# Patient Record
Sex: FEMALE | Race: WHITE | NOT HISPANIC OR LATINO | Employment: PART TIME | URBAN - NONMETROPOLITAN AREA
[De-identification: names, ages, dates, MRNs, and addresses within clinical notes are randomized per-mention and may not be internally consistent; named-entity substitution may affect disease eponyms.]

---

## 2017-02-14 ENCOUNTER — COMMUNICATION - GICH (OUTPATIENT)
Dept: FAMILY MEDICINE | Facility: OTHER | Age: 32
End: 2017-02-14

## 2017-02-14 DIAGNOSIS — L70.0 ACNE VULGARIS: ICD-10-CM

## 2017-06-03 ENCOUNTER — OFFICE VISIT - GICH (OUTPATIENT)
Dept: FAMILY MEDICINE | Facility: OTHER | Age: 32
End: 2017-06-03

## 2017-06-03 ENCOUNTER — HISTORY (OUTPATIENT)
Dept: FAMILY MEDICINE | Facility: OTHER | Age: 32
End: 2017-06-03

## 2017-06-03 DIAGNOSIS — H92.03 OTALGIA OF BOTH EARS: ICD-10-CM

## 2017-06-03 DIAGNOSIS — H65.03 ACUTE SEROUS OTITIS MEDIA OF BOTH EARS: ICD-10-CM

## 2017-06-26 ENCOUNTER — AMBULATORY - GICH (OUTPATIENT)
Dept: FAMILY MEDICINE | Facility: OTHER | Age: 32
End: 2017-06-26

## 2017-06-26 DIAGNOSIS — F32.9 MAJOR DEPRESSIVE DISORDER, SINGLE EPISODE: ICD-10-CM

## 2017-06-26 DIAGNOSIS — F41.1 GENERALIZED ANXIETY DISORDER: ICD-10-CM

## 2017-06-26 DIAGNOSIS — F41.9 ANXIETY DISORDER: ICD-10-CM

## 2017-08-02 ENCOUNTER — HISTORY (OUTPATIENT)
Dept: FAMILY MEDICINE | Facility: OTHER | Age: 32
End: 2017-08-02

## 2017-08-02 ENCOUNTER — OFFICE VISIT - GICH (OUTPATIENT)
Dept: FAMILY MEDICINE | Facility: OTHER | Age: 32
End: 2017-08-02

## 2017-08-02 DIAGNOSIS — Z83.3 FAMILY HISTORY OF DIABETES MELLITUS: ICD-10-CM

## 2017-08-02 DIAGNOSIS — F41.9 ANXIETY DISORDER: ICD-10-CM

## 2017-08-02 DIAGNOSIS — L70.0 ACNE VULGARIS: ICD-10-CM

## 2017-08-02 DIAGNOSIS — J45.20 MILD INTERMITTENT ASTHMA, UNCOMPLICATED: ICD-10-CM

## 2017-08-02 DIAGNOSIS — Z30.49 ENCOUNTER FOR SURVEILLANCE OF OTHER CONTRACEPTIVES: ICD-10-CM

## 2017-08-02 DIAGNOSIS — F32.9 MAJOR DEPRESSIVE DISORDER, SINGLE EPISODE: ICD-10-CM

## 2017-08-02 DIAGNOSIS — G43.909 MIGRAINE WITHOUT STATUS MIGRAINOSUS, NOT INTRACTABLE: ICD-10-CM

## 2017-08-02 LAB
ALT (SGPT) - HISTORICAL: 16 IU/L (ref 7–52)
ANION GAP - HISTORICAL: 10 (ref 5–18)
AST SERPL-CCNC: 13 IU/L (ref 13–39)
BUN SERPL-MCNC: 12 MG/DL (ref 7–25)
BUN/CREAT RATIO - HISTORICAL: 13
CALCIUM SERPL-MCNC: 9.2 MG/DL (ref 8.6–10.3)
CHLORIDE SERPLBLD-SCNC: 105 MMOL/L (ref 98–107)
CO2 SERPL-SCNC: 23 MMOL/L (ref 21–31)
CREAT SERPL-MCNC: 0.93 MG/DL (ref 0.7–1.3)
ESTIMATED AVERAGE GLUCOSE: 103 MG/DL
GFR IF NOT AFRICAN AMERICAN - HISTORICAL: >60 ML/MIN/1.73M2
GLUCOSE SERPL-MCNC: 88 MG/DL (ref 70–105)
HEMOGLOBIN A1C MONITORING (POCT) - HISTORICAL: 5.2 % (ref 4–6.2)
POTASSIUM SERPL-SCNC: 4.1 MMOL/L (ref 3.5–5.1)
SODIUM SERPL-SCNC: 138 MMOL/L (ref 133–143)

## 2017-08-02 ASSESSMENT — ANXIETY QUESTIONNAIRES
GAD7 TOTAL SCORE: 5
5. BEING SO RESTLESS THAT IT IS HARD TO SIT STILL: NOT AT ALL
3. WORRYING TOO MUCH ABOUT DIFFERENT THINGS: SEVERAL DAYS
6. BECOMING EASILY ANNOYED OR IRRITABLE: MORE THAN HALF THE DAYS
4. TROUBLE RELAXING: NOT AT ALL
7. FEELING AFRAID AS IF SOMETHING AWFUL MIGHT HAPPEN: SEVERAL DAYS
2. NOT BEING ABLE TO STOP OR CONTROL WORRYING: SEVERAL DAYS
1. FEELING NERVOUS, ANXIOUS, OR ON EDGE: NOT AT ALL

## 2017-08-28 ENCOUNTER — AMBULATORY - GICH (OUTPATIENT)
Dept: FAMILY MEDICINE | Facility: OTHER | Age: 32
End: 2017-08-28

## 2017-08-28 DIAGNOSIS — F32.9 MAJOR DEPRESSIVE DISORDER, SINGLE EPISODE: ICD-10-CM

## 2017-08-28 DIAGNOSIS — F41.9 ANXIETY DISORDER: ICD-10-CM

## 2017-08-29 ENCOUNTER — AMBULATORY - GICH (OUTPATIENT)
Dept: SCHEDULING | Facility: OTHER | Age: 32
End: 2017-08-29

## 2017-10-20 ENCOUNTER — COMMUNICATION - GICH (OUTPATIENT)
Dept: FAMILY MEDICINE | Facility: OTHER | Age: 32
End: 2017-10-20

## 2017-10-20 DIAGNOSIS — F41.8 OTHER SPECIFIED ANXIETY DISORDERS: ICD-10-CM

## 2017-12-28 NOTE — PROGRESS NOTES
Patient Information     Patient Name MRN Sex Radha Nicholson [id number] Female 1985      Progress Notes by Adri Drake NP at 6/3/2017  3:05 PM     Author:  Adri Drake NP Service:  (none) Author Type:  PHYS- Nurse Practitioner     Filed:  6/3/2017  4:30 PM Encounter Date:  6/3/2017 Status:  Signed     :  Adri Drake NP (PHYS- Nurse Practitioner)            HPI:    Radha Christensen is a 32 y.o. female who presents to clinic today for ear pain both ears. Feels full and with pressure. No recent head cold. No cough, runny nose, facial pain or fever. Has had a headache all week and fatigue.   Nursing Notes:   Zhane Faustin LPN  6/3/2017  3:36 PM  Signed  The patient is here today with complaints of ear pain and headaches. She states they have been like this for a few days.  Zhane Faustin LPN......6/3/2017  3:20 PM        Past Medical History:     Diagnosis  Date     Hx of pregnancy      2, Para 2      No past surgical history on file.  Social History        Substance Use Topics          Smoking status:   Former Smoker      Types:  Cigarettes      Smokeless tobacco:   Never Used      Alcohol use   Yes      Comment: Occ       Current Outpatient Prescriptions       Medication  Sig Dispense Refill     albuterol HFA (VENTOLIN HFA) 90 mcg/actuation inhaler Inhale 2 Puffs by mouth every 4 hours if needed for Shortness Of Breath or Wheezing. Before exercise 1 Inhaler 1     benzoyl peroxide 10% topical (CLEARASIL) 10 % gel Apply  topically to affected area(s) every morning. 1 Tube 6     clindamycin 1% (CLEOCIN-T) 1 % gel Use with benzoyl peroxide as directed twice daily 1 Tube 6     FLUoxetine (PROZAC) 20 mg capsule TAKE ONE CAPSULE BY MOUTH EVERY MORNING 90 capsule 2     LORazepam (ATIVAN) 0.5 mg tab Take 1 tab 1 hour before air flight and can repeat in 6 hours as needed 10 tablet 0     minocycline (MINOCIN) 100 mg capsule TAKE 1 CAPSULE BY MOUTH TWICE DAILY 120 capsule 2      "norgestimate-ethinyl estradiol, 0.25-35 mg-mcg, (SPRINTEC) 0.25-35 mg-mcg tablet Take 1 tablet by mouth once daily. 3 Package 4     topiramate (TOPAMAX) 25 mg tablet TAKE ONE TABLET BY MOUTH EVERY DAY 90 tablet 2     tretinoin 0.025 % gel Apply  topically to affected area(s) at bedtime. 1 Tube 0     No current facility-administered medications for this visit.      Medications have been reviewed by me and are current to the best of my knowledge and ability.    Allergies     Allergen  Reactions     Ceclor [Cefaclor] *Unknown       ROS:  Refer to HPI    /88  Pulse 80  Temp 98.1  F (36.7  C) (Tympanic)   Ht 1.702 m (5' 7\")  Wt 97.2 kg (214 lb 3.2 oz)  Breastfeeding? No  BMI 33.55 kg/m2    EXAM:  General Appearance: Well appearing female, appropriate appearance for age. No acute distress  Head: normocephalic, atraumatic  Ears: Left TM with bony landmarks appreciated with cone of light, no erythema, clear effusion, no bulging, no purulence.  Right TM with bony landmarks appreciated with cone of light, no erythema, clear effusion, no bulging, no purulence.   Left auditory canal clear, Right auditory canal clear, normal external ears, non tender.  Eyes: conjunctivae normal, no drainage  Orophayrnx: moist mucous membranes, posterior pharynx, tonsils without hypertrophy, no erythema, no exudates or petechiae,  post nasal drip seen.  No sinus pain upon palpation of the frontal, maxillary, or ethmoid sinuses  Neck: supple without adenopathy  Respiratory: normal chest wall and respirations.  Normal effort.  Clear to auscultation bilaterally, no wheezes or rhonchi or congestion, no cough appreciated,   Cardiac: RRR with no murmurs  Abdomen: soft,  normal bowel sounds present  Musculoskeletal:full ROM  Dermatological: no rashes or lesions  Psychological: normal affect, alert and pleasant    ASSESSMENT/PLAN:    ICD-10-CM    1. Earache symptoms in both ears H92.03    2. Bilateral acute serous otitis media, recurrence not " specified H65.03      Findings of exam discussed. Ear congestion. Take Sudafed as directed. Warm wash cloths. Steam therapy.     Please take tylenol or ibuprofen as needed for ear pain.  Monitor for any fevers or chills. Return in 7-10 days if not feeling better. Please call clinic with any questions or concerns. Please take in a lot of fluids and get rest. Discouraged swimming     May use cough syrup or cough drops.  Using a humidifier works well to break up the congestion.       Handouts reviewed and given. States understanding of plan.    Follow up if symptoms persist or worsen    Patient Instructions        Index [employer]   Ear Infection: Middle Ear   ________________________________________________________________________  KEY POINTS    The middle ear is the space behind the eardrum. It can get infected by bacteria or a virus.    The symptoms of ear infections often go away in a couple of days without treatment. Your provider may tell you to take decongestant pills or a nasal spray to help relieve pressure and pain in your ear.    Ask your healthcare provider how to take care of yourself at home and what symptoms or problems you should watch for and what to do if you have them.  ________________________________________________________________________  What is a middle ear infection?   The middle ear is the space behind the eardrum. It can get infected by bacteria or a virus. Anyone can get an ear infection, but ear infections are more common in children less than 8 years old. Children are more at risk because the tube that connects the ear to the back of the throat is small and can easily get blocked. There is a tube connected to each ear (called the Eustachian tube) to help drain fluid from the ears.  The medical term for a middle ear infection is otitis media.  What is the cause?   Ear infections usually start with an infection of the nose and throat, such as a cold or sinus infection that spreads to the ear.  Ear infections may start with an allergy, such as hayfever. The allergy, like an infection, can cause swelling of the Eustachian tube. The swelling may trap bacteria and fluid in your middle ear, which causes an infection.  Pressure from the buildup of pus or fluid in the ear sometimes causes the eardrum to tear. The eardrum is a thin covering that separates your outer ear from your middle ear. It protects the hearing organs of the middle ear. If the eardrum tears or breaks open, it can cause permanent hearing loss if it doesn t heal properly.  What are the symptoms?   Symptoms may include:    Earache    Trouble hearing    Feeling of stuffiness or blockage in the ear    Fever    Dizziness  If you have fluid or pus draining from your ear, it may be a sign of a ruptured eardrum.   How is it diagnosed?   Your healthcare provider will ask about your symptoms and medical history and examine you. Your healthcare provider may use a light called an otoscope to look into your ear canal and check for fluid. Your provider may check your eardrum with a puff of air blown into the ear.   How is it treated?   The symptoms of ear infections often go away in a couple of days without treatment. Your healthcare provider may wait 1 to 3 days to see if the symptoms go away before prescribing an antibiotic. Taking antibiotics when you don t need them can cause problems, such as an allergic reaction, rash, or upset stomach. It can also cause bacteria to become resistant to antibiotics.  Your provider may tell you to take decongestant pills or a nasal spray to help relieve pressure and pain in your ear. Use decongestants as directed. If you are using a nonprescription nasal-spray decongestant, you should not use it for more than 3 days. After 3 days it may make your symptoms worse. Ask your healthcare provider if it is OK for you to use a nasal spray decongestant longer than this.  If you have allergies, ask your healthcare provider if you  should take antihistamines to help control your allergy symptoms. Antihistamines block the effect of histamine and decrease swelling in the nose, sinuses, and Eustachian tubes. Histamine is a chemical your body makes when you have an allergic reaction.  Don t use any eardrops for an earache if there is drainage from the ear, unless the drops are prescribed by your healthcare provider.  For pain take a nonprescription pain reliever such as acetaminophen, ibuprofen, or naproxen. Read the label and take as directed. Unless recommended by your healthcare provider, you should not take these medicines for more than 10 days.     Nonsteroidal anti-inflammatory medicines (NSAIDs), such as ibuprofen, naproxen, and aspirin, may cause stomach bleeding and other problems. These risks increase with age.    Acetaminophen may cause liver damage or other problems. Unless recommended by your provider, don't take more than 3000 milligrams (mg) in 24 hours. To make sure you don t take too much, check other medicines you take to see if they also contain acetaminophen. Ask your provider if you need to avoid drinking alcohol while taking this medicine.  How can I take care of myself?   Follow the full course of treatment prescribed by your healthcare provider. In addition:    If you are taking an antibiotic, take the medicine for as long as your healthcare provider prescribes, even if you feel better. If you stop taking the medicine too soon, you may not kill all of the bacteria and you may get sick again.    For pain relief, either a cold pack or cold wet cloth or a warm moist washcloth or a covered hot water bottle on the ear for 20 minutes may help.    If you have fluid leaking from your ear, you can wipe it away with a washcloth and loosely plug the ear with cotton.    Don t smoke, and stay away from others who are smoking.    If you have allergies, try to avoid the things you are allergic to.    Use a humidifier to put more moisture  in the air. This can help to open blocked sinuses and relieve pain. Avoid steam vaporizers because they can cause burns. Be sure to keep the humidifier clean, as recommended in the 's instructions. It's important to keep bacteria and mold from growing in the water container.    Use saline nasal sprays or rinses to help wash out congested nasal passages.    If you have had frequent ear infections, ask your provider if you need a hearing test.  Ask your provider:    How long it will take to recover    If there are activities you should avoid and when you can return to your normal activities    How to take care of yourself at home    What symptoms or problems you should watch for and what to do if you have them  Make sure you know when you should come back for a checkup. Keep all appointments for provider visits or tests.  How can I help prevent ear infections?     If you tend to get ear infections often, ask your healthcare provider if you need to be checked for allergies. Getting treatment for allergies may help prevent ear infections.    Ask if using decongestants when you have a cold may help prevent you from getting ear infections.  Developed by [employer].  Adult Advisor 2016.3 published by [employer].  Last modified: 2016-05-17  Last reviewed: 2014-09-04  This content is reviewed periodically and is subject to change as new health information becomes available. The information is intended to inform and educate and is not a replacement for medical evaluation, advice, diagnosis or treatment by a healthcare professional.  References   Adult Advisor 2016.3 Index    Copyright   2016 [employer], a division of [employer] All rights reserved.       Fluid in ear. Take decongestant - Sudafed 30 mg as directed. Warm wash cloth applications. Steam therapy.   Please take tylenol or ibuprofen as needed for ear pain.  Monitor for any fevers or chills. Return in 7-10 days if not feeling better.  Please call clinic with any questions or concerns. Please take in a lot of fluids and get rest. Discouraged swimming.  Using a humidifier works well to break up the congestion.    Handouts reviewed and given. States understanding of plan.      FÉLIX CASTILLO NP ....................  6/3/2017   3:37 PM

## 2017-12-28 NOTE — PATIENT INSTRUCTIONS
Patient Information     Patient Name MRN Radha Burrell [id number] Female 1985      Patient Instructions by Ermelinda Nunez NP at 2017  3:30 PM     Author:  Ermelinda Nunez NP Service:  (none) Author Type:  PHYS- Nurse Practitioner     Filed:  2017  4:01 PM Encounter Date:  2017 Status:  Signed     :  Ermelinda Nunez NP (PHYS- Nurse Practitioner)            You are due for pap 10/2017

## 2017-12-28 NOTE — PROGRESS NOTES
Patient Information     Patient Name MRN Sex Radha Nicholson [id number] Female 1985      Progress Notes by Ermelinda Nunez NP at 2017  3:30 PM     Author:  Ermelinda Nunez NP Service:  (none) Author Type:  PHYS- Nurse Practitioner     Filed:  2017  7:13 PM Encounter Date:  2017 Status:  Signed     :  Ermelinda Nunez NP (PHYS- Nurse Practitioner)            SUBJECTIVE:    Radha Christensen is a 32 y.o. female who presents for medication review, monitoring labs and other issues.  Feels Wellbutrin is working however she would like to consider dose adjustment upward. No adverse effects.  Reports overall acne is under good control on oral contraceptives, topical medications and OTC acne skin wash.  No tobacco use. Migraines are under good control.  Reports family history with her brother 39 years old diagnosed with diabetes last year, has other family members with diabetes      HPI    Allergies     Allergen  Reactions     Ceclor [Cefaclor] *Unknown   ,   Family History      Problem  Relation Age of Onset     Heart Disease Maternal Grandfather 50     Diabetes Maternal Grandfather      COPD Maternal Grandfather      Diabetes Mother      Thyroid Disease Mother      Thyroid Disease Brother      Diabetes Brother 38     Diabetes Maternal Uncle      Cancer-breast No Family History      Cancer-ovarian No Family History      Cancer-prostate No Family History      Stroke No Family History      Cancer-colon No Family History      Blood Disease No Family History    ,   Current Outpatient Prescriptions on File Prior to Visit       Medication  Sig Dispense Refill     LORazepam (ATIVAN) 0.5 mg tab Take 1 tab 1 hour before air flight and can repeat in 6 hours as needed 10 tablet 0     minocycline (MINOCIN) 100 mg capsule TAKE 1 CAPSULE BY MOUTH TWICE DAILY 120 capsule 2     No current facility-administered medications on file prior to visit.    ,   Current Outpatient Prescriptions:      albuterol HFA (VENTOLIN  HFA) 90 mcg/actuation inhaler, Inhale 2 Puffs by mouth every 4 hours if needed. Before exercise, Disp: 1 Inhaler, Rfl: 1     buPROPion (WELLBUTRIN XL) 300 mg Extended-Release tablet, Take 1 tablet by mouth every morning., Disp: 90 tablet, Rfl: 0     LORazepam (ATIVAN) 0.5 mg tab, Take 1 tab 1 hour before air flight and can repeat in 6 hours as needed, Disp: 10 tablet, Rfl: 0     minocycline (MINOCIN) 100 mg capsule, TAKE 1 CAPSULE BY MOUTH TWICE DAILY, Disp: 120 capsule, Rfl: 2     norgestimate-ethinyl estradiol, 0.25-35 mg-mcg, (SPRINTEC) 0.25-35 mg-mcg tablet, Take 1 tablet by mouth once daily., Disp: 3 Package, Rfl: 4     topiramate (TOPAMAX) 25 mg tablet, Take 1 tablet by mouth once daily., Disp: 90 tablet, Rfl: 2  Medications have been reviewed by me and are current to the best of my knowledge and ability.,       Past Medical History:     Diagnosis  Date     Hx of pregnancy      2, Para 2    ,   Patient Active Problem List       Diagnosis  Date Noted     Family history of diabetes mellitus (DM)  2017     Encounter for surveillance of contraceptives  2017     Cervical high risk HPV (human papillomavirus) test positive  2016     Cheilitis  2013     MIGRAINE HEADACHE  2011     ABNORMAL PAP SMEAR, LGSIL  10/31/2011     TOBACCO ABUSE  10/19/2011     ALCOHOL ABUSE  10/19/2011     CONCUSSION  2011     no loss of consciousness from softball injury          VAGINITIS  2011     ROUTINE GYNECOLOGICAL EXAMINATION       ASTHMA       exercise induced/mild intermittent          ACNE, MILD       ANXIETY DEPRESSION       CONTRACEPTIVE MANAGEMENT     , No past surgical history on file. and   Social History        Substance Use Topics          Smoking status:   Former Smoker      Types:  Cigarettes      Smokeless tobacco:   Never Used      Alcohol use   Yes      Comment: Occ         REVIEW OF SYSTEMS:  Review of Systems   Constitutional: Negative.    HENT: Negative.    Eyes:  "Negative.    Respiratory: Negative.    Cardiovascular: Negative.    Gastrointestinal: Negative.    Genitourinary: Negative.    Musculoskeletal: Negative.    Skin: Negative.    Neurological: Negative.    Endo/Heme/Allergies: Negative.    Psychiatric/Behavioral: The patient is nervous/anxious.        OBJECTIVE:  /82  Pulse 72  Ht 1.7 m (5' 6.93\")  Wt 96.7 kg (213 lb 4 oz)  LMP 07/02/2017 (Approximate)  Breastfeeding? No  BMI 33.47 kg/m2    EXAM:   Physical Exam   Constitutional: She is oriented to person, place, and time and well-developed, well-nourished, and in no distress.   Cardiovascular: Normal rate.    Pulmonary/Chest: Effort normal.   Musculoskeletal: Normal range of motion.   Neurological: She is alert and oriented to person, place, and time. Gait normal.   Skin: Skin is warm and dry.   Psychiatric: Mood, memory, affect and judgment normal.   Nursing note and vitals reviewed.      ASSESSMENT/PLAN:    ICD-10-CM    1. Intermittent asthma, uncomplicated J45.20 albuterol HFA (VENTOLIN HFA) 90 mcg/actuation inhaler   2. Anxiety and depression F41.9 buPROPion (WELLBUTRIN XL) 300 mg Extended-Release tablet     F32.9    3. Migraine without status migrainosus, not intractable, unspecified migraine type G43.909 topiramate (TOPAMAX) 25 mg tablet   4. Encounter for surveillance of other contraceptive Z30.49 norgestimate-ethinyl estradiol, 0.25-35 mg-mcg, (SPRINTEC) 0.25-35 mg-mcg tablet   5. Acne vulgaris L70.0 norgestimate-ethinyl estradiol, 0.25-35 mg-mcg, (SPRINTEC) 0.25-35 mg-mcg tablet      AST (SGOT)      ALT (SGPT)      BASIC METABOLIC PANEL      AST (SGOT)      ALT (SGPT)      BASIC METABOLIC PANEL   6. Family history of diabetes mellitus (DM) Z83.3 Hgb A1c      Hgb A1c      Results for orders placed or performed in visit on 08/02/17      Hgb A1c      Result  Value Ref Range    HEMOGLOBIN A1C MONITORING (POCT) 5.2 4.0 - 6.2 %    ESTIMATED AVERAGE GLUCOSE  103 mg/dL   AST (SGOT)      Result  Value Ref " Range    AST (SGOT) 13 13 - 39 IU/L   ALT (SGPT)      Result  Value Ref Range    ALT (SGPT) 16 7 - 52 IU/L   BASIC METABOLIC PANEL      Result  Value Ref Range    SODIUM 138 133 - 143 mmol/L    POTASSIUM 4.1 3.5 - 5.1 mmol/L    CHLORIDE 105 98 - 107 mmol/L    CO2,TOTAL 23 21 - 31 mmol/L    ANION GAP 10 5 - 18                    GLUCOSE 88 70 - 105 mg/dL    CALCIUM 9.2 8.6 - 10.3 mg/dL    BUN 12 7 - 25 mg/dL    CREATININE 0.93 0.70 - 1.30 mg/dL    BUN/CREAT RATIO           13                    GFR if African American >60 >60 ml/min/1.73m2    GFR if not African American >60 >60 ml/min/1.73m2     lab results will be released to my chart as requested    Plan:  Wellbutrin 150 mg XL increased to 300 mg XL daily    Medications reviewed and refilled for 1 year    Due for Pap October 2017 follow-up to high-risk HPV and ASCUS colposcopy 2 years ago  Pap 2016 negative with negative high-risk HPV    Would screen yearly A1c with family history

## 2017-12-28 NOTE — TELEPHONE ENCOUNTER
Patient Information     Patient Name MRN Radha Burrell [id number] Female 1985      Telephone Encounter by Suri Staley RN at 10/24/2017  8:10 AM     Author:  Suri Staley RN Service:  (none) Author Type:  NURS- Registered Nurse     Filed:  10/24/2017  8:15 AM Encounter Date:  10/20/2017 Status:  Signed     :  Suri Staley RN (NURS- Registered Nurse)            Called pt to verify dosing, pt states 10 mg appears to be working much better than the Wellbutrin.    Depression-in adults 18 and over  SSRI    Office visit in the past 12 months or as indicated in chart.  Should have clinic visit 1-2 months after initial prescription.    Last visit with JOHNNA FERRER was on: 2017 in [facility]  Next visit with JOHNNA FERRER is on: No future appointment listed with this provider  Next visit with Family Practice is on: No future appointment listed in this department    Max refills 12 months from last office visit or per providers notes.    Prescription refilled per RN Medication Refill Policy.................... Suri Staley RN ....................  10/24/2017   8:14 AM

## 2017-12-29 NOTE — PATIENT INSTRUCTIONS
Patient Information     Patient Name MRN Radha Burrell [id number] Female 1985      Patient Instructions by Adri Drake NP at 6/3/2017  3:05 PM     Author:  Adri Drake NP  Service:  (none) Author Type:  PHYS- Nurse Practitioner     Filed:  6/3/2017  4:28 PM  Encounter Date:  6/3/2017 Status:  Addendum     :  Adri Drake NP (PHYS- Nurse Practitioner)        Related Notes: Original Note by Adri Drake NP (PHYS- Nurse Practitioner) filed at 6/3/2017  3:52 PM                 Index Luxembourgish   Ear Infection: Middle Ear   ________________________________________________________________________  KEY POINTS    The middle ear is the space behind the eardrum. It can get infected by bacteria or a virus.    The symptoms of ear infections often go away in a couple of days without treatment. Your provider may tell you to take decongestant pills or a nasal spray to help relieve pressure and pain in your ear.    Ask your healthcare provider how to take care of yourself at home and what symptoms or problems you should watch for and what to do if you have them.  ________________________________________________________________________  What is a middle ear infection?   The middle ear is the space behind the eardrum. It can get infected by bacteria or a virus. Anyone can get an ear infection, but ear infections are more common in children less than 8 years old. Children are more at risk because the tube that connects the ear to the back of the throat is small and can easily get blocked. There is a tube connected to each ear (called the Eustachian tube) to help drain fluid from the ears.  The medical term for a middle ear infection is otitis media.  What is the cause?   Ear infections usually start with an infection of the nose and throat, such as a cold or sinus infection that spreads to the ear. Ear infections may start with an allergy, such as hayfever. The allergy, like an infection,  can cause swelling of the Eustachian tube. The swelling may trap bacteria and fluid in your middle ear, which causes an infection.  Pressure from the buildup of pus or fluid in the ear sometimes causes the eardrum to tear. The eardrum is a thin covering that separates your outer ear from your middle ear. It protects the hearing organs of the middle ear. If the eardrum tears or breaks open, it can cause permanent hearing loss if it doesn t heal properly.  What are the symptoms?   Symptoms may include:    Earache    Trouble hearing    Feeling of stuffiness or blockage in the ear    Fever    Dizziness  If you have fluid or pus draining from your ear, it may be a sign of a ruptured eardrum.   How is it diagnosed?   Your healthcare provider will ask about your symptoms and medical history and examine you. Your healthcare provider may use a light called an otoscope to look into your ear canal and check for fluid. Your provider may check your eardrum with a puff of air blown into the ear.   How is it treated?   The symptoms of ear infections often go away in a couple of days without treatment. Your healthcare provider may wait 1 to 3 days to see if the symptoms go away before prescribing an antibiotic. Taking antibiotics when you don t need them can cause problems, such as an allergic reaction, rash, or upset stomach. It can also cause bacteria to become resistant to antibiotics.  Your provider may tell you to take decongestant pills or a nasal spray to help relieve pressure and pain in your ear. Use decongestants as directed. If you are using a nonprescription nasal-spray decongestant, you should not use it for more than 3 days. After 3 days it may make your symptoms worse. Ask your healthcare provider if it is OK for you to use a nasal spray decongestant longer than this.  If you have allergies, ask your healthcare provider if you should take antihistamines to help control your allergy symptoms. Antihistamines block the  effect of histamine and decrease swelling in the nose, sinuses, and Eustachian tubes. Histamine is a chemical your body makes when you have an allergic reaction.  Don t use any eardrops for an earache if there is drainage from the ear, unless the drops are prescribed by your healthcare provider.  For pain take a nonprescription pain reliever such as acetaminophen, ibuprofen, or naproxen. Read the label and take as directed. Unless recommended by your healthcare provider, you should not take these medicines for more than 10 days.     Nonsteroidal anti-inflammatory medicines (NSAIDs), such as ibuprofen, naproxen, and aspirin, may cause stomach bleeding and other problems. These risks increase with age.    Acetaminophen may cause liver damage or other problems. Unless recommended by your provider, don't take more than 3000 milligrams (mg) in 24 hours. To make sure you don t take too much, check other medicines you take to see if they also contain acetaminophen. Ask your provider if you need to avoid drinking alcohol while taking this medicine.  How can I take care of myself?   Follow the full course of treatment prescribed by your healthcare provider. In addition:    If you are taking an antibiotic, take the medicine for as long as your healthcare provider prescribes, even if you feel better. If you stop taking the medicine too soon, you may not kill all of the bacteria and you may get sick again.    For pain relief, either a cold pack or cold wet cloth or a warm moist washcloth or a covered hot water bottle on the ear for 20 minutes may help.    If you have fluid leaking from your ear, you can wipe it away with a washcloth and loosely plug the ear with cotton.    Don t smoke, and stay away from others who are smoking.    If you have allergies, try to avoid the things you are allergic to.    Use a humidifier to put more moisture in the air. This can help to open blocked sinuses and relieve pain. Avoid steam vaporizers  because they can cause burns. Be sure to keep the humidifier clean, as recommended in the 's instructions. It's important to keep bacteria and mold from growing in the water container.    Use saline nasal sprays or rinses to help wash out congested nasal passages.    If you have had frequent ear infections, ask your provider if you need a hearing test.  Ask your provider:    How long it will take to recover    If there are activities you should avoid and when you can return to your normal activities    How to take care of yourself at home    What symptoms or problems you should watch for and what to do if you have them  Make sure you know when you should come back for a checkup. Keep all appointments for provider visits or tests.  How can I help prevent ear infections?     If you tend to get ear infections often, ask your healthcare provider if you need to be checked for allergies. Getting treatment for allergies may help prevent ear infections.    Ask if using decongestants when you have a cold may help prevent you from getting ear infections.  Developed by [employer].  Adult Advisor 2016.3 published by [employer].  Last modified: 2016-05-17  Last reviewed: 2014-09-04  This content is reviewed periodically and is subject to change as new health information becomes available. The information is intended to inform and educate and is not a replacement for medical evaluation, advice, diagnosis or treatment by a healthcare professional.  References   Adult Advisor 2016.3 Index    Copyright   2016 [employer], a division of [employer] All rights reserved.       Fluid in ear. Take decongestant - Sudafed 30 mg as directed. Warm wash cloth applications. Steam therapy.   Please take tylenol or ibuprofen as needed for ear pain.  Monitor for any fevers or chills. Return in 7-10 days if not feeling better. Please call clinic with any questions or concerns. Please take in a lot of fluids and get  rest. Discouraged swimming.  Using a humidifier works well to break up the congestion.    Handouts reviewed and given. States understanding of plan.

## 2017-12-30 NOTE — NURSING NOTE
Patient Information     Patient Name MRN Radha Burrell [id number] Female 1985      Nursing Note by Monika Lugo at 2017  3:30 PM     Author:  Monika Lugo Service:  (none) Author Type:  (none)     Filed:  2017  3:36 PM Encounter Date:  2017 Status:  Signed     :  Monika Lugo            Patient presents to clinic today for medication management.     Monika Lugo LPN...................2017  3:10 PM

## 2017-12-30 NOTE — NURSING NOTE
Patient Information     Patient Name MRN Radha Burrell [id number] Female 1985      Nursing Note by Zhane Faustin LPN at 6/3/2017  3:05 PM     Author:  Zhane Faustin LPN Service:  (none) Author Type:  NURS- Licensed Practical Nurse     Filed:  6/3/2017  3:36 PM Encounter Date:  6/3/2017 Status:  Signed     :  Zhane Faustin LPN (NURS- Licensed Practical Nurse)            The patient is here today with complaints of ear pain and headaches. She states they have been like this for a few days.  Zhane Faustin LPN......6/3/2017  3:20 PM

## 2018-01-03 NOTE — TELEPHONE ENCOUNTER
Patient Information     Patient Name MRN Radha Burrell [id number] Female 1985      Telephone Encounter by Janet Gallegos RN at 2/15/2017  9:05 AM     Author:  Janet Gallegos RN Service:  (none) Author Type:  NURS- Registered Nurse     Filed:  2/15/2017  9:44 AM Encounter Date:  2017 Status:  Signed     :  Janet Gallegos RN (NURS- Registered Nurse)            Topical and Oral Acne Medications    Office visit in the past 12 months.    Last visit with JOHNNA FERRER was on: 2016 in [facility]  Next visit with JOHNNA FERRER is on: No future appointment listed with this provider  Next visit with Family Practice is on: No future appointment listed in this department    Max refills 12 months from last office visit or per providers notes.    Prescription refilled per RN Medication Refill Policy.................... Janet Gallegos RN ....................  2/15/2017   9:44 AM

## 2018-01-12 ENCOUNTER — HISTORY (OUTPATIENT)
Dept: FAMILY MEDICINE | Facility: OTHER | Age: 33
End: 2018-01-12

## 2018-01-12 ENCOUNTER — OFFICE VISIT - GICH (OUTPATIENT)
Dept: FAMILY MEDICINE | Facility: OTHER | Age: 33
End: 2018-01-12

## 2018-01-12 DIAGNOSIS — J30.89 OTHER ALLERGIC RHINITIS: ICD-10-CM

## 2018-01-12 DIAGNOSIS — J01.00 ACUTE MAXILLARY SINUSITIS: ICD-10-CM

## 2018-01-27 VITALS
HEART RATE: 80 BPM | TEMPERATURE: 98.1 F | BODY MASS INDEX: 33.62 KG/M2 | WEIGHT: 214.2 LBS | DIASTOLIC BLOOD PRESSURE: 88 MMHG | SYSTOLIC BLOOD PRESSURE: 112 MMHG | HEIGHT: 67 IN

## 2018-01-27 VITALS
WEIGHT: 213.25 LBS | SYSTOLIC BLOOD PRESSURE: 134 MMHG | HEIGHT: 67 IN | HEART RATE: 72 BPM | DIASTOLIC BLOOD PRESSURE: 82 MMHG | BODY MASS INDEX: 33.47 KG/M2

## 2018-02-05 ASSESSMENT — PATIENT HEALTH QUESTIONNAIRE - PHQ9: SUM OF ALL RESPONSES TO PHQ QUESTIONS 1-9: 2

## 2018-02-05 ASSESSMENT — ANXIETY QUESTIONNAIRES: GAD7 TOTAL SCORE: 5

## 2018-02-09 ENCOUNTER — DOCUMENTATION ONLY (OUTPATIENT)
Dept: FAMILY MEDICINE | Facility: OTHER | Age: 33
End: 2018-02-09

## 2018-02-09 VITALS
HEART RATE: 72 BPM | HEIGHT: 67 IN | SYSTOLIC BLOOD PRESSURE: 124 MMHG | TEMPERATURE: 98.4 F | WEIGHT: 220.8 LBS | DIASTOLIC BLOOD PRESSURE: 78 MMHG | BODY MASS INDEX: 34.65 KG/M2

## 2018-02-09 PROBLEM — Z30.40 ENCOUNTER FOR SURVEILLANCE OF CONTRACEPTIVES: Status: ACTIVE | Noted: 2017-08-02

## 2018-02-09 PROBLEM — L70.9 ACNE, MILD: Status: ACTIVE | Noted: 2018-02-09

## 2018-02-09 PROBLEM — Z30.9 CONTRACEPTIVE MANAGEMENT: Status: ACTIVE | Noted: 2018-02-09

## 2018-02-09 PROBLEM — J45.909 ASTHMA: Status: ACTIVE | Noted: 2018-02-09

## 2018-02-09 PROBLEM — Z01.419 ENCOUNTER FOR ROUTINE GYNECOLOGICAL EXAMINATION: Status: ACTIVE | Noted: 2018-02-09

## 2018-02-09 PROBLEM — Z83.3 FAMILY HISTORY OF DIABETES MELLITUS (DM): Status: ACTIVE | Noted: 2017-08-02

## 2018-02-09 PROBLEM — F34.1 DYSTHYMIC DISORDER: Status: ACTIVE | Noted: 2018-02-09

## 2018-02-09 RX ORDER — ALBUTEROL SULFATE 90 UG/1
2 AEROSOL, METERED RESPIRATORY (INHALATION) EVERY 4 HOURS PRN
COMMUNITY
Start: 2017-08-02 | End: 2019-08-06

## 2018-02-09 RX ORDER — MINOCYCLINE HYDROCHLORIDE 100 MG/1
100 CAPSULE ORAL 2 TIMES DAILY
COMMUNITY
Start: 2017-02-15 | End: 2018-04-03

## 2018-02-09 RX ORDER — TOPIRAMATE 25 MG/1
25 TABLET, FILM COATED ORAL DAILY
COMMUNITY
Start: 2017-08-02 | End: 2018-07-23

## 2018-02-09 RX ORDER — ESCITALOPRAM OXALATE 10 MG/1
10 TABLET ORAL DAILY
COMMUNITY
Start: 2017-10-24 | End: 2018-05-09

## 2018-02-09 RX ORDER — LORAZEPAM 0.5 MG/1
1 TABLET ORAL EVERY 6 HOURS PRN
COMMUNITY
Start: 2016-05-19 | End: 2018-11-03

## 2018-02-09 RX ORDER — NORGESTIMATE AND ETHINYL ESTRADIOL 0.25-0.035
1 KIT ORAL DAILY
COMMUNITY
Start: 2017-08-02 | End: 2018-10-05

## 2018-02-11 ENCOUNTER — OFFICE VISIT (OUTPATIENT)
Dept: FAMILY MEDICINE | Facility: OTHER | Age: 33
End: 2018-02-11
Attending: NURSE PRACTITIONER
Payer: COMMERCIAL

## 2018-02-11 ENCOUNTER — HEALTH MAINTENANCE LETTER (OUTPATIENT)
Age: 33
End: 2018-02-11

## 2018-02-11 VITALS
BODY MASS INDEX: 33.11 KG/M2 | DIASTOLIC BLOOD PRESSURE: 84 MMHG | SYSTOLIC BLOOD PRESSURE: 120 MMHG | TEMPERATURE: 98.7 F | HEART RATE: 86 BPM | WEIGHT: 211.38 LBS

## 2018-02-11 DIAGNOSIS — R07.0 THROAT PAIN: Primary | ICD-10-CM

## 2018-02-11 DIAGNOSIS — Z20.818 EXPOSURE TO STREP THROAT: ICD-10-CM

## 2018-02-11 LAB
DEPRECATED S PYO AG THROAT QL EIA: NORMAL
SPECIMEN SOURCE: NORMAL

## 2018-02-11 PROCEDURE — 87880 STREP A ASSAY W/OPTIC: CPT | Performed by: NURSE PRACTITIONER

## 2018-02-11 PROCEDURE — G0463 HOSPITAL OUTPT CLINIC VISIT: HCPCS

## 2018-02-11 PROCEDURE — 99213 OFFICE O/P EST LOW 20 MIN: CPT | Performed by: NURSE PRACTITIONER

## 2018-02-11 RX ORDER — AZITHROMYCIN 250 MG/1
TABLET, FILM COATED ORAL
Qty: 6 TABLET | Refills: 0 | Status: SHIPPED | OUTPATIENT
Start: 2018-02-11 | End: 2018-05-01

## 2018-02-11 ASSESSMENT — PAIN SCALES - GENERAL: PAINLEVEL: MILD PAIN (3)

## 2018-02-11 ASSESSMENT — ENCOUNTER SYMPTOMS
PSYCHIATRIC NEGATIVE: 1
MUSCULOSKELETAL NEGATIVE: 1
SORE THROAT: 1
GASTROINTESTINAL NEGATIVE: 1
EYES NEGATIVE: 1
NEUROLOGICAL NEGATIVE: 1
RESPIRATORY NEGATIVE: 1
CARDIOVASCULAR NEGATIVE: 1

## 2018-02-11 NOTE — MR AVS SNAPSHOT
After Visit Summary   2/11/2018    Radha Christensen    MRN: [id number]           Patient Information     Date Of Birth          1985        Visit Information        Provider Department      2/11/2018 11:00 AM Ermelinda Nunez APRN CNP [facility]        Today's Diagnoses     Throat pain    -  1    Exposure to strep throat          Care Instructions      Strep Screen (Rapid)  Does this test have other names?  Throat swab, rapid strep test, rapid antigen test   What is this test?  The rapid strep screen is used to test for bacteria called group A streptococcus. Group A streptococcus bacteria cause illnesses such as strep throat and scarlet fever--a rash that may occur after a case of strep throat. Strep throat and scarlet fever can cause a number of symptoms, particularly a fever and a sore throat. These illnesses are quite contagious and require antibiotics to treat.   Healthcare providers have two ways to test for group A streptococcus. For the rapid strep screen, your healthcare provider or a nurse takes a sample of cells from your tonsils and back of the throat and tests it right in the provider's office. You can get your results in as little as 5 minutes. If the rapid strep screen is positive, you have strep throat and no further tests may be needed.   Why do I need this test?  You may need this test if your healthcare provider suspects that you have strep throat. Symptoms of strep throat can include:    Sore throat    Painful or difficult swallowing    Fever    Swelling or tenderness of the glands in the neck    Nausea or vomiting    Skin rash    Stomachache    Headache    Lack of appetite    Tonsils that are swollen and red    Patches of white on the tongue or throat  You may need this test to confirm you have a bacterial infection instead of a viral infection before a healthcare provider will prescribe antibiotics. You may also need this test if the results of a throat  culture, which can provide a more accurate diagnosis, are unavailable for a few days.   What other tests might I have along with this test?  If the rapid strep screen is negative, your healthcare provider may do another test called throat culture to make sure that strep is not the cause of your sore throat and other symptoms. This test also requires taking a swab of cells from your tonsils or back of the throat. The sample is sent to a lab, where it is grown, or cultured, and tested for strep bacteria. The results are available in about 2 days. Your results will reveal whether you have group A streptococcus.   Your provider may also order:    Influenza (flu) test    Mononucleosis (mono spot) test  What do my test results mean?  Many things may affect your lab test results. These include the method each lab uses to do the test. Even if your test results are different from the normal value, you may not have a problem. To learn what the results mean for you, talk with your healthcare provider.  Your test results will show whether you have group A streptococcus bacteria in the cells or mucus of your throat. A normal (negative) result will not show any group A streptococcus bacteria. If the test is positive, that means bacteria have been found and you likely have strep throat.   How is this test done?  The rapid strep screen requires taking a swab of mucus or cells from the back of your throat. The healthcare provider, nurse, or laboratory technician will gently swipe the back of your throat with a long cotton swab. A second sample may be taken at the same time to be used in a throat culture if the rapid strep screen is negative.   Does this test pose any risks?  This test poses no known risks.  What might affect my test results?  Nothing is likely to affect the results of your test, as only the presence of group A streptococcus bacteria should give you a positive result.  How do I get ready for this test?  You don't  "need to prepare for this test.     0898-7026 The [employer]. [address] [place] [address], [place] [address]. All rights reserved. This information is not intended as a substitute for professional medical care. Always follow your healthcare professional's instructions.                Follow-ups after your visit        Follow-up notes from your care team     Return if symptoms worsen or fail to improve.      Who to contact     If you have questions or need follow up information about today's clinic visit or your schedule please contact [facility] directly at [phone].  Normal or non-critical lab and imaging results will be communicated to you by [employer], letter or phone within 4 business days after the clinic has received the results. If you do not hear from us within 7 days, please contact the clinic through Mozill[employer] or phone. If you have a critical or abnormal lab result, we will notify you by phone as soon as possible.  Submit refill requests through cheerapp or call your pharmacy and they will forward the refill request to us. Please allow 3 business days for your refill to be completed.          Additional Information About Your Visit        MyChart Information     cheerapp lets you send messages to your doctor, view your test results, renew your prescriptions, schedule appointments and more. To sign up, go to [email or web address] . Click on \"Log in\" on the left side of the screen, which will take you to the Welcome page. Then click on \"Sign up Now\" on the right side of the page.     You will be asked to enter the access code listed below, as well as some personal information. Please follow the directions to create your username and password.     Your access code is: [email or web address]  Expires: 2018 12:44 PM     Your access code will  in 90 days. If you need help or a new code, please call your [facility] clinic or [phone].        Care EveryWhere ID     This is your Care " EveryWhere ID. This could be used by other organizations to access your [facility] medical records  [id number]        Your Vitals Were     Pulse Temperature Last Period Breastfeeding? BMI (Body Mass Index)       86 98.7  F (37.1  C) (Oral) 02/01/2018 No 33.11 kg/m2        Blood Pressure from Last 3 Encounters:   02/11/18 120/84   01/12/18 124/78   08/02/17 134/82    Weight from Last 3 Encounters:   02/11/18 211 lb 6 oz (95.9 kg)   01/12/18 220 lb 12.8 oz (100.2 kg)   08/02/17 213 lb 4 oz (96.7 kg)              We Performed the Following     Strep, Rapid Screen          Today's Medication Changes          These changes are accurate as of 2/11/18 11:59 PM.  If you have any questions, ask your nurse or doctor.               Start taking these medicines.        Dose/Directions    azithromycin 250 MG tablet   Commonly known as:  ZITHROMAX   Used for:  Throat pain, Exposure to strep throat   Started by:  Ermelinda Nunez APRN CNP        2 tabs day 1, 1 tab daily days 2-5   Quantity:  6 tablet   Refills:  0            Where to get your medicines      These medications were sent to [facility] - [place], [place] - [address]  [address], [facility] [address]     Phone:  [phone]     azithromycin 250 MG tablet                Primary Care Provider Office Phone # Fax #    SHANTAL Le [phone]       [address] [facility] [address]        Equal Access to Services     [facility] AH: Hadii aad ku hadasho Soomaali, waaxda luqadaha, qaybta kaalmada adejen, waxay idiin hayaan adeeg kharash la'aan . So [facility] [phone].    ATENCIÓN: Si habla español, tiene a nunez disposición servicios gratuitos de asistencia lingüística. Llame al [phone].    We comply with applicable federal civil rights laws and [place] laws. We do not discriminate on the basis of race, color, national origin, age, disability, sex, sexual orientation, or gender  identity.            Thank you!     Thank you for choosing [facility] AND \[facility]\""  for your care. Our goal is always to provide you with excellent care. Hearing back from our patients is one way we can continue to improve our services. Please take a few minutes to complete the written survey that you may receive in the mail after your visit with us. Thank you!             Your Updated Medication List - Protect others around you: Learn how to safely use, store and throw away your medicines at www.disposemymeds.org.          This list is accurate as of 2/11/18 11:59 PM.  Always use your most recent med list.                   Brand Name Dispense Instructions for use Diagnosis    albuterol 108 (90 BASE) MCG/ACT Inhaler    PROAIR HFA/PROVENTIL HFA/VENTOLIN HFA     Inhale 2 puffs into the lungs every 4 hours as needed        azithromycin 250 MG tablet    ZITHROMAX    6 tablet    2 tabs day 1, 1 tab daily days 2-5    Throat pain, Exposure to strep throat       escitalopram 10 MG tablet    LEXAPRO     Take 10 mg by mouth daily        LORazepam 0.5 MG tablet    ATIVAN     Take 1 tablet by mouth every 6 hours as needed        minocycline 100 MG capsule    MINOCIN/DYNACIN     Take 100 mg by mouth 2 times daily        norgestimate-ethinyl estradiol 0.25-35 MG-MCG per tablet    ORTHO-CYCLEN, SPRINTEC     Take 1 tablet by mouth daily        topiramate 25 MG tablet    TOPAMAX     Take 25 mg by mouth daily

## 2018-02-11 NOTE — NURSING NOTE
Patient presents to clinic today for sore throat starting over the last couple of days.    Monika Lugo LPN...................2/11/2018  11:17 AM

## 2018-02-11 NOTE — PATIENT INSTRUCTIONS
Strep Screen (Rapid)  Does this test have other names?  Throat swab, rapid strep test, rapid antigen test   What is this test?  The rapid strep screen is used to test for bacteria called group A streptococcus. Group A streptococcus bacteria cause illnesses such as strep throat and scarlet fever--a rash that may occur after a case of strep throat. Strep throat and scarlet fever can cause a number of symptoms, particularly a fever and a sore throat. These illnesses are quite contagious and require antibiotics to treat.   Healthcare providers have two ways to test for group A streptococcus. For the rapid strep screen, your healthcare provider or a nurse takes a sample of cells from your tonsils and back of the throat and tests it right in the provider's office. You can get your results in as little as 5 minutes. If the rapid strep screen is positive, you have strep throat and no further tests may be needed.   Why do I need this test?  You may need this test if your healthcare provider suspects that you have strep throat. Symptoms of strep throat can include:    Sore throat    Painful or difficult swallowing    Fever    Swelling or tenderness of the glands in the neck    Nausea or vomiting    Skin rash    Stomachache    Headache    Lack of appetite    Tonsils that are swollen and red    Patches of white on the tongue or throat  You may need this test to confirm you have a bacterial infection instead of a viral infection before a healthcare provider will prescribe antibiotics. You may also need this test if the results of a throat culture, which can provide a more accurate diagnosis, are unavailable for a few days.   What other tests might I have along with this test?  If the rapid strep screen is negative, your healthcare provider may do another test called throat culture to make sure that strep is not the cause of your sore throat and other symptoms. This test also requires taking a swab of cells from your tonsils or  back of the throat. The sample is sent to a lab, where it is grown, or cultured, and tested for strep bacteria. The results are available in about 2 days. Your results will reveal whether you have group A streptococcus.   Your provider may also order:    Influenza (flu) test    Mononucleosis (mono spot) test  What do my test results mean?  Many things may affect your lab test results. These include the method each lab uses to do the test. Even if your test results are different from the normal value, you may not have a problem. To learn what the results mean for you, talk with your healthcare provider.  Your test results will show whether you have group A streptococcus bacteria in the cells or mucus of your throat. A normal (negative) result will not show any group A streptococcus bacteria. If the test is positive, that means bacteria have been found and you likely have strep throat.   How is this test done?  The rapid strep screen requires taking a swab of mucus or cells from the back of your throat. The healthcare provider, nurse, or laboratory technician will gently swipe the back of your throat with a long cotton swab. A second sample may be taken at the same time to be used in a throat culture if the rapid strep screen is negative.   Does this test pose any risks?  This test poses no known risks.  What might affect my test results?  Nothing is likely to affect the results of your test, as only the presence of group A streptococcus bacteria should give you a positive result.  How do I get ready for this test?  You don't need to prepare for this test.     0201-2318 [employer]. [address] [place] [address], [place] [address]. All rights reserved. This information is not intended as a substitute for professional medical care. Always follow your healthcare professional's instructions.

## 2018-02-11 NOTE — PROGRESS NOTES
SUBJECTIVE:   Radha Christensen is a 32 year old female presenting with a chief complaint of sore throat with positive confirmed strep exposure  Chief Complaint   Patient presents with     Throat Problem   .        Review of Systems   Constitutional: Positive for malaise/fatigue.   HENT: Positive for sore throat.    Eyes: Negative.    Respiratory: Negative.    Cardiovascular: Negative.    Gastrointestinal: Negative.    Genitourinary: Negative.    Musculoskeletal: Negative.    Skin: Negative.    Neurological: Negative.    Endo/Heme/Allergies: Negative.    Psychiatric/Behavioral: Negative.          Past Medical History:   Diagnosis Date     Personal history of other medical treatment (CODE)      2, Para 2     Current Outpatient Prescriptions   Medication Sig Dispense Refill     azithromycin (ZITHROMAX) 250 MG tablet 2 tabs day 1, 1 tab daily days 2-5 6 tablet 0     albuterol (PROAIR HFA/PROVENTIL HFA/VENTOLIN HFA) 108 (90 BASE) MCG/ACT Inhaler Inhale 2 puffs into the lungs every 4 hours as needed       escitalopram (LEXAPRO) 10 MG tablet Take 10 mg by mouth daily       LORazepam (ATIVAN) 0.5 MG tablet Take 1 tablet by mouth every 6 hours as needed       minocycline (MINOCIN/DYNACIN) 100 MG capsule Take 100 mg by mouth 2 times daily       norgestimate-ethinyl estradiol (ORTHO-CYCLEN, SPRINTEC) 0.25-35 MG-MCG per tablet Take 1 tablet by mouth daily       topiramate (TOPAMAX) 25 MG tablet Take 25 mg by mouth daily       Social History   Substance Use Topics     Smoking status: Former Smoker     Types: Cigarettes     Smokeless tobacco: Never Used     Alcohol use Yes      Comment: Alcoholic Drinks/day: Occ       OBJECTIVE  /84 (BP Location: Right arm, Patient Position: Sitting, Cuff Size: Adult Regular)  Pulse 86  Temp 98.7  F (37.1  C) (Oral)  Wt 211 lb 6 oz (95.9 kg)  LMP 2018  Breastfeeding? No  BMI 33.11 kg/m2    Physical Exam   Constitutional: She is oriented to person, place, and time and  well-developed, well-nourished, and in no distress.   Cardiovascular: Normal rate.    Pulmonary/Chest: Effort normal.   Musculoskeletal: Normal range of motion.   Neurological: She is alert and oriented to person, place, and time. Gait normal.   Skin: Skin is warm and dry.   Psychiatric: Mood, memory, affect and judgment normal.   Nursing note and vitals reviewed.      Labs:  Results for orders placed or performed in visit on 02/11/18 (from the past 24 hour(s))   Strep, Rapid Screen   Result Value Ref Range    Specimen Description Throat     Rapid Strep A Screen       Negative presumptive for Group A Beta Streptococcus           ASSESSMENT:      ICD-10-CM    1. Throat pain R07.0 Strep, Rapid Screen     azithromycin (ZITHROMAX) 250 MG tablet     Strep, Rapid Screen   2. Exposure to strep throat Z20.818 azithromycin (ZITHROMAX) 250 MG tablet        Medical Decision Making:    Differential Diagnosis: Strep exposure symptomatic      PLAN:    Will treat    Followup:    If not improving or if condition worsens, follow up with your Primary Care Provider, If not improving or if conditions worsens over the next 12-24 hours, go to the Emergency Department    Patient Instructions     Strep Screen (Rapid)  Does this test have other names?  Throat swab, rapid strep test, rapid antigen test   What is this test?  The rapid strep screen is used to test for bacteria called group A streptococcus. Group A streptococcus bacteria cause illnesses such as strep throat and scarlet fever--a rash that may occur after a case of strep throat. Strep throat and scarlet fever can cause a number of symptoms, particularly a fever and a sore throat. These illnesses are quite contagious and require antibiotics to treat.   Healthcare providers have two ways to test for group A streptococcus. For the rapid strep screen, your healthcare provider or a nurse takes a sample of cells from your tonsils and back of the throat and tests it right in the  provider's office. You can get your results in as little as 5 minutes. If the rapid strep screen is positive, you have strep throat and no further tests may be needed.   Why do I need this test?  You may need this test if your healthcare provider suspects that you have strep throat. Symptoms of strep throat can include:    Sore throat    Painful or difficult swallowing    Fever    Swelling or tenderness of the glands in the neck    Nausea or vomiting    Skin rash    Stomachache    Headache    Lack of appetite    Tonsils that are swollen and red    Patches of white on the tongue or throat  You may need this test to confirm you have a bacterial infection instead of a viral infection before a healthcare provider will prescribe antibiotics. You may also need this test if the results of a throat culture, which can provide a more accurate diagnosis, are unavailable for a few days.   What other tests might I have along with this test?  If the rapid strep screen is negative, your healthcare provider may do another test called throat culture to make sure that strep is not the cause of your sore throat and other symptoms. This test also requires taking a swab of cells from your tonsils or back of the throat. The sample is sent to a lab, where it is grown, or cultured, and tested for strep bacteria. The results are available in about 2 days. Your results will reveal whether you have group A streptococcus.   Your provider may also order:    Influenza (flu) test    Mononucleosis (mono spot) test  What do my test results mean?  Many things may affect your lab test results. These include the method each lab uses to do the test. Even if your test results are different from the normal value, you may not have a problem. To learn what the results mean for you, talk with your healthcare provider.  Your test results will show whether you have group A streptococcus bacteria in the cells or mucus of your throat. A normal (negative) result  will not show any group A streptococcus bacteria. If the test is positive, that means bacteria have been found and you likely have strep throat.   How is this test done?  The rapid strep screen requires taking a swab of mucus or cells from the back of your throat. The healthcare provider, nurse, or laboratory technician will gently swipe the back of your throat with a long cotton swab. A second sample may be taken at the same time to be used in a throat culture if the rapid strep screen is negative.   Does this test pose any risks?  This test poses no known risks.  What might affect my test results?  Nothing is likely to affect the results of your test, as only the presence of group A streptococcus bacteria should give you a positive result.  How do I get ready for this test?  You don't need to prepare for this test.     8898-7034 [employer]. [address] [place] [address]. All rights reserved. This information is not intended as a substitute for professional medical care. Always follow your healthcare professional's instructions.

## 2018-02-12 NOTE — PATIENT INSTRUCTIONS
Patient Information     Patient Name MRN Sex Radha Nicholson [id number] Female 1985      Patient Instructions by Ermelinda Nunez NP at 2018 10:51 AM     Author:  Ermelinda Nunez NP  Service:  (none) Author Type:  PHYS- Nurse Practitioner     Filed:  2018 10:51 AM  Encounter Date:  2018 Status:  Addendum     :  Ermelinda Nunez NP (PHYS- Nurse Practitioner)        Related Notes: Original Note by Ermelinda Nunez NP (PHYS- Nurse Practitioner) filed at 2018 10:51 AM               Index [place] Related topics   Sinusitis   ________________________________________________________________________  KEY POINTS    Sinusitis is swelling and irritation of the linings of the sinuses, the hollow spaces in the bones of your face and front of your skull.    You may need to take medicine for your stuffy nose, pain, infection, or swelling.    Use saline nasal sprays or rinses to help wash out nasal passages if you have a sinus infection. A humidifier can add moisture to the air also.  ________________________________________________________________________  What is sinusitis?  Sinusitis is swelling and irritation of the linings of the sinuses. The sinuses are hollow spaces in the bones of your face and front of your skull. They connect with the nose through small openings. Like the nose, they are lined with tissue (membranes) that make mucus. Mucus drains through the small openings to the nose.  What is the cause?  The passageways from the sinuses to the nose are very narrow. When drainage of mucus from the sinuses is blocked, the sinuses get swollen and irritated. They may also become infected with bacteria, a virus, or even fungus. Allergies or irritation from pollen, mold, dust, or smoke can also cause swelling of the sinuses. Sometimes a tooth infection spreads to the sinuses.  You may be more likely to get sinus congestion and infections if you have:    Severe or untreated seasonal or  year-round allergies    Injured the bones in your nose    A deformity of the nose that causes the sinuses not to drain properly    Small growths called polyps in the sinuses that partially block the sinus openings  What are the symptoms?  Symptoms may include:    Feeling of fullness or pressure in your face or head    A headache that is most painful when you first wake up in the morning or when you bend over and put your head down    Pain in your face    Aching in the upper jaw and teeth    Runny or stuffy nose    Cough, especially at night    Fluid draining down the back of your throat (postnasal drip)    Sore throat, especially in the morning or evening  How is it diagnosed?  Your healthcare provider will ask about your symptoms and medical history and examine you. Tests are often not needed but may include:    X-ray of your sinuses    CT scan, which uses X-rays and a computer to show detailed pictures of the sinuses  How is it treated?  Several kinds of medicine may help:    Nonprescription pain medicine, such as acetaminophen, ibuprofen, or naproxen. Read the label and take as directed. Unless recommended by your healthcare provider, you should not take these medicines for more than 10 days.    Nonsteroidal anti-inflammatory medicines (NSAIDs), such as ibuprofen, naproxen, and aspirin, may cause stomach bleeding and other problems. These risks increase with age.    Acetaminophen may cause liver damage or other problems. Unless recommended by your provider, don't take more than 3000 milligrams (mg) in 24 hours. To make sure you don t take too much, check other medicines you take to see if they also contain acetaminophen. Ask your provider if you need to avoid drinking alcohol while taking this medicine.    Decongestants pills or nasal sprays to reduce swelling in your nose and sinuses and lessen the amount of mucus. Use decongestants as directed. If you are using a nonprescription nasal-spray decongestant,  generally you should not use it for more than 3 days. After 3 days it may make your symptoms worse. Ask your healthcare provider if it is OK for you to use a nasal spray decongestant longer than this.    Antihistamine tablets or a nasal spray to treat the allergies during your allergy season or, in some cases, year-round. Antihistamines block the effect of a chemical your body makes when you have an allergic reaction.    Antibiotics, if your provider thinks you might have a sinus infection    Steroid nasal spray if your provider thinks it may help clear your sinuses  If sinusitis is still a problem despite treatment, you may be referred to an allergy specialist or an ear, nose, and throat (ENT) specialist. The allergy specialist will check for and help treat your allergies to lessen the chance of having sinusitis. The ENT specialist will check for polyps or a deformed bone that may be blocking your sinuses. You may need surgery to create an extra or enlarged passageway to help your sinuses drain more easily.  Depending on what caused the sinusitis and how severe it is, it may last for days or weeks. For most cases of sinusitis, the symptoms get better gradually over 3 to 10 days.  How can I take care of myself?  Follow the full course of treatment prescribed by your healthcare provider. In addition:    If you are taking an antibiotic, take all of it as directed by your provider. If you stop taking the medicine when your symptoms are gone but before you have taken all of the medicine, symptoms may come back.    Don t smoke, and stay away from others who are smoking.    If you have allergies, try to avoid the things you are allergic to, like animal dander. Use medicine to keep your nose and sinuses open.    Use a humidifier to put more moisture in the air. This can help to open blocked sinuses and relieve pain. Avoid steam vaporizers because they can cause burns. Be sure to keep the humidifier clean, as recommended in  the 's instructions. It's important to keep bacteria and mold from growing in the water container.    Use saline nasal sprays or rinses to help wash out nasal passages if you have a sinus infection. You may use the sprays also to prevent infections.    Get plenty of rest.    Drink more fluids to keep the mucus as thin as possible so your sinuses can drain more easily.    Raise the head of your bed slightly or sleep on extra pillows to help your sinuses drain.    Put warm, moist cloths on painful areas.  Ask your healthcare provider:    How and when you will get your test results    How long it will take to recover    If there are activities you should avoid and when you can return to your normal activities    How to take care of yourself at home    What symptoms or problems you should watch for and what to do if you have them  Make sure you know when you should come back for a checkup. Keep all appointments for provider visits or tests.  How can I help prevent sinusitis?    Treat your colds and allergies promptly. Use decongestants as soon as you start having symptoms, and before you fly, travel to high altitudes, or swim in deep water.    If you smoke, try to quit. Talk to your healthcare provider about ways to quit smoking.  Developed by [employer].  Adult Advisor 2017.2 published by [employer].  Last modified: 2016-03-23  Last reviewed: 2015-08-27  This content is reviewed periodically and is subject to change as new health information becomes available. The information is intended to inform and educate and is not a replacement for medical evaluation, advice, diagnosis or treatment by a healthcare professional.  References   Adult Advisor 2017.2 Index    Copyright   2017 [employer], a division of [employer] All rights reserved.

## 2018-02-12 NOTE — NURSING NOTE
Patient Information     Patient Name MRN Radha Burrell [id number] Female 1985      Nursing Note by Jamar Proctor at 2018 10:15 AM     Author:  Jamar Proctor Service:  (none) Author Type:  (none)     Filed:  2018 10:43 AM Encounter Date:  2018 Status:  Signed     :  Jamar Proctor            Patient presents to the clinic today for ongoing sinus problems since September. Patient states that she has been taking sudafed, mucinex, coricidin on and off with symptoms. Patient denies fevers and sore throat, states she has been having headaches, dripping eyes, pressure in ears, sneezing, and chest congestion. Patient denies use of any medication today.    Jamar Proctor ....................  2018   10:26 AM

## 2018-02-13 NOTE — PROGRESS NOTES
Patient Information     Patient Name MRN Sex Radha Nicholson [id number] Female 1985      Progress Notes by Ermelinda Nunez NP at 2018 10:15 AM     Author:  Ermelinda Nunez NP Service:  (none) Author Type:  PHYS- Nurse Practitioner     Filed:  2018 11:34 AM Encounter Date:  2018 Status:  Signed     :  Ermelinda Nunez NP (PHYS- Nurse Practitioner)            SUBJECTIVE:    Radha Christensen is a 32 y.o. female who presents for 2 weeks of maxillary pain and pressure secondary to a recent cold. Denies any fever or cough. Reports has had chronic ongoing issues with nasal congestion and postnasal drainage, has tried Claritin. Does not use nasal saline or has not tried nasal corticosteroid. Has not tried anything at home.    HPI    Allergies     Allergen  Reactions     Ceclor [Cefaclor] *Unknown - Childhood Rxn   ,   Family History      Problem  Relation Age of Onset     Heart Disease Maternal Grandfather 50     Diabetes Maternal Grandfather      COPD Maternal Grandfather      Diabetes Mother      Thyroid Disease Mother      Thyroid Disease Brother      Diabetes Brother 38     Diabetes Maternal Uncle      Cancer-breast No Family History      Cancer-ovarian No Family History      Cancer-prostate No Family History      Stroke No Family History      Cancer-colon No Family History      Blood Disease No Family History    ,   Current Outpatient Prescriptions on File Prior to Visit       Medication  Sig Dispense Refill     albuterol HFA (VENTOLIN HFA) 90 mcg/actuation inhaler Inhale 2 Puffs by mouth every 4 hours if needed. Before exercise 1 Inhaler 1     escitalopram oxalate (LEXAPRO) 10 mg tablet TAKE 1 TABLET BY MOUTH EVERY DAY 90 tablet 1     LORazepam (ATIVAN) 0.5 mg tab Take 1 tab 1 hour before air flight and can repeat in 6 hours as needed 10 tablet 0     minocycline (MINOCIN) 100 mg capsule TAKE 1 CAPSULE BY MOUTH TWICE DAILY 120 capsule 2     norgestimate-ethinyl estradiol, 0.25-35 mg-mcg,  (SPRINTEC) 0.25-35 mg-mcg tablet Take 1 tablet by mouth once daily. 3 Package 4     topiramate (TOPAMAX) 25 mg tablet Take 1 tablet by mouth once daily. 90 tablet 2     No current facility-administered medications on file prior to visit.    ,   Current Outpatient Prescriptions:      albuterol HFA (VENTOLIN HFA) 90 mcg/actuation inhaler, Inhale 2 Puffs by mouth every 4 hours if needed. Before exercise, Disp: 1 Inhaler, Rfl: 1     amoxicillin-clavulanate 875-125 mg tablet (AUGMENTIN), Take 1 tablet by mouth 2 times daily with meals for 10 days., Disp: 20 tablet, Rfl: 0     escitalopram oxalate (LEXAPRO) 10 mg tablet, TAKE 1 TABLET BY MOUTH EVERY DAY, Disp: 90 tablet, Rfl: 1     fluticasone (50 mcg per actuation) nasal solution (FLONASE), Inhale 1 Spray in the nostril(s) once daily., Disp: 1 Bottle, Rfl: 0     LORazepam (ATIVAN) 0.5 mg tab, Take 1 tab 1 hour before air flight and can repeat in 6 hours as needed, Disp: 10 tablet, Rfl: 0     minocycline (MINOCIN) 100 mg capsule, TAKE 1 CAPSULE BY MOUTH TWICE DAILY, Disp: 120 capsule, Rfl: 2     norgestimate-ethinyl estradiol, 0.25-35 mg-mcg, (SPRINTEC) 0.25-35 mg-mcg tablet, Take 1 tablet by mouth once daily., Disp: 3 Package, Rfl: 4     topiramate (TOPAMAX) 25 mg tablet, Take 1 tablet by mouth once daily., Disp: 90 tablet, Rfl: 2  Medications have been reviewed by me and are current to the best of my knowledge and ability.,   Past Medical History:     Diagnosis  Date     Hx of pregnancy      2, Para 2    ,   Patient Active Problem List       Diagnosis  Date Noted     Family history of diabetes mellitus (DM)  2017     Encounter for surveillance of contraceptives  2017     Cervical high risk HPV (human papillomavirus) test positive  2016     Cheilitis  2013     MIGRAINE HEADACHE  2011     ABNORMAL PAP SMEAR, LGSIL  10/31/2011     TOBACCO ABUSE  10/19/2011     ALCOHOL ABUSE  10/19/2011     CONCUSSION  2011     no loss of  "consciousness from softball injury          VAGINITIS  02/22/2011     ROUTINE GYNECOLOGICAL EXAMINATION       ASTHMA       exercise induced/mild intermittent          ACNE, MILD       ANXIETY DEPRESSION       CONTRACEPTIVE MANAGEMENT     , No past surgical history on file. and   Social History        Substance Use Topics          Smoking status:   Former Smoker      Types:  Cigarettes      Smokeless tobacco:   Never Used      Alcohol use   Yes      Comment: Occ         REVIEW OF SYSTEMS:  Review of Systems   Constitutional: Negative.    HENT: Positive for congestion and sinus pain.    Eyes: Negative.    Respiratory: Negative.    Cardiovascular: Negative.    Gastrointestinal: Negative.    Genitourinary: Negative.    Skin: Negative.    Neurological: Negative.    Endo/Heme/Allergies: Positive for environmental allergies.   Psychiatric/Behavioral: Negative.        OBJECTIVE:  /78 (Cuff Site: Left Arm, Position: Sitting, Cuff Size: Adult Large)  Pulse 72  Temp 98.4  F (36.9  C) (Temporal)  Ht 1.702 m (5' 7\")  Wt 100.2 kg (220 lb 12.8 oz)  LMP 12/18/2017 (Approximate)  Breastfeeding? No  BMI 34.58 kg/m2    EXAM:   Physical Exam   Constitutional: She is oriented to person, place, and time and well-developed, well-nourished, and in no distress.   HENT:   Head: Normocephalic and atraumatic.   Bilateral maxillary sinus pain and pressure   Cardiovascular: Normal rate.    Pulmonary/Chest: Effort normal.   Musculoskeletal: Normal range of motion.   Neurological: She is alert and oriented to person, place, and time. Gait normal.   Skin: Skin is warm and dry.   Psychiatric: Mood, memory, affect and judgment normal.   Nursing note and vitals reviewed.      ASSESSMENT/PLAN:    ICD-10-CM    1. Acute maxillary sinusitis, recurrence not specified J01.00 amoxicillin-clavulanate 875-125 mg tablet (AUGMENTIN)      fluticasone (50 mcg per actuation) nasal solution (FLONASE)   2. Non-seasonal allergic rhinitis due to other " allergic trigger, unspecified chronicity J30.89 amoxicillin-clavulanate 875-125 mg tablet (AUGMENTIN)      fluticasone (50 mcg per actuation) nasal solution (FLONASE)        Plan:  Will treat sinusitis likely secondary to viral and allergic inflammation    Continue daily Claritin or Zyrtec--- try daily nasal corticosteroid for the next month    Nasal saline as needed to promote flow    Will Notify me if baseline congestion is not resolving

## 2018-04-03 DIAGNOSIS — L70.0 ACNE VULGARIS: Primary | ICD-10-CM

## 2018-04-05 RX ORDER — MINOCYCLINE HYDROCHLORIDE 100 MG/1
CAPSULE ORAL
Qty: 120 CAPSULE | Refills: 3 | Status: SHIPPED | OUTPATIENT
Start: 2018-04-05 | End: 2019-03-26

## 2018-04-27 ENCOUNTER — TELEPHONE (OUTPATIENT)
Dept: FAMILY MEDICINE | Facility: OTHER | Age: 33
End: 2018-04-27

## 2018-04-27 NOTE — TELEPHONE ENCOUNTER
Spoke with patient who confirmed her last name and birth date. Patient was calling to request a  letter for her to have a dog. Patient stated she lives at [facility] and they do not allow any pets, patient stated she would benefit greatly from a dog. She would also like a letter for her daughter, which writer will start note in her chart. Please advise  Enedina Echols LPN 4/27/2018 8:58 AM

## 2018-04-27 NOTE — TELEPHONE ENCOUNTER
Returned call to patient. She would be fine with you mentioning her anxiety and depression, and the benefit of having the dog. Her daughter, she is fine with mentioning her school troubles and disabilities. Patient would be able to  the letter anytime.    Arely Bauman LPN on 4/27/2018 at 9:32 AM

## 2018-04-27 NOTE — TELEPHONE ENCOUNTER
Sure, there any specific components required for the landlord that need to be in the letter--need to know what kind of information that it is okay to state or that she wishes to be stated in the letter supporting a  animal  And is the pet for  purposes only ?    If so I can write a letter and Radha can  today to use as she needs  SHANTAL Le CNP   April 27, 2018

## 2018-05-01 ENCOUNTER — OFFICE VISIT (OUTPATIENT)
Dept: FAMILY MEDICINE | Facility: OTHER | Age: 33
End: 2018-05-01
Attending: NURSE PRACTITIONER
Payer: COMMERCIAL

## 2018-05-01 ENCOUNTER — NURSE TRIAGE (OUTPATIENT)
Dept: FAMILY MEDICINE | Facility: OTHER | Age: 33
End: 2018-05-01

## 2018-05-01 VITALS
DIASTOLIC BLOOD PRESSURE: 80 MMHG | SYSTOLIC BLOOD PRESSURE: 118 MMHG | HEART RATE: 88 BPM | WEIGHT: 209.6 LBS | BODY MASS INDEX: 32.83 KG/M2

## 2018-05-01 DIAGNOSIS — J01.00 ACUTE MAXILLARY SINUSITIS, RECURRENCE NOT SPECIFIED: Primary | ICD-10-CM

## 2018-05-01 PROCEDURE — 99213 OFFICE O/P EST LOW 20 MIN: CPT | Performed by: NURSE PRACTITIONER

## 2018-05-01 PROCEDURE — G0463 HOSPITAL OUTPT CLINIC VISIT: HCPCS

## 2018-05-01 RX ORDER — AZITHROMYCIN 250 MG/1
TABLET, FILM COATED ORAL
Qty: 6 TABLET | Refills: 1 | Status: SHIPPED | OUTPATIENT
Start: 2018-05-01 | End: 2018-05-23

## 2018-05-01 NOTE — MR AVS SNAPSHOT
After Visit Summary   5/1/2018    Radha Christensen    MRN: [id number]           Patient Information     Date Of Birth          1985        Visit Information        Provider Department      5/1/2018 10:15 AM Ermelinda Nunez APRN CNP [facility]        Today's Diagnoses     Acute maxillary sinusitis, recurrence not specified    -  1      Care Instructions      Sinusitis (Antibiotic Treatment)    The sinuses are air-filled spaces within the bones of the face. They connect to the inside of the nose. Sinusitis is an inflammation of the tissue that lines the sinuses. Sinusitis can occur during a cold. It can also happen due to allergies to pollens and other particles in the air. Sinusitis can cause symptoms of sinus congestion and a feeling of fullness. A sinus infection causes fever, headache, and facial pain. There is often green or yellow fluid draining from the nose or into the back of the throat (post-nasal drip). You have been given antibiotics to treat this condition.  Home care    Take the full course of antibiotics as instructed. Do not stop taking them, even when you feel better.    Drink plenty of water, hot tea, and other liquids. This may help thin nasal mucus. It also may help your sinuses drain fluids.    Heat may help soothe painful areas of your face. Use a towel soaked in hot water. Or,  the shower and direct the warm spray onto your face. Using a vaporizer along with a menthol rub at night may also help soothe symptoms.     An expectorant with guaifenesin may help thin nasal mucus and help your sinuses drain fluids.    You can use an over-the-counter decongestant, unless a similar medicine was prescribed to you. Nasal sprays work the fastest. Use one that contains phenylephrine or oxymetazoline. First blow your nose gently. Then use the spray. Do not use these medicines more often than directed on the label. If you do, your symptoms may get worse. You may also  take pills that contain pseudoephedrine. Don t use products that combine multiple medicines. This is because side effects may be increased. Read labels. You can also ask the pharmacist for help. (People with high blood pressure should not use decongestants. They can raise blood pressure.)    Over-the-counter antihistamines may help if allergies contributed to your sinusitis.      Do not use nasal rinses or irrigation during an acute sinus infection, unless your healthcare provider tells you to. Rinsing may spread the infection to other areas in your sinuses.    Use acetaminophen or ibuprofen to control pain, unless another pain medicine was prescribed to you. If you have chronic liver or kidney disease or ever had a stomach ulcer, talk with your healthcare provider before using these medicines. (Aspirin should never be taken by anyone under age 18 who is ill with a fever. It may cause severe liver damage.)    Don't smoke. This can make symptoms worse.  Follow-up care  Follow up with your healthcare provider or our staff if you are better in 1 week.  When to seek medical advice  Call your healthcare provider if any of these occur:    Facial pain or headache that gets worse    Stiff neck    Unusual drowsiness or confusion    Swelling of your forehead or eyelids    Vision problems, such as blurred or double vision    Fever of 100.4 F (38 C) or higher, or as directed by your healthcare provider    Seizure    Breathing problems    Symptoms don't go away in 10 days  Prevention  Here are steps you can take to help prevent an infection:    Keep good hand washing habits.    Don t have close contact with people who have sore throats, colds, or other upper respiratory infections.    Don t smoke, and stay away from secondhand smoke.    Stay up to date with of your vaccines.  Date Last Reviewed: 11/1/2017 2000-2017 [employer]. [address] [place] [address], [place] [address]. All rights reserved. This information is  "not intended as a substitute for professional medical care. Always follow your healthcare professional's instructions.                Follow-ups after your visit        Who to contact     If you have questions or need follow up information about today's clinic visit or your schedule please contact [facility] directly at [phone].  Normal or non-critical lab and imaging results will be communicated to you by MyChart, letter or phone within 4 business days after the clinic has received the results. If you do not hear from us within 7 days, please contact the clinic through [employer] or phone. If you have a critical or abnormal lab result, we will notify you by phone as soon as possible.  Submit refill requests through [employer] or call your pharmacy and they will forward the refill request to us. Please allow 3 business days for your refill to be completed.          Additional Information About Your Visit        MyChart Information     [employer] lets you send messages to your doctor, view your test results, renew your prescriptions, schedule appointments and more. To sign up, go to [email or web address] . Click on \"Log in\" on the left side of the screen, which will take you to the Welcome page. Then click on \"Sign up Now\" on the right side of the page.     You will be asked to enter the access code listed below, as well as some personal information. Please follow the directions to create your username and password.     Your access code is: [email or web address]  Expires: 2018  1:44 PM     Your access code will  in 90 days. If you need help or a new code, please call your [place] clinic or [phone].        Care EveryWhere ID     This is your Care EveryWhere ID. This could be used by other organizations to access your [place] medical records  [id number]        Your Vitals Were     Pulse Last Period Breastfeeding? BMI (Body Mass Index)          88 2018 (Approximate) No 32.83 kg/m2   "       Blood Pressure from Last 3 Encounters:   05/01/18 118/80   02/11/18 120/84   01/12/18 124/78    Weight from Last 3 Encounters:   05/01/18 209 lb 9.6 oz (95.1 kg)   02/11/18 211 lb 6 oz (95.9 kg)   01/12/18 220 lb 12.8 oz (100.2 kg)              Today, you had the following     No orders found for display         Today's Medication Changes          These changes are accurate as of 5/1/18 10:48 AM.  If you have any questions, ask your nurse or doctor.               Start taking these medicines.        Dose/Directions    azithromycin 250 MG tablet   Commonly known as:  ZITHROMAX   Used for:  Acute maxillary sinusitis, recurrence not specified   Started by:  Ermelinda Nunez APRN CNP        Two tablets first day, then one tablet daily for four days.   Quantity:  6 tablet   Refills:  1            Where to get your medicines      These medications were sent to [facility], - [place], [place] - [address]  [address] , [place] [address]     Phone:  [phone]     azithromycin 250 MG tablet                Primary Care Provider Office Phone # Fax #    SHANTAL Le [phone]       [address]  [facility] [address]        Equal Access to Services     JEANNETTE SANTOS AH: Hadivy fletchero Soomaali, waaxda luqadaha, qaybta kaalmada adeegyada, allie sommer haylexie callaway. So [facility] [phone].    ATENCIÓN: Si habla español, tiene a nunez disposición servicios gratuitos de asistencia lingüística. Llame al [phone].    We comply with applicable federal civil rights laws and [place] laws. We do not discriminate on the basis of race, color, national origin, age, disability, sex, sexual orientation, or gender identity.            Thank you!     Thank you for choosing [facility]  for your care. Our goal is always to provide you with excellent care. Hearing back from our patients is one way we can continue to  improve our services. Please take a few minutes to complete the written survey that you may receive in the mail after your visit with us. Thank you!             Your Updated Medication List - Protect others around you: Learn how to safely use, store and throw away your medicines at www.disposemymeds.org.          This list is accurate as of 5/1/18 10:48 AM.  Always use your most recent med list.                   Brand Name Dispense Instructions for use Diagnosis    albuterol 108 (90 Base) MCG/ACT Inhaler    PROAIR HFA/PROVENTIL HFA/VENTOLIN HFA     Inhale 2 puffs into the lungs every 4 hours as needed        azithromycin 250 MG tablet    ZITHROMAX    6 tablet    Two tablets first day, then one tablet daily for four days.    Acute maxillary sinusitis, recurrence not specified       escitalopram 10 MG tablet    LEXAPRO     Take 10 mg by mouth daily        LORazepam 0.5 MG tablet    ATIVAN     Take 1 tablet by mouth every 6 hours as needed        minocycline 100 MG capsule    MINOCIN/DYNACIN    120 capsule    TAKE 1 CAPSULE BY MOUTH TWICE DAILY    Acne vulgaris       norgestimate-ethinyl estradiol 0.25-35 MG-MCG per tablet    ORTHO-CYCLEN, SPRINTEC     Take 1 tablet by mouth daily        topiramate 25 MG tablet    TOPAMAX     Take 25 mg by mouth daily

## 2018-05-01 NOTE — PATIENT INSTRUCTIONS

## 2018-05-01 NOTE — TELEPHONE ENCOUNTER
"Ermelinda,  Pt calls today regarding a headache for the last two weeks.  Pt states the headache is in the front part of her head, it throbs and is constant.  Tylenol and ibuprofen help curb the symptoms but do not take it away completely.  Pt complains of blurry vision, sinus pressure and pressure in her ears.  Pt also states she has a stiff neck when asked.  Pt denies fever.  Pt is treated for migraines with Topamax and feels this headache is different.     Per protocol pt is to have a routine office visit, future appointment noted for today with PCP.  Will route to PCP for FYI.  Suri Staley RN.............................5/1/2018 9:00 AM       Reason for Disposition    [1] MODERATE headache (e.g., interferes with normal activities) AND [2] present > 24 hours AND [3] unexplained  (Exceptions: analgesics not tried, typical migraine, or headache part of viral illness)    Answer Assessment - Initial Assessment Questions  1. LOCATION: \"Where does it hurt?\"       Front of head, forehead  2. ONSET: \"When did the headache start?\" (Minutes, hours or days)       2 weeks ago  3. PATTERN: \"Does the pain come and go, or has it been constant since it started?\"      Constant- throbbing  4. SEVERITY: \"How bad is the pain?\" and \"What does it keep you from doing?\"  (e.g., Scale 1-10; mild, moderate, or severe)    - MILD (1-3): doesn't interfere with normal activities     - MODERATE (4-7): interferes with normal activities or awakens from sleep     - SEVERE (8-10): excruciating pain, unable to do any normal activities         Up to a 6 at least  5. RECURRENT SYMPTOM: \"Have you ever had headaches before?\" If so, ask: \"When was the last time?\" and \"What happened that time?\"       Denies  6. CAUSE: \"What do you think is causing the headache?\"      unknown  7. MIGRAINE: \"Have you been diagnosed with migraine headaches?\" If so, ask: \"Is this headache similar?\"       PT takes topamax  8. HEAD INJURY: \"Has there been any recent injury to the " "head?\"       denies  9. OTHER SYMPTOMS: \"Do you have any other symptoms?\" (fever, stiff neck, eye pain, sore throat, cold symptoms)      Sinus infection?  Pressure, painful to the touch, pressure in ears, stiff neck.  10. PREGNANCY: \"Is there any chance you are pregnant?\" \"When was your last menstrual period?\"        NA    Protocols used: HEADACHE-ADULT-AH    "

## 2018-05-02 ASSESSMENT — ENCOUNTER SYMPTOMS
SINUS PAIN: 1
SINUS PRESSURE: 1

## 2018-05-02 NOTE — PROGRESS NOTES
SUBJECTIVE:   Radha Christensen is a 33 year old female who presents to clinic today for the following health issues:    Presents for 2 weeks of bilateral frontal maxillary sinus pain and pressure.  Feels this started out like a cold with some nasal congestion, postnasal drainage, feels like from the neck up.  Was using nasal corticosteroid however this was giving her nosebleeds.  Has been  Causing migraine flares.  Denies any fever, GI symptoms or shortness of breath.  Taking fluids well has not tried anything over-the-counter  Feels best after hot steamy shower--- rest of day feels like her sinuses fill up again    HPI    Patient Active Problem List   Diagnosis     Other abnormal Papanicolaou smear of cervix and cervical HPV(795.09)     Acne, mild     Alcohol abuse     Dysthymic disorder     Asthma     Cervical high risk HPV (human papillomavirus) test positive     Cheilitis     Concussion     Contraceptive management     Encounter for surveillance of contraceptives     Family history of diabetes mellitus (DM)     Migraine headache     Encounter for routine gynecological examination     Tobacco abuse     Vaginitis     History reviewed. No pertinent surgical history.    Social History   Substance Use Topics     Smoking status: Former Smoker     Types: Cigarettes     Smokeless tobacco: Never Used     Alcohol use Yes      Comment: Alcoholic Drinks/day: Occ     Family History   Problem Relation Age of Onset     HEART DISEASE Maternal Grandfather 50     Heart Disease     DIABETES Maternal Grandfather      Diabetes     Chronic Obstructive Pulmonary Disease Maternal Grandfather      COPD     DIABETES Mother      Diabetes     Thyroid Disease Mother      Thyroid Disease     Thyroid Disease Brother      Thyroid Disease     DIABETES Brother 38     Diabetes     DIABETES Maternal Uncle      Diabetes     Breast Cancer No family hx of      Cancer-breast     Ovarian Cancer No family hx of      Cancer-ovarian     Prostate Cancer No  family hx of      Cancer-prostate     Other - See Comments No family hx of      Stroke     Colon Cancer No family hx of      Cancer-colon     Blood Disease No family hx of      Blood Disease         Current Outpatient Prescriptions   Medication Sig Dispense Refill     albuterol (PROAIR HFA/PROVENTIL HFA/VENTOLIN HFA) 108 (90 BASE) MCG/ACT Inhaler Inhale 2 puffs into the lungs every 4 hours as needed       azithromycin (ZITHROMAX) 250 MG tablet Two tablets first day, then one tablet daily for four days. 6 tablet 1     escitalopram (LEXAPRO) 10 MG tablet Take 10 mg by mouth daily       LORazepam (ATIVAN) 0.5 MG tablet Take 1 tablet by mouth every 6 hours as needed       minocycline (MINOCIN/DYNACIN) 100 MG capsule TAKE 1 CAPSULE BY MOUTH TWICE DAILY 120 capsule 3     norgestimate-ethinyl estradiol (ORTHO-CYCLEN, SPRINTEC) 0.25-35 MG-MCG per tablet Take 1 tablet by mouth daily       topiramate (TOPAMAX) 25 MG tablet Take 25 mg by mouth daily       Allergies   Allergen Reactions     Cefaclor Unknown     BP Readings from Last 3 Encounters:   05/01/18 118/80   02/11/18 120/84   01/12/18 124/78    Wt Readings from Last 3 Encounters:   05/01/18 209 lb 9.6 oz (95.1 kg)   02/11/18 211 lb 6 oz (95.9 kg)   01/12/18 220 lb 12.8 oz (100.2 kg)                  Labs reviewed in EPIC    Review of Systems   HENT: Positive for congestion, ear pain, postnasal drip, sinus pain and sinus pressure.    Allergic/Immunologic: Positive for environmental allergies.   All other systems reviewed and are negative.       OBJECTIVE:     /80 (BP Location: Right arm, Patient Position: Chair, Cuff Size: Adult Regular)  Pulse 88  Wt 209 lb 9.6 oz (95.1 kg)  LMP 04/24/2018 (Approximate)  Breastfeeding? No  BMI 32.83 kg/m2  Body mass index is 32.83 kg/(m^2).  Physical Exam   Constitutional: She is oriented to person, place, and time. She appears well-developed and well-nourished.   HENT:   Head: Normocephalic and atraumatic.   Bilateral TMs  effusion fluid bulge without erythema    Lateral maxillary and frontal sinus pain and pressure    Thick white postnasal drainage  Posterior pharynx erythemic with mild tonsillar hypertrophy, no pustules, uvula midline without edema   Neck: Normal range of motion. Neck supple. No JVD present.   Cardiovascular: Normal rate.    Pulmonary/Chest: Effort normal and breath sounds normal.   Musculoskeletal: Normal range of motion.   Lymphadenopathy:     She has no cervical adenopathy.   Neurological: She is alert and oriented to person, place, and time.   Skin: Skin is warm and dry.   Psychiatric: She has a normal mood and affect. Her behavior is normal. Judgment and thought content normal.   Nursing note and vitals reviewed.          ASSESSMENT/PLAN:     2 week history of upper respiratory viral illness with congestion and secondary sinusitis     1. Acute maxillary sinusitis, recurrence not specified    Recommend nasal saline and sinus hygiene  As needed for comfort    Discussed expected course--- should notice some improvement 48 hours with daily improvement--if any onset of worsening should return to clinic or ED    - azithromycin (ZITHROMAX) 250 MG tablet; Two tablets first day, then one tablet daily for four days.  Dispense: 6 tablet; Refill: 1      SHANTAL Le [facility]

## 2018-05-09 DIAGNOSIS — F34.1 DYSTHYMIC DISORDER: Primary | ICD-10-CM

## 2018-05-14 RX ORDER — ESCITALOPRAM OXALATE 10 MG/1
TABLET ORAL
Qty: 90 TABLET | Refills: 3 | Status: SHIPPED | OUTPATIENT
Start: 2018-05-14 | End: 2019-03-26 | Stop reason: DRUGHIGH

## 2018-05-14 NOTE — TELEPHONE ENCOUNTER
Prescription approved per [facility] Refill Protocol.  Suri Staley RN.............................5/14/2018 9:02 AM

## 2018-05-23 ENCOUNTER — OFFICE VISIT (OUTPATIENT)
Dept: FAMILY MEDICINE | Facility: OTHER | Age: 33
End: 2018-05-23
Attending: NURSE PRACTITIONER
Payer: COMMERCIAL

## 2018-05-23 VITALS
DIASTOLIC BLOOD PRESSURE: 74 MMHG | SYSTOLIC BLOOD PRESSURE: 122 MMHG | HEART RATE: 60 BPM | WEIGHT: 211 LBS | BODY MASS INDEX: 33.05 KG/M2

## 2018-05-23 DIAGNOSIS — F41.1 GAD (GENERALIZED ANXIETY DISORDER): Primary | ICD-10-CM

## 2018-05-23 PROCEDURE — 99213 OFFICE O/P EST LOW 20 MIN: CPT | Performed by: NURSE PRACTITIONER

## 2018-05-23 PROCEDURE — G0463 HOSPITAL OUTPT CLINIC VISIT: HCPCS

## 2018-05-23 ASSESSMENT — ENCOUNTER SYMPTOMS: NERVOUS/ANXIOUS: 1

## 2018-05-23 ASSESSMENT — PAIN SCALES - GENERAL: PAINLEVEL: NO PAIN (0)

## 2018-05-23 NOTE — NURSING NOTE
Patient present to have paperwork filled out RE: pet.   Juliane Infante LPN........................5/23/2018  3:23 PM

## 2018-05-23 NOTE — MR AVS SNAPSHOT
"              After Visit Summary   2018    Radha Christensen    MRN: [id number]           Patient Information     Date Of Birth          1985        Visit Information        Provider Department      2018 3:15 PM Ermelinda Nunez APRN CNP [facility]        Today's Diagnoses     MADELINE (generalized anxiety disorder)    -  1       Follow-ups after your visit        Follow-up notes from your care team     Return if symptoms worsen or fail to improve.      Who to contact     If you have questions or need follow up information about today's clinic visit or your schedule please contact [facility] directly at [phone].  Normal or non-critical lab and imaging results will be communicated to you by [employer], letter or phone within 4 business days after the clinic has received the results. If you do not hear from us within 7 days, please contact the clinic through [employer] or phone. If you have a critical or abnormal lab result, we will notify you by phone as soon as possible.  Submit refill requests through [employer] or call your pharmacy and they will forward the refill request to us. Please allow 3 business days for your refill to be completed.          Additional Information About Your Visit        MyChart Information     [employer] lets you send messages to your doctor, view your test results, renew your prescriptions, schedule appointments and more. To sign up, go to [email or web address] . Click on \"Log in\" on the left side of the screen, which will take you to the Welcome page. Then click on \"Sign up Now\" on the right side of the page.     You will be asked to enter the access code listed below, as well as some personal information. Please follow the directions to create your username and password.     Your access code is: [email or web address]  Expires: 2018  7:07 PM     Your access code will  in 90 days. If you need help or a new code, please call your [facility] or [phone].        Care EveryWhere ID     This is your Care EveryWhere ID. This could be used by other organizations to access your [facility] medical records  [id number]        Your Vitals Were     Pulse Last Period Breastfeeding? BMI (Body Mass Index)          60 04/24/2018 (Approximate) No 33.05 kg/m2         Blood Pressure from Last 3 Encounters:   05/23/18 122/74   05/01/18 118/80   02/11/18 120/84    Weight from Last 3 Encounters:   05/23/18 211 lb (95.7 kg)   05/01/18 209 lb 9.6 oz (95.1 kg)   02/11/18 211 lb 6 oz (95.9 kg)              Today, you had the following     No orders found for display       Primary Care Provider Office Phone # Fax #    Ermelinda GOMES SHANTAL Nunez [phone] [address] [facility] [address]        Equal Access to Services     JEANNETTE SANTOS AH: Hadii aad ku hadasho Soomaali, waaxda luqadaha, qaybta kaalmada adeegyada, allie covarrubiasin haylexie arce . So [facility] [phone].    ATENCIÓN: Si habla espdeborah, tiene a nunez disposición servicios gratuitos de asistencia lingüística. Llame al [phone].    We comply with applicable federal civil rights laws and [place] laws. We do not discriminate on the basis of race, color, national origin, age, disability, sex, sexual orientation, or gender identity.            Thank you!     Thank you for choosing [facility]  for your care. Our goal is always to provide you with excellent care. Hearing back from our patients is one way we can continue to improve our services. Please take a few minutes to complete the written survey that you may receive in the mail after your visit with us. Thank you!             Your Updated Medication List - Protect others around you: Learn how to safely use, store and throw away your medicines at www.disposemymeds.org.          This list is accurate as of 5/23/18  7:07 PM.  Always use your most recent med list.                   Brand Name Dispense  Instructions for use Diagnosis    albuterol 108 (90 Base) MCG/ACT Inhaler    PROAIR HFA/PROVENTIL HFA/VENTOLIN HFA     Inhale 2 puffs into the lungs every 4 hours as needed        escitalopram 10 MG tablet    LEXAPRO    90 tablet    TAKE 1 TABLET BY MOUTH EVERY DAY    Dysthymic disorder       LORazepam 0.5 MG tablet    ATIVAN     Take 1 tablet by mouth every 6 hours as needed        minocycline 100 MG capsule    MINOCIN/DYNACIN    120 capsule    TAKE 1 CAPSULE BY MOUTH TWICE DAILY    Acne vulgaris       norgestimate-ethinyl estradiol 0.25-35 MG-MCG per tablet    ORTHO-CYCLEN, SPRINTEC     Take 1 tablet by mouth daily        topiramate 25 MG tablet    TOPAMAX     Take 25 mg by mouth daily

## 2018-05-24 NOTE — PROGRESS NOTES
SUBJECTIVE:   Radha Christensen is a 33 year old female who presents to clinic today for the following health issues:    Presents for chronic anxiety, would like to pursue a therapy animal--would like to discuss necessary paperwork per landlord request  Patient does not have any disabilities  She is aware that final approval is up to her landlord since this is a private residence and this is not certified service animal    HPI    Patient Active Problem List   Diagnosis     Other abnormal Papanicolaou smear of cervix and cervical HPV(795.09)     Acne, mild     Alcohol abuse     Dysthymic disorder     Asthma     Cervical high risk HPV (human papillomavirus) test positive     Cheilitis     Concussion     Contraceptive management     Encounter for surveillance of contraceptives     Family history of diabetes mellitus (DM)     Migraine headache     Encounter for routine gynecological examination     Tobacco abuse     Vaginitis     No past surgical history on file.    Social History   Substance Use Topics     Smoking status: Former Smoker     Types: Cigarettes     Quit date: 1/1/2013     Smokeless tobacco: Never Used     Alcohol use Yes      Comment: Alcoholic Drinks/day: Occ     Family History   Problem Relation Age of Onset     HEART DISEASE Maternal Grandfather 50     Heart Disease     DIABETES Maternal Grandfather      Diabetes     Chronic Obstructive Pulmonary Disease Maternal Grandfather      COPD     DIABETES Mother      Diabetes     Thyroid Disease Mother      Thyroid Disease     Thyroid Disease Brother      Thyroid Disease     DIABETES Brother 38     Diabetes     DIABETES Maternal Uncle      Diabetes     Breast Cancer No family hx of      Cancer-breast     Ovarian Cancer No family hx of      Cancer-ovarian     Prostate Cancer No family hx of      Cancer-prostate     Other - See Comments No family hx of      Stroke     Colon Cancer No family hx of      Cancer-colon     Blood Disease No family hx of      Blood  Disease         Current Outpatient Prescriptions   Medication Sig Dispense Refill     albuterol (PROAIR HFA/PROVENTIL HFA/VENTOLIN HFA) 108 (90 BASE) MCG/ACT Inhaler Inhale 2 puffs into the lungs every 4 hours as needed       escitalopram (LEXAPRO) 10 MG tablet TAKE 1 TABLET BY MOUTH EVERY DAY 90 tablet 3     LORazepam (ATIVAN) 0.5 MG tablet Take 1 tablet by mouth every 6 hours as needed       minocycline (MINOCIN/DYNACIN) 100 MG capsule TAKE 1 CAPSULE BY MOUTH TWICE DAILY 120 capsule 3     norgestimate-ethinyl estradiol (ORTHO-CYCLEN, SPRINTEC) 0.25-35 MG-MCG per tablet Take 1 tablet by mouth daily       topiramate (TOPAMAX) 25 MG tablet Take 25 mg by mouth daily       Allergies   Allergen Reactions     Cefaclor Unknown     BP Readings from Last 3 Encounters:   05/23/18 122/74   05/01/18 118/80   02/11/18 120/84    Wt Readings from Last 3 Encounters:   05/23/18 211 lb (95.7 kg)   05/01/18 209 lb 9.6 oz (95.1 kg)   02/11/18 211 lb 6 oz (95.9 kg)                  Labs reviewed in EPIC    Review of Systems   Psychiatric/Behavioral: The patient is nervous/anxious.    All other systems reviewed and are negative.       OBJECTIVE:     /74 (BP Location: Right arm, Patient Position: Sitting, Cuff Size: Adult Regular)  Pulse 60  Wt 211 lb (95.7 kg)  LMP 04/24/2018 (Approximate)  Breastfeeding? No  BMI 33.05 kg/m2  Body mass index is 33.05 kg/(m^2).  Physical Exam   Constitutional: She is oriented to person, place, and time. She appears well-developed and well-nourished.   Cardiovascular: Normal rate.    Pulmonary/Chest: Effort normal.   Musculoskeletal: Normal range of motion.   Neurological: She is alert and oriented to person, place, and time.   Skin: Skin is warm and dry.   Psychiatric: She has a normal mood and affect. Her behavior is normal. Judgment and thought content normal.   Nursing note and vitals reviewed.          ASSESSMENT/PLAN:         1. MADELINE (generalized anxiety disorder)    Completed paperwork  with full input from patient who agrees she does not have a chronic disability  Animal would be for therapeutic purposes as a nonmedication alternative for anxiety and depression history    She is fully aware final approval discretion of [employer]    Patient will follow-up if she needs any further documentation that I can support  Did make a copy of form in scans        SHANTAL Le CNP  [facility]

## 2018-06-19 ENCOUNTER — TELEPHONE (OUTPATIENT)
Dept: FAMILY MEDICINE | Facility: OTHER | Age: 33
End: 2018-06-19

## 2018-06-19 NOTE — TELEPHONE ENCOUNTER
Patient notified that I do not remember getting the form back after her apt.  She was given a copy, so she will bring in and we will fax it.   Juliane Infante LPN........................6/19/2018  2:10 PM

## 2018-06-19 NOTE — TELEPHONE ENCOUNTER
Ermelinda Nunez, CNP I thought patient came in to have these forms filled out.  I never saw them after the apt.  Didn't  patient take them with her?      Juliane Infante LPN........................6/19/2018  8:39 AM

## 2018-07-23 DIAGNOSIS — G43.909 MIGRAINE WITHOUT STATUS MIGRAINOSUS, NOT INTRACTABLE, UNSPECIFIED MIGRAINE TYPE: Primary | ICD-10-CM

## 2018-07-25 RX ORDER — TOPIRAMATE 25 MG/1
TABLET, FILM COATED ORAL
Qty: 90 TABLET | Refills: 3 | Status: SHIPPED | OUTPATIENT
Start: 2018-07-25 | End: 2019-08-16

## 2018-07-25 NOTE — TELEPHONE ENCOUNTER
PLEASE REVIEW, SIGN AND SEND AS APPROPRIATE: THANK YOU.    TOPIRAMATE 25MG TABLETS  Last Written Prescription Date:  8/2/17  Last Fill Quantity: 90,   # refills: 2  Last Office Visit: 5/23/18  Future Office visit:   None.    Routing refill request to provider for review/approval because:  Anti-Seizure Meds Protocol Failed7/25 4:07 PM   Normal CBC on file in past 26 months    Normal platelet count on file in past 26 months   Last CBC 5/25/16.     CBC lab pending MD co-sign. If approved, please notify Patient or address at next OV.     Unable to complete prescription refill per RN Medication Refill Policy. Krysten Martinez RN .............. 7/25/2018  4:12 PM

## 2018-08-03 DIAGNOSIS — J01.00 ACUTE MAXILLARY SINUSITIS, RECURRENCE NOT SPECIFIED: Primary | ICD-10-CM

## 2018-08-03 RX ORDER — AZITHROMYCIN 250 MG/1
TABLET, FILM COATED ORAL
Qty: 6 TABLET | Refills: 1 | Status: SHIPPED | OUTPATIENT
Start: 2018-08-03 | End: 2018-09-11

## 2018-08-03 RX ORDER — AZITHROMYCIN 250 MG/1
TABLET, FILM COATED ORAL
Refills: 1 | COMMUNITY
Start: 2018-07-03 | End: 2018-08-03

## 2018-08-03 NOTE — TELEPHONE ENCOUNTER
Patient thinks she has a sinus infection. She was wondering if something could be sent over to [facility].

## 2018-08-03 NOTE — TELEPHONE ENCOUNTER
After birth date was verified, spoke with patient and she stated that she thinks that she has another sinus infection. States that she has the following symptoms:    Throbbing headache, pressure and fluid in ears bilateral, bloody/yellow mucus, it is worst when she gets up in the morning, she is tired and not hungry.     Pharmacy is [facility].        Jamar Proctor LPN 08/03/18 9:13 AM

## 2018-09-11 ENCOUNTER — HOSPITAL ENCOUNTER (OUTPATIENT)
Dept: GENERAL RADIOLOGY | Facility: OTHER | Age: 33
Discharge: HOME | End: 2018-09-11
Attending: NURSE PRACTITIONER | Admitting: NURSE PRACTITIONER
Payer: COMMERCIAL

## 2018-09-11 ENCOUNTER — OFFICE VISIT (OUTPATIENT)
Dept: FAMILY MEDICINE | Facility: OTHER | Age: 33
End: 2018-09-11
Attending: NURSE PRACTITIONER
Payer: COMMERCIAL

## 2018-09-11 VITALS
BODY MASS INDEX: 34.88 KG/M2 | SYSTOLIC BLOOD PRESSURE: 128 MMHG | HEIGHT: 67 IN | TEMPERATURE: 97.7 F | WEIGHT: 222.2 LBS | DIASTOLIC BLOOD PRESSURE: 82 MMHG | HEART RATE: 64 BPM

## 2018-09-11 DIAGNOSIS — R53.83 FATIGUE, UNSPECIFIED TYPE: ICD-10-CM

## 2018-09-11 DIAGNOSIS — R14.0 BLOATED ABDOMEN: ICD-10-CM

## 2018-09-11 DIAGNOSIS — K59.09 OTHER CONSTIPATION: ICD-10-CM

## 2018-09-11 DIAGNOSIS — K59.09 OTHER CONSTIPATION: Primary | ICD-10-CM

## 2018-09-11 DIAGNOSIS — R63.5 WEIGHT GAIN: ICD-10-CM

## 2018-09-11 LAB
ALBUMIN SERPL-MCNC: 4.1 G/DL (ref 3.5–5.7)
ALP SERPL-CCNC: 70 U/L (ref 34–104)
ALT SERPL W P-5'-P-CCNC: 12 U/L (ref 7–52)
ANION GAP SERPL CALCULATED.3IONS-SCNC: 5 MMOL/L (ref 3–14)
AST SERPL W P-5'-P-CCNC: 15 U/L (ref 13–39)
B-HCG SERPL-ACNC: <1 IU/L
BASOPHILS # BLD AUTO: 0 10E9/L (ref 0–0.2)
BASOPHILS NFR BLD AUTO: 0.6 %
BILIRUB SERPL-MCNC: 0.5 MG/DL (ref 0.3–1)
BUN SERPL-MCNC: 16 MG/DL (ref 7–25)
CALCIUM SERPL-MCNC: 8.9 MG/DL (ref 8.6–10.3)
CHLORIDE SERPL-SCNC: 107 MMOL/L (ref 98–107)
CO2 SERPL-SCNC: 24 MMOL/L (ref 21–31)
CREAT SERPL-MCNC: 0.77 MG/DL (ref 0.6–1.2)
DIFFERENTIAL METHOD BLD: NORMAL
EOSINOPHIL # BLD AUTO: 0.2 10E9/L (ref 0–0.7)
EOSINOPHIL NFR BLD AUTO: 2.4 %
ERYTHROCYTE [DISTWIDTH] IN BLOOD BY AUTOMATED COUNT: 12.4 % (ref 10–15)
GFR SERPL CREATININE-BSD FRML MDRD: 85 ML/MIN/1.7M2
GLUCOSE SERPL-MCNC: 97 MG/DL (ref 70–105)
HBA1C MFR BLD: 4.7 % (ref 4–6)
HCT VFR BLD AUTO: 40 % (ref 35–47)
HGB BLD-MCNC: 12.9 G/DL (ref 11.7–15.7)
IMM GRANULOCYTES # BLD: 0 10E9/L (ref 0–0.4)
IMM GRANULOCYTES NFR BLD: 0.1 %
LYMPHOCYTES # BLD AUTO: 2.2 10E9/L (ref 0.8–5.3)
LYMPHOCYTES NFR BLD AUTO: 32.7 %
MCH RBC QN AUTO: 28.4 PG (ref 26.5–33)
MCHC RBC AUTO-ENTMCNC: 32.3 G/DL (ref 31.5–36.5)
MCV RBC AUTO: 88 FL (ref 78–100)
MONOCYTES # BLD AUTO: 0.5 10E9/L (ref 0–1.3)
MONOCYTES NFR BLD AUTO: 6.7 %
NEUTROPHILS # BLD AUTO: 3.9 10E9/L (ref 1.6–8.3)
NEUTROPHILS NFR BLD AUTO: 57.5 %
PLATELET # BLD AUTO: 266 10E9/L (ref 150–450)
POTASSIUM SERPL-SCNC: 4 MMOL/L (ref 3.5–5.1)
PROT SERPL-MCNC: 6.8 G/DL (ref 6.4–8.9)
RBC # BLD AUTO: 4.55 10E12/L (ref 3.8–5.2)
SODIUM SERPL-SCNC: 136 MMOL/L (ref 134–144)
TSH SERPL DL<=0.05 MIU/L-ACNC: 2.7 IU/ML (ref 0.34–5.6)
WBC # BLD AUTO: 6.8 10E9/L (ref 4–11)

## 2018-09-11 PROCEDURE — G0463 HOSPITAL OUTPT CLINIC VISIT: HCPCS

## 2018-09-11 PROCEDURE — 99214 OFFICE O/P EST MOD 30 MIN: CPT | Performed by: NURSE PRACTITIONER

## 2018-09-11 PROCEDURE — 84443 ASSAY THYROID STIM HORMONE: CPT | Performed by: NURSE PRACTITIONER

## 2018-09-11 PROCEDURE — 80053 COMPREHEN METABOLIC PANEL: CPT | Performed by: NURSE PRACTITIONER

## 2018-09-11 PROCEDURE — 85025 COMPLETE CBC W/AUTO DIFF WBC: CPT | Performed by: NURSE PRACTITIONER

## 2018-09-11 PROCEDURE — 36415 COLL VENOUS BLD VENIPUNCTURE: CPT | Performed by: NURSE PRACTITIONER

## 2018-09-11 PROCEDURE — 83036 HEMOGLOBIN GLYCOSYLATED A1C: CPT | Performed by: NURSE PRACTITIONER

## 2018-09-11 PROCEDURE — 84702 CHORIONIC GONADOTROPIN TEST: CPT | Performed by: NURSE PRACTITIONER

## 2018-09-11 PROCEDURE — 74019 RADEX ABDOMEN 2 VIEWS: CPT

## 2018-09-11 RX ORDER — POLYETHYLENE GLYCOL 3350 17 G/17G
1 POWDER, FOR SOLUTION ORAL DAILY
Qty: 510 G | Refills: 1 | Status: SHIPPED | OUTPATIENT
Start: 2018-09-11 | End: 2018-11-01

## 2018-09-11 RX ORDER — SENNOSIDES A AND B 8.6 MG/1
TABLET, FILM COATED ORAL
Qty: 120 TABLET | Refills: 0 | Status: SHIPPED | OUTPATIENT
Start: 2018-09-11 | End: 2019-12-19

## 2018-09-11 ASSESSMENT — ENCOUNTER SYMPTOMS
ABDOMINAL DISTENTION: 1
FATIGUE: 1
CONSTIPATION: 1
APPETITE CHANGE: 1

## 2018-09-11 NOTE — PATIENT INSTRUCTIONS
Excess Gas  Certain foods produce gas when digested. In some people, these foods make an excessive amount of gas. This may cause bloating, burping, or increased gas passing through the rectum (flatulence).     Foods that cause gas  The following foods are more likely to cause this problem. Limit them, or remove them from your diet:    Broccoli    Cauliflower    [place] sprouts    Cabbage    Cooked dried beans    Fizzy (carbonated) drinks, such as sparkling water, soda, beer, and champagne  Other causes  Other causes of excess gas include:    Eating too fast or talking while you chew. This may cause you to swallow air. This increases the amount of gas in your stomach. And it may make your symptoms worse. Chew each mouthful completely before you swallow. Take your time.    Chewing on gum or sucking on hard candy. These cause you to swallow more often. And some of what you are swallowing is air. This leads to more gas in your stomach. Avoid chewing gum and hard candy.    Overeating. This may increase the feeling of being bloated and cause more gas. When you are full, stop eating.     Being constipated. This can increase the amount of normal intestinal gas. Avoid constipation by getting more fiber in your diet. Good sources of fiber include whole-grain cereal, fresh vegetables (except those in the above list), and fresh fruits. High-fiber foods absorb water and carry it out of the body. When adding more fiber to your diet, you also need to drink more water. You should drink at least 8, 8-ounce glasses of water (2 quarts) per day.  Date Last Reviewed: 8/1/2016 2000-2017 [employer]. [address] [place] [address], [place] [address]. All rights reserved. This information is not intended as a substitute for professional medical care. Always follow your healthcare professional's instructions.

## 2018-09-11 NOTE — PROGRESS NOTES
SUBJECTIVE:   Radha Christensen is a 33 year old female who presents to clinic today for the following health issues:    Presents for noticing over the last month of generalized abdominal bloating, had some epigastric pain but that resolves  Denies any nausea or vomiting.  Reports bowel movements usually daily however have been every couple of days recently  She is not having abdominal pain today however generalized bloating that is bothersome--has felt fatigued  Feels like she is pregnant.  No changes in foods or medications or over-the-counter medications  Has gained 10 pounds over the last 3 months  Works at [employer]--lots of sitting--limited exercise    HPI    Patient Active Problem List   Diagnosis     Other abnormal Papanicolaou smear of cervix and cervical HPV(795.09)     Acne, mild     Alcohol abuse     Dysthymic disorder     Asthma     Cervical high risk HPV (human papillomavirus) test positive     Cheilitis     Concussion     Contraceptive management     Encounter for surveillance of contraceptives     Family history of diabetes mellitus (DM)     Migraine headache     Encounter for routine gynecological examination     Tobacco abuse     Vaginitis     History reviewed. No pertinent surgical history.    Social History   Substance Use Topics     Smoking status: Former Smoker     Types: Cigarettes     Quit date: 1/1/2013     Smokeless tobacco: Never Used     Alcohol use Yes      Comment: Alcoholic Drinks/day: Occ     Family History   Problem Relation Age of Onset     HEART DISEASE Maternal Grandfather 50     Heart Disease     Diabetes Maternal Grandfather      Diabetes     Chronic Obstructive Pulmonary Disease Maternal Grandfather      COPD     Diabetes Mother      Diabetes     Thyroid Disease Mother      Thyroid Disease     Thyroid Disease Brother      Thyroid Disease     Diabetes Brother 38     Diabetes     Diabetes Maternal Uncle      Diabetes     Breast Cancer No family hx of      Cancer-breast     Ovarian  "Cancer No family hx of      Cancer-ovarian     Prostate Cancer No family hx of      Cancer-prostate     Other - See Comments No family hx of      Stroke     Colon Cancer No family hx of      Cancer-colon     Blood Disease No family hx of      Blood Disease         Current Outpatient Prescriptions   Medication Sig Dispense Refill     albuterol (PROAIR HFA/PROVENTIL HFA/VENTOLIN HFA) 108 (90 BASE) MCG/ACT Inhaler Inhale 2 puffs into the lungs every 4 hours as needed       escitalopram (LEXAPRO) 10 MG tablet TAKE 1 TABLET BY MOUTH EVERY DAY 90 tablet 3     LORazepam (ATIVAN) 0.5 MG tablet Take 1 tablet by mouth every 6 hours as needed       minocycline (MINOCIN/DYNACIN) 100 MG capsule TAKE 1 CAPSULE BY MOUTH TWICE DAILY 120 capsule 3     norgestimate-ethinyl estradiol (ORTHO-CYCLEN, SPRINTEC) 0.25-35 MG-MCG per tablet Take 1 tablet by mouth daily       polyethylene glycol (MIRALAX) powder Take 17 g (1 capful) by mouth daily 510 g 1     senna (SENOKOT) 8.6 MG tablet 2 tabs today and as needed up to once day for constaiption 120 tablet 0     topiramate (TOPAMAX) 25 MG tablet TAKE 1 TABLET BY MOUTH EVERY DAY 90 tablet 3     Allergies   Allergen Reactions     Cefaclor Unknown     BP Readings from Last 3 Encounters:   09/11/18 128/82   05/23/18 122/74   05/01/18 118/80    Wt Readings from Last 3 Encounters:   09/11/18 222 lb 3.2 oz (100.8 kg)   05/23/18 211 lb (95.7 kg)   05/01/18 209 lb 9.6 oz (95.1 kg)                  Labs reviewed in EPIC    Review of Systems   Constitutional: Positive for appetite change and fatigue.   Gastrointestinal: Positive for abdominal distention and constipation.   All other systems reviewed and are negative.       OBJECTIVE:     /82  Pulse 64  Temp 97.7  F (36.5  C)  Ht 5' 7\" (1.702 m)  Wt 222 lb 3.2 oz (100.8 kg)  LMP 09/03/2018  Breastfeeding? No  BMI 34.8 kg/m2  Body mass index is 34.8 kg/(m^2).  Physical Exam   Constitutional: She is oriented to person, place, and time. She " appears well-developed and well-nourished.   Cardiovascular: Normal rate.    Pulmonary/Chest: Effort normal.   Abdominal: Soft. Bowel sounds are normal. She exhibits distension. She exhibits no mass. There is no tenderness. There is no rebound and no guarding.   Abdomen generally distended, soft, bowel sounds ×4 quadrants, no focal tenderness  No guarding or rebound  Able to palpate any masses, limited by body habitus   Musculoskeletal: Normal range of motion.   Neurological: She is alert and oriented to person, place, and time.   Skin: Skin is warm and dry.   Psychiatric: She has a normal mood and affect. Her behavior is normal. Judgment and thought content normal.   Nursing note and vitals reviewed.          ASSESSMENT/PLAN:   Not really correlated with any particular foods or activity--significant weight gain in the past couple of months    Appears a slow transit issue--will try bowel maintenance--Senna today x1  Tomorrow start MiraLAX daily with fluids for bowel maintenance--diary for any triggering foods    If labs negative would suggest dietitian consult          1. Fatigue, unspecified type    - TSH; Future  - HCG quantitative pregnancy; Future  - CBC and Differential; Future  - Comprehensive Metabolic Panel; Future  - Hemoglobin A1c; Future  - TSH  - HCG quantitative pregnancy  - CBC and Differential  - Comprehensive Metabolic Panel  - Hemoglobin A1c    2. Other constipation    - TSH; Future  - XR Abdomen 2 Views; Future  - polyethylene glycol (MIRALAX) powder; Take 17 g (1 capful) by mouth daily  Dispense: 510 g; Refill: 1  - senna (SENOKOT) 8.6 MG tablet; 2 tabs today and as needed up to once day for constaiption  Dispense: 120 tablet; Refill: 0  - TSH    3. Bloated abdomen    - HCG quantitative pregnancy; Future  - senna (SENOKOT) 8.6 MG tablet; 2 tabs today and as needed up to once day for constaiption  Dispense: 120 tablet; Refill: 0  - HCG quantitative pregnancy      Ermelinda Nunez, SHANTAL CNP  [facility]

## 2018-09-11 NOTE — NURSING NOTE
Patient presents to clinic with concerns about bloating, decreased appetite. LMP 9/3/2018  Ann Bermeo ....................  9/11/2018   2:54 PM

## 2018-09-11 NOTE — MR AVS SNAPSHOT
After Visit Summary   9/11/2018    Radha Christensen    MRN: [id number]           Patient Information     Date Of Birth          1985        Visit Information        Provider Department      9/11/2018 3:00 PM Ermelinda Nunez APRN CNP [facility]        Today's Diagnoses     Other constipation    -  1    Fatigue, unspecified type        Bloated abdomen          Care Instructions      Excess Gas  Certain foods produce gas when digested. In some people, these foods make an excessive amount of gas. This may cause bloating, burping, or increased gas passing through the rectum (flatulence).     Foods that cause gas  The following foods are more likely to cause this problem. Limit them, or remove them from your diet:    Broccoli    Cauliflower    [place] sprouts    Cabbage    Cooked dried beans    Fizzy (carbonated) drinks, such as sparkling water, soda, beer, and champagne  Other causes  Other causes of excess gas include:    Eating too fast or talking while you chew. This may cause you to swallow air. This increases the amount of gas in your stomach. And it may make your symptoms worse. Chew each mouthful completely before you swallow. Take your time.    Chewing on gum or sucking on hard candy. These cause you to swallow more often. And some of what you are swallowing is air. This leads to more gas in your stomach. Avoid chewing gum and hard candy.    Overeating. This may increase the feeling of being bloated and cause more gas. When you are full, stop eating.     Being constipated. This can increase the amount of normal intestinal gas. Avoid constipation by getting more fiber in your diet. Good sources of fiber include whole-grain cereal, fresh vegetables (except those in the above list), and fresh fruits. High-fiber foods absorb water and carry it out of the body. When adding more fiber to your diet, you also need to drink more water. You should drink at least 8, 8-ounce glasses of  water (2 quarts) per day.  Date Last Reviewed: 8/1/2016 2000-2017 [employer]. [address] [place] [address], [place] [address]. All rights reserved. This information is not intended as a substitute for professional medical care. Always follow your healthcare professional's instructions.                Follow-ups after your visit        Follow-up notes from your care team     Return if symptoms worsen or fail to improve.      Future tests that were ordered for you today     Open Future Orders        Priority Expected Expires Ordered    TSH Routine  9/11/2019 9/11/2018    HCG quantitative pregnancy Routine  9/11/2019 9/11/2018    CBC and Differential Routine  9/12/2019 9/11/2018    Comprehensive Metabolic Panel Routine  9/11/2019 9/11/2018    Hemoglobin A1c Routine  9/11/2019 9/11/2018    XR Abdomen 2 Views Routine 9/11/2018 9/11/2019 9/11/2018            Who to contact     If you have questions or need follow up information about today's clinic visit or your schedule please contact [facility] directly at [phone].  Normal or non-critical lab and imaging results will be communicated to you by ISBXhart, letter or phone within 4 business days after the clinic has received the results. If you do not hear from us within 7 days, please contact the clinic through [employer] or phone. If you have a critical or abnormal lab result, we will notify you by phone as soon as possible.  Submit refill requests through [employer] or call your pharmacy and they will forward the refill request to us. Please allow 3 business days for your refill to be completed.          Additional Information About Your Visit        ISBXhart Information     [employer] gives you secure access to your electronic health record. If you see a primary care provider, you can also send messages to your care team and make appointments. If you have questions, please call your primary care clinic.  If you do not have a primary care  "provider, please call [phone] and they will assist you.        Care EveryWhere ID     This is your Care EveryWhere ID. This could be used by other organizations to access your [place] medical records  [id number]        Your Vitals Were     Pulse Temperature Height Last Period Breastfeeding? BMI (Body Mass Index)    64 97.7  F (36.5  C) 5' 7\" (1.702 m) 09/03/2018 No 34.8 kg/m2       Blood Pressure from Last 3 Encounters:   09/11/18 128/82   05/23/18 122/74   05/01/18 118/80    Weight from Last 3 Encounters:   09/11/18 222 lb 3.2 oz (100.8 kg)   05/23/18 211 lb (95.7 kg)   05/01/18 209 lb 9.6 oz (95.1 kg)                 Today's Medication Changes          These changes are accurate as of 9/11/18  3:14 PM.  If you have any questions, ask your nurse or doctor.               Start taking these medicines.        Dose/Directions    polyethylene glycol powder   Commonly known as:  MIRALAX   Used for:  Other constipation   Started by:  Ermelinda Nunez APRN CNP        Dose:  1 capful   Take 17 g (1 capful) by mouth daily   Quantity:  510 g   Refills:  1       senna 8.6 MG tablet   Commonly known as:  SENOKOT   Used for:  Other constipation, Bloated abdomen   Started by:  Ermelinda Nunez APRN CNP        2 tabs today and as needed up to once day for constaiption   Quantity:  120 tablet   Refills:  0            Where to get your medicines      These medications were sent to [facility] [address] [place], [place] - [address]  [address], [facility] [address]     Phone:  [phone]     polyethylene glycol powder    senna 8.6 MG tablet                Primary Care Provider Office Phone # Fax #    SHANTAL Le [phone] [address] [facility] [address]        Equal Access to Services     JEANNETTE SANTOS AH: Hadii marii hernandez Sogeorgi, locda luqadaha, qaybjose liu, allie callaway. So [facility] " [phone].    ATENCIÓN: Si gabriela damon, tiene a nunez disposición servicios gratuitos de asistencia lingüística. Talat martínez [phone].    We comply with applicable federal civil rights laws and [place] laws. We do not discriminate on the basis of race, color, national origin, age, disability, sex, sexual orientation, or gender identity.            Thank you!     Thank you for choosing [facility]  for your care. Our goal is always to provide you with excellent care. Hearing back from our patients is one way we can continue to improve our services. Please take a few minutes to complete the written survey that you may receive in the mail after your visit with us. Thank you!             Your Updated Medication List - Protect others around you: Learn how to safely use, store and throw away your medicines at www.disposemymeds.org.          This list is accurate as of 9/11/18  3:14 PM.  Always use your most recent med list.                   Brand Name Dispense Instructions for use Diagnosis    albuterol 108 (90 Base) MCG/ACT inhaler    PROAIR HFA/PROVENTIL HFA/VENTOLIN HFA     Inhale 2 puffs into the lungs every 4 hours as needed        azithromycin 250 MG tablet    ZITHROMAX    6 tablet    Two tablets first day, then one tablet daily for four days.    Acute maxillary sinusitis, recurrence not specified       escitalopram 10 MG tablet    LEXAPRO    90 tablet    TAKE 1 TABLET BY MOUTH EVERY DAY    Dysthymic disorder       LORazepam 0.5 MG tablet    ATIVAN     Take 1 tablet by mouth every 6 hours as needed        minocycline 100 MG capsule    MINOCIN/DYNACIN    120 capsule    TAKE 1 CAPSULE BY MOUTH TWICE DAILY    Acne vulgaris       norgestimate-ethinyl estradiol 0.25-35 MG-MCG per tablet    ORTHO-CYCLEN, SPRINTEC     Take 1 tablet by mouth daily        polyethylene glycol powder    MIRALAX    510 g    Take 17 g (1 capful) by mouth daily    Other constipation       senna 8.6 MG tablet    SENOKOT     120 tablet    2 tabs today and as needed up to once day for constaiption    Other constipation, Bloated abdomen       topiramate 25 MG tablet    TOPAMAX    90 tablet    TAKE 1 TABLET BY MOUTH EVERY DAY    Migraine without status migrainosus, not intractable, unspecified migraine type

## 2018-09-24 ENCOUNTER — MYC MEDICAL ADVICE (OUTPATIENT)
Dept: FAMILY MEDICINE | Facility: OTHER | Age: 33
End: 2018-09-24

## 2018-10-05 DIAGNOSIS — L70.9 ACNE, MILD: ICD-10-CM

## 2018-10-05 DIAGNOSIS — Z30.41 ENCOUNTER FOR SURVEILLANCE OF CONTRACEPTIVE PILLS: Primary | ICD-10-CM

## 2018-10-08 RX ORDER — NORGESTIMATE AND ETHINYL ESTRADIOL
KIT
Qty: 28 TABLET | Refills: 1 | Status: SHIPPED | OUTPATIENT
Start: 2018-10-08 | End: 2018-10-18

## 2018-10-08 NOTE — TELEPHONE ENCOUNTER
Request from [facility] for Previfem.      LOV with PCP 9/11/2018    Med management appt 8/2/2017    No upcoming appt noted at this time    norgestimate-ethinyl estradiol (ORTHO-CYCLEN, SPRINTEC) 0.25-35 MG-MCG per tablet last ordered 8/2/2017.    Noted patient also on Topamax.    Will route as per teamlet protocol for refill consideration and add reminder in note to pharmacy    Medication Detail      Disp Refills Start End FLORENCIA   norgestimate-ethinyl estradiol, 0.25-35 mg-mcg, (SPRINTEC) 0.25-35 mg-mcg tablet 3 Package 4 8/2/2017  No   Sig: Take 1 tablet by mouth once daily.   Class: eRx   Route: Oral   E-Prescribing Status: Receipt confirmed by pharmacy (8/2/2017  3:40 PM CDT)   Associated Diagnoses     Encounter for surveillance of other contraceptive       Acne vulgaris         Unable to complete prescription refill per RN Medication Refill Policy.................... Cris Mendes ....................  10/8/2018   10:12 AM

## 2018-10-08 NOTE — TELEPHONE ENCOUNTER
"Sangita calling. Wanting a message sent back.  Requesting for this medication refill to be addressed by another provider.  \"pt. Is completely out as of yesterday and we know that Dr. Nunez is out of the office today.\"  "

## 2018-10-18 ENCOUNTER — OFFICE VISIT (OUTPATIENT)
Dept: FAMILY MEDICINE | Facility: OTHER | Age: 33
End: 2018-10-18
Attending: NURSE PRACTITIONER
Payer: COMMERCIAL

## 2018-10-18 VITALS
WEIGHT: 224 LBS | HEART RATE: 64 BPM | SYSTOLIC BLOOD PRESSURE: 120 MMHG | BODY MASS INDEX: 35.08 KG/M2 | DIASTOLIC BLOOD PRESSURE: 88 MMHG | TEMPERATURE: 98.9 F

## 2018-10-18 DIAGNOSIS — Z12.4 SCREENING FOR CERVICAL CANCER: ICD-10-CM

## 2018-10-18 DIAGNOSIS — Z30.41 ENCOUNTER FOR SURVEILLANCE OF CONTRACEPTIVE PILLS: Primary | ICD-10-CM

## 2018-10-18 DIAGNOSIS — Z23 NEED FOR PROPHYLACTIC VACCINATION AND INOCULATION AGAINST INFLUENZA: ICD-10-CM

## 2018-10-18 DIAGNOSIS — Z23 ENCOUNTER FOR IMMUNIZATION: ICD-10-CM

## 2018-10-18 DIAGNOSIS — L70.9 ACNE, MILD: ICD-10-CM

## 2018-10-18 PROCEDURE — 87624 HPV HI-RISK TYP POOLED RSLT: CPT

## 2018-10-18 PROCEDURE — 90471 IMMUNIZATION ADMIN: CPT

## 2018-10-18 PROCEDURE — 88142 CYTOPATH C/V THIN LAYER: CPT | Mod: TC

## 2018-10-18 PROCEDURE — G0123 SCREEN CERV/VAG THIN LAYER: HCPCS

## 2018-10-18 PROCEDURE — 90686 IIV4 VACC NO PRSV 0.5 ML IM: CPT | Performed by: NURSE PRACTITIONER

## 2018-10-18 PROCEDURE — G0463 HOSPITAL OUTPT CLINIC VISIT: HCPCS

## 2018-10-18 PROCEDURE — 99395 PREV VISIT EST AGE 18-39: CPT | Performed by: NURSE PRACTITIONER

## 2018-10-18 RX ORDER — NORGESTIMATE AND ETHINYL ESTRADIOL 0.25-0.035
1 KIT ORAL DAILY
Qty: 84 TABLET | Refills: 5 | Status: SHIPPED | OUTPATIENT
Start: 2018-10-18 | End: 2019-03-26

## 2018-10-18 RX ORDER — NORGESTIMATE AND ETHINYL ESTRADIOL 0.25-0.035
1 KIT ORAL DAILY
Qty: 28 TABLET | Refills: 3 | Status: CANCELLED | OUTPATIENT
Start: 2018-10-18

## 2018-10-18 ASSESSMENT — PAIN SCALES - GENERAL: PAINLEVEL: NO PAIN (0)

## 2018-10-18 NOTE — PATIENT INSTRUCTIONS
What is Genital HPV?  HPV (human papillomavirus) is a very common family of viruses. Some strains of genital HPV can cause abnormal changes (dysplasia) in the cells of a woman s cervix. In a small number of women, these changes can lead to cancer if not treated. Also, certain strains of HPV can cause genital warts (condyloma). Although many people carry HPV, it often causes no symptoms. The virus may first be detected when signs of dysplasia or warts are found during a Pap test.     Dysplasia develops when cervical cells change in ways that are not normal.             Warts on the cervix can be detected with a Pap test.             Warts around the genitals may be visible. These external warts can affect the vulva, vagina, and anus.      How does HPV spread?  HPV lives inside skin and mucous membranes. It spreads when skin carrying the virus touches other skin. Genital HPV most often spreads during sexual contact. Condoms and other barriers help protect against the spread by preventing skin contact. But condoms may not cover all affected skin, so they may not provide complete protection. There is no cure for HPV. Even if symptoms go away, the virus may remain in the body. Because it often doesn t cause symptoms, many people who have HPV don t even know it.  When dysplasia occurs  The cervix is the narrow canal at the bottom of the uterus. It s made up of layers of cells that normally change as they grow. Dysplasia occurs when HPV causes some cervical cells to change in ways that are not normal. These abnormal cells can be detected with a Pap test. Left  untreated, abnormal cells can develop into cancer.  When warts form  Certain strains of HPV can make skin cells reproduce more often than they should. These extra skin cells build up into warts. Warts can form on the cervix. They can also form around or inside the genitals. Treating warts helps keep HPV from spreading to sexual partners.     A vaccine is available  that protects against certain strains of HPV. Your health care provider can tell you more.   Date Last Reviewed: 1/1/2017 2000-2017 [employer] [address] [facility], [place] [address]. All rights reserved. This information is not intended as a substitute for professional medical care. Always follow your healthcare professional's instructions.

## 2018-10-18 NOTE — MR AVS SNAPSHOT
After Visit Summary   10/18/2018    Radha Christensen    MRN: [id number]           Patient Information     Date Of Birth          1985        Visit Information        Provider Department      10/18/2018 3:00 PM Ermelinda Nunez APRN CNP [facility]        Today's Diagnoses     Encounter for surveillance of contraceptive pills    -  1    Acne, mild        Encounter for immunization        Screening for cervical cancer          Care Instructions      What is Genital HPV?  HPV (human papillomavirus) is a very common family of viruses. Some strains of genital HPV can cause abnormal changes (dysplasia) in the cells of a woman s cervix. In a small number of women, these changes can lead to cancer if not treated. Also, certain strains of HPV can cause genital warts (condyloma). Although many people carry HPV, it often causes no symptoms. The virus may first be detected when signs of dysplasia or warts are found during a Pap test.     Dysplasia develops when cervical cells change in ways that are not normal.             Warts on the cervix can be detected with a Pap test.             Warts around the genitals may be visible. These external warts can affect the vulva, vagina, and anus.      How does HPV spread?  HPV lives inside skin and mucous membranes. It spreads when skin carrying the virus touches other skin. Genital HPV most often spreads during sexual contact. Condoms and other barriers help protect against the spread by preventing skin contact. But condoms may not cover all affected skin, so they may not provide complete protection. There is no cure for HPV. Even if symptoms go away, the virus may remain in the body. Because it often doesn t cause symptoms, many people who have HPV don t even know it.  When dysplasia occurs  The cervix is the narrow canal at the bottom of the uterus. It s made up of layers of cells that normally change as they grow. Dysplasia occurs when HPV causes  some cervical cells to change in ways that are not normal. These abnormal cells can be detected with a Pap test. Left  untreated, abnormal cells can develop into cancer.  When warts form  Certain strains of HPV can make skin cells reproduce more often than they should. These extra skin cells build up into warts. Warts can form on the cervix. They can also form around or inside the genitals. Treating warts helps keep HPV from spreading to sexual partners.     A vaccine is available that protects against certain strains of HPV. Your health care provider can tell you more.   Date Last Reviewed: 1/1/2017 2000-2017 [employer]. [address] [place] [id number]. All rights reserved. This information is not intended as a substitute for professional medical care. Always follow your healthcare professional's instructions.                Follow-ups after your visit        Follow-up notes from your care team     Return in about 1 year (around 10/18/2019), or if symptoms worsen or fail to improve.      Who to contact     If you have questions or need follow up information about today's clinic visit or your schedule please contact [facility] directly at [phone].  Normal or non-critical lab and imaging results will be communicated to you by [employer], letter or phone within 4 business days after the clinic has received the results. If you do not hear from us within 7 days, please contact the clinic through [employer] or phone. If you have a critical or abnormal lab result, we will notify you by phone as soon as possible.  Submit refill requests through [employer] or call your pharmacy and they will forward the refill request to us. Please allow 3 business days for your refill to be completed.          Additional Information About Your Visit        [employer] Information     [employer] gives you secure access to your electronic health record. If you see a primary care provider, you can also  send messages to your care team and make appointments. If you have questions, please call your primary care clinic.  If you do not have a primary care provider, please call [phone] and they will assist you.        Care EveryWhere ID     This is your Care EveryWhere ID. This could be used by other organizations to access your [place] medical records  [id number]        Your Vitals Were     Pulse Temperature Last Period Breastfeeding? BMI (Body Mass Index)       64 98.9  F (37.2  C) (Temporal) 10/02/2018 No 35.08 kg/m2        Blood Pressure from Last 3 Encounters:   10/18/18 120/88   09/11/18 128/82   05/23/18 122/74    Weight from Last 3 Encounters:   10/18/18 224 lb (101.6 kg)   09/11/18 222 lb 3.2 oz (100.8 kg)   05/23/18 211 lb (95.7 kg)              We Performed the Following     GH IMM-  HC FLU VAC PRESRV FREE QUAD SPLIT VIR 3+YRS IM     HPV High Risk Types DNA Cervical     Pap Screen Thin Prep with HPV - recommended age 30 - 65 years (select HPV order below)          Today's Medication Changes          These changes are accurate as of 10/18/18  3:46 PM.  If you have any questions, ask your nurse or doctor.               These medicines have changed or have updated prescriptions.        Dose/Directions    norgestimate-ethinyl estradiol 0.25-35 MG-MCG per tablet   Commonly known as:  PREVIFEM   This may have changed:  See the new instructions.   Used for:  Encounter for surveillance of contraceptive pills, Acne, mild   Changed by:  Ermelinda Nunez, APRN CNP        Dose:  1 tablet   Take 1 tablet by mouth daily   Quantity:  84 tablet   Refills:  5            Where to get your medicines      These medications were sent to [id number] - [place], [place] - [address]  [address], [facility] [address]     Phone:  [phone]     norgestimate-ethinyl estradiol 0.25-35 MG-MCG per tablet                Primary Care Provider Office Phone # Fax #    Ermelinda Nunez  APRN [phone]       [address]  [facility] [address]        Equal Access to Services     JEANNETTE SANTOS : Hadii aad ku hadjuloi Palaciosali, josé miguel annajuan, stella kalida noe, allie satishin hayaasachin gelleredward mckeon laRachidlexie callaway. So [facility] [phone].    ATENCIÓN: Si habla español, tiene a nunez disposición servicios gratuitos de asistencia lingüística. Llame al [phone].    We comply with applicable federal civil rights laws and [place] laws. We do not discriminate on the basis of race, color, national origin, age, disability, sex, sexual orientation, or gender identity.            Thank you!     Thank you for choosing [facility]  for your care. Our goal is always to provide you with excellent care. Hearing back from our patients is one way we can continue to improve our services. Please take a few minutes to complete the written survey that you may receive in the mail after your visit with us. Thank you!             Your Updated Medication List - Protect others around you: Learn how to safely use, store and throw away your medicines at www.disposemymeds.org.          This list is accurate as of 10/18/18  3:46 PM.  Always use your most recent med list.                   Brand Name Dispense Instructions for use Diagnosis    albuterol 108 (90 Base) MCG/ACT inhaler    PROAIR HFA/PROVENTIL HFA/VENTOLIN HFA     Inhale 2 puffs into the lungs every 4 hours as needed        escitalopram 10 MG tablet    LEXAPRO    90 tablet    TAKE 1 TABLET BY MOUTH EVERY DAY    Dysthymic disorder       LORazepam 0.5 MG tablet    ATIVAN     Take 1 tablet by mouth every 6 hours as needed        minocycline 100 MG capsule    MINOCIN/DYNACIN    120 capsule    TAKE 1 CAPSULE BY MOUTH TWICE DAILY    Acne vulgaris       norgestimate-ethinyl estradiol 0.25-35 MG-MCG per tablet    PREVIFEM    84 tablet    Take 1 tablet by mouth daily    Encounter for surveillance of contraceptive pills, Acne, mild        polyethylene glycol powder    MIRALAX    510 g    Take 17 g (1 capful) by mouth daily    Other constipation       senna 8.6 MG tablet    SENOKOT    120 tablet    2 tabs today and as needed up to once day for constaiption    Other constipation, Bloated abdomen       topiramate 25 MG tablet    TOPAMAX    90 tablet    TAKE 1 TABLET BY MOUTH EVERY DAY    Migraine without status migrainosus, not intractable, unspecified migraine type

## 2018-10-18 NOTE — NURSING NOTE
Patient is here today for a yearly physical and pap. Annel Bautista LPN......................10/18/2018 3:11 PM

## 2018-10-18 NOTE — PROGRESS NOTES

## 2018-10-19 ENCOUNTER — MYC MEDICAL ADVICE (OUTPATIENT)
Dept: FAMILY MEDICINE | Facility: OTHER | Age: 33
End: 2018-10-19

## 2018-10-19 ASSESSMENT — ASTHMA QUESTIONNAIRES: ACT_TOTALSCORE: 23

## 2018-11-01 ENCOUNTER — MYC MEDICAL ADVICE (OUTPATIENT)
Dept: FAMILY MEDICINE | Facility: OTHER | Age: 33
End: 2018-11-01

## 2018-11-01 DIAGNOSIS — K59.09 OTHER CONSTIPATION: ICD-10-CM

## 2018-11-01 RX ORDER — POLYETHYLENE GLYCOL 3350 17 G/17G
1 POWDER, FOR SOLUTION ORAL DAILY
Qty: 510 G | Refills: 3 | Status: SHIPPED | OUTPATIENT
Start: 2018-11-01 | End: 2019-12-19

## 2019-01-24 ENCOUNTER — OFFICE VISIT (OUTPATIENT)
Dept: FAMILY MEDICINE | Facility: OTHER | Age: 34
End: 2019-01-24
Attending: NURSE PRACTITIONER
Payer: COMMERCIAL

## 2019-01-24 VITALS
DIASTOLIC BLOOD PRESSURE: 80 MMHG | RESPIRATION RATE: 16 BRPM | HEART RATE: 60 BPM | SYSTOLIC BLOOD PRESSURE: 140 MMHG | TEMPERATURE: 97.4 F | WEIGHT: 229.4 LBS | BODY MASS INDEX: 35.93 KG/M2

## 2019-01-24 DIAGNOSIS — Z88.9 HISTORY OF ALLERGY: ICD-10-CM

## 2019-01-24 DIAGNOSIS — H92.03 OTALGIA, BILATERAL: ICD-10-CM

## 2019-01-24 DIAGNOSIS — J01.81 OTHER ACUTE RECURRENT SINUSITIS: Primary | ICD-10-CM

## 2019-01-24 DIAGNOSIS — J34.89 FRONTAL SINUS PAIN: ICD-10-CM

## 2019-01-24 PROCEDURE — G0463 HOSPITAL OUTPT CLINIC VISIT: HCPCS | Performed by: NURSE PRACTITIONER

## 2019-01-24 PROCEDURE — 99214 OFFICE O/P EST MOD 30 MIN: CPT | Performed by: NURSE PRACTITIONER

## 2019-01-24 RX ORDER — DOXYCYCLINE HYCLATE 100 MG
100 TABLET ORAL 2 TIMES DAILY
Qty: 20 TABLET | Refills: 0 | Status: SHIPPED | OUTPATIENT
Start: 2019-01-24 | End: 2019-03-12

## 2019-01-24 RX ORDER — FLUTICASONE PROPIONATE 50 MCG
2 SPRAY, SUSPENSION (ML) NASAL DAILY
Qty: 1 BOTTLE | Refills: 11 | Status: SHIPPED | OUTPATIENT
Start: 2019-01-24 | End: 2019-08-23

## 2019-01-24 ASSESSMENT — PATIENT HEALTH QUESTIONNAIRE - PHQ9: SUM OF ALL RESPONSES TO PHQ QUESTIONS 1-9: 0

## 2019-01-24 ASSESSMENT — PAIN SCALES - GENERAL: PAINLEVEL: MILD PAIN (3)

## 2019-01-24 NOTE — PATIENT INSTRUCTIONS
Use steam or saline sinus rinse to improve drainage and flow    Start flonase or whatever is covered     Start doxycycline 2 x day for 10 days--stop minocycline while use and restart when done    You will get scheduled for sinus CT and ENT consultation

## 2019-01-24 NOTE — NURSING NOTE
Patient presents to clinic today for sinus and ear pain since after [place], taking OTC for allergies and sinus, and took antibiotic treatment and didn't clear the issues up. Ear pain with pressure.     No LMP recorded.  Medication Reconciliation: complete    Valarie Alexandra LPN  1/24/2019 8:42 AM

## 2019-01-25 ENCOUNTER — HOSPITAL ENCOUNTER (OUTPATIENT)
Dept: CT IMAGING | Facility: OTHER | Age: 34
Discharge: HOME | End: 2019-01-25
Attending: NURSE PRACTITIONER | Admitting: NURSE PRACTITIONER
Payer: COMMERCIAL

## 2019-01-25 DIAGNOSIS — J34.89 FRONTAL SINUS PAIN: ICD-10-CM

## 2019-01-25 DIAGNOSIS — J01.81 OTHER ACUTE RECURRENT SINUSITIS: ICD-10-CM

## 2019-01-25 DIAGNOSIS — H92.03 OTALGIA, BILATERAL: ICD-10-CM

## 2019-01-25 PROCEDURE — 70486 CT MAXILLOFACIAL W/O DYE: CPT

## 2019-01-28 ASSESSMENT — ENCOUNTER SYMPTOMS
SINUS PAIN: 1
SINUS PRESSURE: 1

## 2019-01-28 NOTE — PROGRESS NOTES
Nursing Notes:   Valarie Alexandra LPN  1/24/2019  8:51 AM  Signed  Patient presents to clinic today for sinus and ear pain since after Nayeli, taking OTC for allergies and sinus, and took antibiotic treatment and didn't clear the issues up. Ear pain with pressure.     No LMP recorded.  Medication Reconciliation: complete    Valarie Aleaxndra LPN  1/24/2019 8:42 AM    Nursing note reviewed with patient.  Accurracy and completeness verified.   Ms. Christensen is a 33 year old female who:  Patient presents with:  Sinus Problem  Ear Problem      ICD-10-CM    1. Other acute recurrent sinusitis J01.81 CT Sinus w/o Contrast     fluticasone (FLONASE) 50 MCG/ACT nasal spray     doxycycline hyclate (VIBRA-TABS) 100 MG tablet     OTOLARYNGOLOGY REFERRAL   2. Otalgia, bilateral H92.03 CT Sinus w/o Contrast     fluticasone (FLONASE) 50 MCG/ACT nasal spray     doxycycline hyclate (VIBRA-TABS) 100 MG tablet     OTOLARYNGOLOGY REFERRAL   3. Frontal sinus pain J34.89 CT Sinus w/o Contrast     fluticasone (FLONASE) 50 MCG/ACT nasal spray     doxycycline hyclate (VIBRA-TABS) 100 MG tablet     OTOLARYNGOLOGY REFERRAL   4. History of allergy Z88.9 OTOLARYNGOLOGY REFERRAL     HPI   Presents for follow-up on bilateral frontal sinus pain and pressure, mild otalgia feels more like pressure  Recent upper respiratory viral cold, has children in school.  No fever  Does like she has had frontal and maxillary sinus pain and pressure intermittently since last November  When I asked her if sinus infections have ever completely resolved reports unsure  Does get some benefit out of Zyrtec does feel that she has allergies--- however has not had allergy evaluation  Quit tobacco 2013  Denies any snoring or DYLLAN--no known history of asthma  Her brother has septum deviation and she is wondering if she has the same thing        Review of Systems   HENT: Positive for congestion, ear pain, sinus pressure and sinus pain.    All other systems reviewed and are negative.      All other systems reviewed and negative.     MADELINE:   MADELINE-7 SCORE 7/8/2015 5/25/2016 8/2/2017   Total Score 3 1 5     PHQ9:  PHQ-9 SCORE 9/21/2016 8/2/2017 1/24/2019   PHQ-9 Total Score 4 2 0       I have personally reviewed the past medical history, past surgical history, medications, allergies, family and social history as listed below, on 1/28/2019.    Allergies   Allergen Reactions     Cefaclor Unknown       Current Outpatient Medications   Medication Sig Dispense Refill     albuterol (PROAIR HFA/PROVENTIL HFA/VENTOLIN HFA) 108 (90 BASE) MCG/ACT Inhaler Inhale 2 puffs into the lungs every 4 hours as needed       doxycycline hyclate (VIBRA-TABS) 100 MG tablet Take 1 tablet (100 mg) by mouth 2 times daily for 10 days 20 tablet 0     escitalopram (LEXAPRO) 10 MG tablet TAKE 1 TABLET BY MOUTH EVERY DAY 90 tablet 3     fluticasone (FLONASE) 50 MCG/ACT nasal spray Spray 2 sprays into both nostrils daily 1 Bottle 11     minocycline (MINOCIN/DYNACIN) 100 MG capsule TAKE 1 CAPSULE BY MOUTH TWICE DAILY 120 capsule 3     norgestimate-ethinyl estradiol (PREVIFEM) 0.25-35 MG-MCG per tablet Take 1 tablet by mouth daily 84 tablet 5     polyethylene glycol (MIRALAX) powder Take 17 g (1 capful) by mouth daily 510 g 3     senna (SENOKOT) 8.6 MG tablet 2 tabs today and as needed up to once day for constaiption 120 tablet 0     topiramate (TOPAMAX) 25 MG tablet TAKE 1 TABLET BY MOUTH EVERY DAY 90 tablet 3        Patient Active Problem List    Diagnosis Date Noted     Acne, mild 02/09/2018     Priority: Medium     Dysthymic disorder 02/09/2018     Priority: Medium     Asthma 02/09/2018     Priority: Medium     Overview:   exercise induced/mild intermittent       Contraceptive management 02/09/2018     Priority: Medium     Encounter for routine gynecological examination 02/09/2018     Priority: Medium     Encounter for surveillance of contraceptives 08/02/2017     Priority: Medium     Family history of diabetes mellitus (DM)  2017     Priority: Medium     Cervical high risk HPV (human papillomavirus) test positive 2016     Priority: Medium     Cheilitis 2013     Priority: Medium     Migraine headache 2011     Priority: Medium     Other abnormal Papanicolaou smear of cervix and cervical HPV(795.09) 10/31/2011     Priority: Medium     Alcohol abuse 10/19/2011     Priority: Medium     Tobacco abuse 10/19/2011     Priority: Medium     Concussion 2011     Priority: Medium     Overview:   no loss of consciousness from softball injury       Vaginitis 2011     Priority: Medium     Past Medical History:   Diagnosis Date     Personal history of other medical treatment (CODE)      2, Para 2     No past surgical history on file.  Social History     Socioeconomic History     Marital status:      Spouse name: None     Number of children: None     Years of education: None     Highest education level: None   Social Needs     Financial resource strain: None     Food insecurity - worry: None     Food insecurity - inability: None     Transportation needs - medical: None     Transportation needs - non-medical: None   Occupational History     None   Tobacco Use     Smoking status: Former Smoker     Types: Cigarettes     Last attempt to quit: 2013     Years since quittin.0     Smokeless tobacco: Never Used   Substance and Sexual Activity     Alcohol use: Yes     Comment: Alcoholic Drinks/day: Occ     Drug use: No     Sexual activity: Yes     Partners: Male     Birth control/protection: Pill   Other Topics Concern     Parent/sibling w/ CABG, MI or angioplasty before 65F 55M? Not Asked   Social History Narrative    two children.     Preload 2013     Family History   Problem Relation Age of Onset     Heart Disease Maternal Grandfather 50        Heart Disease     Diabetes Maternal Grandfather         Diabetes     Chronic Obstructive Pulmonary Disease Maternal Grandfather         COPD      "Diabetes Mother         Diabetes     Thyroid Disease Mother         Thyroid Disease     Thyroid Disease Brother         Thyroid Disease     Diabetes Brother 38        Diabetes     Diabetes Maternal Uncle         Diabetes     Breast Cancer No family hx of         Cancer-breast     Ovarian Cancer No family hx of         Cancer-ovarian     Prostate Cancer No family hx of         Cancer-prostate     Other - See Comments No family hx of         Stroke     Colon Cancer No family hx of         Cancer-colon     Blood Disease No family hx of         Blood Disease       EXAM:   Vitals:    01/24/19 0845   BP: 140/80   BP Location: Right arm   Patient Position: Sitting   Cuff Size: Adult Regular   Pulse: 60   Resp: 16   Temp: 97.4  F (36.3  C)   TempSrc: Tympanic   Weight: 104.1 kg (229 lb 6.4 oz)       Current Pain Score: Mild Pain (3)     BP Readings from Last 3 Encounters:   01/24/19 140/80   10/18/18 120/88   09/11/18 128/82      Wt Readings from Last 3 Encounters:   01/24/19 104.1 kg (229 lb 6.4 oz)   10/18/18 101.6 kg (224 lb)   09/11/18 100.8 kg (222 lb 3.2 oz)      Estimated body mass index is 35.93 kg/m  as calculated from the following:    Height as of 9/11/18: 1.702 m (5' 7\").    Weight as of this encounter: 104.1 kg (229 lb 6.4 oz).     Physical Exam   Constitutional: She is oriented to person, place, and time. She appears well-developed and well-nourished.   HENT:   Head: Normocephalic and atraumatic.   Bilateral serous effusions  Bilateral maxillary and frontal sinus pain and pressure    Posterior pharynx injection without tonsillar hypertrophy or pustules, uvula midline without edema     Neck: Normal range of motion. Neck supple.   Cardiovascular: Normal rate.   Pulmonary/Chest: Effort normal and breath sounds normal.   Musculoskeletal: Normal range of motion.   Lymphadenopathy:     She has no cervical adenopathy.   Neurological: She is alert and oriented to person, place, and time.   Skin: Skin is warm and dry. "   Psychiatric: She has a normal mood and affect. Her behavior is normal. Judgment and thought content normal.   Nursing note and vitals reviewed.       INVESTIGATIONS:  /25/19 11:43 AM [id number] [facility]    PACS Images      Show images for CT Sinus w/o Contrast   Study Result     PROCEDURE: CT SINUS W/O CONTRAST     HISTORY: Sinusitis, chronic, possible surgery; recurrent sinusitis,  allergies; Otalgia, bilateral; Frontal sinus pain; Other acute  recurrent sinusitis.       TECHNIQUE: Helical noncontrast CT images of the paranasal sinuses with  reconstructions in 3 planes.     COMPARISON: None.     FINDINGS:     Frontal sinus: The frontal sinus is aplastic.     Anterior ethmoids: Minimal mucosal thickening.     Maxillary sinuses: Lobulated mucosal thickening/retention cyst  formation is present in the right maxillary sinus. Mild lobulated left  maxillary sinus mucosal thickening is present. The ostiomeatal units  are clear.     Posterior ethmoids: The posterior ethmoid air cells are clear.     Sphenoid sinus: The sphenoid sinus and bilateral sphenoethmoidal  recesses are clear.       There is mild S-shaped nasal septal deviation.  The nasal turbinate  morphology is notable for partial reversal of the middle turbinates.   The nasal cavity is clear.     The lamina papyracea are intact.  The roof of the ethmoids is  relatively symmetric.                                                                      IMPRESSION:     Aplastic frontal sinus.     Moderate lobulated right maxillary sinus mucosal thickening versus  retention cyst formation. Clear ostiomeatal units.     Mild S-shaped nasal septal deviation.     MICKY SHEPPARD MD   Order-Level Documents:     There are no order-level documents.   Lab and Collection     CT Sinus w/o Contrast (Order: [id number]) - 1/25/2019   Result Information     Status: Final result (Exam End: 1/25/2019 11:43 AM) Provider Status: Open   Reading Providers       Reading Role Read Nikos Jack MD  1/25/2019   Signed by     Signed Date/Time  Phone Pager   NIKOS SHEPPARD 1/25/2019 12:53 [phone]    Associated Diagnoses     Otalgia, bilateral [H92.03]       Frontal sinus pain [J34.89]       Other acute recurrent sinusitis [J01.81]       Patient Release Status:     This result is viewable by the patient in MyChart.   Last viewed in MyChart:     1/28/2019  9:39 AM   By:     Radha Christensen   Result Notes for CT Sinus w/o Contrast     Notes recorded by Ermelinda Nunez APRN CNP on 1/27/2019 at 5:01 PM CST  Has upcoming consult in the next few weeks  ------    Notes recorded by Karen Sanchez MD on 1/25/2019 at 8:09 PM CST  Don't think i've seen her yet?          Recipient List for Orders   Sent From To Cc'd Forwarded To Results   1/25/2019 12:54 PM Interface, Radiant Ib  Karen Sanchez MD  CT Sinus w/o Contrast [[id number]]      1/25/2019 12:54 PM Interface, Radiant Ib  SHANTAL Le CNP  CT Sinus w/o Contrast [[id number]]             Order Providers     Authorizing Provider Encounter Provider Billing Provider   Ermelinda Nunez APRN CNP [id number] Nikos Sheppard MD   Base CPT Code (Reference Only)     Code CPT Chargeables   [id number]     [id number]     [id number]     [id number]   External Result Report     External Result Report   Order Report     View Order Information         ASSESSMENT AND PLAN:  Problem List Items Addressed This Visit     None      Visit Diagnoses     Other acute recurrent sinusitis    -  Primary    Relevant Medications    fluticasone (FLONASE) 50 MCG/ACT nasal spray    doxycycline hyclate (VIBRA-TABS) 100 MG tablet    Other Relevant Orders    CT Sinus w/o Contrast (Completed)    OTOLARYNGOLOGY REFERRAL    Otalgia, bilateral        Relevant Medications    fluticasone (FLONASE) 50 MCG/ACT nasal spray    doxycycline hyclate (VIBRA-TABS) 100 MG tablet    Other Relevant Orders    CT Sinus w/o Contrast (Completed)     OTOLARYNGOLOGY REFERRAL    Frontal sinus pain        Relevant Medications    fluticasone (FLONASE) 50 MCG/ACT nasal spray    doxycycline hyclate (VIBRA-TABS) 100 MG tablet    Other Relevant Orders    CT Sinus w/o Contrast (Completed)    OTOLARYNGOLOGY REFERRAL    History of allergy        Relevant Orders    OTOLARYNGOLOGY REFERRAL      Current sinus symptoms over 2 weeks, will treat with course of doxycycline  Continue saline sinus rinse    Restart Flonase nasal spray every other day---   She is not able to tolerate nasal corticosteroid daily as it causes nosebleeds and irritation    Will obtain sinus imaging to determine if there is any structural etiology      Will refer to [facility] for evaluation and recurrent sinusitis and allergy evaluation    Would encourage her to use her Zyrtec daily if there is an allergy component I suspect that there is      -- Expected clinical course discussed    -- Medications and their side effects discussed    Patient Instructions   Use steam or saline sinus rinse to improve drainage and flow    Start flonase or whatever is covered     Start doxycycline 2 x day for 10 days--stop minocycline while use and restart when done    You will get scheduled for sinus CT and ENT consultation    Ermelinda Nunez [facility]     Portions of this note were dictated using speech recognition software. The note has been proofread but errors in the text may have been overlooked. Please contact me if there are any concerns regarding the accuracy of the dictation.

## 2019-01-29 ENCOUNTER — OFFICE VISIT (OUTPATIENT)
Dept: OTOLARYNGOLOGY | Facility: OTHER | Age: 34
End: 2019-01-29
Attending: NURSE PRACTITIONER
Payer: COMMERCIAL

## 2019-01-29 VITALS
HEIGHT: 67 IN | OXYGEN SATURATION: 98 % | DIASTOLIC BLOOD PRESSURE: 82 MMHG | TEMPERATURE: 98.5 F | BODY MASS INDEX: 35.94 KG/M2 | SYSTOLIC BLOOD PRESSURE: 128 MMHG | WEIGHT: 229 LBS | HEART RATE: 89 BPM

## 2019-01-29 DIAGNOSIS — J32.0 CHRONIC MAXILLARY SINUSITIS: ICD-10-CM

## 2019-01-29 DIAGNOSIS — Z91.09 ALLERGY TO ANIMALS: ICD-10-CM

## 2019-01-29 DIAGNOSIS — H69.93 DYSFUNCTION OF BOTH EUSTACHIAN TUBES: Primary | ICD-10-CM

## 2019-01-29 DIAGNOSIS — J34.3 NASAL TURBINATE HYPERTROPHY: ICD-10-CM

## 2019-01-29 DIAGNOSIS — R09.81 NASAL CONGESTION: ICD-10-CM

## 2019-01-29 PROCEDURE — 87070 CULTURE OTHR SPECIMN AEROBIC: CPT | Mod: ZL | Performed by: NURSE PRACTITIONER

## 2019-01-29 PROCEDURE — 99213 OFFICE O/P EST LOW 20 MIN: CPT | Mod: 25 | Performed by: NURSE PRACTITIONER

## 2019-01-29 PROCEDURE — 31237 NSL/SINS NDSC SURG BX POLYPC: CPT | Performed by: NURSE PRACTITIONER

## 2019-01-29 PROCEDURE — G0463 HOSPITAL OUTPT CLINIC VISIT: HCPCS

## 2019-01-29 PROCEDURE — 87102 FUNGUS ISOLATION CULTURE: CPT | Mod: ZL | Performed by: NURSE PRACTITIONER

## 2019-01-29 PROCEDURE — 87205 SMEAR GRAM STAIN: CPT | Mod: ZL | Performed by: NURSE PRACTITIONER

## 2019-01-29 PROCEDURE — 99000 SPECIMEN HANDLING OFFICE-LAB: CPT | Performed by: NURSE PRACTITIONER

## 2019-01-29 RX ORDER — BUDESONIDE 0.5 MG/2ML
INHALANT ORAL
Qty: 2 BOX | Refills: 3 | Status: SHIPPED | OUTPATIENT
Start: 2019-01-29 | End: 2019-12-19

## 2019-01-29 ASSESSMENT — MIFFLIN-ST. JEOR: SCORE: 1766.37

## 2019-01-29 ASSESSMENT — PAIN SCALES - GENERAL: PAINLEVEL: MODERATE PAIN (4)

## 2019-01-29 NOTE — PATIENT INSTRUCTIONS
Thank you for allowing Valeria Hurtado CNP and our ENT team to participate in your care.  If your medications are too expensive, please give the nurse a call.  We can possibly change this medication.  If you have a scheduling or an appointment question please contact [facility] Coordinator at their direct line [phone]  ALL nursing questions or concerns can be directed to your ENT nurse at: [phone] - Kamila    1. Budesonide nasal saline irrigation per instructions: Do twice daily x 1 month. Stop and notify me if any bleeding.   -Obtain Jones Med Sinus rinse over the counter.    -Use warm distilled water and 2 packets of the salt solution that comes with the bottle, dissolve in bottle up to the 240 mL jory.  -Add 1 vial of budesonide.  -Irrigate each side of your nose leaning over the sink, using 1/2 the volume of the bottle in each nostril every irrigation.  Irrigate 2 times daily.  -If additional rinses are needed/recommended, you may use the plain [facility] Sinus irrigation without the use of added budesonide.   2. Daily antihistamine x 2-3 weeks, stop at at that time if no improvement in your symptoms.  3. Plain Jones Med sinus rinse as needed throughout the day.   4. 2 weeks can start daily Flonase as directed, stop if any bleeding.  5. Complete allergy testing and I will see you after.    To prevent epistaxis:   Use over the counter nasal saline spray (ocean nasal spray)  3-4 x daily and before bed, then apply aquaphor ointment.  No bending, straining or lifting over 10 pounds.  Notify me if any further bleeding.

## 2019-01-29 NOTE — NURSING NOTE
"Chief Complaint   Patient presents with     Ent Problem     otalgia bilateral, acute recurrent sinusitis. Referred by Ermelinda Nunez. Off and on sinus infections for about 1 year, ears and head are in pain right now.        Initial /82   Pulse 89   Temp 98.5  F (36.9  C) (Tympanic)   Ht 1.702 m (5' 7\")   Wt 103.9 kg (229 lb)   LMP 01/20/2019 (Exact Date)   SpO2 98%   BMI 35.87 kg/m   Estimated body mass index is 35.87 kg/m  as calculated from the following:    Height as of this encounter: 1.702 m (5' 7\").    Weight as of this encounter: 103.9 kg (229 lb).  Medication Reconciliation: complete    Kamila Duke LPN  "

## 2019-01-29 NOTE — LETTER
1/29/2019         RE: Radha Christensen  [address]  [place] [address]        Dear Colleague,    Thank you for referring your patient, Radha Christensen, to the [facility]. Please see a copy of my visit note below.    Otolaryngology Note         Chief Complaint:     Chief Complaint   Patient presents with     Ent Problem     otalgia bilateral, acute recurrent sinusitis. Referred by Ermelinda Nunez. Off and on sinus infections for about 1 year, ears and head are in pain right now.           History of Present Illness:     Radha Christensen is a 33 year old female  I was asked to see for evaluation of chronic sinusitis.The patient was sent here by  Ermelinda Nunez.  For the past year, has been having issues with her sinuses, treated 4-5 times this year for acute sinusitis, never having significant relief. Most recently 1/24/19 with doxycycline, currently on day 3, no significant relief in her nasal congestion/sinus pressure. This past infection, she has been having bilateral otalgia/pressure/fullness with no acute hearing changes or fluctuating hearing loss.    Over the past year, pretty consistent/constant facial pressure, bilateral maxillary and forehead pain/pressure, clear drainage, some strings of blood in the morning with no dripping/oozing epistaxis.   Hot/steamy showers, warm washcloth the most effective.   Flonase PRN (has caused some mild epistaxis).   Radha denies prior nasal surgery or trauma.    She denies any allergy testing.  There are no new environmental exposures in the home or at work.    Denies seasonal allergies. No sneezing or itchy/watery eyes or nasal sx come seasonal changes  Allergy to animals (cat, dog), will get stuffy, sneezing, itchy/watery eyes and flare up of her asthma. (more so dogs because she is around them the most). Takes PRN Zyrtec with animal exposure    History of migraines. Denies vision changes/nause with this, current symptoms not significant  with her typical migraines.     19: Acute sinusitis, doxycycline  8/3/18: Telephone call with recurrent sinus symptoms, called in Azithromycin  18: Acute maxillary sinusitis, azithromycin  18: Acute maxillary sinusitis, augmentin    Thinks she filled 1 refill of azithromycin in this time frame also.    Sinus CT 19:  IMPRESSION:  Aplastic frontal sinus.  Moderate lobulated right maxillary sinus mucosal thickening versus  retention cyst formation. Clear ostiomeatal units.   Mild S-shaped nasal septal deviation.         Medications:     Current Outpatient Rx   Medication Sig Dispense Refill     albuterol (PROAIR HFA/PROVENTIL HFA/VENTOLIN HFA) 108 (90 BASE) MCG/ACT Inhaler Inhale 2 puffs into the lungs every 4 hours as needed       doxycycline hyclate (VIBRA-TABS) 100 MG tablet Take 1 tablet (100 mg) by mouth 2 times daily for 10 days 20 tablet 0     escitalopram (LEXAPRO) 10 MG tablet TAKE 1 TABLET BY MOUTH EVERY DAY 90 tablet 3     fluticasone (FLONASE) 50 MCG/ACT nasal spray Spray 2 sprays into both nostrils daily 1 Bottle 11     minocycline (MINOCIN/DYNACIN) 100 MG capsule TAKE 1 CAPSULE BY MOUTH TWICE DAILY 120 capsule 3     norgestimate-ethinyl estradiol (PREVIFEM) 0.25-35 MG-MCG per tablet Take 1 tablet by mouth daily 84 tablet 5     polyethylene glycol (MIRALAX) powder Take 17 g (1 capful) by mouth daily 510 g 3     senna (SENOKOT) 8.6 MG tablet 2 tabs today and as needed up to once day for constaiption 120 tablet 0     topiramate (TOPAMAX) 25 MG tablet TAKE 1 TABLET BY MOUTH EVERY DAY 90 tablet 3            Allergies:     Allergies: Cefaclor          Past Medical History:     Past Medical History:   Diagnosis Date     Personal history of other medical treatment (CODE)      2, Para 2            Past Surgical History:     No past surgical history on file.    ENT family history reviewed         Social History:     Social History     Tobacco Use     Smoking status: Former Smoker      "Types: Cigarettes     Last attempt to quit: 2013     Years since quittin.0     Smokeless tobacco: Never Used   Substance Use Topics     Alcohol use: Yes     Comment: Alcoholic Drinks/day: Occ     Drug use: No            Review of Systems:     ROS: See HPI         Physical Exam:     /82   Pulse 89   Temp 98.5  F (36.9  C) (Tympanic)   Ht 1.702 m (5' 7\")   Wt 103.9 kg (229 lb)   LMP 2019 (Exact Date)   SpO2 98%   BMI 35.87 kg/m       General - The patient is well nourished and well developed, and appears to have good nutritional status.  Alert and oriented to person and place, answers questions and cooperates with examination appropriately.   Head and Face - Normocephalic and atraumatic, with no gross asymmetry noted.  The facial nerve is intact, with strong symmetric movements.  Voice and Breathing - The patient was breathing comfortably without the use of accessory muscles. There was no wheezing, stridor. The patients voice was clear and strong, and had appropriate pitch and quality.  Ears - External ear normal. Canals are patent. Right tympanic membrane is intact without effusion, retraction or mass. Left tympanic membrane is intact without effusion, retraction or mass.  Eyes - Extraocular movements intact, and the pupils were reactive to light. Sclera were not icteric or injected, conjunctiva were pink and moist.  Mouth - Examination of the oral cavity showed pink, healthy oral mucosa. Dentition in good condition. No lesions or ulcerations noted. The tongue was mobile and midline.   Throat - The walls of the oropharynx were smooth, pink, moist, symmetric, and had no lesions or ulcerations.  The tonsillar pillars and soft palate were symmetric. The uvula was midline on elevation.    Neck - Normal midline excursion of the laryngotracheal complex during swallowing.  Full range of motion on passive movement.  Palpation of the occipital, submental, submandibular, internal jugular chain, and " supraclavicular nodes did not demonstrate any abnormal lymph nodes or masses.  Palpation of the thyroid was soft and smooth, with no nodules or goiter appreciated.  The trachea was mobile and midline.  Nose - External contour is symmetric, no gross deflection or scars.      To further evaluate the nasal cavity, I performed rigid nasal endoscopy.  I first sprayed the nasal cavity bilaterally with a mix of lidocaine and neosynephrine.  I used a 2.7mm,   30 degree rigid nasal endoscope for examination.    Septum irregular with hypervascularity noted left Keisselbach's Plexus, no active bleeding/oozing.     Left side:    Inferior turbinate hypertrophy. Scant clear/yellow/bloody drainage inferior meatus, collected for culture and debrided with #5 maxwell.  The left middle turbinate and middle meatus were clearly visualized and normal in appearance.  Going further back, the sphenoethmoid and frontal recess were normal in appearance.  The nasopharynx was unremarkable, and the eustachian tube opening on this side was unobstructed.  No abnormal secretions, purulence, or polyps were noted.    Right side:    Inferior turbinate hypertrophy. Scant clear secretions inferior meatus, collected for culture and debrided with #5 maxwell.  The right middle turbinate with hypertrophy and middle meatus were clearly visualized and normal in appearance.  Going further back, the sphenoethmoid and frontal recess was normal in appearance, scant clear secretions noted from sphenoethmoid recess.  The nasopharynx was unremarkable, and the eustachian tube opening on this side was unobstructed.  No abnormal secretions, purulence, or polyps were noted.    PROCEDURES  To evaluate the nose and sinuses , I performed rigid nasal endoscopy. I sprayed both nares with lidocaine and neosynephrine.     I began with the LEFT side using a 0 degree rigid nasal endoscope, and then similarly examined the RIGHT side    Options were explained to the patient  regarding conservative measures versus nasal cautery in the clinic today.  The patient wished to proceed with cautery and packing.  Risks including infection, further bleeding, possible need for surgery, septal perforation were discussed and the patient consented.  I anesthetized the nose with topical lidocaine and neosynepherine.  I then applied silver nitrate to the vessels, starting distally, and working my way back to the vessels  point of entry onto the nasal mucosa (left Keisselbach's Plexus).  The patient tolerated the procedure well.         Assessment and Plan:       ICD-10-CM    1. Dysfunction of both eustachian tubes H69.83    2. Chronic maxillary sinusitis J32.0    3. Allergy to animals J30.81    4. Nasal turbinate hypertrophy J34.3    5. Nasal congestion R09.81      The patient has been cauterized today for epistaxis.  I counseled them on keeping the head above the level of the heart at all times for the next week.  No picking or rubbing at the cauterized side of the nose, or blowing for 1 week.   No lifting bending or straining for 2 weeks, 10 pound weight limit.  Sneeze with mouth open.  The patient was counseled that in the event of heavy bleeding, to apply pressure to front of nose, lean forward and spit out blood.  Apply afrin nasal spray to side of bleed until is slows or stops.  If bleeding persists or is worrisome, present to the emergency room.    To prevent epistaxis:   Use over the counter nasal saline spray (ocean nasal spray)  3-4 x daily and before bed, then apply aquaphor ointment.   No bending, straining or lifting over 10 pounds.    Will call with sinus culture results once available. Continue doxycycline for now.       1. Budesonide nasal saline irrigation per instructions: Do twice daily x 1 month. Stop and notify me if any bleeding.   -Obtain Jones Med Sinus rinse over the counter.    -Use warm distilled water and 2 packets of the salt solution that comes with the bottle, dissolve in  bottle up to the 240 mL jory.  -Add 1 vial of budesonide.  -Irrigate each side of your nose leaning over the sink, using 1/2 the volume of the bottle in each nostril every irrigation.  Irrigate 2 times daily.  -If additional rinses are needed/recommended, you may use the plain [facility] Med Sinus irrigation without the use of added budesonide.   2. Daily antihistamine x 2-3 weeks, stop at at that time if no improvement in your symptoms.  3. Plain Jones Med sinus rinse as needed throughout the day.   4. 2 weeks can start daily Flonase as directed, stop if any bleeding.  5. Complete allergy testing and I will see you after. Consider immunotherapy and/or sinus surgery (septoplasty/bilateral turbinate reduction/right maxillary sinus debridement) as necessary if ongoing symptoms/concerns.     Return sooner as needed.      Valeria Hurtado NP  ENT  [facility] [place] [place]  [phone]      [id number]    Again, thank you for allowing me to participate in the care of your patient.        Sincerely,        SHANTAL Hays CNP

## 2019-01-30 ENCOUNTER — TELEPHONE (OUTPATIENT)
Dept: OTOLARYNGOLOGY | Facility: OTHER | Age: 34
End: 2019-01-30

## 2019-01-30 LAB
GRAM STN SPEC: ABNORMAL
SPECIMEN SOURCE: ABNORMAL

## 2019-01-30 NOTE — TELEPHONE ENCOUNTER
I spoke with the patient today regarding allergy skin testing.    All general instructions are reviewed with the patient.      The patient is made aware of all medications that need to be held prior to testing, and will stop medications as directed per instructions.  The patient verbalized understanding and agree with plan.      Should the patient be given any additional medications prior to the day of testing, they are told to let the provider know that they are going to be allergy tested, so medications that need to be help prior to MQT are not prescribed.      An appointment will be arranged by the [facility] for testing date and time.     This message is forwarded to the [facility] [place]. Written instructions are mailed to the patient as well,  by the [place] [facility].  Jovita Bullock RN

## 2019-01-30 NOTE — TELEPHONE ENCOUNTER
1/30/19 Received PA request from [facility] for Budesonide. Submitted request to [facility]. Waiting for response.

## 2019-01-31 NOTE — TELEPHONE ENCOUNTER
APPROVAL 1/31/19 Received approval from [employer] for Budesonide. Approval dates 1/30/19 thru 1/29/20. Pharmacy advised. Forms scanned to [employer].

## 2019-02-01 LAB
BACTERIA SPEC CULT: NORMAL
BACTERIA SPEC CULT: NORMAL
SPECIMEN SOURCE: NORMAL

## 2019-02-27 LAB
FUNGUS SPEC CULT: NORMAL
SPECIMEN SOURCE: NORMAL

## 2019-03-12 ENCOUNTER — OFFICE VISIT (OUTPATIENT)
Dept: OTOLARYNGOLOGY | Facility: OTHER | Age: 34
End: 2019-03-12
Attending: NURSE PRACTITIONER
Payer: COMMERCIAL

## 2019-03-12 VITALS
DIASTOLIC BLOOD PRESSURE: 110 MMHG | TEMPERATURE: 97.3 F | HEIGHT: 67 IN | BODY MASS INDEX: 36.1 KG/M2 | HEART RATE: 73 BPM | OXYGEN SATURATION: 97 % | WEIGHT: 230 LBS | SYSTOLIC BLOOD PRESSURE: 160 MMHG

## 2019-03-12 DIAGNOSIS — J32.0 CHRONIC MAXILLARY SINUSITIS: Primary | ICD-10-CM

## 2019-03-12 ASSESSMENT — PAIN SCALES - GENERAL: PAINLEVEL: NO PAIN (0)

## 2019-03-12 ASSESSMENT — MIFFLIN-ST. JEOR: SCORE: 1765.9

## 2019-03-12 NOTE — LETTER
3/12/2019         RE: Radha Christensen  [address]  [place] [address]        Dear Colleague,    Thank you for referring your patient, Radha Christensen, to the [facility]. Please see a copy of my visit note below.    MQT was not done today.  The patient will be rescheduled after clearance from her PCP for HTN.    Again, thank you for allowing me to participate in the care of your patient.        Sincerely,        SHANTAL Hays CNP

## 2019-03-12 NOTE — NURSING NOTE
Prior to testing, the patient's identity is verified using name and date of birth.  Radha presents for allergy skin testing.      The patient's symptoms have included CRS for the last 2 years.  She has been treated with multiple antibiotics, yet symptoms resume right after treatments.  She has also had nasal congestion, post nasal drainage.  Symptoms seem worse during the winter season.      The patient lives in a [facility] house.   The patient does not suspect mold, water and/or moisture issues  in the home.  It does have carpet, including in the bedroom.  There is forced air natural gas heat, and central air conditioning.       Radha has no pets.    The patient denies allergy testing in the past.    All of the patients medications are reviewed prior to testing.  All appropriate medications have been stopped.         Consent is signed by the patient and signature is verified.    I spoke with Valeria hurtado CNP, as Radha's BP is elevated.  The patient states she does not have a history of high BP, but may be a bit nervous about testing.    She has had no recent illness outside of her sinus infections and denies any further symptoms today.    Per Valeria Hurtado CNP, Radha is to follow-up with Ermelinda Nunez, her PCP for further workup related to HTN.  She will need to be cleared by Ermelinda Nunez in order to proceed with MQT in the future.     Radha verbalized understanding and agrees with this plan.     Radha did not see Valeria Hurtado CNP today.  This note is for documentation only. No allergy skin testing was done today.  Jovita Bullock RN

## 2019-03-12 NOTE — NURSING NOTE
"Chief Complaint   Patient presents with     Other     MQT allergy skin test       Initial BP (!) 154/98 (BP Location: Right arm, Patient Position: Chair, Cuff Size: Adult Regular)   Pulse 73   Temp 97.3  F (36.3  C) (Oral)   Ht 1.702 m (5' 7\")   Wt 104.3 kg (230 lb)   SpO2 97%   BMI 36.02 kg/m   Estimated body mass index is 36.02 kg/m  as calculated from the following:    Height as of this encounter: 1.702 m (5' 7\").    Weight as of this encounter: 104.3 kg (230 lb).  Medication Reconciliation: complete         Jovita Bullock RN    "

## 2019-03-13 ENCOUNTER — OFFICE VISIT (OUTPATIENT)
Dept: FAMILY MEDICINE | Facility: OTHER | Age: 34
End: 2019-03-13
Attending: NURSE PRACTITIONER
Payer: COMMERCIAL

## 2019-03-13 VITALS
RESPIRATION RATE: 22 BRPM | SYSTOLIC BLOOD PRESSURE: 126 MMHG | TEMPERATURE: 98.5 F | BODY MASS INDEX: 35.77 KG/M2 | DIASTOLIC BLOOD PRESSURE: 92 MMHG | WEIGHT: 228.4 LBS | HEART RATE: 69 BPM

## 2019-03-13 DIAGNOSIS — R03.0 ELEVATED BLOOD PRESSURE READING WITHOUT DIAGNOSIS OF HYPERTENSION: Primary | ICD-10-CM

## 2019-03-13 LAB
ANION GAP SERPL CALCULATED.3IONS-SCNC: 7 MMOL/L (ref 3–14)
BUN SERPL-MCNC: 13 MG/DL (ref 7–25)
CALCIUM SERPL-MCNC: 9.1 MG/DL (ref 8.6–10.3)
CHLORIDE SERPL-SCNC: 106 MMOL/L (ref 98–107)
CHOLEST SERPL-MCNC: 164 MG/DL
CO2 SERPL-SCNC: 23 MMOL/L (ref 21–31)
CREAT SERPL-MCNC: 0.76 MG/DL (ref 0.6–1.2)
GFR SERPL CREATININE-BSD FRML MDRD: 86 ML/MIN/{1.73_M2}
GLUCOSE SERPL-MCNC: 94 MG/DL (ref 70–105)
HBA1C MFR BLD: 4.9 % (ref 4–6)
HDLC SERPL-MCNC: 48 MG/DL (ref 23–92)
LDLC SERPL CALC-MCNC: 80 MG/DL
NONHDLC SERPL-MCNC: 116 MG/DL
POTASSIUM SERPL-SCNC: 3.7 MMOL/L (ref 3.5–5.1)
SODIUM SERPL-SCNC: 136 MMOL/L (ref 134–144)
TRIGL SERPL-MCNC: 182 MG/DL

## 2019-03-13 PROCEDURE — 83036 HEMOGLOBIN GLYCOSYLATED A1C: CPT | Performed by: NURSE PRACTITIONER

## 2019-03-13 PROCEDURE — 99214 OFFICE O/P EST MOD 30 MIN: CPT | Performed by: NURSE PRACTITIONER

## 2019-03-13 PROCEDURE — 80048 BASIC METABOLIC PNL TOTAL CA: CPT | Performed by: NURSE PRACTITIONER

## 2019-03-13 PROCEDURE — 36415 COLL VENOUS BLD VENIPUNCTURE: CPT | Performed by: NURSE PRACTITIONER

## 2019-03-13 PROCEDURE — 80061 LIPID PANEL: CPT | Performed by: NURSE PRACTITIONER

## 2019-03-13 PROCEDURE — G0463 HOSPITAL OUTPT CLINIC VISIT: HCPCS

## 2019-03-13 ASSESSMENT — ENCOUNTER SYMPTOMS: NERVOUS/ANXIOUS: 1

## 2019-03-13 ASSESSMENT — PAIN SCALES - GENERAL: PAINLEVEL: NO PAIN (0)

## 2019-03-13 NOTE — PATIENT INSTRUCTIONS
Check daily Blood pressure--target -149/60-80  If outlier number recheck x 3 every 5 minutes relaxed for average    Come back Tuesday 26 th with readings from home    Try to monitor salt--

## 2019-03-13 NOTE — NURSING NOTE
Patient presents to clinic today for blood pressure recheck, racing heart, and a letter to let the allergist know that she is good to proceed. She had some tingling in the back of her head last night.     No LMP recorded.  Medication Reconciliation: complete    Valarie Alexandra LPN  3/13/2019 8:44 AM

## 2019-03-13 NOTE — PROGRESS NOTES
Nursing Notes:   Valarie Alexandra LPN  3/13/2019  9:11 AM  Signed  Patient presents to clinic today for blood pressure recheck, racing heart, and a letter to let the allergist know that she is good to proceed. She had some tingling in the back of her head last night.     No LMP recorded.  Medication Reconciliation: complete    Valarie Alexandra LPN  3/13/2019 8:44 AM    Nursing note reviewed with patient.  Accurracy and completeness verified.   Ms. Christensen is a 34 year old female who:  Patient presents with:  Hypertension      ICD-10-CM    1. Elevated blood pressure reading without diagnosis of hypertension R03.0 Hemoglobin A1c     Basic Metabolic Panel     Lipid Panel     Lipid Panel     Basic Metabolic Panel     Hemoglobin A1c     HPI   Presents for concerns of high blood pressure reading when she was meeting with allergist to start her diagnostic testing  Sent back to primary care for hypertension evaluation  There is a family history of hypertension--- in reviewing her records she has not had sustained hypertension  However she has gained 20 pounds in the past year--she is currently working on a weight loss program with nutrition shakes  She did show me the ingredients--- there is no caffeine or stimulants in her shakes    Otherwise feels well    Quit tobacco 2013--maybe 10 year history total      Review of Systems   Psychiatric/Behavioral: The patient is nervous/anxious.    All other systems reviewed and are negative.     All other systems reviewed and negative.     MADELINE:   MADELINE-7 SCORE 7/8/2015 5/25/2016 8/2/2017   Total Score 3 1 5     PHQ9:  PHQ-9 SCORE 9/21/2016 8/2/2017 1/24/2019   PHQ-9 Total Score 4 2 0       I have personally reviewed the past medical history, past surgical history, medications, allergies, family and social history as listed below, on 3/13/2019.    Allergies   Allergen Reactions     Cefaclor Unknown       Current Outpatient Medications   Medication Sig Dispense Refill     albuterol (PROAIR  HFA/PROVENTIL HFA/VENTOLIN HFA) 108 (90 BASE) MCG/ACT Inhaler Inhale 2 puffs into the lungs every 4 hours as needed       budesonide (PULMICORT) 0.5 MG/2ML neb solution Squirt entire vial into previously made john med saline bottle, mix, irrigate both nostrils until entire bottle empty. Do this twice daily. 2 Box 3     escitalopram (LEXAPRO) 10 MG tablet TAKE 1 TABLET BY MOUTH EVERY DAY 90 tablet 3     fluticasone (FLONASE) 50 MCG/ACT nasal spray Spray 2 sprays into both nostrils daily 1 Bottle 11     minocycline (MINOCIN/DYNACIN) 100 MG capsule TAKE 1 CAPSULE BY MOUTH TWICE DAILY 120 capsule 3     norgestimate-ethinyl estradiol (PREVIFEM) 0.25-35 MG-MCG per tablet Take 1 tablet by mouth daily 84 tablet 5     polyethylene glycol (MIRALAX) powder Take 17 g (1 capful) by mouth daily 510 g 3     senna (SENOKOT) 8.6 MG tablet 2 tabs today and as needed up to once day for constaiption 120 tablet 0     topiramate (TOPAMAX) 25 MG tablet TAKE 1 TABLET BY MOUTH EVERY DAY 90 tablet 3        Patient Active Problem List    Diagnosis Date Noted     Acne, mild 02/09/2018     Priority: Medium     Dysthymic disorder 02/09/2018     Priority: Medium     Asthma 02/09/2018     Priority: Medium     Overview:   exercise induced/mild intermittent       Contraceptive management 02/09/2018     Priority: Medium     Encounter for routine gynecological examination 02/09/2018     Priority: Medium     Encounter for surveillance of contraceptives 08/02/2017     Priority: Medium     Family history of diabetes mellitus (DM) 08/02/2017     Priority: Medium     Cervical high risk HPV (human papillomavirus) test positive 09/21/2016     Priority: Medium     Cheilitis 12/13/2013     Priority: Medium     Migraine headache 12/07/2011     Priority: Medium     Other abnormal Papanicolaou smear of cervix and cervical HPV(795.09) 10/31/2011     Priority: Medium     Alcohol abuse 10/19/2011     Priority: Medium     Tobacco abuse 10/19/2011     Priority: Medium      Concussion 2011     Priority: Medium     Overview:   no loss of consciousness from softball injury       Vaginitis 2011     Priority: Medium     Past Medical History:   Diagnosis Date     Personal history of other medical treatment (CODE)      2, Para 2     History reviewed. No pertinent surgical history.  Social History     Socioeconomic History     Marital status:      Spouse name: None     Number of children: None     Years of education: None     Highest education level: None   Occupational History     None   Social Needs     Financial resource strain: None     Food insecurity:     Worry: None     Inability: None     Transportation needs:     Medical: None     Non-medical: None   Tobacco Use     Smoking status: Former Smoker     Years: 10.00     Types: Cigarettes     Last attempt to quit: 2013     Years since quittin.2     Smokeless tobacco: Never Used   Substance and Sexual Activity     Alcohol use: Yes     Comment: Alcoholic Drinks/day: Occ     Drug use: No     Sexual activity: Yes     Partners: Male     Birth control/protection: Pill   Lifestyle     Physical activity:     Days per week: None     Minutes per session: None     Stress: None   Relationships     Social connections:     Talks on phone: None     Gets together: None     Attends Gnosticism service: None     Active member of club or organization: None     Attends meetings of clubs or organizations: None     Relationship status: None     Intimate partner violence:     Fear of current or ex partner: None     Emotionally abused: None     Physically abused: None     Forced sexual activity: None   Other Topics Concern     Parent/sibling w/ CABG, MI or angioplasty before 65F 55M? Not Asked   Social History Narrative    two children.     Preload 2013     Family History   Problem Relation Age of Onset     Heart Disease Maternal Grandfather 50        Heart Disease     Diabetes Maternal Grandfather          "Diabetes     Chronic Obstructive Pulmonary Disease Maternal Grandfather         COPD     Diabetes Mother         Diabetes     Thyroid Disease Mother         Thyroid Disease     Heart Disease Mother      Heart Disease Maternal Uncle      Thyroid Disease Brother         Thyroid Disease     Diabetes Brother 38        Diabetes     Diabetes Maternal Uncle         Diabetes     Heart Disease Maternal Uncle      Heart Disease Maternal Grandmother      Breast Cancer No family hx of         Cancer-breast     Ovarian Cancer No family hx of         Cancer-ovarian     Prostate Cancer No family hx of         Cancer-prostate     Other - See Comments No family hx of         Stroke     Colon Cancer No family hx of         Cancer-colon     Blood Disease No family hx of         Blood Disease       EXAM:   Vitals:    03/13/19 0845 03/13/19 0850 03/13/19 0852   BP: (!) 137/102 (!) 130/97 (!) 126/92   BP Location: Left arm Left arm Left arm   Patient Position: Sitting Sitting Sitting   Cuff Size: Adult Large Adult Regular Adult Large   Pulse: 83 67 69   Resp: 22     Temp: 98.5  F (36.9  C)     TempSrc: Tympanic     Weight: 103.6 kg (228 lb 6.4 oz)         Current Pain Score: No Pain (0)     BP Readings from Last 3 Encounters:   03/13/19 (!) 126/92   03/12/19 (!) 160/110   01/29/19 128/82      Wt Readings from Last 3 Encounters:   03/13/19 103.6 kg (228 lb 6.4 oz)   03/12/19 104.3 kg (230 lb)   01/29/19 103.9 kg (229 lb)      Estimated body mass index is 35.77 kg/m  as calculated from the following:    Height as of 3/12/19: 1.702 m (5' 7\").    Weight as of this encounter: 103.6 kg (228 lb 6.4 oz).     Physical Exam   Constitutional: She is oriented to person, place, and time. She appears well-developed and well-nourished.   Cardiovascular: Normal rate and regular rhythm.   Pulmonary/Chest: Effort normal.   Musculoskeletal: Normal range of motion.   Neurological: She is alert and oriented to person, place, and time.   Skin: Skin is warm " and dry.   Psychiatric: She has a normal mood and affect. Her behavior is normal. Judgment and thought content normal.   Nursing note and vitals reviewed.      INVESTIGATIONS:  Results for orders placed or performed in visit on 03/13/19   Lipid Panel   Result Value Ref Range    Cholesterol 164 <200 mg/dL    Triglycerides 182 (H) <150 mg/dL    HDL Cholesterol 48 23 - 92 mg/dL    LDL Cholesterol Calculated 80 <100 mg/dL    Non HDL Cholesterol 116 <130 mg/dL   Basic Metabolic Panel   Result Value Ref Range    Sodium 136 134 - 144 mmol/L    Potassium 3.7 3.5 - 5.1 mmol/L    Chloride 106 98 - 107 mmol/L    Carbon Dioxide 23 21 - 31 mmol/L    Anion Gap 7 3 - 14 mmol/L    Glucose 94 70 - 105 mg/dL    Urea Nitrogen 13 7 - 25 mg/dL    Creatinine 0.76 0.60 - 1.20 mg/dL    GFR Estimate 86 >60 mL/min/[1.73_m2]    GFR Estimate If Black >90 >60 mL/min/[1.73_m2]    Calcium 9.1 8.6 - 10.3 mg/dL   Hemoglobin A1c   Result Value Ref Range    Hemoglobin A1C 4.9 4.0 - 6.0 %       ASSESSMENT AND PLAN:  Problem List Items Addressed This Visit     None      Visit Diagnoses     Elevated blood pressure reading without diagnosis of hypertension    -  Primary    Relevant Orders    Hemoglobin A1c (Completed)    Basic Metabolic Panel (Completed)    Lipid Panel (Completed)        Has gained 20 pounds over the last year and does have baseline anxiety--- blood pressure seems to return to normal after 2-3 rechecks when triggered by stress or anxiety  She will obtain a home monitoring blood pressure monitor- will check daily, we talked about target blood pressure and will record--bring to next appointment  Would like her to return to clinic in 2 weeks and will evaluate if there is truly a persistent underlying hypertension    Obtain some follow-up labs today--    Will purchase Omeron electronic cuff--basic model reasonably prized $25-$30--- this is affordable for her          -- Expected clinical course discussed    -- Medications and their side  effects discussed    Patient Instructions   Check daily Blood pressure--target -149/60-80  If outlier number recheck x 3 every 5 minutes relaxed for average    Come back Tuesday 26 th with readings from home    Try to monitor salt--        Ermelinda Nunez CNP  [facility]     Portions of this note were dictated using speech recognition software. The note has been proofread but errors in the text may have been overlooked. Please contact me if there are any concerns regarding the accuracy of the dictation.

## 2019-03-14 ASSESSMENT — ASTHMA QUESTIONNAIRES: ACT_TOTALSCORE: 25

## 2019-03-26 ENCOUNTER — OFFICE VISIT (OUTPATIENT)
Dept: FAMILY MEDICINE | Facility: OTHER | Age: 34
End: 2019-03-26
Attending: NURSE PRACTITIONER
Payer: COMMERCIAL

## 2019-03-26 VITALS
DIASTOLIC BLOOD PRESSURE: 92 MMHG | HEIGHT: 67 IN | HEART RATE: 62 BPM | WEIGHT: 229 LBS | RESPIRATION RATE: 16 BRPM | TEMPERATURE: 98.3 F | BODY MASS INDEX: 35.94 KG/M2 | SYSTOLIC BLOOD PRESSURE: 138 MMHG

## 2019-03-26 DIAGNOSIS — I10 HYPERTENSION, UNSPECIFIED TYPE: Primary | ICD-10-CM

## 2019-03-26 DIAGNOSIS — L70.0 ACNE VULGARIS: ICD-10-CM

## 2019-03-26 DIAGNOSIS — F41.1 GAD (GENERALIZED ANXIETY DISORDER): ICD-10-CM

## 2019-03-26 PROCEDURE — G0463 HOSPITAL OUTPT CLINIC VISIT: HCPCS

## 2019-03-26 PROCEDURE — 99214 OFFICE O/P EST MOD 30 MIN: CPT | Performed by: NURSE PRACTITIONER

## 2019-03-26 RX ORDER — LISINOPRIL 10 MG/1
10 TABLET ORAL DAILY
Qty: 60 TABLET | Refills: 1 | Status: SHIPPED | OUTPATIENT
Start: 2019-03-26 | End: 2019-08-17

## 2019-03-26 RX ORDER — TRETINOIN 0.05 G/100G
GEL TOPICAL
Qty: 45 G | Refills: 1 | Status: SHIPPED | OUTPATIENT
Start: 2019-03-26 | End: 2019-08-06

## 2019-03-26 RX ORDER — ESCITALOPRAM OXALATE 20 MG/1
20 TABLET ORAL DAILY
Qty: 90 TABLET | Refills: 3 | Status: SHIPPED | OUTPATIENT
Start: 2019-03-26 | End: 2020-03-29

## 2019-03-26 ASSESSMENT — PAIN SCALES - GENERAL: PAINLEVEL: NO PAIN (0)

## 2019-03-26 ASSESSMENT — MIFFLIN-ST. JEOR: SCORE: 1761.37

## 2019-03-26 NOTE — PROGRESS NOTES
Nursing Notes:   Valarie Alexandra LPN  3/26/2019  4:36 PM  Signed  Patient presents to clinic today for a blood pressure follow up. Patient brought a 2 week log of BP readings. She feels that her continuous headaches are coming from the elevation of her BP. She is interested in stopping a few of her other medications, she will discuss those which are noted with you. She would like to increase Lexapro if possible.     Patient denies dizziness. She feels the racing heart symptoms. She denies any pain.     No LMP recorded.  Medication Reconciliation: complete    Valarie Alexandra LPN  3/26/2019 4:03 PM      Nursing note reviewed with patient.  Accurracy and completeness verified.   Ms. Christensen is a 34 year old female who:  Patient presents with:  Follow Up: Blood Pressure      ICD-10-CM    1. Hypertension, unspecified type I10 lisinopril (PRINIVIL/ZESTRIL) 10 MG tablet   2. Acne vulgaris L70.0 tretinoin (RETIN-A) 0.05 % external gel   3. MADELINE (generalized anxiety disorder) F41.1 escitalopram (LEXAPRO) 20 MG tablet     HPI  Follow-up on home blood pressure readings--at her last allergy appointment her blood pressure was elevated and it was recommended that she follow-up with primary care to determine treatment before starting allergy testing    She brought in her home blood pressure readings running 136-151/70-88--range mostly in the 140s  She has the capability to check her blood pressure at home she has a good blood pressure monitor she understands target range  We will try starting a low-dose lisinopril--- she will contact me if she is running in the low 100s systolically consistently  Otherwise she will come back in 4 weeks for blood pressure medication follow-up and home readings    She can start her allergy testing as scheduled this week--  She would also like to discontinue some of her medications such as her oral contraceptives and her minocycline  As she does not feel they have been effective    She would like to streamline  some of her other medications as well such as her daily Topamax for migraine prevention  She has been migraine free for years    She would also like to adjust her Lexapro from 10 mg up to 20 mg    Overall she has been doing well she has been healthy she does not smoke tobacco    Review of Systems   Skin: Positive for rash.   All other systems reviewed and are negative.     All other systems reviewed and negative.     MADELINE:   MADELINE-7 SCORE 7/8/2015 5/25/2016 8/2/2017   Total Score 3 1 5     PHQ9:  PHQ-9 SCORE 9/21/2016 8/2/2017 1/24/2019   PHQ-9 Total Score 4 2 0       I have personally reviewed the past medical history, past surgical history, medications, allergies, family and social history as listed below, on 3/26/2019.    Allergies   Allergen Reactions     Cefaclor Unknown       Current Outpatient Medications   Medication Sig Dispense Refill     albuterol (PROAIR HFA/PROVENTIL HFA/VENTOLIN HFA) 108 (90 BASE) MCG/ACT Inhaler Inhale 2 puffs into the lungs every 4 hours as needed       budesonide (PULMICORT) 0.5 MG/2ML neb solution Squirt entire vial into previously made john med saline bottle, mix, irrigate both nostrils until entire bottle empty. Do this twice daily. 2 Box 3     escitalopram (LEXAPRO) 20 MG tablet Take 1 tablet (20 mg) by mouth daily 90 tablet 3     fluticasone (FLONASE) 50 MCG/ACT nasal spray Spray 2 sprays into both nostrils daily 1 Bottle 11     lisinopril (PRINIVIL/ZESTRIL) 10 MG tablet Take 1 tablet (10 mg) by mouth daily 60 tablet 1     polyethylene glycol (MIRALAX) powder Take 17 g (1 capful) by mouth daily 510 g 3     senna (SENOKOT) 8.6 MG tablet 2 tabs today and as needed up to once day for constaiption 120 tablet 0     topiramate (TOPAMAX) 25 MG tablet TAKE 1 TABLET BY MOUTH EVERY DAY 90 tablet 3     tretinoin (RETIN-A) 0.05 % external gel Use at bedtime daily 45 g 1        Patient Active Problem List    Diagnosis Date Noted     Acne, mild 02/09/2018     Priority: Medium     Dysthymic  disorder 2018     Priority: Medium     Asthma 2018     Priority: Medium     Overview:   exercise induced/mild intermittent       Contraceptive management 2018     Priority: Medium     Encounter for routine gynecological examination 2018     Priority: Medium     Encounter for surveillance of contraceptives 2017     Priority: Medium     Family history of diabetes mellitus (DM) 2017     Priority: Medium     Cervical high risk HPV (human papillomavirus) test positive 2016     Priority: Medium     Cheilitis 2013     Priority: Medium     Migraine headache 2011     Priority: Medium     Other abnormal Papanicolaou smear of cervix and cervical HPV(795.09) 10/31/2011     Priority: Medium     Alcohol abuse 10/19/2011     Priority: Medium     Tobacco abuse 10/19/2011     Priority: Medium     Concussion 2011     Priority: Medium     Overview:   no loss of consciousness from softball injury       Vaginitis 2011     Priority: Medium     Past Medical History:   Diagnosis Date     Personal history of other medical treatment (CODE)      2, Para 2     No past surgical history on file.  Social History     Socioeconomic History     Marital status:      Spouse name: None     Number of children: None     Years of education: None     Highest education level: None   Occupational History     None   Social Needs     Financial resource strain: None     Food insecurity:     Worry: None     Inability: None     Transportation needs:     Medical: None     Non-medical: None   Tobacco Use     Smoking status: Former Smoker     Years: 10.00     Types: Cigarettes     Last attempt to quit: 2013     Years since quittin.2     Smokeless tobacco: Never Used   Substance and Sexual Activity     Alcohol use: Yes     Comment: Alcoholic Drinks/day: Occ     Drug use: No     Sexual activity: Yes     Partners: Male     Birth control/protection: Pill   Lifestyle     Physical  "activity:     Days per week: None     Minutes per session: None     Stress: None   Relationships     Social connections:     Talks on phone: None     Gets together: None     Attends Advent service: None     Active member of club or organization: None     Attends meetings of clubs or organizations: None     Relationship status: None     Intimate partner violence:     Fear of current or ex partner: None     Emotionally abused: None     Physically abused: None     Forced sexual activity: None   Other Topics Concern     Parent/sibling w/ CABG, MI or angioplasty before 65F 55M? Not Asked   Social History Narrative    two children.     Preload 03/01/2013     Family History   Problem Relation Age of Onset     Heart Disease Maternal Grandfather 50        Heart Disease     Diabetes Maternal Grandfather         Diabetes     Chronic Obstructive Pulmonary Disease Maternal Grandfather         COPD     Diabetes Mother         Diabetes     Thyroid Disease Mother         Thyroid Disease     Heart Disease Mother      Heart Disease Maternal Uncle      Thyroid Disease Brother         Thyroid Disease     Diabetes Brother 38        Diabetes     Diabetes Maternal Uncle         Diabetes     Heart Disease Maternal Uncle      Heart Disease Maternal Grandmother      Breast Cancer No family hx of         Cancer-breast     Ovarian Cancer No family hx of         Cancer-ovarian     Prostate Cancer No family hx of         Cancer-prostate     Other - See Comments No family hx of         Stroke     Colon Cancer No family hx of         Cancer-colon     Blood Disease No family hx of         Blood Disease       EXAM:   Vitals:    03/26/19 1604   BP: (!) 138/92   BP Location: Right arm   Patient Position: Sitting   Cuff Size: Adult Regular   Pulse: 62   Resp: 16   Temp: 98.3  F (36.8  C)   TempSrc: Tympanic   Weight: 103.9 kg (229 lb)   Height: 1.702 m (5' 7\")       Current Pain Score: No Pain (0)     BP Readings from Last 3 Encounters: " "  03/26/19 (!) 138/92   03/13/19 (!) 126/92   03/12/19 (!) 160/110      Wt Readings from Last 3 Encounters:   03/26/19 103.9 kg (229 lb)   03/13/19 103.6 kg (228 lb 6.4 oz)   03/12/19 104.3 kg (230 lb)      Estimated body mass index is 35.87 kg/m  as calculated from the following:    Height as of this encounter: 1.702 m (5' 7\").    Weight as of this encounter: 103.9 kg (229 lb).     Physical Exam   Constitutional: She is oriented to person, place, and time. She appears well-developed and well-nourished.   Cardiovascular: Normal rate and regular rhythm.   Pulmonary/Chest: Effort normal.   Musculoskeletal: Normal range of motion.   Neurological: She is alert and oriented to person, place, and time.   Skin: Skin is warm and dry. Rash noted.   Mostly closed comedones scattered distribution pattern T-zone of face a few on her anterior chest and on her posterior shoulders   Psychiatric: She has a normal mood and affect. Her behavior is normal. Judgment and thought content normal.   Nursing note and vitals reviewed.      INVESTIGATIONS:  Results for orders placed or performed in visit on 03/13/19   Lipid Panel   Result Value Ref Range    Cholesterol 164 <200 mg/dL    Triglycerides 182 (H) <150 mg/dL    HDL Cholesterol 48 23 - 92 mg/dL    LDL Cholesterol Calculated 80 <100 mg/dL    Non HDL Cholesterol 116 <130 mg/dL   Basic Metabolic Panel   Result Value Ref Range    Sodium 136 134 - 144 mmol/L    Potassium 3.7 3.5 - 5.1 mmol/L    Chloride 106 98 - 107 mmol/L    Carbon Dioxide 23 21 - 31 mmol/L    Anion Gap 7 3 - 14 mmol/L    Glucose 94 70 - 105 mg/dL    Urea Nitrogen 13 7 - 25 mg/dL    Creatinine 0.76 0.60 - 1.20 mg/dL    GFR Estimate 86 >60 mL/min/[1.73_m2]    GFR Estimate If Black >90 >60 mL/min/[1.73_m2]    Calcium 9.1 8.6 - 10.3 mg/dL   Hemoglobin A1c   Result Value Ref Range    Hemoglobin A1C 4.9 4.0 - 6.0 %       ASSESSMENT AND PLAN:  Problem List Items Addressed This Visit     None      Visit Diagnoses     " Hypertension, unspecified type    -  Primary    Relevant Medications    lisinopril (PRINIVIL/ZESTRIL) 10 MG tablet    Acne vulgaris        Relevant Medications    tretinoin (RETIN-A) 0.05 % external gel    MADELINE (generalized anxiety disorder)        Relevant Medications    escitalopram (LEXAPRO) 20 MG tablet        Hypertension--borderline--we will start lisinopril 10 mg daily she understands target range and will contact me if her systolic is running persistently in the low 100s or less  Otherwise she will return in 4 weeks for blood pressure reading review and medication follow-up  She is okay to start her allergy testing this Thursday as scheduled    We will discontinue her oral contraceptives and minocycline as she requests--- she has not been sexually active since 2016  She will use condoms if needed and will let me know if she needs to restart oral contraceptives--did not like IUD  Discussed as long as she remains tobacco free she could use combined oral contraceptives  Another option we discussed for the acne would be spironolactone but would need to do regular lab follow-ups to make sure she is stable  Like to wait on this    Recommended following up with dermatology as she has not been seen for years she will let me know if she considers this and I will send an electronic referral    She will try nightly tretinoin--if it causes irritation she will reduce the dose to 3 times a week or less and gently increase as tolerated  She knows to avoid sun exposure    We will increase Lexapro from 10 up to 20 mg daily    Would suggest continuing Topamax at current dose--- 3-4 months passes and she is doing well on current medications could consider trying a gentle taper        -- Expected clinical course discussed    -- Medications and their side effects discussed    There are no Patient Instructions on file for this visit.  Ermelinda Nunez CNP  [facility]     Portions of this note were dictated using  speech recognition software. The note has been proofread but errors in the text may have been overlooked. Please contact me if there are any concerns regarding the accuracy of the dictation.

## 2019-03-26 NOTE — LETTER
March 26, 2019      Radha Christensen  [address]  [place] [id number]        To Whom It May Concern:    Radha Christensen was seen in our clinic. Her blood pressures are borderline. Starting Lisinopril 10 mg today daily and patient has capability to check BP at home and knows target range 120-149/60-80s overall.  Ok to start allergy testing.      Sincerely,        SHANTAL Le CNP

## 2019-03-26 NOTE — NURSING NOTE
Patient presents to clinic today for a blood pressure follow up. Patient brought a 2 week log of BP readings. She feels that her continuous headaches are coming from the elevation of her BP. She is interested in stopping a few of her other medications, she will discuss those which are noted with you. She would like to increase Lexapro if possible.     Patient denies dizziness. She feels the racing heart symptoms. She denies any pain.     No LMP recorded.  Medication Reconciliation: complete    Valarie Alexandra LPN  3/26/2019 4:03 PM

## 2019-03-28 ENCOUNTER — OFFICE VISIT (OUTPATIENT)
Dept: OTOLARYNGOLOGY | Facility: OTHER | Age: 34
End: 2019-03-28
Attending: AUDIOLOGIST
Payer: COMMERCIAL

## 2019-03-28 ENCOUNTER — OFFICE VISIT (OUTPATIENT)
Dept: ALLERGY | Facility: OTHER | Age: 34
End: 2019-03-28
Attending: AUDIOLOGIST
Payer: COMMERCIAL

## 2019-03-28 VITALS
WEIGHT: 230 LBS | TEMPERATURE: 99.4 F | HEART RATE: 79 BPM | HEIGHT: 67 IN | SYSTOLIC BLOOD PRESSURE: 120 MMHG | BODY MASS INDEX: 36.1 KG/M2 | DIASTOLIC BLOOD PRESSURE: 87 MMHG

## 2019-03-28 DIAGNOSIS — R09.81 NASAL CONGESTION: ICD-10-CM

## 2019-03-28 DIAGNOSIS — Z86.69 HISTORY OF MIGRAINE: ICD-10-CM

## 2019-03-28 DIAGNOSIS — J34.3 NASAL TURBINATE HYPERTROPHY: ICD-10-CM

## 2019-03-28 DIAGNOSIS — J32.0 CHRONIC MAXILLARY SINUSITIS: Primary | ICD-10-CM

## 2019-03-28 DIAGNOSIS — Z91.09 ALLERGY TO ANIMALS: Primary | ICD-10-CM

## 2019-03-28 DIAGNOSIS — J30.89 PERENNIAL ALLERGIC RHINITIS: ICD-10-CM

## 2019-03-28 PROCEDURE — 95004 PERQ TESTS W/ALRGNC XTRCS: CPT

## 2019-03-28 PROCEDURE — 95004 PERQ TESTS W/ALRGNC XTRCS: CPT | Mod: TC

## 2019-03-28 PROCEDURE — G0463 HOSPITAL OUTPT CLINIC VISIT: HCPCS | Mod: 25

## 2019-03-28 PROCEDURE — 95024 IQ TESTS W/ALLERGENIC XTRCS: CPT | Mod: TC

## 2019-03-28 PROCEDURE — 95024 IQ TESTS W/ALLERGENIC XTRCS: CPT

## 2019-03-28 PROCEDURE — 99213 OFFICE O/P EST LOW 20 MIN: CPT | Mod: 25 | Performed by: NURSE PRACTITIONER

## 2019-03-28 ASSESSMENT — MIFFLIN-ST. JEOR: SCORE: 1765.9

## 2019-03-28 ASSESSMENT — PAIN SCALES - GENERAL: PAINLEVEL: NO PAIN (0)

## 2019-03-28 NOTE — NURSING NOTE
"Chief Complaint   Patient presents with     Other     MQT allergy testing and follow-up       Initial /87 (BP Location: Left arm, Patient Position: Chair, Cuff Size: Adult Regular)   Pulse 79   Temp 99.4  F (37.4  C) (Oral)   Ht 1.702 m (5' 7\")   Wt 104.3 kg (230 lb)   LMP 03/22/2019 (Exact Date)   BMI 36.02 kg/m   Estimated body mass index is 36.02 kg/m  as calculated from the following:    Height as of this encounter: 1.702 m (5' 7\").    Weight as of this encounter: 104.3 kg (230 lb).  Medication Reconciliation: complete    Karly Maurice RN     This patient presents today for allergy skin testing.      Symptoms have included chronic sinus infections, especially over the last 2-3 years.  Patient states nothing seems to help.  She gets sinus pressure and headaches.  She notes left eye drainage that is clear and watery, along with burning.  She has PND.  When she is around dogs and cats, if she touches her face after petting them, she will get itchy, watery eyes, congestion, and sneezing.  She also reports frequent sneezing when she is dusting her house.  Although she has not paid close attention to seasonal changes, she has noticed this winter her symptoms have been worse.  Patient has a history of asthma.  She states pet exposure (cats, dogs), exercise, having a cold, and being in cold weather are all triggers for her asthma.  She uses a rescue inhaler and finds this to be very helpful in treating her symptoms.  She has never needed to be seen in the emergency department for an asthma flare-up.  She reports that she visited family in [place] last February and did not notice any allergy symptoms during her trip.  She denies skin issues such as psoriasis or eczema.        This patient lives in a [facility], without a basement.  This patient does suspect there could be mold in her master bathroom.  She reports areas that are black up on the corners of the ceiling. There is carpet in the home, and " carpet in the bedroom.  Home has natural gas, forced air heat and does have air conditioning.        This patient has no pets.      This patient has not had allergy testing in the past.    This patient's medications have been reviewed prior to testing and all appropriate medications have been stopped.    Consent is signed by patient and signature is verified.     MQT/ID test is performed per protocol.  The patient tolerated testing well.  Benadryl gel was applied to testing sites on patient's skin.  Patient also received Claritin 10 mg (children's chewable tablets), both per standing order.  All findings are recorded on the paper flow sheet. Results are reviewed with this patient.  They are given written information regarding allergy.       The patient will follow-up with Valeria Hurtado CNP, for treatment plan.      Karly Maurice RN

## 2019-03-28 NOTE — PROGRESS NOTES
Prior to testing, verified patient identity using patient's name and date of birth.    Radha was seen for allergy skin testing. Patient was seen by this nurse in conjunction with ENT provider. All encounter details are documented in ENT Provider's appointment from this same date. Please see referenced encounter for this visits documentation.     Karly Maurice RN

## 2019-03-28 NOTE — PROGRESS NOTES
Otolaryngology Progress Note           Chief Complaint:     Chief Complaint   Patient presents with     Other     MQT allergy testing and follow-up          History of Present Illness:     Radha Christensen is a 34 year old female, history of migraines and recurrent sinusitis, here to follow-up on MQT that was completed today. She initially came in for testing 3/12/19 but we needed to cancel due to elevated blood pressure. She did see PCP who recommended home BP monitoring and results came back with some mild elevations and was dx with HTN and started on low dose lisinopril.    Today Radha tolerated testing without difficulty.   Prior nasal culture was negative.    She did do the budesonide nasal irrigations along with the Claritin. She feels improvement in the nasal congestion and sinus pressure and is very happy with this. She cannot tell if this has helped with her migraines or not, as she has been on daily Topamax for these.    I did cauterize left Keisselbach's plexus last visit 1/29/19 and she reports no issues with bleeding. She has been doing Aquaphor to prevent nasal dryness.    MQT Results 3/28/19  High sensitivities: dust  Moderate sensitivities: ragweed, pigweed, russian thistle, erasto grass, pine, eastern cottonwood, alternaria, aspergillus, hormodendrum, penicillium, fusarium, helminothosporium, cat, dog  Low sensitivities: maple, elm, dipti, epicoccum    1/24/19: Acute sinusitis, doxycycline  8/3/18: Telephone call with recurrent sinus symptoms, called in Azithromycin  5/1/18: Acute maxillary sinusitis, azithromycin  1/12/18: Acute maxillary sinusitis, augmentin     Sinus CT 1/25/19:  IMPRESSION:  Aplastic frontal sinus.  Moderate lobulated right maxillary sinus mucosal thickening versus  retention cyst formation. Clear ostiomeatal units.   Mild S-shaped nasal septal deviation.         Review of Systems:     ROS: See HPI         Physical Exam:     /87 (BP Location: Left arm, Patient Position:  "Chair, Cuff Size: Adult Regular)   Pulse 79   Temp 99.4  F (37.4  C) (Oral)   Ht 1.702 m (5' 7\")   Wt 104.3 kg (230 lb)   LMP 03/22/2019 (Exact Date)   BMI 36.02 kg/m      General - The patient is well nourished and well developed, and appears to have good nutritional status.  Alert and oriented to person and place, interactive.  Head and Face - Normocephalic and atraumatic, with no gross asymmetry noted of the contour of the facial features.  The facial nerve is intact, with strong symmetric movements.  Neck- No palpable lymphadenopathy or thyroid mass.  Trachea is midline.  Eyes - Extraocular movements intact.   Ears- External ears normal. Canals clear. Right tympanic membrane intact without effusion, worrisome retraction or mass. Left tympanic membrane intact without effusion, worrisome retraction or mass.    Nose - Nasal mucosa is pink and moist with no abnormal mucus.  The septum was grossly midline and non-obstructive with well healed prior cautery site left anterior septum (no bleeding). Bilateral ITH.  No polyps, masses, or purulence noted on examination.  Mouth - Examination of the oral cavity shows pink, healthy, moist mucosa. Dentition in good condition.  No lesions or ulceration noted. The tongue is mobile and midline.    Throat - The walls of the oropharynx were smooth, pink, moist, symmetric, and had no lesions or ulcerations.  The tonsillar pillars and soft palate were symmetric.  The uvula was midline on elevation.           Assessment and Plan:       ICD-10-CM    1. Allergy to animals J30.81    2. Nasal turbinate hypertrophy J34.3    3. Nasal congestion R09.81    4. History of migraine Z86.69    5. Perennial allergic rhinitis J30.89      Reviewed MQT with Radha.    She is very pleased with the relief in her nasal congestion, drainage and sinus pressure.    Continue daily Claritin.    Jones Med sinus irrigation twice daily and more as needed.    Use as needed Flonase for further relief.  If " nose bleeds, stop using for 2 weeks, then re-introduce.  Aquaphor to bilateral nostrils at night and return if any ongoing concerns regarding epistaxis.    She may try weaning off her Topamax to see if she has any further issues with migraines, once she is back on her Claritin for at least 1 month. I suspect some of her symptoms could be related to increased histamine.  She does have aplastic frontal sinuses noted on CT, not necessarily a cause of migraines, but if these continue, offered follow-up with Dr. Sanchez to review sinus CT/symtpoms to see if any surgical treatment would be recommended.    Follow up as needed, otherwise annual for ongoing medication refills.    Valeria Hurtado NP  ENT  [facility], [place]  [phone]

## 2019-03-28 NOTE — LETTER
3/28/2019         RE: Radha Christensen  [address]  [place] [facility] [place] [address]        Dear Colleague,    Thank you for referring your patient, Radha Christensen, to the [facility]. Please see a copy of my visit note below.    Otolaryngology Progress Note           Chief Complaint:     Chief Complaint   Patient presents with     Other     MQT allergy testing and follow-up          History of Present Illness:     Radha Christensen is a 34 year old female, history of migraines and recurrent sinusitis, here to follow-up on MQT that was completed today. She initially came in for testing 3/12/19 but we needed to cancel due to elevated blood pressure. She did see PCP who recommended home BP monitoring and results came back with some mild elevations and was dx with HTN and started on low dose lisinopril.    Today Radha tolerated testing without difficulty.   Prior nasal culture was negative.    She did do the budesonide nasal irrigations along with the Claritin. She feels improvement in the nasal congestion and sinus pressure and is very happy with this. She cannot tell if this has helped with her migraines or not, as she has been on daily Topamax for these.    I did cauterize left Keisselbach's plexus last visit 1/29/19 and she reports no issues with bleeding. She has been doing Aquaphor to prevent nasal dryness.    MQT Results 3/28/19  High sensitivities: dust  Moderate sensitivities: ragweed, pigweed, russian thistle, erasto grass, pine, eastern cottonwood, alternaria, aspergillus, hormodendrum, penicillium, fusarium, helminothosporium, cat, dog  Low sensitivities: maple, elm, dipti, epicoccum    1/24/19: Acute sinusitis, doxycycline  8/3/18: Telephone call with recurrent sinus symptoms, called in Azithromycin  5/1/18: Acute maxillary sinusitis, azithromycin  1/12/18: Acute maxillary sinusitis, augmentin     Sinus CT 1/25/19:  IMPRESSION:  Aplastic frontal sinus.  Moderate lobulated right  "maxillary sinus mucosal thickening versus  retention cyst formation. Clear ostiomeatal units.   Mild S-shaped nasal septal deviation.         Review of Systems:     ROS: See HPI         Physical Exam:     /87 (BP Location: Left arm, Patient Position: Chair, Cuff Size: Adult Regular)   Pulse 79   Temp 99.4  F (37.4  C) (Oral)   Ht 1.702 m (5' 7\")   Wt 104.3 kg (230 lb)   LMP 03/22/2019 (Exact Date)   BMI 36.02 kg/m       General - The patient is well nourished and well developed, and appears to have good nutritional status.  Alert and oriented to person and place, interactive.  Head and Face - Normocephalic and atraumatic, with no gross asymmetry noted of the contour of the facial features.  The facial nerve is intact, with strong symmetric movements.  Neck- No palpable lymphadenopathy or thyroid mass.  Trachea is midline.  Eyes - Extraocular movements intact.   Ears- External ears normal. Canals clear. Right tympanic membrane intact without effusion, worrisome retraction or mass. Left tympanic membrane intact without effusion, worrisome retraction or mass.    Nose - Nasal mucosa is pink and moist with no abnormal mucus.  The septum was grossly midline and non-obstructive with well healed prior cautery site left anterior septum (no bleeding). Bilateral ITH.  No polyps, masses, or purulence noted on examination.  Mouth - Examination of the oral cavity shows pink, healthy, moist mucosa. Dentition in good condition.  No lesions or ulceration noted. The tongue is mobile and midline.    Throat - The walls of the oropharynx were smooth, pink, moist, symmetric, and had no lesions or ulcerations.  The tonsillar pillars and soft palate were symmetric.  The uvula was midline on elevation.           Assessment and Plan:       ICD-10-CM    1. Allergy to animals J30.81    2. Nasal turbinate hypertrophy J34.3    3. Nasal congestion R09.81    4. History of migraine Z86.69    5. Perennial allergic rhinitis J30.89  "     Reviewed MQT with Radha.    She is very pleased with the relief in her nasal congestion, drainage and sinus pressure.    Continue daily Claritin.    Jones Med sinus irrigation twice daily and more as needed.    Use as needed Flonase for further relief.  If nose bleeds, stop using for 2 weeks, then re-introduce.  Aquaphor to bilateral nostrils at night and return if any ongoing concerns regarding epistaxis.    She may try weaning off her Topamax to see if she has any further issues with migraines, once she is back on her Claritin for at least 1 month. I suspect some of her symptoms could be related to increased histamine.  She does have aplastic frontal sinuses noted on CT, not necessarily a cause of migraines, but if these continue, offered follow-up with Dr. Sanchez to review sinus CT/symtpoms to see if any surgical treatment would be recommended.    Follow up as needed, otherwise annual for ongoing medication refills.    Valeria Hurtado NP  ENT  [facility], [place]  [phone]              Again, thank you for allowing me to participate in the care of your patient.        Sincerely,        SHANTAL Hays CNP

## 2019-03-28 NOTE — PATIENT INSTRUCTIONS
Daily Claritin.    Jones Med sinus irrigation twice daily and more as needed.    Use as needed Flonase for further relief.  If nose bleeds, stop using for 2 weeks, then re-introduce.  Aquaphor to bilateral nostrils at night.     Follow up as needed, otherwise annual for ongoing medication refills.    Thank you for allowing Valeria Hurtado CNP and our ENT team to participate in your care.  If your medications are too expensive or if there are any questions or concerns with your medication, please give the nurse a call.  If you have a scheduling or an appointment question please contact our Ear/Nose/Throat/Allergy Health Unit Coordinator at their direct line [phone].   ALL nursing questions or concerns can be directed to your ENT nurse at: [phone]- Kamila

## 2019-05-16 ENCOUNTER — TELEPHONE (OUTPATIENT)
Dept: FAMILY MEDICINE | Facility: OTHER | Age: 34
End: 2019-05-16

## 2019-05-16 DIAGNOSIS — L70.0 ACNE VULGARIS: Primary | ICD-10-CM

## 2019-05-16 RX ORDER — ADAPALENE GEL USP, 0.3% 3 MG/G
GEL TOPICAL
Qty: 45 G | Refills: 3 | Status: SHIPPED | OUTPATIENT
Start: 2019-05-16 | End: 2019-05-20

## 2019-05-16 NOTE — TELEPHONE ENCOUNTER
Stacey at [facility] needs to know why this patient can't try their step-therapy medication Differin instead of Tretinoin (RETIN-A) 0.05 % external gel.  She needs to know by 2 pm on 5/17/19 or else this PA request will be withdrawn.  Please let me know so that I can let her know.    Erica Reddy on 5/16/2019 at 11:56 AM

## 2019-05-16 NOTE — TELEPHONE ENCOUNTER
Left message for patient to call us back, need to know if she is willing to use the Differin. Need to know this as soon as possible.     Thank you,  Valarie Alexandra LPN........................5/16/2019  4:28 PM

## 2019-05-16 NOTE — TELEPHONE ENCOUNTER
She is willing to try Differin  I will send this to [facility] and she will try it if not effective we will step up to the tretinoin thanks  Ermelinda Nunez, APRN CNP   May 16, 2019

## 2019-05-17 NOTE — TELEPHONE ENCOUNTER
Patient is willing to use the Differin. Sending script to [facility].     Valarie Alexandra LPN........................5/17/2019  9:12 AM      Spoke with Stacey at [facility] and confirmed patient will use the Differin Cream.     Valarie Alexandra LPN........................5/17/2019  9:13 AM

## 2019-05-20 ENCOUNTER — TELEPHONE (OUTPATIENT)
Dept: FAMILY MEDICINE | Facility: OTHER | Age: 34
End: 2019-05-20

## 2019-05-20 DIAGNOSIS — L70.0 ACNE VULGARIS: ICD-10-CM

## 2019-05-20 DIAGNOSIS — L70.0 ACNE VULGARIS: Primary | ICD-10-CM

## 2019-05-20 RX ORDER — ADAPALENE 45 G/G
GEL TOPICAL AT BEDTIME
Qty: 45 G | Refills: 4 | Status: SHIPPED | OUTPATIENT
Start: 2019-05-20 | End: 2020-05-29

## 2019-05-20 NOTE — TELEPHONE ENCOUNTER
"OK--they could be helpful if they would specify when they limit prescriptions---Hopefully they will not restrict gel versus cream    [employer] requested dose resent to delicia    This patient has had acne for years--This dose is basically over the counter potency    Thanks for contacting [facility] about this    Very good chance this will not be effective and will need to \"step\" up dose and product    Patient is aware insurance coverage issues    Ermelinda Nunez, APRN CNP   May 20, 2019         "

## 2019-05-20 NOTE — TELEPHONE ENCOUNTER
Stacey at [facility] called and said that the Adapalene (DIFFERIN) external gel needs to be 0.1% not 0.3%.  Can you please prescribe the 0.1% instead?  Please let me know so that I can let Stacey know.  Erica Reddy on 5/20/2019 at 4:22 PM

## 2019-05-21 NOTE — TELEPHONE ENCOUNTER
Entered in Error, MD and nurses are addressing this medication and seems to be insurance coverage concerns.

## 2019-05-22 RX ORDER — ADAPALENE GEL USP, 0.3% 3 MG/G
GEL TOPICAL
Qty: 45 G | Refills: 0 | OUTPATIENT
Start: 2019-05-22

## 2019-05-22 NOTE — TELEPHONE ENCOUNTER
Refill request from [facility]  for:  adapalene (DIFFERIN) 0.3 % external gel     Noted medication filled 5/20/2019 for 45 g and 4 refills-Receipt confirmed by pharmacy [facility].      Prescription refused.  This is a duplicate request.  Cris Mendes RN.......5/22/2019 12:27 PM

## 2019-07-29 ENCOUNTER — MYC MEDICAL ADVICE (OUTPATIENT)
Dept: FAMILY MEDICINE | Facility: OTHER | Age: 34
End: 2019-07-29

## 2019-07-29 ENCOUNTER — HOSPITAL ENCOUNTER (EMERGENCY)
Facility: OTHER | Age: 34
Discharge: HOME | End: 2019-07-29
Attending: PHYSICIAN ASSISTANT | Admitting: PHYSICIAN ASSISTANT
Payer: COMMERCIAL

## 2019-07-29 VITALS
SYSTOLIC BLOOD PRESSURE: 137 MMHG | HEIGHT: 67 IN | RESPIRATION RATE: 12 BRPM | OXYGEN SATURATION: 98 % | DIASTOLIC BLOOD PRESSURE: 102 MMHG | HEART RATE: 58 BPM | TEMPERATURE: 98.3 F | BODY MASS INDEX: 36.02 KG/M2

## 2019-07-29 DIAGNOSIS — R82.90 FOUL SMELLING URINE: ICD-10-CM

## 2019-07-29 DIAGNOSIS — I10 HTN (HYPERTENSION): ICD-10-CM

## 2019-07-29 LAB
ALBUMIN UR-MCNC: NEGATIVE MG/DL
APPEARANCE UR: CLEAR
BILIRUB UR QL STRIP: NEGATIVE
COLOR UR AUTO: YELLOW
GLUCOSE UR STRIP-MCNC: NEGATIVE MG/DL
HCG UR QL: NEGATIVE
HGB UR QL STRIP: ABNORMAL
KETONES UR STRIP-MCNC: NEGATIVE MG/DL
LEUKOCYTE ESTERASE UR QL STRIP: NEGATIVE
NITRATE UR QL: NEGATIVE
NON-SQ EPI CELLS #/AREA URNS LPF: NORMAL /LPF
PH UR STRIP: 7.5 PH (ref 5–9)
RBC #/AREA URNS AUTO: NORMAL /HPF
SOURCE: ABNORMAL
SP GR UR STRIP: 1.01 (ref 1–1.03)
UROBILINOGEN UR STRIP-ACNC: 0.2 EU/DL (ref 0.2–1)
WBC #/AREA URNS AUTO: NORMAL /HPF

## 2019-07-29 PROCEDURE — 81001 URINALYSIS AUTO W/SCOPE: CPT | Performed by: FAMILY MEDICINE

## 2019-07-29 PROCEDURE — 81025 URINE PREGNANCY TEST: CPT | Performed by: FAMILY MEDICINE

## 2019-07-29 PROCEDURE — 99283 EMERGENCY DEPT VISIT LOW MDM: CPT | Performed by: PHYSICIAN ASSISTANT

## 2019-07-29 PROCEDURE — 99283 EMERGENCY DEPT VISIT LOW MDM: CPT | Mod: Z6 | Performed by: PHYSICIAN ASSISTANT

## 2019-07-29 NOTE — TELEPHONE ENCOUNTER
Pt returned call right after [employer] message was sent back to pt.  Advised pt to present to [facility] today for assessment.  Pt in agreement with plan and will present there shortly.    Cris Mendes RN  ....................  7/29/2019   10:43 AM

## 2019-07-29 NOTE — ED AVS SNAPSHOT
[facility]  [address]  [place] [address]  Phone:  [phone]  Fax:  [phone]                                    Radha Christensen   MRN: [id number]    Department:  [facility]   Date of Visit:  7/29/2019           After Visit Summary Signature Page    I have received my discharge instructions, and my questions have been answered. I have discussed any challenges I see with this plan with the nurse or doctor.    ..........................................................................................................................................  Patient/Patient Representative Signature      ..........................................................................................................................................  Patient Representative Print Name and Relationship to Patient    ..................................................               ................................................  Date                                   Time    ..........................................................................................................................................  Reviewed by Signature/Title    ...................................................              ..............................................  Date                                               Time          22EPIC Rev 08/18

## 2019-07-29 NOTE — DISCHARGE INSTRUCTIONS
Get plenty of fluids and rest.  Your urine today was negative for any infection and your blood pressure appears well.  Be reassured that you appear to be thriving and I am seeing no acute medical problem at this time.  I recommend that you call and make a follow-up appointment with your PCP to determine if any medication changes may be needed.  Return to the emergency department if you have increasing blood pressures especially with associated symptoms.

## 2019-07-29 NOTE — ED TRIAGE NOTES
"ED Nursing Triage Note (General)   ________________________________    Radha Christensen is a 34 year old Female that presents to triage private car  With history of  HTN and a possible UTI reported by patient   Significant symptoms had onset 24 hour(s) ago.  BP (!) 162/90   Pulse 62   Temp 98.3  F (36.8  C) (Temporal)   Resp 18   Ht 1.702 m (5' 7\")   SpO2 99%   BMI 36.02 kg/m  t  Patient appears alert  and oriented, in no acute distress, but was mildly anxious., and cooperative, pleasant and calm behavior.  Anxiety:   GCS Total = 15  Airway: intact  Breathing noted as Normal.  Circulation Normal  Skin normal, warm, dry  Action taken:  Waiting room to await for a room      PRE HOSPITAL PRIOR LIVING SITUATION Spouse  "

## 2019-07-31 ASSESSMENT — ENCOUNTER SYMPTOMS
CONFUSION: 0
WOUND: 0
ADENOPATHY: 0
CHEST TIGHTNESS: 0
HEMATURIA: 0
SHORTNESS OF BREATH: 0
FEVER: 0
CHILLS: 0
ABDOMINAL PAIN: 0
BACK PAIN: 0
BRUISES/BLEEDS EASILY: 0

## 2019-07-31 NOTE — ED PROVIDER NOTES
History     Chief Complaint   Patient presents with     Hypertension     Urinary Frequency     HPI  Radha Christensen is a 34 year old female who presents to the ED with complaints of HTN, urniary frequency.  Patient reports a recent diagnosis of hypertension for which she was placed on lisinopril.  She began to take her blood pressure symptoms that they are slightly higher than normal.  This made her concerned.  She also reports foul-smelling urine began yesterday.  Patient denies headaches, chest pain or shortness of breath, abdominal pain.  She also denies any recent fevers or sicknesses.    Allergies:  Allergies   Allergen Reactions     Cefaclor Unknown       Problem List:    Patient Active Problem List    Diagnosis Date Noted     Acne, mild 02/09/2018     Priority: Medium     Dysthymic disorder 02/09/2018     Priority: Medium     Asthma 02/09/2018     Priority: Medium     Overview:   exercise induced/mild intermittent       Contraceptive management 02/09/2018     Priority: Medium     Encounter for routine gynecological examination 02/09/2018     Priority: Medium     Encounter for surveillance of contraceptives 08/02/2017     Priority: Medium     Family history of diabetes mellitus (DM) 08/02/2017     Priority: Medium     Cervical high risk HPV (human papillomavirus) test positive 09/21/2016     Priority: Medium     Cheilitis 12/13/2013     Priority: Medium     Migraine headache 12/07/2011     Priority: Medium     Other abnormal Papanicolaou smear of cervix and cervical HPV(795.09) 10/31/2011     Priority: Medium     Alcohol abuse 10/19/2011     Priority: Medium     Tobacco abuse 10/19/2011     Priority: Medium     Concussion 06/14/2011     Priority: Medium     Overview:   no loss of consciousness from softball injury       Vaginitis 02/22/2011     Priority: Medium        Past Medical History:    Past Medical History:   Diagnosis Date     Personal history of other medical treatment (CODE)        Past Surgical  History:    History reviewed. No pertinent surgical history.    Family History:    Family History   Problem Relation Age of Onset     Heart Disease Maternal Grandfather 50        Heart Disease     Diabetes Maternal Grandfather         Diabetes     Chronic Obstructive Pulmonary Disease Maternal Grandfather         COPD     Diabetes Mother         Diabetes     Thyroid Disease Mother         Thyroid Disease     Heart Disease Mother      Heart Disease Maternal Uncle      Thyroid Disease Brother         Thyroid Disease     Diabetes Brother 38        Diabetes     Diabetes Maternal Uncle         Diabetes     Heart Disease Maternal Uncle      Heart Disease Maternal Grandmother      Breast Cancer No family hx of         Cancer-breast     Ovarian Cancer No family hx of         Cancer-ovarian     Prostate Cancer No family hx of         Cancer-prostate     Other - See Comments No family hx of         Stroke     Colon Cancer No family hx of         Cancer-colon     Blood Disease No family hx of         Blood Disease       Social History:  Marital Status:   [4]  Social History     Tobacco Use     Smoking status: Former Smoker     Years: 10.00     Types: Cigarettes     Last attempt to quit: 2013     Years since quittin.5     Smokeless tobacco: Never Used   Substance Use Topics     Alcohol use: Yes     Comment: Alcoholic Drinks/day: Occ     Drug use: No        Medications:      adapalene (DIFFERIN) 0.1 % external gel   albuterol (PROAIR HFA/PROVENTIL HFA/VENTOLIN HFA) 108 (90 BASE) MCG/ACT Inhaler   budesonide (PULMICORT) 0.5 MG/2ML neb solution   escitalopram (LEXAPRO) 20 MG tablet   fluticasone (FLONASE) 50 MCG/ACT nasal spray   lisinopril (PRINIVIL/ZESTRIL) 10 MG tablet   polyethylene glycol (MIRALAX) powder   senna (SENOKOT) 8.6 MG tablet   topiramate (TOPAMAX) 25 MG tablet   tretinoin (RETIN-A) 0.05 % external gel         Review of Systems   Constitutional: Negative for chills and fever.   HENT: Negative for  "congestion.    Eyes: Negative for visual disturbance.   Respiratory: Negative for chest tightness and shortness of breath.    Cardiovascular: Negative for chest pain.   Gastrointestinal: Negative for abdominal pain.   Genitourinary: Negative for hematuria.        Foul smelling urine   Musculoskeletal: Negative for back pain.   Skin: Negative for rash and wound.   Neurological: Negative for syncope.   Hematological: Negative for adenopathy. Does not bruise/bleed easily.   Psychiatric/Behavioral: Negative for confusion.       Physical Exam   BP: (!) 162/90  Pulse: 62  Temp: 98.3  F (36.8  C)  Resp: 18  Height: 170.2 cm (5' 7\")  SpO2: 99 %      Physical Exam   Constitutional: She is oriented to person, place, and time. She appears well-developed and well-nourished. No distress.   HENT:   Head: Normocephalic and atraumatic.   Eyes: Pupils are equal, round, and reactive to light. Conjunctivae and EOM are normal. No scleral icterus.   Neck: Normal range of motion. Neck supple.   Cardiovascular: Normal rate, regular rhythm and normal heart sounds.   No murmur heard.  Pulmonary/Chest: Effort normal and breath sounds normal. No stridor. No respiratory distress. She has no wheezes.   Abdominal: Soft. Bowel sounds are normal. There is no tenderness.   Musculoskeletal: She exhibits no deformity.   Lymphadenopathy:     She has no cervical adenopathy.   Neurological: She is alert and oriented to person, place, and time. No cranial nerve deficit.   Skin: Skin is warm and dry. No rash noted. She is not diaphoretic.   Psychiatric: She has a normal mood and affect. Her behavior is normal. Judgment and thought content normal.       ED Course        Procedures               Critical Care time:  none               No results found for this or any previous visit (from the past 24 hour(s)).    Medications - No data to display    Assessments & Plan (with Medical Decision Making)   Patient is nontoxic-appearing no acute distress.  Heart, " lung, bowel sounds normal.  Abdomen soft nontender palpation, nondistended.  Vital signs stable, afebrile.  Basically, patient is asymptomatic.  Slight increases in blood pressure upon arrival.  However with every reading the are consistently declining.  I discussed with the patient hypertensive urgency versus emergency.  Urinalysis negative for signs of infection however she does have a large amount of blood noted, she is currently on her period.    Last blood pressure taken was 137/102.    At this time we will make no changes to her medication. She appears to be thriving, she is to f/u with pcp, return if worsening escpecially symptomsatic. She understands and agrees and pt is discharged.     Tomy Braden PA-C    I have reviewed the nursing notes.    I have reviewed the findings, diagnosis, plan and need for follow up with the patient.          Medication List      There are no discharge medications for this visit.         Final diagnoses:   Foul smelling urine   HTN (hypertension)       7/29/2019   [facility]     Tomy Braden PA  07/31/19 [id number]

## 2019-08-06 ENCOUNTER — OFFICE VISIT (OUTPATIENT)
Dept: FAMILY MEDICINE | Facility: OTHER | Age: 34
End: 2019-08-06
Attending: NURSE PRACTITIONER
Payer: COMMERCIAL

## 2019-08-06 VITALS
OXYGEN SATURATION: 98 % | SYSTOLIC BLOOD PRESSURE: 132 MMHG | HEART RATE: 76 BPM | BODY MASS INDEX: 37.42 KG/M2 | HEIGHT: 67 IN | WEIGHT: 238.4 LBS | TEMPERATURE: 98.1 F | RESPIRATION RATE: 16 BRPM | DIASTOLIC BLOOD PRESSURE: 82 MMHG

## 2019-08-06 DIAGNOSIS — Z79.899 ENCOUNTER FOR MEDICATION REVIEW: ICD-10-CM

## 2019-08-06 DIAGNOSIS — I10 HYPERTENSION, UNSPECIFIED TYPE: ICD-10-CM

## 2019-08-06 DIAGNOSIS — J45.20 MILD INTERMITTENT ASTHMA WITHOUT COMPLICATION: Primary | ICD-10-CM

## 2019-08-06 PROCEDURE — G0463 HOSPITAL OUTPT CLINIC VISIT: HCPCS | Performed by: NURSE PRACTITIONER

## 2019-08-06 PROCEDURE — 99214 OFFICE O/P EST MOD 30 MIN: CPT | Performed by: NURSE PRACTITIONER

## 2019-08-06 RX ORDER — ALBUTEROL SULFATE 90 UG/1
2 AEROSOL, METERED RESPIRATORY (INHALATION) EVERY 4 HOURS PRN
Qty: 1 INHALER | Refills: 1 | Status: SHIPPED | OUTPATIENT
Start: 2019-08-06 | End: 2022-09-30

## 2019-08-06 ASSESSMENT — ANXIETY QUESTIONNAIRES
GAD7 TOTAL SCORE: 4
7. FEELING AFRAID AS IF SOMETHING AWFUL MIGHT HAPPEN: NOT AT ALL
IF YOU CHECKED OFF ANY PROBLEMS ON THIS QUESTIONNAIRE, HOW DIFFICULT HAVE THESE PROBLEMS MADE IT FOR YOU TO DO YOUR WORK, TAKE CARE OF THINGS AT HOME, OR GET ALONG WITH OTHER PEOPLE: NOT DIFFICULT AT ALL
5. BEING SO RESTLESS THAT IT IS HARD TO SIT STILL: NOT AT ALL
3. WORRYING TOO MUCH ABOUT DIFFERENT THINGS: SEVERAL DAYS
2. NOT BEING ABLE TO STOP OR CONTROL WORRYING: SEVERAL DAYS
1. FEELING NERVOUS, ANXIOUS, OR ON EDGE: SEVERAL DAYS
6. BECOMING EASILY ANNOYED OR IRRITABLE: SEVERAL DAYS

## 2019-08-06 ASSESSMENT — ENCOUNTER SYMPTOMS: COUGH: 1

## 2019-08-06 ASSESSMENT — MIFFLIN-ST. JEOR: SCORE: 1804.01

## 2019-08-06 ASSESSMENT — PAIN SCALES - GENERAL: PAINLEVEL: NO PAIN (0)

## 2019-08-06 ASSESSMENT — PATIENT HEALTH QUESTIONNAIRE - PHQ9: 5. POOR APPETITE OR OVEREATING: NOT AT ALL

## 2019-08-06 NOTE — PROGRESS NOTES
Nursing Notes:   Valarie Alexandra LPN  8/6/2019  3:18 PM  Signed  Patient presents to clinic today for a follow up from the ER. Patient started feeling ill around the 23rd of July but started to monitor and had a a few high readings. Patient can feel the difference in her body when her pressures are running high.     No LMP recorded.  Medication Reconciliation: complete    Valarie Alxeandra LPN  8/6/2019 2:58 PM    Nursing note reviewed with patient.  Accurracy and completeness verified.   Ms. Christensen is a 34 year old female who:  Patient presents with:  Hospital F/U: ER follow up BP      ICD-10-CM    1. Mild intermittent asthma without complication J45.20 albuterol (PROAIR HFA/PROVENTIL HFA/VENTOLIN HFA) 108 (90 Base) MCG/ACT inhaler   2. Encounter for medication review Z79.899    3. Hypertension, unspecified type I10      HPI  Presents for blood pressure and medication review--was seen in ED last week for higher blood pressure reports it started her menstrual cycle was unsure what triggered however during ED visit blood pressure came down closer to baseline was discontinued on same medications as previous and follow-up  She has been checking her blood pressures at home they are running overall 120s- 130s--- a couple of 110s--  Reports her mother is on for anti-hypertensive medications since she was in her late 30s to early 40s  Radha has the capability to check her blood pressure at home--she does not usually recheck her blood pressure when she has an outlier-reading  She reports she has been feeling well and is wondering if menstrual cycle triggered  Overall she reports she has tolerated the medication well--she does not have any problems with edema  Does not smoke    Review of Systems   Respiratory: Positive for cough.    Allergic/Immunologic: Positive for environmental allergies.   All other systems reviewed and are negative.     All other systems reviewed and negative.     MADELINE:   MADELINE-7 SCORE 5/25/2016 8/2/2017 8/6/2019    Total Score 1 5 4     PHQ9:  PHQ-9 SCORE 9/21/2016 8/2/2017 1/24/2019   PHQ-9 Total Score 4 2 0       I have personally reviewed the past medical history, past surgical history, medications, allergies, family and social history as listed below, on 8/6/2019.    Allergies   Allergen Reactions     Cefaclor Unknown       Current Outpatient Medications   Medication Sig Dispense Refill     adapalene (DIFFERIN) 0.1 % external gel Apply topically At Bedtime 45 g 4     albuterol (PROAIR HFA/PROVENTIL HFA/VENTOLIN HFA) 108 (90 Base) MCG/ACT inhaler Inhale 2 puffs into the lungs every 4 hours as needed for shortness of breath / dyspnea 1 Inhaler 1     budesonide (PULMICORT) 0.5 MG/2ML neb solution Squirt entire vial into previously made john med saline bottle, mix, irrigate both nostrils until entire bottle empty. Do this twice daily. 2 Box 3     escitalopram (LEXAPRO) 20 MG tablet Take 1 tablet (20 mg) by mouth daily 90 tablet 3     fluticasone (FLONASE) 50 MCG/ACT nasal spray Spray 2 sprays into both nostrils daily 1 Bottle 11     lisinopril (PRINIVIL/ZESTRIL) 10 MG tablet Take 1 tablet (10 mg) by mouth daily 60 tablet 1     polyethylene glycol (MIRALAX) powder Take 17 g (1 capful) by mouth daily 510 g 3     senna (SENOKOT) 8.6 MG tablet 2 tabs today and as needed up to once day for constaiption 120 tablet 0     topiramate (TOPAMAX) 25 MG tablet TAKE 1 TABLET BY MOUTH EVERY DAY 90 tablet 3        Patient Active Problem List    Diagnosis Date Noted     Acne, mild 02/09/2018     Priority: Medium     Dysthymic disorder 02/09/2018     Priority: Medium     Asthma 02/09/2018     Priority: Medium     Overview:   exercise induced/mild intermittent       Contraceptive management 02/09/2018     Priority: Medium     Encounter for routine gynecological examination 02/09/2018     Priority: Medium     Encounter for surveillance of contraceptives 08/02/2017     Priority: Medium     Family history of diabetes mellitus (DM) 08/02/2017      Priority: Medium     Cervical high risk HPV (human papillomavirus) test positive 2016     Priority: Medium     Cheilitis 2013     Priority: Medium     Migraine headache 2011     Priority: Medium     Other abnormal Papanicolaou smear of cervix and cervical HPV(795.09) 10/31/2011     Priority: Medium     Alcohol abuse 10/19/2011     Priority: Medium     Tobacco abuse 10/19/2011     Priority: Medium     Concussion 2011     Priority: Medium     Overview:   no loss of consciousness from softball injury       Vaginitis 2011     Priority: Medium     Past Medical History:   Diagnosis Date     Personal history of other medical treatment (CODE)      2, Para 2     No past surgical history on file.  Social History     Socioeconomic History     Marital status:      Spouse name: None     Number of children: None     Years of education: None     Highest education level: None   Occupational History     None   Social Needs     Financial resource strain: None     Food insecurity:     Worry: None     Inability: None     Transportation needs:     Medical: None     Non-medical: None   Tobacco Use     Smoking status: Former Smoker     Years: 10.00     Types: Cigarettes     Last attempt to quit: 2013     Years since quittin.5     Smokeless tobacco: Never Used   Substance and Sexual Activity     Alcohol use: Yes     Comment: Alcoholic Drinks/day: Occ     Drug use: No     Sexual activity: Yes     Partners: Male     Birth control/protection: Pill   Lifestyle     Physical activity:     Days per week: None     Minutes per session: None     Stress: None   Relationships     Social connections:     Talks on phone: None     Gets together: None     Attends Yazdanism service: None     Active member of club or organization: None     Attends meetings of clubs or organizations: None     Relationship status: None     Intimate partner violence:     Fear of current or ex partner: None     Emotionally  "abused: None     Physically abused: None     Forced sexual activity: None   Other Topics Concern     Parent/sibling w/ CABG, MI or angioplasty before 65F 55M? Not Asked   Social History Narrative    two children.     Preload 03/01/2013     Family History   Problem Relation Age of Onset     Heart Disease Maternal Grandfather 50        Heart Disease     Diabetes Maternal Grandfather         Diabetes     Chronic Obstructive Pulmonary Disease Maternal Grandfather         COPD     Diabetes Mother         Diabetes     Thyroid Disease Mother         Thyroid Disease     Heart Disease Mother      Heart Disease Maternal Uncle      Thyroid Disease Brother         Thyroid Disease     Diabetes Brother 38        Diabetes     Diabetes Maternal Uncle         Diabetes     Heart Disease Maternal Uncle      Heart Disease Maternal Grandmother      Breast Cancer No family hx of         Cancer-breast     Ovarian Cancer No family hx of         Cancer-ovarian     Prostate Cancer No family hx of         Cancer-prostate     Other - See Comments No family hx of         Stroke     Colon Cancer No family hx of         Cancer-colon     Blood Disease No family hx of         Blood Disease       EXAM:   Vitals:    08/06/19 1503   BP: 132/82   BP Location: Right arm   Patient Position: Sitting   Cuff Size: Adult Regular   Pulse: 76   Resp: 16   Temp: 98.1  F (36.7  C)   TempSrc: Temporal   SpO2: 98%   Weight: 108.1 kg (238 lb 6.4 oz)   Height: 1.702 m (5' 7\")       Current Pain Score: No Pain (0)     BP Readings from Last 3 Encounters:   08/06/19 132/82   07/29/19 (!) 137/102   03/28/19 120/87      Wt Readings from Last 3 Encounters:   08/06/19 108.1 kg (238 lb 6.4 oz)   03/28/19 104.3 kg (230 lb)   03/26/19 103.9 kg (229 lb)      Estimated body mass index is 37.34 kg/m  as calculated from the following:    Height as of this encounter: 1.702 m (5' 7\").    Weight as of this encounter: 108.1 kg (238 lb 6.4 oz).     Physical Exam "   Constitutional: She is oriented to person, place, and time. She appears well-developed and well-nourished.   Cardiovascular: Normal rate.   Pulmonary/Chest: Effort normal.   Musculoskeletal: Normal range of motion.   Neurological: She is alert and oriented to person, place, and time.   Skin: Skin is warm and dry.   Psychiatric: She has a normal mood and affect. Her behavior is normal. Judgment and thought content normal.   Nursing note and vitals reviewed.       INVESTIGATIONS:  Results for orders placed or performed during the hospital encounter of 07/29/19   *UA reflex to Microscopic   Result Value Ref Range    Color Urine Yellow     Appearance Urine Clear     Glucose Urine Negative NEG^Negative mg/dL    Bilirubin Urine Negative NEG^Negative    Ketones Urine Negative NEG^Negative mg/dL    Specific Gravity Urine 1.010 1.000 - 1.030    Blood Urine Large (A) NEG^Negative    pH Urine 7.5 5.0 - 9.0 pH    Protein Albumin Urine Negative NEG^Negative mg/dL    Urobilinogen Urine 0.2 0.2 - 1.0 EU/dL    Nitrite Urine Negative NEG^Negative    Leukocyte Esterase Urine Negative NEG^Negative    Source Midstream Urine    HCG qualitative urine (UPT)   Result Value Ref Range    HCG Qual Urine Negative NEG^Negative   Urine Microscopic   Result Value Ref Range    WBC Urine 0 - 5 OTO5^0 - 5 /HPF    RBC Urine O - 2 OTO2^O - 2 /HPF    Squamous Epithelial /LPF Urine Few FEW^Few /LPF       ASSESSMENT AND PLAN:  Problem List Items Addressed This Visit        Respiratory    Asthma - Primary    Relevant Medications    albuterol (PROAIR HFA/PROVENTIL HFA/VENTOLIN HFA) 108 (90 Base) MCG/ACT inhaler      Other Visit Diagnoses     Encounter for medication review        Hypertension, unspecified type            Reviewed medications--refilled her once yearly Ventolin MDI very rarely uses it for allergy induced--- reactive airway    We will continue on lisinopril current dose--contemplated lisinopril hydrochlorothiazide 10/12.5 mg---  Do not  really want to overcorrect blood pressure  She will monitor for triggers    Discussed checking her blood pressure daily-if outlier recheck every 5 to 10 minutes at least x3 and if not returning to normal she will send me a message or come in  Otherwise she will send me her blood pressures next week on my chart    She is very agreeable and comfortable with above plan and monitoring her blood pressure at home      -- Expected clinical course discussed    -- Medications and their side effects discussed    There are no Patient Instructions on file for this visit.  Ermelinda Nunez CNP  [facility]     Portions of this note were dictated using speech recognition software. The note has been proofread but errors in the text may have been overlooked. Please contact me if there are any concerns regarding the accuracy of the dictation.

## 2019-08-06 NOTE — NURSING NOTE
Patient presents to clinic today for a follow up from the ER. Patient started feeling ill around the 23rd of July but started to monitor and had a a few high readings. Patient can feel the difference in her body when her pressures are running high.     No LMP recorded.  Medication Reconciliation: complete    Valarie Alexandra LPN  8/6/2019 2:58 PM

## 2019-08-07 ENCOUNTER — APPOINTMENT (OUTPATIENT)
Dept: GENERAL RADIOLOGY | Facility: OTHER | Age: 34
End: 2019-08-07
Attending: FAMILY MEDICINE
Payer: COMMERCIAL

## 2019-08-07 ENCOUNTER — HOSPITAL ENCOUNTER (EMERGENCY)
Facility: OTHER | Age: 34
Discharge: HOME | End: 2019-08-07
Attending: FAMILY MEDICINE | Admitting: FAMILY MEDICINE
Payer: COMMERCIAL

## 2019-08-07 VITALS
DIASTOLIC BLOOD PRESSURE: 84 MMHG | OXYGEN SATURATION: 96 % | HEIGHT: 67 IN | TEMPERATURE: 97.9 F | RESPIRATION RATE: 23 BRPM | HEART RATE: 83 BPM | SYSTOLIC BLOOD PRESSURE: 139 MMHG | BODY MASS INDEX: 36.1 KG/M2 | WEIGHT: 230 LBS

## 2019-08-07 DIAGNOSIS — K29.01 ACUTE GASTRITIS WITH HEMORRHAGE, UNSPECIFIED GASTRITIS TYPE: ICD-10-CM

## 2019-08-07 DIAGNOSIS — F10.929 ALCOHOLIC INTOXICATION WITH COMPLICATION (H): ICD-10-CM

## 2019-08-07 LAB
ALBUMIN SERPL-MCNC: 4.4 G/DL (ref 3.5–5.7)
ALBUMIN UR-MCNC: NEGATIVE MG/DL
ALP SERPL-CCNC: 76 U/L (ref 34–104)
ALT SERPL W P-5'-P-CCNC: 17 U/L (ref 7–52)
ANION GAP SERPL CALCULATED.3IONS-SCNC: 7 MMOL/L (ref 3–14)
APPEARANCE UR: CLEAR
AST SERPL W P-5'-P-CCNC: 20 U/L (ref 13–39)
BASOPHILS # BLD AUTO: 0 10E9/L (ref 0–0.2)
BASOPHILS NFR BLD AUTO: 0.3 %
BILIRUB SERPL-MCNC: 0.3 MG/DL (ref 0.3–1)
BILIRUB UR QL STRIP: NEGATIVE
BUN SERPL-MCNC: 11 MG/DL (ref 7–25)
CALCIUM SERPL-MCNC: 8.7 MG/DL (ref 8.6–10.3)
CHLORIDE SERPL-SCNC: 109 MMOL/L (ref 98–107)
CO2 SERPL-SCNC: 25 MMOL/L (ref 21–31)
COLOR UR AUTO: YELLOW
CREAT SERPL-MCNC: 0.73 MG/DL (ref 0.6–1.2)
DIFFERENTIAL METHOD BLD: NORMAL
EOSINOPHIL # BLD AUTO: 0.1 10E9/L (ref 0–0.7)
EOSINOPHIL NFR BLD AUTO: 0.8 %
ERYTHROCYTE [DISTWIDTH] IN BLOOD BY AUTOMATED COUNT: 13 % (ref 10–15)
ETHANOL SERPL-MCNC: 0.2 %
GFR SERPL CREATININE-BSD FRML MDRD: >90 ML/MIN/{1.73_M2}
GLUCOSE SERPL-MCNC: 123 MG/DL (ref 70–105)
GLUCOSE UR STRIP-MCNC: NEGATIVE MG/DL
HCG UR QL: NEGATIVE
HCT VFR BLD AUTO: 40.4 % (ref 35–47)
HGB BLD-MCNC: 13.1 G/DL (ref 11.7–15.7)
HGB UR QL STRIP: NEGATIVE
IMM GRANULOCYTES # BLD: 0 10E9/L (ref 0–0.4)
IMM GRANULOCYTES NFR BLD: 0.3 %
INR PPP: 0.95 (ref 0–1.3)
KETONES UR STRIP-MCNC: NEGATIVE MG/DL
LEUKOCYTE ESTERASE UR QL STRIP: NEGATIVE
LIPASE SERPL-CCNC: 73 U/L (ref 11–82)
LYMPHOCYTES # BLD AUTO: 1.6 10E9/L (ref 0.8–5.3)
LYMPHOCYTES NFR BLD AUTO: 27.1 %
MCH RBC QN AUTO: 28.1 PG (ref 26.5–33)
MCHC RBC AUTO-ENTMCNC: 32.4 G/DL (ref 31.5–36.5)
MCV RBC AUTO: 87 FL (ref 78–100)
MONOCYTES # BLD AUTO: 0.3 10E9/L (ref 0–1.3)
MONOCYTES NFR BLD AUTO: 5.2 %
NEUTROPHILS # BLD AUTO: 4 10E9/L (ref 1.6–8.3)
NEUTROPHILS NFR BLD AUTO: 66.3 %
NITRATE UR QL: NEGATIVE
PH UR STRIP: 7 PH (ref 5–9)
PLATELET # BLD AUTO: 254 10E9/L (ref 150–450)
POTASSIUM SERPL-SCNC: 3.6 MMOL/L (ref 3.5–5.1)
PROT SERPL-MCNC: 7.3 G/DL (ref 6.4–8.9)
RBC # BLD AUTO: 4.66 10E12/L (ref 3.8–5.2)
SODIUM SERPL-SCNC: 141 MMOL/L (ref 134–144)
SOURCE: NORMAL
SP GR UR STRIP: 1.01 (ref 1–1.03)
UROBILINOGEN UR STRIP-ACNC: 0.2 EU/DL (ref 0.2–1)
WBC # BLD AUTO: 6 10E9/L (ref 4–11)

## 2019-08-07 PROCEDURE — 25000128 H RX IP 250 OP 636: Performed by: FAMILY MEDICINE

## 2019-08-07 PROCEDURE — 80053 COMPREHEN METABOLIC PANEL: CPT | Performed by: FAMILY MEDICINE

## 2019-08-07 PROCEDURE — 83690 ASSAY OF LIPASE: CPT | Performed by: FAMILY MEDICINE

## 2019-08-07 PROCEDURE — 81003 URINALYSIS AUTO W/O SCOPE: CPT | Mod: XU | Performed by: FAMILY MEDICINE

## 2019-08-07 PROCEDURE — 85610 PROTHROMBIN TIME: CPT | Performed by: FAMILY MEDICINE

## 2019-08-07 PROCEDURE — 96365 THER/PROPH/DIAG IV INF INIT: CPT | Mod: XU | Performed by: FAMILY MEDICINE

## 2019-08-07 PROCEDURE — 96375 TX/PRO/DX INJ NEW DRUG ADDON: CPT | Mod: XU | Performed by: FAMILY MEDICINE

## 2019-08-07 PROCEDURE — 36415 COLL VENOUS BLD VENIPUNCTURE: CPT | Performed by: FAMILY MEDICINE

## 2019-08-07 PROCEDURE — 85025 COMPLETE CBC W/AUTO DIFF WBC: CPT | Performed by: FAMILY MEDICINE

## 2019-08-07 PROCEDURE — 81025 URINE PREGNANCY TEST: CPT | Performed by: FAMILY MEDICINE

## 2019-08-07 PROCEDURE — 80320 DRUG SCREEN QUANTALCOHOLS: CPT | Mod: 91 | Performed by: FAMILY MEDICINE

## 2019-08-07 PROCEDURE — 81003 URINALYSIS AUTO W/O SCOPE: CPT | Performed by: FAMILY MEDICINE

## 2019-08-07 PROCEDURE — 74019 RADEX ABDOMEN 2 VIEWS: CPT

## 2019-08-07 PROCEDURE — 80320 DRUG SCREEN QUANTALCOHOLS: CPT | Performed by: FAMILY MEDICINE

## 2019-08-07 PROCEDURE — 99284 EMERGENCY DEPT VISIT MOD MDM: CPT | Mod: 25 | Performed by: FAMILY MEDICINE

## 2019-08-07 PROCEDURE — 99283 EMERGENCY DEPT VISIT LOW MDM: CPT | Mod: Z6 | Performed by: FAMILY MEDICINE

## 2019-08-07 RX ORDER — ONDANSETRON 2 MG/ML
4 INJECTION INTRAMUSCULAR; INTRAVENOUS EVERY 30 MIN PRN
Status: DISCONTINUED | OUTPATIENT
Start: 2019-08-07 | End: 2019-08-07 | Stop reason: HOSPADM

## 2019-08-07 RX ORDER — SODIUM CHLORIDE 9 MG/ML
1000 INJECTION, SOLUTION INTRAVENOUS CONTINUOUS
Status: DISCONTINUED | OUTPATIENT
Start: 2019-08-07 | End: 2019-08-07 | Stop reason: HOSPADM

## 2019-08-07 RX ORDER — FAMOTIDINE 20 MG/1
20 TABLET, FILM COATED ORAL 2 TIMES DAILY
Qty: 60 TABLET | Refills: 1 | COMMUNITY
Start: 2019-08-07 | End: 2019-08-19

## 2019-08-07 RX ADMIN — FAMOTIDINE 20 MG: 20 INJECTION, SOLUTION INTRAVENOUS at 08:06

## 2019-08-07 RX ADMIN — ONDANSETRON HYDROCHLORIDE 4 MG: 2 INJECTION, SOLUTION INTRAMUSCULAR; INTRAVENOUS at 08:07

## 2019-08-07 RX ADMIN — SODIUM CHLORIDE 1000 ML: 9 INJECTION, SOLUTION INTRAVENOUS at 08:06

## 2019-08-07 ASSESSMENT — ENCOUNTER SYMPTOMS
NECK STIFFNESS: 0
DIFFICULTY URINATING: 0
COLOR CHANGE: 0
FEVER: 0
SHORTNESS OF BREATH: 0
CONFUSION: 0
HEADACHES: 0
EYE REDNESS: 0
ARTHRALGIAS: 0
ABDOMINAL PAIN: 0

## 2019-08-07 ASSESSMENT — ANXIETY QUESTIONNAIRES: GAD7 TOTAL SCORE: 4

## 2019-08-07 ASSESSMENT — MIFFLIN-ST. JEOR: SCORE: 1765.9

## 2019-08-07 NOTE — ED TRIAGE NOTES
"Pt presents to ED with c/o vomiting beginning last night and noticed into this morning that there was blood in her vomit. Pt states she was drinking last night, states \"I don't drink often, but when I do drink I drink very hard\". Pt denies HA or dizziness, denies fevers at home. Last drink at appx 0300. Pt c/o heartburn, denies chest pain and denies other pain. Denies nausea.  Amalia Fitzpatrick    "

## 2019-08-07 NOTE — ED PROVIDER NOTES
"  History     Chief Complaint   Patient presents with     Vomiting     Hematemesis     HPI  Radha Christensen is a 34 year old female who presents the emergency department with recent vomiting.  Blood stained vomitus.  Bleeding began after recurrent emesis.  Patient gets frequent heartburn.  Patient admits to frequent binge drinking.  She states she is currently intoxicated.  No recent fevers.  No blood per stools.    Review nurse's notes below, similar history is related to me.  Pt presents to ED with c/o vomiting beginning last night and noticed into this morning that there was blood in her vomit. Pt states she was drinking last night, states \"I don't drink often, but when I do drink I drink very hard\". Pt denies HA or dizziness, denies fevers at home. Last drink at appx 0300. Pt c/o heartburn, denies chest pain and denies other pain. Denies nausea.  Allergies:  Allergies   Allergen Reactions     Cefaclor Unknown       Problem List:    Patient Active Problem List    Diagnosis Date Noted     Acne, mild 02/09/2018     Priority: Medium     Dysthymic disorder 02/09/2018     Priority: Medium     Asthma 02/09/2018     Priority: Medium     Overview:   exercise induced/mild intermittent       Contraceptive management 02/09/2018     Priority: Medium     Encounter for routine gynecological examination 02/09/2018     Priority: Medium     Encounter for surveillance of contraceptives 08/02/2017     Priority: Medium     Family history of diabetes mellitus (DM) 08/02/2017     Priority: Medium     Cervical high risk HPV (human papillomavirus) test positive 09/21/2016     Priority: Medium     Cheilitis 12/13/2013     Priority: Medium     Migraine headache 12/07/2011     Priority: Medium     Other abnormal Papanicolaou smear of cervix and cervical HPV(795.09) 10/31/2011     Priority: Medium     Alcohol abuse 10/19/2011     Priority: Medium     Tobacco abuse 10/19/2011     Priority: Medium     Concussion 06/14/2011     Priority: Medium "     Overview:   no loss of consciousness from softball injury       Vaginitis 2011     Priority: Medium        Past Medical History:    Past Medical History:   Diagnosis Date     Personal history of other medical treatment (CODE)        Past Surgical History:    History reviewed. No pertinent surgical history.    Family History:    Family History   Problem Relation Age of Onset     Heart Disease Maternal Grandfather 50        Heart Disease     Diabetes Maternal Grandfather         Diabetes     Chronic Obstructive Pulmonary Disease Maternal Grandfather         COPD     Diabetes Mother         Diabetes     Thyroid Disease Mother         Thyroid Disease     Heart Disease Mother      Heart Disease Maternal Uncle      Thyroid Disease Brother         Thyroid Disease     Diabetes Brother 38        Diabetes     Diabetes Maternal Uncle         Diabetes     Heart Disease Maternal Uncle      Heart Disease Maternal Grandmother      Breast Cancer No family hx of         Cancer-breast     Ovarian Cancer No family hx of         Cancer-ovarian     Prostate Cancer No family hx of         Cancer-prostate     Other - See Comments No family hx of         Stroke     Colon Cancer No family hx of         Cancer-colon     Blood Disease No family hx of         Blood Disease       Social History:  Marital Status:   [4]  Social History     Tobacco Use     Smoking status: Former Smoker     Years: 10.00     Types: Cigarettes     Last attempt to quit: 2013     Years since quittin.6     Smokeless tobacco: Never Used   Substance Use Topics     Alcohol use: Yes     Comment: Alcoholic Drinks/day: Occ     Drug use: No        Medications:      adapalene (DIFFERIN) 0.1 % external gel   albuterol (PROAIR HFA/PROVENTIL HFA/VENTOLIN HFA) 108 (90 Base) MCG/ACT inhaler   budesonide (PULMICORT) 0.5 MG/2ML neb solution   escitalopram (LEXAPRO) 20 MG tablet   famotidine (PEPCID) 20 MG tablet   fluticasone (FLONASE) 50 MCG/ACT nasal spray  "  lisinopril (PRINIVIL/ZESTRIL) 10 MG tablet   polyethylene glycol (MIRALAX) powder   topiramate (TOPAMAX) 25 MG tablet   senna (SENOKOT) 8.6 MG tablet         Review of Systems   Constitutional: Negative for fever.   HENT: Negative for congestion.    Eyes: Negative for redness.   Respiratory: Negative for shortness of breath.    Cardiovascular: Negative for chest pain.   Gastrointestinal: Negative for abdominal pain.   Genitourinary: Negative for difficulty urinating.   Musculoskeletal: Negative for arthralgias and neck stiffness.   Skin: Negative for color change.   Neurological: Negative for headaches.   Psychiatric/Behavioral: Negative for confusion.       Physical Exam   BP: (!) 146/96  Pulse: 98  Heart Rate: 89  Temp: 97.9  F (36.6  C)  Resp: 16  Height: 170.2 cm (5' 7\")  Weight: 104.3 kg (230 lb)  SpO2: 97 %      Physical Exam   Constitutional: She appears well-developed and well-nourished. No distress.   HENT:   Head: Normocephalic and atraumatic.   Eyes: Conjunctivae are normal. No scleral icterus.   Neck: Neck supple.   Cardiovascular: Normal rate and regular rhythm.   Pulmonary/Chest: Effort normal and breath sounds normal.   Abdominal: Soft. There is no tenderness.   Musculoskeletal: She exhibits no deformity.   Lymphadenopathy:     She has no cervical adenopathy.   Neurological: She is alert.   Skin: Skin is warm and dry. No rash noted. She is not diaphoretic.   Psychiatric: She has a normal mood and affect.   Nursing note and vitals reviewed.  No suicidality    ED Course        Procedures       Results for orders placed or performed during the hospital encounter of 08/07/19 (from the past 24 hour(s))   CBC with platelets differential   Result Value Ref Range    WBC 6.0 4.0 - 11.0 10e9/L    RBC Count 4.66 3.8 - 5.2 10e12/L    Hemoglobin 13.1 11.7 - 15.7 g/dL    Hematocrit 40.4 35.0 - 47.0 %    MCV 87 78 - 100 fl    MCH 28.1 26.5 - 33.0 pg    MCHC 32.4 31.5 - 36.5 g/dL    RDW 13.0 10.0 - 15.0 %    " Platelet Count 254 150 - 450 10e9/L    Diff Method Automated Method     % Neutrophils 66.3 %    % Lymphocytes 27.1 %    % Monocytes 5.2 %    % Eosinophils 0.8 %    % Basophils 0.3 %    % Immature Granulocytes 0.3 %    Absolute Neutrophil 4.0 1.6 - 8.3 10e9/L    Absolute Lymphocytes 1.6 0.8 - 5.3 10e9/L    Absolute Monocytes 0.3 0.0 - 1.3 10e9/L    Absolute Eosinophils 0.1 0.0 - 0.7 10e9/L    Absolute Basophils 0.0 0.0 - 0.2 10e9/L    Abs Immature Granulocytes 0.0 0 - 0.4 10e9/L   Comprehensive metabolic panel   Result Value Ref Range    Sodium 141 134 - 144 mmol/L    Potassium 3.6 3.5 - 5.1 mmol/L    Chloride 109 (H) 98 - 107 mmol/L    Carbon Dioxide 25 21 - 31 mmol/L    Anion Gap 7 3 - 14 mmol/L    Glucose 123 (H) 70 - 105 mg/dL    Urea Nitrogen 11 7 - 25 mg/dL    Creatinine 0.73 0.60 - 1.20 mg/dL    GFR Estimate >90 >60 mL/min/[1.73_m2]    GFR Estimate If Black >90 >60 mL/min/[1.73_m2]    Calcium 8.7 8.6 - 10.3 mg/dL    Bilirubin Total 0.3 0.3 - 1.0 mg/dL    Albumin 4.4 3.5 - 5.7 g/dL    Protein Total 7.3 6.4 - 8.9 g/dL    Alkaline Phosphatase 76 34 - 104 U/L    ALT 17 7 - 52 U/L    AST 20 13 - 39 U/L   INR   Result Value Ref Range    INR 0.95 0 - 1.3   Ethanol GH   Result Value Ref Range    Ethanol g/dL 0.20 (H) <0.01 %   Lipase   Result Value Ref Range    Lipase 73 11 - 82 U/L   HCG qualitative urine (UPT)   Result Value Ref Range    HCG Qual Urine Negative NEG^Negative   UA reflex to Microscopic and Culture   Result Value Ref Range    Color Urine Yellow     Appearance Urine Clear     Glucose Urine Negative NEG^Negative mg/dL    Bilirubin Urine Negative NEG^Negative    Ketones Urine Negative NEG^Negative mg/dL    Specific Gravity Urine 1.010 1.000 - 1.030    Blood Urine Negative NEG^Negative    pH Urine 7.0 5.0 - 9.0 pH    Protein Albumin Urine Negative NEG^Negative mg/dL    Urobilinogen Urine 0.2 0.2 - 1.0 EU/dL    Nitrite Urine Negative NEG^Negative    Leukocyte Esterase Urine Negative NEG^Negative    Source  Midstream Urine    XR Abdomen 2 Views    Narrative    PROCEDURE:  XR ABDOMEN 2 VW    HISTORY:  flat and upright.     TECHNIQUE:  Upright and supine views of the abdomen were obtained.    COMPARISON:  9/11/2018    FINDINGS:     There is a nonobstructive bowel gas pattern. No free air is seen. No  abnormal calcifications are evident. There is moderate stool in the  colon.      Impression    IMPRESSION:    No obstruction or free air.    CARLI HI MD       Medications   0.9% sodium chloride BOLUS (1,000 mLs Intravenous New Bag 8/7/19 0806)     Followed by   sodium chloride 0.9% infusion (has no administration in time range)   ondansetron (ZOFRAN) injection 4 mg (4 mg Intravenous Given 8/7/19 0807)   famotidine (PEPCID) infusion 20 mg (0 mg Intravenous Stopped 8/7/19 0901)       Assessments & Plan (with Medical Decision Making)     Patient is feeling better over the course of her ER stay.  She has had no emesis at all during the several hour emergency department visit.  Her nausea is resolved as well.  Discussed options with her, recommend famotidine as an outpatient, referral for EGD and outpatient options for her alcohol treatment were discussed.  Return to the emergency department recurrent bleeding, abdominal pain, fever or intractable vomiting.  Patient verbalized understanding plan is agreement she left the ER in improved condition.       Medication List      Started    famotidine 20 MG tablet  Commonly known as:  PEPCID  20 mg, Oral, 2 TIMES DAILY            Final diagnoses:   Acute gastritis with hemorrhage, unspecified gastritis type   Alcoholic intoxication with complication (H)       8/7/2019   [facility] AND \[facility] [place] [facility]\""     Jean Pack MD  08/07/19 1011

## 2019-08-07 NOTE — ED AVS SNAPSHOT
[facility]  [address]  [place] [address]  Phone:  [phone]  Fax:  [phone]                                    Radha Christensen   MRN: [id number]    Department:  [facility]   Date of Visit:  8/7/2019           After Visit Summary Signature Page    I have received my discharge instructions, and my questions have been answered. I have discussed any challenges I see with this plan with the nurse or doctor.    ..........................................................................................................................................  Patient/Patient Representative Signature      ..........................................................................................................................................  Patient Representative Print Name and Relationship to Patient    ..................................................               ................................................  Date                                   Time    ..........................................................................................................................................  Reviewed by Signature/Title    ...................................................              ..............................................  Date                                               Time          22EPIC Rev 08/18

## 2019-08-16 ENCOUNTER — MYC MEDICAL ADVICE (OUTPATIENT)
Dept: FAMILY MEDICINE | Facility: OTHER | Age: 34
End: 2019-08-16

## 2019-08-16 DIAGNOSIS — G43.909 MIGRAINE WITHOUT STATUS MIGRAINOSUS, NOT INTRACTABLE, UNSPECIFIED MIGRAINE TYPE: ICD-10-CM

## 2019-08-17 DIAGNOSIS — I10 HYPERTENSION, UNSPECIFIED TYPE: ICD-10-CM

## 2019-08-19 ENCOUNTER — TELEPHONE (OUTPATIENT)
Dept: SURGERY | Facility: OTHER | Age: 34
End: 2019-08-19

## 2019-08-19 ENCOUNTER — OFFICE VISIT (OUTPATIENT)
Dept: SURGERY | Facility: OTHER | Age: 34
End: 2019-08-19
Attending: FAMILY MEDICINE
Payer: COMMERCIAL

## 2019-08-19 VITALS
BODY MASS INDEX: 37.81 KG/M2 | DIASTOLIC BLOOD PRESSURE: 82 MMHG | WEIGHT: 241.4 LBS | HEART RATE: 80 BPM | RESPIRATION RATE: 18 BRPM | SYSTOLIC BLOOD PRESSURE: 134 MMHG | TEMPERATURE: 97.6 F

## 2019-08-19 DIAGNOSIS — K29.01 ACUTE GASTRITIS WITH HEMORRHAGE, UNSPECIFIED GASTRITIS TYPE: Primary | ICD-10-CM

## 2019-08-19 PROCEDURE — G0463 HOSPITAL OUTPT CLINIC VISIT: HCPCS

## 2019-08-19 PROCEDURE — 99204 OFFICE O/P NEW MOD 45 MIN: CPT | Performed by: SURGERY

## 2019-08-19 ASSESSMENT — PAIN SCALES - GENERAL: PAINLEVEL: MILD PAIN (3)

## 2019-08-19 NOTE — PROGRESS NOTES
GENERAL SURGERY CONSULTATION NOTE    Radha Christensen   [address]   [place] [address]  34 year old  female    Primary Care Provider:  Ermelinda Nunez      HPI: Radha Christensen presents to clinic with history of recent hematemesis.  Patient was seen in the emergency room recently for hematemesis.  Apparently that day she has been drinking quite a bit to the point where she threw up.  When she saw a little bit of blood in her emesis she presented to the emergency room.  Patient has not had hematemesis in the past.  She has not had hematemesis since this incident.  Patient has been laying off the alcohol since she saw blood in her emesis.  Patient denies dark or bloody stools.  Patient never had a colonoscopy and EGD in the past.  Patient was recently started on proton pump inhibitor therapy.  She says she still feels a little bit bloated but denies abdominal pain.  She says her GERD symptoms have gotten better since starting the Prilosec.  The patient takes quite a bit of ibuprofen for her headaches.     REVIEW OF SYSTEMS:    GENERAL: No fevers or chills. Denies fatigue, recent weight loss.  HEENT: No sinus drainage. No changes with vision or hearing. No difficulty swallowing.   LYMPHATICS:  No swollen nodes in axilla, neck or groin.  CARDIOVASCULAR: Denies chest pain, palpitations and dyspnea on exertion.  PULMONARY: No shortness of breath or cough. No increase in sputum production.  GI: Denies melena,bright red blood in stools. No hematemesis. No constipation or diarrhea.  : No dysuria or hematuria.  SKIN: No recent rashes or ulcers.   HEMATOLOGY:  No history of easy bruising or bleeding.  ENDOCRINE:  No history of diabetes or thyroid problems.  NEUROLOGY:  No history of seizures or headaches. No motor or sensory changes.        Patient Active Problem List   Diagnosis     Other abnormal Papanicolaou smear of cervix and cervical HPV(795.09)     Acne, mild     Alcohol abuse     Dysthymic disorder      Asthma     Cervical high risk HPV (human papillomavirus) test positive     Cheilitis     Concussion     Contraceptive management     Encounter for surveillance of contraceptives     Family history of diabetes mellitus (DM)     Migraine headache     Encounter for routine gynecological examination     Tobacco abuse     Vaginitis       Past Medical History:   Diagnosis Date     Personal history of other medical treatment (CODE)      2, Para 2       No past surgical history on file.    Family History   Problem Relation Age of Onset     Heart Disease Maternal Grandfather 50        Heart Disease     Diabetes Maternal Grandfather         Diabetes     Chronic Obstructive Pulmonary Disease Maternal Grandfather         COPD     Diabetes Mother         Diabetes     Thyroid Disease Mother         Thyroid Disease     Heart Disease Mother      Heart Disease Maternal Uncle      Thyroid Disease Brother         Thyroid Disease     Diabetes Brother 38        Diabetes     Diabetes Maternal Uncle         Diabetes     Heart Disease Maternal Uncle      Heart Disease Maternal Grandmother      Breast Cancer No family hx of         Cancer-breast     Ovarian Cancer No family hx of         Cancer-ovarian     Prostate Cancer No family hx of         Cancer-prostate     Other - See Comments No family hx of         Stroke     Colon Cancer No family hx of         Cancer-colon     Blood Disease No family hx of         Blood Disease       Social History     Social History Narrative    two children.     Preload 2013       Social History     Socioeconomic History     Marital status:      Spouse name: Not on file     Number of children: Not on file     Years of education: Not on file     Highest education level: Not on file   Occupational History     Not on file   Social Needs     Financial resource strain: Not on file     Food insecurity:     Worry: Not on file     Inability: Not on file     Transportation needs:      Medical: Not on file     Non-medical: Not on file   Tobacco Use     Smoking status: Former Smoker     Years: 10.00     Types: Cigarettes     Last attempt to quit: 2013     Years since quittin.6     Smokeless tobacco: Never Used   Substance and Sexual Activity     Alcohol use: Yes     Comment: Alcoholic Drinks/day: Occ     Drug use: No     Sexual activity: Yes     Partners: Male     Birth control/protection: Pill   Lifestyle     Physical activity:     Days per week: Not on file     Minutes per session: Not on file     Stress: Not on file   Relationships     Social connections:     Talks on phone: Not on file     Gets together: Not on file     Attends [employer] service: Not on file     Active member of club or organization: Not on file     Attends meetings of clubs or organizations: Not on file     Relationship status: Not on file     Intimate partner violence:     Fear of current or ex partner: Not on file     Emotionally abused: Not on file     Physically abused: Not on file     Forced sexual activity: Not on file   Other Topics Concern     Parent/sibling w/ CABG, MI or angioplasty before 65F 55M? Not Asked   Social History Narrative    two children.     Preload 2013         Current Outpatient Medications on File Prior to Visit:  adapalene (DIFFERIN) 0.1 % external gel Apply topically At Bedtime   albuterol (PROAIR HFA/PROVENTIL HFA/VENTOLIN HFA) 108 (90 Base) MCG/ACT inhaler Inhale 2 puffs into the lungs every 4 hours as needed for shortness of breath / dyspnea   budesonide (PULMICORT) 0.5 MG/2ML neb solution Squirt entire vial into previously made john med saline bottle, mix, irrigate both nostrils until entire bottle empty. Do this twice daily.   escitalopram (LEXAPRO) 20 MG tablet Take 1 tablet (20 mg) by mouth daily   fluticasone (FLONASE) 50 MCG/ACT nasal spray Spray 2 sprays into both nostrils daily   lisinopril (PRINIVIL/ZESTRIL) 10 MG tablet Take 1 tablet (10 mg) by mouth daily    omeprazole (PRILOSEC) 20 MG DR capsule Take 20 mg by mouth daily   polyethylene glycol (MIRALAX) powder Take 17 g (1 capful) by mouth daily   senna (SENOKOT) 8.6 MG tablet 2 tabs today and as needed up to once day for constaiption   topiramate (TOPAMAX) 25 MG tablet TAKE 1 TABLET BY MOUTH EVERY DAY     No current facility-administered medications on file prior to visit.       ALLERGIES/SENSITIVITIES:   Allergies   Allergen Reactions     Cefaclor Unknown       PHYSICAL EXAM:     /82 (BP Location: Right arm, Patient Position: Sitting, Cuff Size: Adult Large)   Pulse 80   Temp 97.6  F (36.4  C) (Temporal)   Resp 18   Wt 109.5 kg (241 lb 6.4 oz)   LMP 07/26/2019   Breastfeeding? No   BMI 37.81 kg/m      General Appearance:   Sitting up in the chair, no apparent distress  HEENT: Pupils are equal and reactive, no scleral icterus,   Heart & CV:  RRR no murmur.  Intact distal pulses, good cap refill.  LUNGS: No increased work of breathing.Lugns are CTA B/L, no wheezing or crackles.  Abd: Obese abdomen, soft, nontender, nondistended  Ext: Normal bulk and tone, no lower extremity edema  Neuro: Alert and oriented, normal speech and mentation        CONSULTATION ASSESSMENT AND PLAN:    34 year old female with recent history of hematemesis and reflux disease.  Patient has a history of NSAID use and I am concerned patient may have developed an ulcer from NSAID use and alcohol use.  The technical details of EGD with possible biopsy were discussed with the patient along with the risks and benefits to include bleeding, infection, perforation, aspiration.  Patient demonstrated understanding is willing to proceed.    Schedule for EGD with possible biopsy at the patient's convenience  Continue PPI medication  Limit or, even better, stop ibuprofen use for headaches.  Patient was agreeable to trying Tylenol for headaches  Limit alcohol intake as this can exacerbate peptic ulcer disease and gastritis.      Emigdio RICHMOND  MD Ashlyn on 8/19/2019 at 2:32 PM

## 2019-08-19 NOTE — H&P (VIEW-ONLY)
GENERAL SURGERY CONSULTATION NOTE    Radha Christensen   [address]   [place] [id number]  34 year old  female    Primary Care Provider:  Ermelinda Nunez      HPI: Radha Christensne presents to clinic with history of recent hematemesis.  Patient was seen in the emergency room recently for hematemesis.  Apparently that day she has been drinking quite a bit to the point where she threw up.  When she saw a little bit of blood in her emesis she presented to the emergency room.  Patient has not had hematemesis in the past.  She has not had hematemesis since this incident.  Patient has been laying off the alcohol since she saw blood in her emesis.  Patient denies dark or bloody stools.  Patient never had a colonoscopy and EGD in the past.  Patient was recently started on proton pump inhibitor therapy.  She says she still feels a little bit bloated but denies abdominal pain.  She says her GERD symptoms have gotten better since starting the Prilosec.  The patient takes quite a bit of ibuprofen for her headaches.     REVIEW OF SYSTEMS:    GENERAL: No fevers or chills. Denies fatigue, recent weight loss.  HEENT: No sinus drainage. No changes with vision or hearing. No difficulty swallowing.   LYMPHATICS:  No swollen nodes in axilla, neck or groin.  CARDIOVASCULAR: Denies chest pain, palpitations and dyspnea on exertion.  PULMONARY: No shortness of breath or cough. No increase in sputum production.  GI: Denies melena,bright red blood in stools. No hematemesis. No constipation or diarrhea.  : No dysuria or hematuria.  SKIN: No recent rashes or ulcers.   HEMATOLOGY:  No history of easy bruising or bleeding.  ENDOCRINE:  No history of diabetes or thyroid problems.  NEUROLOGY:  No history of seizures or headaches. No motor or sensory changes.        Patient Active Problem List   Diagnosis     Other abnormal Papanicolaou smear of cervix and cervical HPV(795.09)     Acne, mild     Alcohol abuse     Dysthymic disorder      Asthma     Cervical high risk HPV (human papillomavirus) test positive     Cheilitis     Concussion     Contraceptive management     Encounter for surveillance of contraceptives     Family history of diabetes mellitus (DM)     Migraine headache     Encounter for routine gynecological examination     Tobacco abuse     Vaginitis       Past Medical History:   Diagnosis Date     Personal history of other medical treatment (CODE)      2, Para 2       No past surgical history on file.    Family History   Problem Relation Age of Onset     Heart Disease Maternal Grandfather 50        Heart Disease     Diabetes Maternal Grandfather         Diabetes     Chronic Obstructive Pulmonary Disease Maternal Grandfather         COPD     Diabetes Mother         Diabetes     Thyroid Disease Mother         Thyroid Disease     Heart Disease Mother      Heart Disease Maternal Uncle      Thyroid Disease Brother         Thyroid Disease     Diabetes Brother 38        Diabetes     Diabetes Maternal Uncle         Diabetes     Heart Disease Maternal Uncle      Heart Disease Maternal Grandmother      Breast Cancer No family hx of         Cancer-breast     Ovarian Cancer No family hx of         Cancer-ovarian     Prostate Cancer No family hx of         Cancer-prostate     Other - See Comments No family hx of         Stroke     Colon Cancer No family hx of         Cancer-colon     Blood Disease No family hx of         Blood Disease       Social History     Social History Narrative    two children.     Preload 2013       Social History     Socioeconomic History     Marital status:      Spouse name: Not on file     Number of children: Not on file     Years of education: Not on file     Highest education level: Not on file   Occupational History     Not on file   Social Needs     Financial resource strain: Not on file     Food insecurity:     Worry: Not on file     Inability: Not on file     Transportation needs:      Medical: Not on file     Non-medical: Not on file   Tobacco Use     Smoking status: Former Smoker     Years: 10.00     Types: Cigarettes     Last attempt to quit: 2013     Years since quittin.6     Smokeless tobacco: Never Used   Substance and Sexual Activity     Alcohol use: Yes     Comment: Alcoholic Drinks/day: Occ     Drug use: No     Sexual activity: Yes     Partners: Male     Birth control/protection: Pill   Lifestyle     Physical activity:     Days per week: Not on file     Minutes per session: Not on file     Stress: Not on file   Relationships     Social connections:     Talks on phone: Not on file     Gets together: Not on file     Attends [employer] service: Not on file     Active member of club or organization: Not on file     Attends meetings of clubs or organizations: Not on file     Relationship status: Not on file     Intimate partner violence:     Fear of current or ex partner: Not on file     Emotionally abused: Not on file     Physically abused: Not on file     Forced sexual activity: Not on file   Other Topics Concern     Parent/sibling w/ CABG, MI or angioplasty before 65F 55M? Not Asked   Social History Narrative    two children.     Preload 2013         Current Outpatient Medications on File Prior to Visit:  adapalene (DIFFERIN) 0.1 % external gel Apply topically At Bedtime   albuterol (PROAIR HFA/PROVENTIL HFA/VENTOLIN HFA) 108 (90 Base) MCG/ACT inhaler Inhale 2 puffs into the lungs every 4 hours as needed for shortness of breath / dyspnea   budesonide (PULMICORT) 0.5 MG/2ML neb solution Squirt entire vial into previously made john med saline bottle, mix, irrigate both nostrils until entire bottle empty. Do this twice daily.   escitalopram (LEXAPRO) 20 MG tablet Take 1 tablet (20 mg) by mouth daily   fluticasone (FLONASE) 50 MCG/ACT nasal spray Spray 2 sprays into both nostrils daily   lisinopril (PRINIVIL/ZESTRIL) 10 MG tablet Take 1 tablet (10 mg) by mouth daily    omeprazole (PRILOSEC) 20 MG DR capsule Take 20 mg by mouth daily   polyethylene glycol (MIRALAX) powder Take 17 g (1 capful) by mouth daily   senna (SENOKOT) 8.6 MG tablet 2 tabs today and as needed up to once day for constaiption   topiramate (TOPAMAX) 25 MG tablet TAKE 1 TABLET BY MOUTH EVERY DAY     No current facility-administered medications on file prior to visit.       ALLERGIES/SENSITIVITIES:   Allergies   Allergen Reactions     Cefaclor Unknown       PHYSICAL EXAM:     /82 (BP Location: Right arm, Patient Position: Sitting, Cuff Size: Adult Large)   Pulse 80   Temp 97.6  F (36.4  C) (Temporal)   Resp 18   Wt 109.5 kg (241 lb 6.4 oz)   LMP 07/26/2019   Breastfeeding? No   BMI 37.81 kg/m      General Appearance:   Sitting up in the chair, no apparent distress  HEENT: Pupils are equal and reactive, no scleral icterus,   Heart & CV:  RRR no murmur.  Intact distal pulses, good cap refill.  LUNGS: No increased work of breathing.Lugns are CTA B/L, no wheezing or crackles.  Abd: Obese abdomen, soft, nontender, nondistended  Ext: Normal bulk and tone, no lower extremity edema  Neuro: Alert and oriented, normal speech and mentation        CONSULTATION ASSESSMENT AND PLAN:    34 year old female with recent history of hematemesis and reflux disease.  Patient has a history of NSAID use and I am concerned patient may have developed an ulcer from NSAID use and alcohol use.  The technical details of EGD with possible biopsy were discussed with the patient along with the risks and benefits to include bleeding, infection, perforation, aspiration.  Patient demonstrated understanding is willing to proceed.    Schedule for EGD with possible biopsy at the patient's convenience  Continue PPI medication  Limit or, even better, stop ibuprofen use for headaches.  Patient was agreeable to trying Tylenol for headaches  Limit alcohol intake as this can exacerbate peptic ulcer disease and gastritis.      Emigdio RICHMOND  MD [place]lyn on 8/19/2019 at 2:32 PM

## 2019-08-19 NOTE — TELEPHONE ENCOUNTER
Screening Questions for the Scheduling of Screening Colonoscopies   (If Colonoscopy is diagnostic, Provider should review the chart before scheduling.)  Are you younger than 50 or older than 80? YES   Do you take aspirin or fish oil?  NO  (if yes, tell patient to stop 1 week prior to Colonoscopy)  Do you take warfarin (Coumadin), clopidogrel (Plavix), apixaban (Eliquis), dabigatram (Pradaxa), rivaroxaban (Xarelto) or any blood thinner? NO   Do you use oxygen at home?  NO   Do you have kidney disease? NO   Are you on dialysis? NO   Have you had a stroke or heart attack in the last year? NO   Have you had a stent in your heart or any blood vessel in the last year? NO   Have you had a transplant of any organ? NO   Have you had a colonoscopy or upper endoscopy (EGD) before? NO         When?    Date of scheduled EGD  08/27/2019  Provider  [facility]   [facility]armacy

## 2019-08-19 NOTE — NURSING NOTE
"Chief Complaint   Patient presents with     Consult       Initial /82 (BP Location: Right arm, Patient Position: Sitting, Cuff Size: Adult Large)   Pulse 80   Temp 97.6  F (36.4  C) (Temporal)   Resp 18   Wt 109.5 kg (241 lb 6.4 oz)   LMP 07/26/2019   Breastfeeding? No   BMI 37.81 kg/m   Estimated body mass index is 37.81 kg/m  as calculated from the following:    Height as of 8/7/19: 1.702 m (5' 7\").    Weight as of this encounter: 109.5 kg (241 lb 6.4 oz).  Medication Reconciliation: complete    Teresa Barnett LPN  "

## 2019-08-20 RX ORDER — LISINOPRIL 10 MG/1
TABLET ORAL
Qty: 90 TABLET | Refills: 1 | Status: SHIPPED | OUTPATIENT
Start: 2019-08-20 | End: 2020-02-28

## 2019-08-20 RX ORDER — TOPIRAMATE 25 MG/1
TABLET, FILM COATED ORAL
Qty: 90 TABLET | Refills: 1 | Status: SHIPPED | OUTPATIENT
Start: 2019-08-20 | End: 2020-02-28

## 2019-08-20 NOTE — TELEPHONE ENCOUNTER
Prescription approved per [facility] Refill Protocol.  Krysten Armstrong RN on 8/20/2019 at 2:14 PM

## 2019-08-20 NOTE — TELEPHONE ENCOUNTER
"Requested Prescriptions   Pending Prescriptions Disp Refills     lisinopril (PRINIVIL/ZESTRIL) 10 MG tablet [Pharmacy Med Name: LISINOPRIL 10MG TABLETS] 60 tablet 0     Sig: TAKE 1 TABLET(10 MG) BY MOUTH DAILY       ACE Inhibitors (Including Combos) Protocol Passed - 8/17/2019  7:57 AM        Passed - Blood pressure under 140/90 in past 12 months     BP Readings from Last 3 Encounters:   08/19/19 134/82   08/07/19 139/84   08/06/19 132/82                 Passed - Recent (12 mo) or future (30 days) visit within the authorizing provider's specialty     Patient had office visit in the last 12 months or has a visit in the next 30 days with authorizing provider or within the authorizing provider's specialty.  See \"Patient Info\" tab in inbasket, or \"Choose Columns\" in Meds & Orders section of the refill encounter.              Passed - Medication is active on med list        Passed - Patient is age 18 or older        Passed - No active pregnancy on record        Passed - Normal serum creatinine on file in past 12 months     Recent Labs   Lab Test 08/07/19  0802   CR 0.73             Passed - Normal serum potassium on file in past 12 months     Recent Labs   Lab Test 08/07/19  0802   POTASSIUM 3.6             Passed - No positive pregnancy test within past 12 months        Pt continues on the medication listed and has followed with Ermelinda in my chart and no noted changes for meds . Did have emesis of blood and is doing some procedures for determining cause fo this and also on Prilosec now. Prescription approved per [facility] Refill Protocol.  Juliane Tyler RN on 8/20/2019 at 9:11 AM   "

## 2019-08-27 ENCOUNTER — HOSPITAL ENCOUNTER (OUTPATIENT)
Facility: OTHER | Age: 34
Discharge: HOME | End: 2019-08-27
Attending: SURGERY | Admitting: SURGERY
Payer: COMMERCIAL

## 2019-08-27 ENCOUNTER — ANESTHESIA (OUTPATIENT)
Dept: SURGERY | Facility: OTHER | Age: 34
End: 2019-08-27
Payer: COMMERCIAL

## 2019-08-27 ENCOUNTER — ANESTHESIA EVENT (OUTPATIENT)
Dept: SURGERY | Facility: OTHER | Age: 34
End: 2019-08-27
Payer: COMMERCIAL

## 2019-08-27 VITALS
DIASTOLIC BLOOD PRESSURE: 88 MMHG | OXYGEN SATURATION: 97 % | RESPIRATION RATE: 16 BRPM | HEART RATE: 61 BPM | TEMPERATURE: 97.8 F | SYSTOLIC BLOOD PRESSURE: 126 MMHG

## 2019-08-27 LAB — HCG UR QL: NEGATIVE

## 2019-08-27 PROCEDURE — 25000132 ZZH RX MED GY IP 250 OP 250 PS 637: Performed by: SURGERY

## 2019-08-27 PROCEDURE — 81025 URINE PREGNANCY TEST: CPT | Performed by: NURSE ANESTHETIST, CERTIFIED REGISTERED

## 2019-08-27 PROCEDURE — 25000125 ZZHC RX 250: Performed by: NURSE ANESTHETIST, CERTIFIED REGISTERED

## 2019-08-27 PROCEDURE — 40000010 ZZH STATISTIC ANES STAT CODE-CRNA PER MINUTE: Performed by: SURGERY

## 2019-08-27 PROCEDURE — 88305 TISSUE EXAM BY PATHOLOGIST: CPT

## 2019-08-27 PROCEDURE — 25800030 ZZH RX IP 258 OP 636: Performed by: SURGERY

## 2019-08-27 PROCEDURE — 43239 EGD BIOPSY SINGLE/MULTIPLE: CPT | Performed by: SURGERY

## 2019-08-27 PROCEDURE — 25000128 H RX IP 250 OP 636: Performed by: NURSE ANESTHETIST, CERTIFIED REGISTERED

## 2019-08-27 PROCEDURE — 25000125 ZZHC RX 250: Performed by: SURGERY

## 2019-08-27 PROCEDURE — 43239 EGD BIOPSY SINGLE/MULTIPLE: CPT | Performed by: NURSE ANESTHETIST, CERTIFIED REGISTERED

## 2019-08-27 RX ORDER — SIMETHICONE
LIQUID (ML) MISCELLANEOUS PRN
Status: DISCONTINUED | OUTPATIENT
Start: 2019-08-27 | End: 2019-08-27 | Stop reason: HOSPADM

## 2019-08-27 RX ORDER — FLUMAZENIL 0.1 MG/ML
0.2 INJECTION, SOLUTION INTRAVENOUS
Status: DISCONTINUED | OUTPATIENT
Start: 2019-08-27 | End: 2019-08-27 | Stop reason: HOSPADM

## 2019-08-27 RX ORDER — PROPOFOL 10 MG/ML
INJECTION, EMULSION INTRAVENOUS PRN
Status: DISCONTINUED | OUTPATIENT
Start: 2019-08-27 | End: 2019-08-27

## 2019-08-27 RX ORDER — SODIUM CHLORIDE, SODIUM LACTATE, POTASSIUM CHLORIDE, CALCIUM CHLORIDE 600; 310; 30; 20 MG/100ML; MG/100ML; MG/100ML; MG/100ML
INJECTION, SOLUTION INTRAVENOUS CONTINUOUS
Status: DISCONTINUED | OUTPATIENT
Start: 2019-08-27 | End: 2019-08-27 | Stop reason: HOSPADM

## 2019-08-27 RX ORDER — LIDOCAINE 40 MG/G
CREAM TOPICAL
Status: DISCONTINUED | OUTPATIENT
Start: 2019-08-27 | End: 2019-08-27 | Stop reason: HOSPADM

## 2019-08-27 RX ORDER — NALOXONE HYDROCHLORIDE 0.4 MG/ML
.1-.4 INJECTION, SOLUTION INTRAMUSCULAR; INTRAVENOUS; SUBCUTANEOUS
Status: DISCONTINUED | OUTPATIENT
Start: 2019-08-27 | End: 2019-08-27 | Stop reason: HOSPADM

## 2019-08-27 RX ORDER — LIDOCAINE HYDROCHLORIDE 20 MG/ML
INJECTION, SOLUTION INFILTRATION; PERINEURAL PRN
Status: DISCONTINUED | OUTPATIENT
Start: 2019-08-27 | End: 2019-08-27

## 2019-08-27 RX ORDER — PROPOFOL 10 MG/ML
INJECTION, EMULSION INTRAVENOUS CONTINUOUS PRN
Status: DISCONTINUED | OUTPATIENT
Start: 2019-08-27 | End: 2019-08-27

## 2019-08-27 RX ADMIN — LIDOCAINE HYDROCHLORIDE 0.1 ML: 10 INJECTION, SOLUTION EPIDURAL; INFILTRATION; INTRACAUDAL; PERINEURAL at 07:45

## 2019-08-27 RX ADMIN — PROPOFOL 150 MCG/KG/MIN: 10 INJECTION, EMULSION INTRAVENOUS at 08:13

## 2019-08-27 RX ADMIN — LIDOCAINE HYDROCHLORIDE 5 ML: 20 INJECTION, SOLUTION INFILTRATION; PERINEURAL at 08:13

## 2019-08-27 RX ADMIN — SODIUM CHLORIDE, POTASSIUM CHLORIDE, SODIUM LACTATE AND CALCIUM CHLORIDE: 600; 310; 30; 20 INJECTION, SOLUTION INTRAVENOUS at 07:41

## 2019-08-27 RX ADMIN — PROPOFOL 100 MG: 10 INJECTION, EMULSION INTRAVENOUS at 08:13

## 2019-08-27 ASSESSMENT — LIFESTYLE VARIABLES: TOBACCO_USE: 1

## 2019-08-27 NOTE — ANESTHESIA PREPROCEDURE EVALUATION
Anesthesia Pre-Procedure Evaluation    Patient: Radha Christensen   MRN: [id number] : 1985          Preoperative Diagnosis: need for egd    Procedure(s):  ESOPHAGOGASTRODUODENOSCOPY (EGD)    Past Medical History:   Diagnosis Date     Personal history of other medical treatment (CODE)      2, Para 2     Past Surgical History:   Procedure Laterality Date     wisdom teeth          Anesthesia Evaluation     . Pt has had prior anesthetic. Type: MAC    No history of anesthetic complications          ROS/MED HX    ENT/Pulmonary:     (+)tobacco use, Past use asthma , . .    Neurologic: Comment: Hx of concussion    (+)migraines,     Cardiovascular:  - neg cardiovascular ROS       METS/Exercise Tolerance:  >4 METS   Hematologic:  - neg hematologic  ROS       Musculoskeletal:  - neg musculoskeletal ROS       GI/Hepatic: Comment: Hx etoh abuse        Renal/Genitourinary:  - ROS Renal section negative       Endo:     (+) Obesity, .      Psychiatric: Comment: Dysthymic disorder        Infectious Disease:  - neg infectious disease ROS       Malignancy:      - no malignancy   Other:    - neg other ROS                      Physical Exam  Normal systems: cardiovascular, pulmonary and dental    Airway   Mallampati: II  TM distance: >3 FB  Neck ROM: full    Dental     Cardiovascular   Rhythm and rate: regular and normal      Pulmonary    breath sounds clear to auscultation            Lab Results   Component Value Date    WBC 6.0 2019    HGB 13.1 2019    HCT 40.4 2019     2019     2019    POTASSIUM 3.6 2019    CHLORIDE 109 (H) 2019    CO2 25 2019    BUN 11 2019    CR 0.73 2019     (H) 2019    PREETI 8.7 2019    ALBUMIN 4.4 2019    PROTTOTAL 7.3 2019    ALT 17 2019    AST 20 2019    ALKPHOS 76 2019    BILITOTAL 0.3 2019    LIPASE 73 2019    INR 0.95 2019    T4 0.64 2016    HCG Negative  "08/07/2019       Preop Vitals  BP Readings from Last 3 Encounters:   08/19/19 134/82   08/07/19 139/84   08/06/19 132/82    Pulse Readings from Last 3 Encounters:   08/19/19 80   08/07/19 83   08/06/19 76      Resp Readings from Last 3 Encounters:   08/19/19 18   08/07/19 23   08/06/19 16    SpO2 Readings from Last 3 Encounters:   08/07/19 96%   08/06/19 98%   07/29/19 98%      Temp Readings from Last 1 Encounters:   08/19/19 97.6  F (36.4  C) (Temporal)    Ht Readings from Last 1 Encounters:   08/07/19 1.702 m (5' 7\")      Wt Readings from Last 1 Encounters:   08/19/19 109.5 kg (241 lb 6.4 oz)    Estimated body mass index is 37.81 kg/m  as calculated from the following:    Height as of 8/7/19: 1.702 m (5' 7\").    Weight as of 8/19/19: 109.5 kg (241 lb 6.4 oz).       Anesthesia Plan      History & Physical Review  History and physical reviewed and following examination; no interval change.    ASA Status:  2 .    NPO Status:  > 8 hours    Plan for MAC Reason for MAC:  Procedure to face, neck, head or breast         Postoperative Care      Consents  Anesthetic plan, risks, benefits and alternatives discussed with:  Patient..                 Robert Lal CRNA, APRN CRNA  "

## 2019-08-27 NOTE — ANESTHESIA CARE TRANSFER NOTE
Patient: Radha Christensen    Procedure(s):  ESOPHAGOGASTRODUODENOSCOPY, WITH BIOPSY    Diagnosis: need for egd  Diagnosis Additional Information: No value filed.    Anesthesia Type:   MAC     Note:  Airway :Nasal Cannula  Patient transferred to:Phase II  Handoff Report: Identifed the Patient, Identified the Reponsible Provider, Reviewed the pertinent medical history, Discussed the surgical course, Reviewed Intra-OP anesthesia mangement and issues during anesthesia, Set expectations for post-procedure period and Allowed opportunity for questions and acknowledgement of understanding      Vitals: (Last set prior to Anesthesia Care Transfer)    CRNA VITALS  8/27/2019 0757 - 8/27/2019 0828      8/27/2019             Resp Rate (set):  10                Electronically Signed By: Robert Lal CRNA, APRN CRNA  August 27, 2019  8:28 AM

## 2019-08-27 NOTE — ANESTHESIA POSTPROCEDURE EVALUATION
Patient: Radha Christensen    Procedure(s):  ESOPHAGOGASTRODUODENOSCOPY, WITH BIOPSY    Diagnosis:need for egd  Diagnosis Additional Information: No value filed.    Anesthesia Type:  MAC    Note:  Anesthesia Post Evaluation    Patient location during evaluation: Phase 2 and Bedside  Patient participation: Able to fully participate in evaluation  Level of consciousness: awake and alert  Pain management: adequate  Airway patency: patent  Cardiovascular status: acceptable  Respiratory status: acceptable  Hydration status: acceptable  PONV: none     Anesthetic complications: None          Last vitals:  Vitals:    08/27/19 0720   BP: (!) 140/95   Resp: 16   Temp: 97.9  F (36.6  C)   SpO2: 97%         Electronically Signed By: Robert Lal CRNA, APRN CRNA  August 27, 2019  8:34 AM

## 2019-08-27 NOTE — INTERVAL H&P NOTE
I saw and examined Radha Christensen.  I have reviewed the history and physical and find no changes to the patient's medical status or condition with the exceptions noted below.       Emigdio Goldberg MD   7:20 AM 8/27/2019

## 2019-08-27 NOTE — OP NOTE
PROCEDURE NOTE    DATE OF SERVICE: 8/27/2019    SURGEON: BOBBY Goldberg MD     PRE-OP DIAGNOSIS:  epigastric or chest pain    POST-OP DIAGNOSIS:  Epigastric pain     PROCEDURE:   EGD with biopsy    ASSISTANT:  Circulator: Vicky Omalley RN  Relief Circulator: Kailee Sanchez RN  Scrub Person: Vicky Madera  Pre-Op Nurse: Teresa Doyle RN    ANESTHESIA:  MAC                            Monitor Anesthesia CareCRNA Independent: Robert Lal APRN CRNA    INDICATION FOR THE PROCEDURE: Radha Christensen is a 34 year old female. The patient presents with epigastric pain and history of hematemasis. I explained to the patient the risks, benefits and alternatives to diagnostic EGD for evaluating epigastric pain. We specifically discussed the risks of bleeding, infection,perforation and the risks of sedation. The patient's questions were answered and the patient wished to proceed. Informed consent paperwork was completed.    PROCEDURE:The patient was taken to the endoscopy suite. Appropriate monitors were attached. The patient was placed in the left lateral decubitus position. Bite block was positioned.Timeout was performed confirming the patient's identity and procedure to be performed. After appropriate sedation was confirmed, the flexible endoscope was advanced into the oropharynx. The posterior oropharynx appeared grossly normal. The scope was advanced into the proximal esophagus. The esophagus was insufflated with air. The scope was advanced under direct visualization. No acute abnormalities of the esophagus were noted. The scope was advanced into the stomach. The scope was advanced through the pylorus into the duodenal bulb. The bulb and distal duodenum appeared grossly normal.  The scope was withdrawn back into the stomach. Antral biopsy was obtained and sent to pathology. The scope was retroflexed and the GE junction inspected. No abnormalities were noted.  The scope was returned to a neutral position and  the stomach was decompressed. The scope was withdrawn to the GE junction. The mucosa of the esophagus was inspected while withdrawing thescope. No abnormalities were noted. The scope was withdrawn from the patient. The bite block was removed. The patient tolerated the procedure with no immediately apparent complication. The patient was taken to recovery instable condition.     ESTIMATED BLOOD LOSS: none    COMPLICATIONS:  None    TISSUE REMOVED:  Yes    RECOMMEND:    Continue PPi      BOBBY Goldberg MD

## 2019-08-27 NOTE — DISCHARGE INSTRUCTIONS
[facility]  Adult Discharge Orders & Instructions    ________________________________________________________________          For 12 hours after surgery  1. Get plenty of rest.  A responsible adult must stay with you for at least 12 hours after you leave the hospital.   2. You may feel lightheaded.  IF so, sit for a few minutes before standing.  Have someone help you get up.   3. You may have a slight fever. Call the doctor if your fever is over 101 F (38.3 C) (taken under the tongue) or lasts longer than 24 hours.  4. You may have a dry mouth, a sore throat, muscle aches or trouble sleeping.  These should go away after 24 hours.  5. Do not make important or legal decisions.  6.   Do not drive or use heavy equipment.  If you have weakness or tingling, don't drive or use heavy equipment until this feeling goes away.    To contact a doctor, call   [phone]_______________________

## 2019-08-29 ENCOUNTER — TELEPHONE (OUTPATIENT)
Dept: SURGERY | Facility: OTHER | Age: 34
End: 2019-08-29

## 2019-11-06 ENCOUNTER — OFFICE VISIT (OUTPATIENT)
Dept: FAMILY MEDICINE | Facility: OTHER | Age: 34
End: 2019-11-06
Attending: NURSE PRACTITIONER
Payer: COMMERCIAL

## 2019-11-06 VITALS
TEMPERATURE: 97.7 F | HEART RATE: 60 BPM | RESPIRATION RATE: 16 BRPM | DIASTOLIC BLOOD PRESSURE: 72 MMHG | SYSTOLIC BLOOD PRESSURE: 128 MMHG | WEIGHT: 233.2 LBS | BODY MASS INDEX: 36.52 KG/M2

## 2019-11-06 DIAGNOSIS — K21.9 GASTROESOPHAGEAL REFLUX DISEASE WITHOUT ESOPHAGITIS: ICD-10-CM

## 2019-11-06 DIAGNOSIS — J01.00 ACUTE MAXILLARY SINUSITIS, RECURRENCE NOT SPECIFIED: Primary | ICD-10-CM

## 2019-11-06 DIAGNOSIS — F10.10 ALCOHOL ABUSE: ICD-10-CM

## 2019-11-06 DIAGNOSIS — I10 HYPERTENSION, UNSPECIFIED TYPE: ICD-10-CM

## 2019-11-06 DIAGNOSIS — F34.1 DYSTHYMIC DISORDER: ICD-10-CM

## 2019-11-06 PROCEDURE — G0463 HOSPITAL OUTPT CLINIC VISIT: HCPCS

## 2019-11-06 PROCEDURE — 99214 OFFICE O/P EST MOD 30 MIN: CPT | Performed by: NURSE PRACTITIONER

## 2019-11-06 RX ORDER — PREDNISONE 20 MG/1
20 TABLET ORAL DAILY
Qty: 3 TABLET | Refills: 0 | Status: SHIPPED | OUTPATIENT
Start: 2019-11-06 | End: 2019-12-19

## 2019-11-06 RX ORDER — FAMOTIDINE 20 MG/1
20 TABLET, FILM COATED ORAL 2 TIMES DAILY
Qty: 60 TABLET | Refills: 3 | Status: SHIPPED | OUTPATIENT
Start: 2019-11-06 | End: 2020-11-17

## 2019-11-06 ASSESSMENT — PAIN SCALES - GENERAL: PAINLEVEL: NO PAIN (0)

## 2019-11-06 ASSESSMENT — ENCOUNTER SYMPTOMS
DYSPHORIC MOOD: 1
SINUS PAIN: 1
SINUS PRESSURE: 1
ABDOMINAL PAIN: 1

## 2019-11-06 NOTE — PATIENT INSTRUCTIONS
Start pepcid daily--discontinue prilosec    pepcid 2 x day--if doing well try reducing 1 x day or minimal dose that is effective    augmentin x 10 day    predisone 1 x day for 3 days    Schedule new provider in next 4 weeks on progress

## 2019-11-06 NOTE — NURSING NOTE
"Chief Complaint   Patient presents with     RECHECK     F/U BP       Initial /72 (BP Location: Right arm, Patient Position: Sitting, Cuff Size: Adult Regular)   Pulse 60   Temp 97.7  F (36.5  C) (Tympanic)   Resp 16   Wt 105.8 kg (233 lb 3.2 oz)   LMP 10/17/2019   Breastfeeding? No   BMI 36.52 kg/m   Estimated body mass index is 36.52 kg/m  as calculated from the following:    Height as of 8/7/19: 1.702 m (5' 7\").    Weight as of this encounter: 105.8 kg (233 lb 3.2 oz).  Medication Reconciliation: Completed     Enedelia Sanchez LPN  "

## 2019-11-07 NOTE — PROGRESS NOTES
"Nursing Notes:   Enedelia Sanchez LPN  11/6/2019  3:45 PM  Signed  Chief Complaint   Patient presents with     RECHECK     F/U BP       Initial /72 (BP Location: Right arm, Patient Position: Sitting, Cuff Size: Adult Regular)   Pulse 60   Temp 97.7  F (36.5  C) (Tympanic)   Resp 16   Wt 105.8 kg (233 lb 3.2 oz)   LMP 10/17/2019   Breastfeeding? No   BMI 36.52 kg/m    Estimated body mass index is 36.52 kg/m  as calculated from the following:    Height as of 8/7/19: 1.702 m (5' 7\").    Weight as of this encounter: 105.8 kg (233 lb 3.2 oz).  Medication Reconciliation: Completed     Enedelia Sanchez LPN  Nursing note reviewed with patient.  Accurracy and completeness verified.   Ms. Christensen is a 34 year old female who:  Patient presents with:  RECHECK: F/U BP      ICD-10-CM    1. Acute maxillary sinusitis, recurrence not specified J01.00 amoxicillin-clavulanate (AUGMENTIN) 875-125 MG tablet     predniSONE (DELTASONE) 20 MG tablet   2. Gastroesophageal reflux disease without esophagitis K21.9 famotidine (PEPCID) 20 MG tablet   3. Dysthymic disorder F34.1    4. Alcohol abuse F10.10    5. Hypertension, unspecified type I10      HPI     Radha is following up on recent ED visit a few months ago for alcohol abuse intermittently has been self-medicating for dysthymic issues  She had vomited and noticed some blood--had follow-up EGD which did not show any pathology or active bleeding or ulcer disease  Has been on Prilosec and was told to return to primary care after 30 days    She is working with an outpatient chemical dependency therapist  Has remained sober since her ED visit  Denies any abdominal pain nausea vomiting--- her bloating and abdominal issues have subsided I suspect were secondary to alcohol use    The other issue is she has had extensive allergy work-up and sinus work-up with ENT at [facility]  Does not want to do the allergy injections-uses Pulmicort sinus--rinses which has worked overall along with " daily Zyrtec  Recent maxillary sinus pain and pressure persistent for the past 4 weeks does not feel she can get it under control  There is not been a cough, there is not been any fever    Also like to review blood pressure--blood pressure has been in target range on 10 mg of lisinopril I suspect fluctuations due to alcohol use and depression anxiety exacerbations          Review of Systems   HENT: Positive for sinus pressure and sinus pain.    Gastrointestinal: Positive for abdominal pain.   Allergic/Immunologic: Positive for environmental allergies.   Psychiatric/Behavioral: Positive for dysphoric mood.   All other systems reviewed and are negative.     All other systems reviewed and negative.     MADELINE:   MADELINE-7 SCORE 5/25/2016 8/2/2017 8/6/2019   Total Score 1 5 4     PHQ9:  PHQ-9 SCORE 9/21/2016 8/2/2017 1/24/2019   PHQ-9 Total Score 4 2 0       I have personally reviewed the past medical history, past surgical history, medications, allergies, family and social history as listed below, on 11/6/2019.    Allergies   Allergen Reactions     Cefaclor Unknown       Current Outpatient Medications   Medication Sig Dispense Refill     amoxicillin-clavulanate (AUGMENTIN) 875-125 MG tablet Take 1 tablet by mouth 2 times daily for 10 days 20 tablet 0     famotidine (PEPCID) 20 MG tablet Take 1 tablet (20 mg) by mouth 2 times daily 60 tablet 3     predniSONE (DELTASONE) 20 MG tablet Take 1 tablet (20 mg) by mouth daily 3 tablet 0     adapalene (DIFFERIN) 0.1 % external gel Apply topically At Bedtime 45 g 4     albuterol (PROAIR HFA/PROVENTIL HFA/VENTOLIN HFA) 108 (90 Base) MCG/ACT inhaler Inhale 2 puffs into the lungs every 4 hours as needed for shortness of breath / dyspnea 1 Inhaler 1     budesonide (PULMICORT) 0.5 MG/2ML neb solution Squirt entire vial into previously made john med saline bottle, mix, irrigate both nostrils until entire bottle empty. Do this twice daily. 2 Box 3     escitalopram (LEXAPRO) 20 MG tablet Take  1 tablet (20 mg) by mouth daily 90 tablet 3     lisinopril (PRINIVIL/ZESTRIL) 10 MG tablet TAKE 1 TABLET(10 MG) BY MOUTH DAILY 90 tablet 1     polyethylene glycol (MIRALAX) powder Take 17 g (1 capful) by mouth daily 510 g 3     senna (SENOKOT) 8.6 MG tablet 2 tabs today and as needed up to once day for constaiption 120 tablet 0     topiramate (TOPAMAX) 25 MG tablet TAKE 1 TABLET BY MOUTH EVERY DAY 90 tablet 1        Patient Active Problem List    Diagnosis Date Noted     Acne, mild 2018     Priority: Medium     Dysthymic disorder 2018     Priority: Medium     Asthma 2018     Priority: Medium     Overview:   exercise induced/mild intermittent       Contraceptive management 2018     Priority: Medium     Encounter for routine gynecological examination 2018     Priority: Medium     Encounter for surveillance of contraceptives 2017     Priority: Medium     Family history of diabetes mellitus (DM) 2017     Priority: Medium     Cervical high risk HPV (human papillomavirus) test positive 2016     Priority: Medium     Cheilitis 2013     Priority: Medium     Migraine headache 2011     Priority: Medium     Other abnormal Papanicolaou smear of cervix and cervical HPV(795.09) 10/31/2011     Priority: Medium     Alcohol abuse 10/19/2011     Priority: Medium     Tobacco abuse 10/19/2011     Priority: Medium     Concussion 2011     Priority: Medium     Overview:   no loss of consciousness from softball injury       Vaginitis 2011     Priority: Medium     Past Medical History:   Diagnosis Date     Personal history of other medical treatment (CODE)      2, Para 2     Past Surgical History:   Procedure Laterality Date     ESOPHAGOSCOPY, GASTROSCOPY, DUODENOSCOPY (EGD), COMBINED N/A 2019    Procedure: ESOPHAGOGASTRODUODENOSCOPY, WITH BIOPSY;  Surgeon: Emigdio Goldberg MD;  Location: [facility] OR     wisdom teeth        Social History     Socioeconomic  History     Marital status:      Spouse name: None     Number of children: None     Years of education: None     Highest education level: None   Occupational History     None   Social Needs     Financial resource strain: None     Food insecurity:     Worry: None     Inability: None     Transportation needs:     Medical: None     Non-medical: None   Tobacco Use     Smoking status: Former Smoker     Years: 10.00     Types: Cigarettes     Last attempt to quit: 2013     Years since quittin.8     Smokeless tobacco: Never Used   Substance and Sexual Activity     Alcohol use: Yes     Comment: Alcoholic Drinks/day: Occ     Drug use: No     Sexual activity: Yes     Partners: Male     Birth control/protection: Pill   Lifestyle     Physical activity:     Days per week: None     Minutes per session: None     Stress: None   Relationships     Social connections:     Talks on phone: None     Gets together: None     Attends [employer] service: None     Active member of club or organization: None     Attends meetings of clubs or organizations: None     Relationship status: None     Intimate partner violence:     Fear of current or ex partner: None     Emotionally abused: None     Physically abused: None     Forced sexual activity: None   Other Topics Concern     Parent/sibling w/ CABG, MI or angioplasty before 65F 55M? Not Asked   Social History Narrative    two children.     Preload 2013     Family History   Problem Relation Age of Onset     Heart Disease Maternal Grandfather 50        Heart Disease     Diabetes Maternal Grandfather         Diabetes     Chronic Obstructive Pulmonary Disease Maternal Grandfather         COPD     Diabetes Mother         Diabetes     Thyroid Disease Mother         Thyroid Disease     Heart Disease Mother      Heart Disease Maternal Uncle      Thyroid Disease Brother         Thyroid Disease     Diabetes Brother 38        Diabetes     Diabetes Maternal Uncle         Diabetes  "    Heart Disease Maternal Uncle      Heart Disease Maternal Grandmother      Breast Cancer No family hx of         Cancer-breast     Ovarian Cancer No family hx of         Cancer-ovarian     Prostate Cancer No family hx of         Cancer-prostate     Other - See Comments No family hx of         Stroke     Colon Cancer No family hx of         Cancer-colon     Blood Disease No family hx of         Blood Disease       EXAM:   Vitals:    11/06/19 1527   BP: 128/72   BP Location: Right arm   Patient Position: Sitting   Cuff Size: Adult Regular   Pulse: 60   Resp: 16   Temp: 97.7  F (36.5  C)   TempSrc: Tympanic   Weight: 105.8 kg (233 lb 3.2 oz)       Current Pain Score: No Pain (0)     BP Readings from Last 3 Encounters:   11/06/19 128/72   08/27/19 126/88   08/19/19 134/82      Wt Readings from Last 3 Encounters:   11/06/19 105.8 kg (233 lb 3.2 oz)   08/19/19 109.5 kg (241 lb 6.4 oz)   08/07/19 104.3 kg (230 lb)      Estimated body mass index is 36.52 kg/m  as calculated from the following:    Height as of 8/7/19: 1.702 m (5' 7\").    Weight as of this encounter: 105.8 kg (233 lb 3.2 oz).     Physical Exam  Vitals signs and nursing note reviewed.   Constitutional:       Appearance: Normal appearance.   Neck:      Musculoskeletal: Normal range of motion and neck supple.   Cardiovascular:      Rate and Rhythm: Normal rate.   Pulmonary:      Effort: Pulmonary effort is normal.   Musculoskeletal: Normal range of motion.   Skin:     General: Skin is warm and dry.   Neurological:      Mental Status: She is alert and oriented to person, place, and time.   Psychiatric:         Mood and Affect: Mood normal.         Behavior: Behavior normal.         Thought Content: Thought content normal.         Judgement: Judgment normal.         INVESTIGATIONS:  No results found for any visits on 11/06/19.    ASSESSMENT AND PLAN:  Problem List Items Addressed This Visit        Nervous and Auditory    Alcohol abuse       Behavioral    " Dysthymic disorder      Other Visit Diagnoses     Acute maxillary sinusitis, recurrence not specified    -  Primary    Relevant Medications    amoxicillin-clavulanate (AUGMENTIN) 875-125 MG tablet    predniSONE (DELTASONE) 20 MG tablet    Gastroesophageal reflux disease without esophagitis        Relevant Medications    famotidine (PEPCID) 20 MG tablet    Hypertension, unspecified type            Hypertension well-controlled on lisinopril 10 mg--should continue and monitor blood pressure at home    Congratulated on sobriety--encouraged to continue working with therapist  Not adjust her SSRI at this time    Discussed reflux management--could try converting to Pepcid H2 blocker twice daily -- day discontinue PPI  If stable on H2 blocker twice daily for a month, could try once daily and self adjust dose minimum to manage reflux along with avoiding dietary triggers    We will treat sinusitis with amoxicillin clavulanate--- sinus rinses and 3 days of prednisone burst for inflammation  Discussed expected course return to clinic if no improvement or abrupt worsening    Discussed I will be leaving family practice  She will schedule and establish care and follow-up appointment with new providers that we reviewed today and her daughter for continuity of care      -- Expected clinical course discussed    -- Medications and their side effects discussed    Patient Instructions   Start pepcid daily--discontinue prilosec    pepcid 2 x day--if doing well try reducing 1 x day or minimal dose that is effective    augmentin x 10 day    predisone 1 x day for 3 days    Schedule new provider in next 4 weeks on progress    Ermelinda Nunez CNP  [facility]     Portions of this note were dictated using speech recognition software. The note has been proofread but errors in the text may have been overlooked. Please contact me if there are any concerns regarding the accuracy of the dictation.

## 2019-12-19 ENCOUNTER — OFFICE VISIT (OUTPATIENT)
Dept: FAMILY MEDICINE | Facility: OTHER | Age: 34
End: 2019-12-19
Attending: FAMILY MEDICINE
Payer: COMMERCIAL

## 2019-12-19 VITALS
SYSTOLIC BLOOD PRESSURE: 122 MMHG | OXYGEN SATURATION: 99 % | RESPIRATION RATE: 16 BRPM | TEMPERATURE: 98.2 F | WEIGHT: 230.8 LBS | HEIGHT: 67 IN | DIASTOLIC BLOOD PRESSURE: 80 MMHG | HEART RATE: 70 BPM | BODY MASS INDEX: 36.22 KG/M2

## 2019-12-19 DIAGNOSIS — Z77.122 PROBLEMS RELATED TO EXPOSURE TO NOISE: Primary | ICD-10-CM

## 2019-12-19 PROCEDURE — 99213 OFFICE O/P EST LOW 20 MIN: CPT | Performed by: FAMILY MEDICINE

## 2019-12-19 PROCEDURE — G0463 HOSPITAL OUTPT CLINIC VISIT: HCPCS

## 2019-12-19 ASSESSMENT — MIFFLIN-ST. JEOR: SCORE: 1769.53

## 2019-12-19 ASSESSMENT — PAIN SCALES - GENERAL: PAINLEVEL: NO PAIN (0)

## 2019-12-19 ASSESSMENT — PATIENT HEALTH QUESTIONNAIRE - PHQ9: SUM OF ALL RESPONSES TO PHQ QUESTIONS 1-9: 4

## 2019-12-19 NOTE — PROGRESS NOTES
"Nursing Notes:   Sally Antunez LPN  12/19/2019  3:46 PM  Signed  Chief Complaint   Patient presents with     Establish Care     PCP was Ermelinda Nunez. Looking to get OT referral.     Medication Reconciliation: complete    Sally Antunez LPN     SUBJECTIVE:  HPI: Radha Christensen is a 34 year old female here for OT referral.     Patient has done really well with her blood pressure, GERD, depression/anxiety, and abstinence from alcohol.  These issues are all stable.    She does concerned about having symptoms of sensory overload disorder and noise sensitivity.  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  These episodes are triggered by coworkers speaking loudly on the phone or an elevation of noise in general in her work area.  Another example is when she goes to her daughter's volleyball games, and multiple teams are practicing at once with lots of loud sounds like whistles blowing and lots of chaotic yelling.  She is also experienced these episodes when she is at social events and allow TVs playing in the background, or even when her daughter plays music too loud in the car.    She is able to go and enjoys going to country music concerts, and sporting games like the [employer].  She is able to tolerate that level of noise in chaos, but by the end of the game a concert feels like she \"needs to leave immediately \".    She denies any full-blown panic attacks recently.  She tells me overall she has been doing very well, and has been focusing on and investing more in herself.  She has been going to mental health counseling and chemical dependency counseling.  She is also trying to lose weight and improve her health by walking regularly and incorporating more Herbalife smoothies into her diet. In the past she has had a phobia of flying, and has used anxiety medications for " "these instances.    Allergies:  Allergies   Allergen Reactions     Cefaclor Unknown     ROS:  See HPI.  She denies chest pain or shortness of breath.    OBJECTIVE:  /80 (BP Location: Right arm, Patient Position: Sitting, Cuff Size: Adult Regular)   Pulse 70   Temp 98.2  F (36.8  C) (Tympanic)   Resp 16   Ht 1.702 m (5' 7\")   Wt 104.7 kg (230 lb 12.8 oz)   LMP 12/04/2019   SpO2 99%   Breastfeeding No   BMI 36.15 kg/m      EXAM:  General Appearance: Pleasant, alert, appropriate appearance for age. No acute distress  Head: Normal. Normocephalic, atraumatic.  Eyes: PERRL  Skin: no concerning or new rashes.  Neurologic Exam: CN 2-12 grossly intact. Normal gait. No focal motor or sensory deficits. No tremor.  Psychiatric Exam: Alert and oriented, appropriate affect.    MADELINE-7 SCORE 5/25/2016 8/2/2017 8/6/2019   Total Score 1 5 4     PHQ-9 SCORE 8/2/2017 1/24/2019 12/19/2019   PHQ-9 Total Score 2 0 4     Current Outpatient Medications   Medication     adapalene (DIFFERIN) 0.1 % external gel     albuterol (PROAIR HFA/PROVENTIL HFA/VENTOLIN HFA) 108 (90 Base) MCG/ACT inhaler     escitalopram (LEXAPRO) 20 MG tablet     famotidine (PEPCID) 20 MG tablet     lisinopril (PRINIVIL/ZESTRIL) 10 MG tablet     topiramate (TOPAMAX) 25 MG tablet     No current facility-administered medications for this visit.          ASSESSEMENT AND PLAN:    1. Problems related to exposure to noise  -Potentially noise sensitivity/stimulation overload disorder  -OCCUPATIONAL THERAPY REFERRAL  -Her boss also said they would work with her in terms of strategies to reduce the noise in their cubicle style office  -Anxiety depression stable at this time, continue to monitor    Healthcare maintenance: Last Pap 2016 was normal next due in 2021  RTC PRN    Myrna Fulton MD  [facility]    "

## 2019-12-19 NOTE — NURSING NOTE
Chief Complaint   Patient presents with     Establish Care     PCP was Ermelinda Nunez. Looking to get OT referral.     Medication Reconciliation: complete    Sally Antunez LPN

## 2019-12-20 ASSESSMENT — ASTHMA QUESTIONNAIRES: ACT_TOTALSCORE: 25

## 2020-01-13 ENCOUNTER — HOSPITAL ENCOUNTER (OUTPATIENT)
Dept: OCCUPATIONAL THERAPY | Facility: OTHER | Age: 35
Setting detail: THERAPIES SERIES
End: 2020-01-13
Attending: FAMILY MEDICINE
Payer: COMMERCIAL

## 2020-01-13 DIAGNOSIS — Z77.122 PROBLEMS RELATED TO EXPOSURE TO NOISE: ICD-10-CM

## 2020-01-13 PROCEDURE — 97165 OT EVAL LOW COMPLEX 30 MIN: CPT | Mod: GO

## 2020-01-13 PROCEDURE — 97535 SELF CARE MNGMENT TRAINING: CPT | Mod: GO

## 2020-01-16 ENCOUNTER — HOSPITAL ENCOUNTER (OUTPATIENT)
Dept: OCCUPATIONAL THERAPY | Facility: OTHER | Age: 35
Setting detail: THERAPIES SERIES
End: 2020-01-16
Attending: FAMILY MEDICINE
Payer: COMMERCIAL

## 2020-01-16 PROCEDURE — 97535 SELF CARE MNGMENT TRAINING: CPT | Mod: GO

## 2020-01-20 NOTE — PROGRESS NOTES
ADOLESCENT/ADULT REHAB SENSORY PROFILE    Radha Christensen completed an Adolescent/Adult Sensory Profile. This provides a standard method to measure the individual s sensory processing abilities and to explain the effect that sensory processing has on functional performance in the daily life of a person.     The Sensory Profile is a judgment-based questionnaire consisting of  items, depending on the age of the individual, that are rated by frequency of the individual s response to various sensory experiences. Certain patterns of response on the sensory profile are suggestive of difficulties of sensory processing and performance in daily life situations.    The scores are classified intoTypical (within 1 Standard Deviation of the mean), Probable Difference (within the 1-2 SD from the mean range), and Definite Difference Performance (>2.0 SD from the mean) ranges. The scores also give an indication of behaviors occurring less frequently than in typically scoring children (less than others range) or more frequently (more than others range).     Scores are divided into two main groups: the more specific individual sensory processing areas and behaviors, and the more general approaches measured by the quadrants     The scores indicate whether a certain pattern of behavior is occurring. For example: A Definite Difference in the More Than Others range in Sensory Seeking suggests that an individual displays more sensation seeking behaviors than a typically performing individual. Knowing the patterns of an individual s responses to a variety of sensations helps us understand and interpret their behaviors and then guide treatment appropriately.    The Sensory Profile Quadrant Summary looks at an individual s general response pattern and approach rather than at specific areas. It can be useful in looking at broad patterns of behavior such as general amount of responsiveness (level of response and amount of stimulus needed  to elicit a response), and whether the child tends to seek or avoid stimulus.     QUADRANT SUMMARY  Radha Christensen quadrant scores were:   Definite Difference   Much Less Than Others  (--) Probable Difference Less Than Others  (-) Typical Performance  (=) Probable Difference More Than Others (+) Definite Difference Much More Than Others (++)   Low Registration    36/75    Sensation Seeking  41/75      Sensory Sensitivity     49/75   Sensation Avoiding    49/75      INTERPRETATION OF SENSORY PROFILE:  Radha scored in the definite difference for sensory sensitivity, which would explain the increased sensitivity to noise and irritability with too much stimuli.  She also scored probable difference for sensory seeking.  Radha uses a weighted blanket at times, which helps her to fall asleep.  Radha is able to use various strategies, and was educated in additional strategies to assist in calming her sensory system.       References:  Brittnee Mitchell, and Anita Wilder: Adolescent/Adult Sensory Profile. 2002. [employer]: a [employer]

## 2020-01-20 NOTE — PROGRESS NOTES
01/13/20 1500   Quick Adds   Type of Visit Initial Outpatient Occupational Therapy Evaluation   General Information   Start Of Care Date 01/13/20   Referring Physician Dr. Fulton   Orders Evaluate and treat as indicated   Medical Diagnosis anxiety/sensory processing    Onset of Illness/Injury or Date of Surgery   (2018)   Precautions/Limitations No known precautions/limitations   Additional Occupational Profile Info/Pertinent History of Current Problem Radha is a 34 year old female who reports to OT with concerns related to noise exposure.  She reports that over the last few years she has been noticing being overly irritated with loud noises. 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  She reports that she has also noticed that she has been losing focus and inability to multitask.      Role/Living Environment   Current Community Support Family/friend caregiver;Therapy services   Patient role/Employment history Employed   Fall Risk Screen   Fall screen completed by OT   Have you fallen 2 or more times in the past year? No   Have you fallen and had an injury in the past year? No   Is patient a fall risk? No   Sensation   Sensation Comments Sensory: completed sensory profile self checklist    Planned Therapy Interventions   Planned Therapy Interventions Self care/Home management;Therapeutic activities   Adult OT Eval Goals   OT Eval Goals (Adult) 1;2    OT Goal 1   Goal Identifier STG 1    Goal Description Patient will complete Sensory Profile in order to further assess and establish treatment plan    Target Date 01/29/20    OT Goal 2   Goal Identifier LTG 1    Goal Description Radha will demonstrate knowledge and acceptance and comply with sensory diet recommendations    Target Date 02/14/20   Clinical Impression   Criteria for Skilled Therapeutic Interventions Met Yes, treatment indicated   OT Diagnosis Sensory processing     Assessment of Occupational Performance 1-3 Performance Deficits   Identified Performance Deficits work and lesiure    Clinical Decision Making (Complexity) Low complexity   Therapy Frequency 1x week    Predicted Duration of Therapy Intervention (days/wks) 2 weeks    Risks and Benefits of Treatment have been explained. Yes   Patient, Family & other staff in agreement with plan of care Yes   Education Assessment   Barriers To Learning No Barriers   Preferred Learning Style Listening;Reading   Total Evaluation Time   OT Lyric, Low Complexity Minutes (24277) 30

## 2020-02-27 DIAGNOSIS — I10 HYPERTENSION, UNSPECIFIED TYPE: ICD-10-CM

## 2020-02-27 DIAGNOSIS — G43.909 MIGRAINE WITHOUT STATUS MIGRAINOSUS, NOT INTRACTABLE, UNSPECIFIED MIGRAINE TYPE: ICD-10-CM

## 2020-02-28 RX ORDER — TOPIRAMATE 25 MG/1
TABLET, FILM COATED ORAL
Qty: 90 TABLET | Refills: 1 | Status: SHIPPED | OUTPATIENT
Start: 2020-02-28 | End: 2020-05-28

## 2020-02-28 RX ORDER — LISINOPRIL 10 MG/1
TABLET ORAL
Qty: 90 TABLET | Refills: 3 | Status: SHIPPED | OUTPATIENT
Start: 2020-02-28 | End: 2020-10-28

## 2020-02-28 NOTE — TELEPHONE ENCOUNTER
[facility] sent Rx request for the following:      topiramate 25 mg tablet      Last Prescription Date:   8/20/19  Last Fill Qty/Refills:         90, R-1    Last Office Visit:              12/19/19   Future Office visit:           None noted with PCP         Lisinopril 10 mg tablet      Last Prescription Date:   8/20/19  Last Fill Qty/Refills:         90, R-1      Prescription approved per [facility] Refill Protocol.  Krysten Devries RN............................ 2/28/2020 11:53 AM

## 2020-03-11 ENCOUNTER — HEALTH MAINTENANCE LETTER (OUTPATIENT)
Age: 35
End: 2020-03-11

## 2020-03-29 DIAGNOSIS — F41.1 GAD (GENERALIZED ANXIETY DISORDER): ICD-10-CM

## 2020-03-29 RX ORDER — ESCITALOPRAM OXALATE 20 MG/1
TABLET ORAL
Qty: 90 TABLET | Refills: 3 | Status: SHIPPED | OUTPATIENT
Start: 2020-03-29 | End: 2021-03-30

## 2020-04-23 ENCOUNTER — E-VISIT (OUTPATIENT)
Dept: FAMILY MEDICINE | Facility: OTHER | Age: 35
End: 2020-04-23
Payer: COMMERCIAL

## 2020-04-23 DIAGNOSIS — J45.41 MODERATE PERSISTENT ASTHMA WITH EXACERBATION: Primary | ICD-10-CM

## 2020-04-23 DIAGNOSIS — J01.00 ACUTE NON-RECURRENT MAXILLARY SINUSITIS: ICD-10-CM

## 2020-04-23 PROCEDURE — 99422 OL DIG E/M SVC 11-20 MIN: CPT | Performed by: FAMILY MEDICINE

## 2020-04-23 RX ORDER — PREDNISONE 20 MG/1
40 TABLET ORAL DAILY
Qty: 10 TABLET | Refills: 0 | Status: SHIPPED | OUTPATIENT
Start: 2020-04-23 | End: 2020-04-29

## 2020-04-23 NOTE — PATIENT INSTRUCTIONS
Thank you for choosing us for your care. I have placed an order for a prescription so that you can start treatment. View your full visit summary for details by clicking on the link below. Your pharmacist will able to address any questions you may have about the medication.     If you're not feeling better within 5-7 days, please schedule an appointment.  You can schedule an appointment right here in [facility], or call [phone]  If the visit is for the same symptoms as your e-visit, we'll refund the cost of your e-visit if seen within seven days.    You may want to try a nasal lavage (also known as nasal irrigation). You can find over-the-counter products, such as Neti-Pot, at retail locations or make your own at home. Instructions for homemade nasal lavage and more information on the process are available online at http://www.aafp.org/afp/2009/1115/p1121.html.      Sinusitis (Antibiotic Treatment)    The sinuses are air-filled spaces within the bones of the face. They connect to the inside of the nose. Sinusitis is an inflammation of the tissue that lines the sinuses. Sinusitis can occur during a cold. It can also happen due to allergies to pollens and other particles in the air. Sinusitis can cause symptoms of sinus congestion and a feeling of fullness. A sinus infection causes fever, headache, and facial pain. There is often green or yellow fluid draining from the nose or into the back of the throat (post-nasal drip). You have been given antibiotics to treat this condition.  Home care    Take the full course of antibiotics as instructed. Do not stop taking them, even when you feel better.    Drink plenty of water, hot tea, and other liquids. This may help thin nasal mucus. It also may help your sinuses drain fluids.    Heat may help soothe painful areas of your face. Use a towel soaked in hot water. Or,  the shower and direct the warm spray onto your face. Using a vaporizer along with a menthol rub at  night may also help soothe symptoms.     An expectorant with guaifenesin may help thin nasal mucus and help your sinuses drain fluids.    You can use an over-the-counter decongestant, unless a similar medicine was prescribed to you. Nasal sprays work the fastest. Use one that contains phenylephrine or oxymetazoline. First blow your nose gently. Then use the spray. Do not use these medicines more often than directed on the label. If you do, your symptoms may get worse. You may also take pills that contain pseudoephedrine. Don t use products that combine multiple medicines. This is because side effects may be increased. Read labels. You can also ask the pharmacist for help. (People with high blood pressure should not use decongestants. They can raise blood pressure.)    Over-the-counter antihistamines may help if allergies contributed to your sinusitis.      Do not use nasal rinses or irrigation during an acute sinus infection, unless your healthcare provider tells you to. Rinsing may spread the infection to other areas in your sinuses.    Use acetaminophen or ibuprofen to control pain, unless another pain medicine was prescribed to you. If you have chronic liver or kidney disease or ever had a stomach ulcer, talk with your healthcare provider before using these medicines. (Aspirin should never be taken by anyone under age 18 who is ill with a fever. It may cause severe liver damage.)    Don't smoke. This can make symptoms worse.  Follow-up care  Follow up with your healthcare provider or our staff if you are not better in 1 week.  When to seek medical advice  Call your healthcare provider if any of these occur:    Facial pain or headache that gets worse    Stiff neck    Unusual drowsiness or confusion    Swelling of your forehead or eyelids    Vision problems, such as blurred or double vision    Fever of 100.4 F (38 C) or higher, or as directed by your healthcare provider    Seizure    Breathing problems    Symptoms  don't go away in 10 days  Prevention  Here are steps you can take to help prevent an infection:    Keep good hand washing habits.    Don t have close contact with people who have sore throats, colds, or other upper respiratory infections.    Don t smoke, and stay away from secondhand smoke.    Stay up to date with of your vaccines.  Date Last Reviewed: 11/1/2017 2000-2019 [employer]. [address] [place] [address], [place] [id number]. All rights reserved. This information is not intended as a substitute for professional medical care. Always follow your healthcare professional's instructions.

## 2020-04-23 NOTE — TELEPHONE ENCOUNTER
Routing to a provider in clinic in absence of PCP. Jie Shelton RN  ....................  4/23/2020   1:01 PM

## 2020-04-29 ENCOUNTER — OFFICE VISIT (OUTPATIENT)
Dept: FAMILY MEDICINE | Facility: OTHER | Age: 35
End: 2020-04-29
Attending: NURSE PRACTITIONER
Payer: COMMERCIAL

## 2020-04-29 ENCOUNTER — HOSPITAL ENCOUNTER (OUTPATIENT)
Dept: GENERAL RADIOLOGY | Facility: OTHER | Age: 35
End: 2020-04-29
Attending: NURSE PRACTITIONER
Payer: COMMERCIAL

## 2020-04-29 VITALS
WEIGHT: 227.8 LBS | DIASTOLIC BLOOD PRESSURE: 82 MMHG | TEMPERATURE: 98 F | HEART RATE: 80 BPM | OXYGEN SATURATION: 98 % | SYSTOLIC BLOOD PRESSURE: 134 MMHG | BODY MASS INDEX: 35.68 KG/M2 | RESPIRATION RATE: 16 BRPM

## 2020-04-29 DIAGNOSIS — M54.9 UPPER BACK PAIN ON RIGHT SIDE: ICD-10-CM

## 2020-04-29 DIAGNOSIS — J45.21 MILD INTERMITTENT ASTHMA WITH ACUTE EXACERBATION: ICD-10-CM

## 2020-04-29 DIAGNOSIS — R07.89 BURNING IN THE CHEST: ICD-10-CM

## 2020-04-29 DIAGNOSIS — J45.21 MILD INTERMITTENT ASTHMA WITH ACUTE EXACERBATION: Primary | ICD-10-CM

## 2020-04-29 LAB
SARS-COV-2 PCR COMMENT: NORMAL
SARS-COV-2 RNA SPEC QL NAA+PROBE: NEGATIVE
SARS-COV-2 RNA SPEC QL NAA+PROBE: NORMAL
SPECIMEN SOURCE: NORMAL
SPECIMEN SOURCE: NORMAL

## 2020-04-29 PROCEDURE — 87635 SARS-COV-2 COVID-19 AMP PRB: CPT | Mod: ZL | Performed by: NURSE PRACTITIONER

## 2020-04-29 PROCEDURE — 99214 OFFICE O/P EST MOD 30 MIN: CPT | Performed by: NURSE PRACTITIONER

## 2020-04-29 PROCEDURE — G0463 HOSPITAL OUTPT CLINIC VISIT: HCPCS | Mod: 25

## 2020-04-29 PROCEDURE — G0463 HOSPITAL OUTPT CLINIC VISIT: HCPCS

## 2020-04-29 PROCEDURE — 71046 X-RAY EXAM CHEST 2 VIEWS: CPT

## 2020-04-29 RX ORDER — PREDNISONE 20 MG/1
20 TABLET ORAL 2 TIMES DAILY
Qty: 10 TABLET | Refills: 0 | Status: SHIPPED | OUTPATIENT
Start: 2020-04-29 | End: 2020-05-28

## 2020-04-29 ASSESSMENT — PAIN SCALES - GENERAL: PAINLEVEL: MODERATE PAIN (5)

## 2020-04-29 NOTE — NURSING NOTE
"Chief Complaint   Patient presents with     Breathing Problem     for 4 days   Patient presents to clinic with pain in her right side of chest and upper to mid back for 4 days. States she hasn't had any other symptoms.    Initial /82   Pulse 80   Temp 98  F (36.7  C) (Tympanic)   Resp 16   Wt 103.3 kg (227 lb 12.8 oz)   LMP 04/13/2020 (Approximate)   SpO2 98%   Breastfeeding No   BMI 35.68 kg/m   Estimated body mass index is 35.68 kg/m  as calculated from the following:    Height as of 12/19/19: 1.702 m (5' 7\").    Weight as of this encounter: 103.3 kg (227 lb 12.8 oz).  Medication Reconciliation: complete    Madhavi Medina LPN  "

## 2020-04-29 NOTE — PATIENT INSTRUCTIONS
Prednisone twice daily x 5 days, take morning and afternoon with food    Continue Albuterol inhaler as needed    Chest xray was clear    Try alternating ice and heat for back pain    Will call with Covid test results    If you were tested, we will call you with your COVID-19 result. You don't need to call us to check on your result. You can also use the information at the end of this document to sign up for  [employer] [place] [employer] where you can get your results and a message about those results sent to you through the [employer] application.    Regardless of if you have been tested or not:  Patient who have symptoms (cough, fever, or shortness of breath), need to isolate for 7 days from when symptoms started AND 72 hours after fever resolves (without fever reducing medications) AND improvement of respiratory symptoms (whichever is longer).      Isolate yourself at home (in own room/own bathroom if possible)    Do Not allow any visitors    Do Not go to work or school    Do Not go to [employer],  centers, shopping, or other public places.    Do Not shake hands.    Avoid close and intimate contact with others (hugging, kissing).    Follow CDC recommendations for household cleaning of frequently touched services.     After the initial 7 days, continue to isolate yourself from household members as much as possible. To continue decrease the risk of community spread and exposure, you and any members of your household should limit activities in public for 14 days after starting home isolation.     You can reference the following CDC link for helpful home isolation/care tips:  https://www.cdc.gov/coronavirus/2019-ncov/downloads/10Things.pdf    Protect Others:    Cover Your Mouth and Nose with a mask, disposable tissue or wash cloth to avoid spreading germs to others.    Wash your hands and face frequently with soap and water    Call Back If: Breathing difficulty develops or you become worse.    For more information  about COVID19 and options for caring for yourself at home, please visit the CDC website at https://www.cdc.gov/coronavirus/2019-ncov/about/steps-when-sick.html  For more options for care at [facility], please visit our website at [email or web address]    For more information, please use the [place] Department of Health COVID-19 Website: [email or web address]/[email or web address]/[email or web address].html  [place] Department of Health ([facility]) COVID-19 Hotlines (Interpreters available):      Health questions: Phone Number: [phone] or [phone] and Hours: 7 a.m. to 7 p.m.    Schools and  questions: Phone Number: [phone] or [phone] and Hours 7 a.m. to 7 p.m.

## 2020-04-29 NOTE — PROGRESS NOTES
HPI:    Radha Christensen is a 35 year old female  who presents to [facility] today for breathing difficulty.    Right sided chest pain with burning pain. Right upper back with sharp stabbing pain, worse with movement.  Pain started about 4 days ago, progressively worsening.  Both lungs with constant burn and feel like on fire with exercise.  Chest feels tight.  Able to take a deep breath but burns.  Right side of chest feels heavy.  Shortness of breath with stairs and exercise.  Intermittent rattling.    Persistent headaches, sinus pressure headaches in frontal area, not like her usual hormonal migraine headaches.  No nasal drainage.  Nasal congestion.  Ears feel plugged.  No sore throat.  No fevers, chills, or sweats.  Fatigued but continues to work and exercise.    Using Albuterol inhaler a lot.    Started on Augmentin on  for sinus infection along with Prednisone 40 mg x 5 days, states sinuses felt better on the prednisone but her lungs never felt any better.        Past Medical History:   Diagnosis Date     Personal history of other medical treatment (CODE)      2, Para 2     Past Surgical History:   Procedure Laterality Date     ESOPHAGOSCOPY, GASTROSCOPY, DUODENOSCOPY (EGD), COMBINED N/A 2019    Procedure: ESOPHAGOGASTRODUODENOSCOPY, WITH BIOPSY;  Surgeon: Emigdio Goldberg MD;  Location:  OR     [facility]        Social History     Tobacco Use     Smoking status: Former Smoker     Years: 10.00     Types: Cigarettes     Last attempt to quit: 2013     Years since quittin.3     Smokeless tobacco: Never Used   Substance Use Topics     Alcohol use: Not Currently     Comment: Quit 2019     Current Outpatient Medications   Medication Sig Dispense Refill     adapalene (DIFFERIN) 0.1 % external gel Apply topically At Bedtime 45 g 4     albuterol (PROAIR HFA/PROVENTIL HFA/VENTOLIN HFA) 108 (90 Base) MCG/ACT inhaler Inhale 2 puffs into the lungs every 4 hours as needed for shortness  of breath / dyspnea 1 Inhaler 1     amoxicillin-clavulanate (AUGMENTIN) 875-125 MG tablet Take 1 tablet by mouth 2 times daily for 7 days 14 tablet 0     escitalopram (LEXAPRO) 20 MG tablet TAKE 1 TABLET(20 MG) BY MOUTH DAILY 90 tablet 3     famotidine (PEPCID) 20 MG tablet Take 1 tablet (20 mg) by mouth 2 times daily 60 tablet 3     lisinopril (ZESTRIL) 10 MG tablet TAKE 1 TABLET(10 MG) BY MOUTH DAILY 90 tablet 3     topiramate (TOPAMAX) 25 MG tablet TAKE 1 TABLET BY MOUTH EVERY DAY 90 tablet 1     Allergies   Allergen Reactions     Cefaclor Unknown         Past medical history, past surgical history, current medications and allergies reviewed and accurate to the best of my knowledge.        ROS:  Refer to HPI    /82   Pulse 80   Temp 98  F (36.7  C) (Tympanic)   Resp 16   Wt 103.3 kg (227 lb 12.8 oz)   LMP 04/13/2020 (Approximate)   SpO2 98%   Breastfeeding No   BMI 35.68 kg/m      EXAM:  General Appearance: Well appearing adult female, non ill appearance, appropriate appearance for age. No acute distress  Ears: Left TM intact, translucent with bony landmarks appreciated, no erythema, no effusion, no bulging, no purulence.  Right TM intact, translucent with bony landmarks appreciated, no erythema, no effusion, no bulging, no purulence.  Left auditory canal clear.  Right auditory canal clear.  Normal external ears, non tender.  Eyes: conjunctivae normal without erythema or irritation, corneas clear, no drainage or crusting, no eyelid swelling, pupils equal   Orophayrnx: moist mucous membranes, posterior pharynx without erythema, tonsils without hypertrophy, no erythema, no exudates or petechiae, no post nasal drip seen, no trismus, voice clear.    Sinuses:  No sinus tenderness upon palpation of the frontal or maxillary sinuses  Nose:  Bilateral nares: no erythema, no edema, no drainage or congestion noted  Neck: supple without adenopathy  Respiratory: normal chest wall and respirations.  Normal  effort.  Clear to auscultation bilaterally, no wheezing, crackles or rhonchi.  No increased work of breathing.  No cough appreciated.  Cardiac: RRR with no murmurs  Musculoskeletal:  Equal movement of bilateral upper extremities.  Equal movement of bilateral lower extremities.  Normal gait.  Psychological: normal affect, alert, oriented, and pleasant.       Labs:  Covid-19 test pending    Xray:  Results for orders placed or performed during the hospital encounter of 04/29/20   XR Chest 2 Views     Status: None    Narrative    Procedure:XR CHEST 2 VW    Clinical history:Female, 35 years, Mild intermittent asthma with acute  exacerbation; Burning in the chest    Technique: Two views are submitted.    Comparison: None    Findings: The cardiac silhouette is normal. The pulmonary vasculature  is normal.    The lungs are clear. Bony structures are unremarkable.      Impression    Impression:   No acute abnormality. No evidence of acute or active cardiopulmonary  disease.    TSERING YANG MD           ASSESSMENT/PLAN:  1. Mild intermittent asthma with acute exacerbation    - COVID-19 Virus (Coronavirus) by PCR Nasopharyngeal swab    - XR Chest 2 Views; Future    - predniSONE (DELTASONE) 20 MG tablet; Take 1 tablet (20 mg) by mouth 2 times daily for 5 days  Dispense: 10 tablet; Refill: 0    Continue Albuterol inhaler PRN    CXR completed and reviewed, no infiltrate or pneumothorax appreciated, radiologist over read:  No acute abnormality. No evidence of acute or active cardiopulmonary disease.    Likely viral illness.   Awaiting Covid-19 testing.  Treat symptomaticallly with prednisone, continue Albuterol inhaler PRN, encouraged fluids, etc    Patient is currently on Augmentin for sinusitis, complete entire course as prescribed, no further antibiotics indicated at this time.    Discussed warning signs/symptoms indicative of need to f/u    Follow up if symptoms persist or worsen or concerns    2. Burning in the chest    -  COVID-19 Virus (Coronavirus) by PCR Nasopharyngeal swab  - XR Chest 2 Views; Future    CXR completed and reviewed, no infiltrate or pneumothorax appreciated, radiologist over read:  No acute abnormality. No evidence of acute or active cardiopulmonary disease.    Likely viral illness.   Awaiting Covid-19 testing.  Treat symptomaticallly with prednisone, continue Albuterol inhaler PRN, encouraged fluids, etc    Patient is currently on Augmentin for sinusitis, complete entire course as prescribed, no further antibiotics indicated at this time.    Discussed warning signs/symptoms indicative of need to f/u    Follow up if symptoms persist or worsen or concerns      3. Upper back pain on right side    Discussed with patient this is likely a muscular strain from playing softball as the area is tender on exam and pain worsens with movement.  No noted rash on exam.      Recommend symptomatic treatment with alternating ice and heat, Tylenol, etc    Discussed warning signs/symptoms indicative of need to f/u    Follow up if symptoms persist or worsen or concerns      I explained my diagnostic considerations and recommendations to the patient, who voiced understanding and agreement with the treatment plan. All questions were answered. We discussed potential side effects of any prescribed or recommended therapies, as well as expectations for response to treatments.    Disclaimer:  This note consists of words and symbols derived from keyboarding, dictation, or using voice recognition software. As a result, there may be errors in the script that have gone undetected. Please consider this when interpreting information found in this note.

## 2020-05-07 ENCOUNTER — TELEPHONE (OUTPATIENT)
Dept: OTOLARYNGOLOGY | Facility: OTHER | Age: 35
End: 2020-05-07

## 2020-05-12 ENCOUNTER — TELEPHONE (OUTPATIENT)
Dept: FAMILY MEDICINE | Facility: OTHER | Age: 35
End: 2020-05-12

## 2020-05-12 NOTE — TELEPHONE ENCOUNTER
Attempted to call the patient. No answer. Left a voicemail that I will call back at a later time.

## 2020-05-12 NOTE — TELEPHONE ENCOUNTER
To: Nurse to ENT department  Patient has been struggling with sinus infections and is inquiring about surgery.  Please call her back @ [phone].  Thank you

## 2020-05-27 NOTE — PROGRESS NOTES
"Otolaryngology Progress Note          Radha Christensen is a 35 year old female  Presents for follow-up of CRS and AR    She was skin tested on 3/28/2019 and saw Valeria high-sensitivity the best findings belowa;     She has chronic congestion, maxillary and frontal pressure, some facial paresthesia over cheeks, aural fullness    H/o migraines, on topamax  Migraine with aura and photosensitivity.  Her sinus facial pressure is much different from her migraines.    She continues to have chronic congestion and rhinorrhea postnasal drainage fatigue    No flux HL, no vertigo    Overall she feels her allergies are well controlled on antihistamine and Flonase    MQT Results 3/28/19  High sensitivities: dust  Moderate sensitivities: ragweed, pigweed, russian thistle, erasto grass, pine, eastern cottonwood, alternaria, aspergillus, hormodendrum, penicillium, fusarium, helminothosporium, cat, dog  Low sensitivities: maple, elm, dipti, epicoccum     Recently augmentin and 2 oral steroids  1/24/19: Acute sinusitis, doxycycline  8/3/18: Telephone call with recurrent sinus symptoms, called in Azithromycin  5/1/18: Acute maxillary sinusitis, azithromycin  1/12/18: Acute maxillary sinusitis, augmentin     chronic sinusitis and allergies.  No recent CT sinus CT sinus dated 1/25/2019 was reviewed    Bilateral maxillary sinus mucoperiosteal thickening with slight deviated nasal septum inferior and mid septum deviated left superior septum deviated right     Large polypoid retention cyst of the right maxillary sinus smaller retention cyst left maxillary sinus remainder of the sinuses are clear aplastic frontal sinuses          Physical Exam  /80   Pulse 75   Temp 99.1  F (37.3  C) (Tympanic)   Resp 18   Ht 1.702 m (5' 7\")   Wt 99.8 kg (220 lb)   SpO2 99%   BMI 34.46 kg/m    General - The patient is well nourished and well developed, and appears to have good nutritional status.  Alert and oriented to person and place, " interactive.  Head and Face - Normocephalic and atraumatic, with no gross asymmetry noted of the contour of the facial features.  The facial nerve is intact, with strong symmetric movements.  Neck-no palpable lymphadenopathy or thyroid mass.  Trachea is midline.  Eyes - Extraocular movements intact.   Ears- External auditory canals are patent, tympanic membranes are intact without effusion or worrisome retractions   Nose - Nasal mucosa is pink and moist with no abnormal mucus.  Significant internal and external nasal valve collapse with inhalation improvement in modified Anatoliy maneuver photos taken  DNS left severe inferior turbinate boggy hypertrophy with septal contact on the left  Mouth - Examination of the oral cavity shows pink, healthy, moist mucosa.  No lesions or ulceration noted.  The dentition are in good repair.  The tongue is mobile and midline.  Throat - The walls of the oropharynx were smooth, pink, moist, symmetric, and had no lesions or ulcerations.  The tonsillar pillars and soft palate were symmetric.  The uvula was midline on elevation.      To evaluate the nose and sinuses, I performed rigid nasal endoscopy. The LPN had previously sprayed both nares with lidocaine and neosynephrine.    I began with the LEFT side using a 0 degree rigid nasal endoscope, and then similarly examined the RIGHT side    Findings:  Inferior turbinates:  Enlarged, boggy  Middle turbinate and middle meatus:  No purulence, no polyposis,   Mucosa is edematous throughout,  no polyps nor polypoid degeneration  Sphenoethmoidal recess without purulence   Nasopharynx clear bilaterally  The patient tolerated the procedure well    CT sinus dated 1/25/2019 was reviewed    Bilateral maxillary sinus mucoperiosteal thickening with DNS left, displaced of max crest  Large polypoid retention cyst of the right maxillary sinus smaller retention cyst left maxillary sinus remainder of the sinuses are clear aplastic frontal  sinuses          SNOT 72      Impression/Plan  Radha Christensen is a 35 year old female    ICD-10-CM    1. Chronic pansinusitis  J32.4 budesonide (PULMICORT) 0.5 MG/2ML neb solution     predniSONE (DELTASONE) 20 MG tablet   2. DNS (deviated nasal septum)  J34.2    3. Nasal turbinate hypertrophy  J34.3    4. Nasal valve collapse  J34.89    5. Perennial allergic rhinitis  J30.89    6. Migraine with aura and without status migrainosus, not intractable  G43.109            The risks and complications of bilateral endoscopic sinus surgery septoplasty and turbinate reduction, bilateral nasal valve repair were openly discussed.  The potential risks include bleeding, general anesthesia, infection, scar formation, need for additional surgery, septal perforation, and rarely injury to the eye with the possibility of blindness,  injury to the brain, meningitis or other intracranial complications.  Nonsurgical options were discussed including prolonged antibioitics and/or oral and topical steroids.   Sinus anatomy and sinus disease were reviewed.  CT sinus was reviewed with the patient.  All questions were answered.     I discussed the risks and complications of nasal valve repair, including but no limited to anesthesia, bleeding, infection, change in the appearance of the nose, foreign body sensation, implant or cartilage extrusion, septal perforation, anosmia, atrophic rhinitis, scar formation, numbness over the incision, need for additional surgery.  Understanding is expressed, all questions are answered and wishes are to proceed with surgical intervention.  No guarantees were given that headaches would resolve with surgery    Bilateral max with polypectomy, AE, septo, TR  Aplastic frontals    No balloon    Continue all current medications recommended by me or my staff.    Continue budesonide irrigations.  Verbal and written education on use provided.    The importance of budesonide irrigations postoperatively was  reinforced.    The correct use of nasal irrigations as prescribed will prevent synechiae and allow for proper recovery of the sinus mucosa.       Risks of oral steroid use were discussed and include psychiatric/mood changes, insomnia, stomach ulcers and potential GI bleeding, blood sugar elevation/worsening diabetes, hip/bone necrosis called avascular necrosis, or bone demineralization.        Total exam time was over 40 minutes with over 25 minutes of this time spent in direct patient education, counseling and coordination of care.  We discussed the importance of high flow nasal saline use to prevent nasal secretion stasis, the correct use of nasal steroids, and nasal and sinus anatomy were reviewed.        Karen Sanchez D.O.  Otolaryngology/Head and Neck Surgery  Allergy

## 2020-05-28 ENCOUNTER — OFFICE VISIT (OUTPATIENT)
Dept: OTOLARYNGOLOGY | Facility: OTHER | Age: 35
End: 2020-05-28
Attending: OTOLARYNGOLOGY
Payer: COMMERCIAL

## 2020-05-28 ENCOUNTER — PREP FOR PROCEDURE (OUTPATIENT)
Dept: OTOLARYNGOLOGY | Facility: OTHER | Age: 35
End: 2020-05-28

## 2020-05-28 VITALS
HEIGHT: 67 IN | SYSTOLIC BLOOD PRESSURE: 117 MMHG | OXYGEN SATURATION: 99 % | BODY MASS INDEX: 34.53 KG/M2 | WEIGHT: 220 LBS | TEMPERATURE: 99.1 F | DIASTOLIC BLOOD PRESSURE: 80 MMHG | HEART RATE: 75 BPM | RESPIRATION RATE: 18 BRPM

## 2020-05-28 DIAGNOSIS — J34.829 NASAL VALVE COLLAPSE: ICD-10-CM

## 2020-05-28 DIAGNOSIS — J32.4 CHRONIC PANSINUSITIS: Primary | ICD-10-CM

## 2020-05-28 DIAGNOSIS — J33.9 NASAL POLYP: ICD-10-CM

## 2020-05-28 DIAGNOSIS — L70.0 ACNE VULGARIS: ICD-10-CM

## 2020-05-28 DIAGNOSIS — J34.2 DEVIATED NASAL SEPTUM: ICD-10-CM

## 2020-05-28 DIAGNOSIS — G43.109 MIGRAINE WITH AURA AND WITHOUT STATUS MIGRAINOSUS, NOT INTRACTABLE: ICD-10-CM

## 2020-05-28 DIAGNOSIS — J34.3 NASAL TURBINATE HYPERTROPHY: ICD-10-CM

## 2020-05-28 DIAGNOSIS — J32.9 CHRONIC RECURRENT SINUSITIS: Primary | ICD-10-CM

## 2020-05-28 DIAGNOSIS — J34.2 DNS (DEVIATED NASAL SEPTUM): ICD-10-CM

## 2020-05-28 DIAGNOSIS — J30.89 PERENNIAL ALLERGIC RHINITIS: ICD-10-CM

## 2020-05-28 PROCEDURE — 31231 NASAL ENDOSCOPY DX: CPT | Performed by: OTOLARYNGOLOGY

## 2020-05-28 PROCEDURE — 99215 OFFICE O/P EST HI 40 MIN: CPT | Mod: 25 | Performed by: OTOLARYNGOLOGY

## 2020-05-28 PROCEDURE — G0463 HOSPITAL OUTPT CLINIC VISIT: HCPCS | Mod: 25

## 2020-05-28 RX ORDER — LORATADINE 10 MG/1
10 TABLET ORAL DAILY
COMMUNITY
End: 2023-09-28

## 2020-05-28 RX ORDER — TOPIRAMATE 25 MG/1
25 TABLET, FILM COATED ORAL 2 TIMES DAILY
COMMUNITY
End: 2020-09-10

## 2020-05-28 RX ORDER — BUDESONIDE 0.5 MG/2ML
INHALANT ORAL
Qty: 3 BOX | Refills: 11 | Status: SHIPPED | OUTPATIENT
Start: 2020-05-28 | End: 2020-10-08

## 2020-05-28 RX ORDER — PREDNISONE 20 MG/1
20 TABLET ORAL DAILY
Qty: 4 TABLET | Refills: 0 | Status: SHIPPED | OUTPATIENT
Start: 2020-05-28 | End: 2020-07-21

## 2020-05-28 ASSESSMENT — MIFFLIN-ST. JEOR: SCORE: 1715.54

## 2020-05-28 ASSESSMENT — PAIN SCALES - GENERAL: PAINLEVEL: NO PAIN (0)

## 2020-05-28 NOTE — NURSING NOTE
"Chief Complaint   Patient presents with     Sinus Problem     Pt is here for recurrent sinus infections.       Initial /80   Pulse 75   Temp 99.1  F (37.3  C) (Tympanic)   Resp 18   Ht 1.702 m (5' 7\")   Wt 99.8 kg (220 lb)   SpO2 99%   BMI 34.46 kg/m   Estimated body mass index is 34.46 kg/m  as calculated from the following:    Height as of this encounter: 1.702 m (5' 7\").    Weight as of this encounter: 99.8 kg (220 lb).  Medication Reconciliation: complete  Jackelyn Hurtado LPN  "

## 2020-05-28 NOTE — LETTER
5/28/2020         RE: Radha Christensen  [address]  [facility] [address]        Dear Colleague,    Thank you for referring your patient, Radha Christensen, to the [facility]. Please see a copy of my visit note below.    Otolaryngology Progress Note          Radha Christensen is a 35 year old female  Presents for follow-up of CRS and AR    She was skin tested on 3/28/2019 and saw Valeria high-sensitivity the best findings belowa;     She has chronic congestion, maxillary and frontal pressure, some facial paresthesia over cheeks, aural fullness    H/o migraines, on topamax  Migraine with aura and photosensitivity.  Her sinus facial pressure is much different from her migraines.    She continues to have chronic congestion and rhinorrhea postnasal drainage fatigue    No flux HL, no vertigo    Overall she feels her allergies are well controlled on antihistamine and Flonase    MQT Results 3/28/19  High sensitivities: dust  Moderate sensitivities: ragweed, pigweed, russian thistle, erasto grass, pine, eastern cottonwood, alternaria, aspergillus, hormodendrum, penicillium, fusarium, helminothosporium, cat, dog  Low sensitivities: maple, elm, dipti, epicoccum     Recently augmentin and 2 oral steroids  1/24/19: Acute sinusitis, doxycycline  8/3/18: Telephone call with recurrent sinus symptoms, called in Azithromycin  5/1/18: Acute maxillary sinusitis, azithromycin  1/12/18: Acute maxillary sinusitis, augmentin     chronic sinusitis and allergies.  No recent CT sinus CT sinus dated 1/25/2019 was reviewed    Bilateral maxillary sinus mucoperiosteal thickening with slight deviated nasal septum inferior and mid septum deviated left superior septum deviated right     Large polypoid retention cyst of the right maxillary sinus smaller retention cyst left maxillary sinus remainder of the sinuses are clear aplastic frontal sinuses          Physical Exam  /80   Pulse 75   Temp 99.1  F (37.3  " C) (Tympanic)   Resp 18   Ht 1.702 m (5' 7\")   Wt 99.8 kg (220 lb)   SpO2 99%   BMI 34.46 kg/m    General - The patient is well nourished and well developed, and appears to have good nutritional status.  Alert and oriented to person and place, interactive.  Head and Face - Normocephalic and atraumatic, with no gross asymmetry noted of the contour of the facial features.  The facial nerve is intact, with strong symmetric movements.  Neck-no palpable lymphadenopathy or thyroid mass.  Trachea is midline.  Eyes - Extraocular movements intact.   Ears- External auditory canals are patent, tympanic membranes are intact without effusion or worrisome retractions   Nose - Nasal mucosa is pink and moist with no abnormal mucus.  Significant internal and external nasal valve collapse with inhalation improvement in modified Page maneuver photos taken  DNS left severe inferior turbinate boggy hypertrophy with septal contact on the left  Mouth - Examination of the oral cavity shows pink, healthy, moist mucosa.  No lesions or ulceration noted.  The dentition are in good repair.  The tongue is mobile and midline.  Throat - The walls of the oropharynx were smooth, pink, moist, symmetric, and had no lesions or ulcerations.  The tonsillar pillars and soft palate were symmetric.  The uvula was midline on elevation.      To evaluate the nose and sinuses, I performed rigid nasal endoscopy. The LPN had previously sprayed both nares with lidocaine and neosynephrine.    I began with the LEFT side using a 0 degree rigid nasal endoscope, and then similarly examined the RIGHT side    Findings:  Inferior turbinates:  Enlarged, boggy  Middle turbinate and middle meatus:  No purulence, no polyposis,   Mucosa is edematous throughout,  no polyps nor polypoid degeneration  Sphenoethmoidal recess without purulence   Nasopharynx clear bilaterally  The patient tolerated the procedure well    CT sinus dated 1/25/2019 was reviewed    Bilateral " maxillary sinus mucoperiosteal thickening with DNS left, displaced of max crest  Large polypoid retention cyst of the right maxillary sinus smaller retention cyst left maxillary sinus remainder of the sinuses are clear aplastic frontal sinuses          SNOT 72      Impression/Plan  Radha Christensen is a 35 year old female    ICD-10-CM    1. Chronic pansinusitis  J32.4 budesonide (PULMICORT) 0.5 MG/2ML neb solution     predniSONE (DELTASONE) 20 MG tablet   2. DNS (deviated nasal septum)  J34.2    3. Nasal turbinate hypertrophy  J34.3    4. Nasal valve collapse  J34.89    5. Perennial allergic rhinitis  J30.89    6. Migraine with aura and without status migrainosus, not intractable  G43.109            The risks and complications of bilateral endoscopic sinus surgery septoplasty and turbinate reduction, bilateral nasal valve repair were openly discussed.  The potential risks include bleeding, general anesthesia, infection, scar formation, need for additional surgery, septal perforation, and rarely injury to the eye with the possibility of blindness,  injury to the brain, meningitis or other intracranial complications.  Nonsurgical options were discussed including prolonged antibioitics and/or oral and topical steroids.   Sinus anatomy and sinus disease were reviewed.  CT sinus was reviewed with the patient.  All questions were answered.     I discussed the risks and complications of nasal valve repair, including but no limited to anesthesia, bleeding, infection, change in the appearance of the nose, foreign body sensation, implant or cartilage extrusion, septal perforation, anosmia, atrophic rhinitis, scar formation, numbness over the incision, need for additional surgery.  Understanding is expressed, all questions are answered and wishes are to proceed with surgical intervention.  No guarantees were given that headaches would resolve with surgery    Bilateral max with polypectomy, AE, septo, TR  Aplastic frontals    No  balloon    Continue all current medications recommended by me or my staff.    Continue budesonide irrigations.  Verbal and written education on use provided.    The importance of budesonide irrigations postoperatively was reinforced.    The correct use of nasal irrigations as prescribed will prevent synechiae and allow for proper recovery of the sinus mucosa.       Risks of oral steroid use were discussed and include psychiatric/mood changes, insomnia, stomach ulcers and potential GI bleeding, blood sugar elevation/worsening diabetes, hip/bone necrosis called avascular necrosis, or bone demineralization.        Total exam time was over 40 minutes with over 25 minutes of this time spent in direct patient education, counseling and coordination of care.  We discussed the importance of high flow nasal saline use to prevent nasal secretion stasis, the correct use of nasal steroids, and nasal and sinus anatomy were reviewed.        Karen Sanchez D.O.  Otolaryngology/Head and Neck Surgery  Allergy            Again, thank you for allowing me to participate in the care of your patient.        Sincerely,        Karen Sanchez MD

## 2020-05-28 NOTE — PATIENT INSTRUCTIONS
Thank you for allowing Dr. Sanchez and our ENT team to participate in your care.  If your medications are too expensive, please give the nurse a call.  We can possibly change this medication.  If you have a scheduling or an appointment question please contact our Health Unit Coordinator at their direct line [phone].   ALL nursing questions or concerns can be directed to your ENT nurse at: [phone] - Nesha      Start Prednisone as prescribed before surgery  Start Budesonide Rinses Twice Daily  Continue Flonase and Claritin    Budesonide nasal saline irrigation per instructions:  -Obtain Jones Med Sinus rinse over the counter.    -Use warm distilled water and 2 packets of the salt solution that comes with the bottle, dissolve in bottle up to the 240 mL jory.  -Add 1 vial of budesonide.  -Irrigate each side of your nose leaning over the sink, using 1/3 to 1/2 the volume of the bottle in each nostril every irrigation.  Irrigate 2 times daily.  -If additional rinses are needed/recommended, you may use the plan Jones Med Sinus irrigation without the use of added budesonide.     Instructions for Sinus Surgery    Recovery - Everyone recovers differently from a general anesthetic.  Symptoms such as fatigue, nausea, light-headedness, and sometimes a low grade fever (up to 100 degrees) are not unusual.  As your body removes the anesthetic drugs from circulation, these symptoms will resolve.  Your nose will be sore after surgery, and you may even have symptoms similar to a sinus infection with headache, congestion, and pressure.  These will resolve with healing.  For several days you may experience bloody drainage from the nose, please use the drip pad as necessary for this.  If there is persistent bleeding, please call the office during business hours or the on call ENT physician after hours.  There are no diet restrictions after sinus surgery, and you can resume your home medications.      Please do not blow your  nose until 1 week after surgery.  At 1 week you may gently blow your nose, unless otherwise indicated by Dr. Sanchez.     Limit your activity to no strenuous activities until I see you for the first follow-up visit in approximately 2 weeks.      Medications - You were sent home with narcotic pain medication.  If you can tolerate the discomfort during your recovery by using just plain Tylenol or ibuprofen (advil), please do so.  However, do not hesitate to use the stronger pain medication if needed.  If you were sent home with an antibiotic, it is primarily used to help the healing process.  If it causes loose bowel movements or other signs of intolerance, it is appropriate to discontinue it.  By far the most important measure you can take to speed recovery, and maximize the chances of long term success of sinus surgery is using the sinus rinses at least three time per day for the first month after surgery.       Start John Med saline irrigation tomorrow and use at least 5 times daily.     I recommend 2 john med bottles, one to add the budesonide to and irrigate with the budesonide rinse twice a day for 2 months.    In the other john med bottle use only the saline solution, and irrigate with this at least 3 additional times daily.    Perform gentle irrigation for the first week.  Starting 1 week after surgery, you should increase the volume of john med saline irrigation to each nostril, continuing to use the rinses in an alternating fashion at least 5 times daily.  You cannot use too much of the john med saline, but please limit budesonide rinses to twice daily.    At 2 weeks after surgery, you may also restart nasal steroids (flonase, nasonex, etc).        Complications - Problems related to sinus surgery almost always are detected during the operation, and special instruction will be given in that situation.  However, unexpected things can happen, and are all related to the structures around the sinus cavities.   Symptoms that should alert you to a possible problem include: severe eye pain or eye swelling, persistent heavy bleeding from the nose, and high fevers with headache and neck pain.  Any of these symptoms should be called into my office or to the on call ENT if after hours.  The most common non-emergency complication of sinus surgery is the formation of scar tissue which can re-block the sinuses.  This is addressed below.    Follow-up -  As you have noted, there are quite a few follow-up visits after sinus surgery.  This is done to aggressively manage the most common complication of this technique, which is scar tissue blocking the sinuses.  These visits will require the examination of your nose and possibly removal of crusts of dry mucous and blood, with possible removal of early scar tissue.  Please prepare for these visits by using your sinus rinses.    If there are any questions or issues with the above, or if there are other issues that concern you, always feel free to call the clinic and I am happy to speak with you as soon as I can.    Karen Sanchez D.O.  Otolaryngology/Head and Neck Surgery  Allergy    [phone] office            HOW TO PREPARE-      You need to have a scheduled Pre-Op with your primary care physician within 30 days of your scheduled surgery.        You need a friend or family member available to drive you home AND stay with you for 24 hours after you leave the hospital. You will not be allowed to drive yourself. IF you need to take a taxi or the bus you MUST have a responsible person to ride with you. YOUR PROCEDURE WILL BE CANCELLED IF YOU DO NOT HAVE A RESPONSIBLE ADULT TO DRIVE YOU HOME.       You CANNOT have anything to eat or drink after midnight the night before your surgery, including water and coffee. Your stomach needs to be completely empty. Do NOT chew gum, suck on hard candy, or smoke. You can brush your teeth the morning of surgery.       The Surgery Education  Nurses will call you  1-2 weeks prior to your surgery date at  [phone] or toll free [phone]. Please have your medication and allergy lists ready.      Stop your aspirin or other NSAIDs(Ibuprofen, Motrin, Aleve, Celebrex, Naproxen, etc...) 7 days before your surgery.      Hospital admitting will call you the day before your surgery with your exact arrival time.       Please call your primary care physician if you should become ill within 24 hours of scheduled surgery. (ex.vomiting, diarrhea, fever)          You will need to wash the night before AND the morning of you procedure with a liquid antibacterial soap, like Dial.

## 2020-05-29 RX ORDER — ADAPALENE 1 MG/G
GEL TOPICAL
Qty: 45 G | Refills: 3 | Status: SHIPPED | OUTPATIENT
Start: 2020-05-29 | End: 2020-12-30

## 2020-05-29 NOTE — TELEPHONE ENCOUNTER
Disp  Refills  Start  End  FLORENCIA    adapalene (DIFFERIN) 0.1 % external gel  45 g  4  5/20/2019   --    Sig - Route: Apply topically At Bedtime - Topical        LOV: 12/19/2019  Future Office visit:    Next 5 appointments (look out 90 days)    Jun 24, 2020  1:00 PM CDT  Pre-Op physical with Sheila Uriostegui PA-C  [facility] ([facility]) [address]  [place] [address]  [phone]          Topical Acne Medications Protocol [id number]/28/2020 06:23 PM   Patient is 12 years of age or older Protocol Details    Recent (12 mo) or future (30 days) visit within the authorizing provider's specialty     Medication is active on med list      Jie Shelton RN  ....................  5/29/2020   8:19 AM

## 2020-06-16 DIAGNOSIS — Z01.818 PREOP EXAMINATION: Primary | ICD-10-CM

## 2020-06-16 DIAGNOSIS — Z01.818 PRE-OPERATIVE EXAMINATION: Primary | ICD-10-CM

## 2020-06-24 ENCOUNTER — OFFICE VISIT (OUTPATIENT)
Dept: FAMILY MEDICINE | Facility: OTHER | Age: 35
End: 2020-06-24
Attending: PHYSICIAN ASSISTANT
Payer: COMMERCIAL

## 2020-06-24 ENCOUNTER — ANESTHESIA EVENT (OUTPATIENT)
Dept: SURGERY | Facility: HOSPITAL | Age: 35
End: 2020-06-24
Payer: COMMERCIAL

## 2020-06-24 ENCOUNTER — TELEPHONE (OUTPATIENT)
Dept: OTOLARYNGOLOGY | Facility: OTHER | Age: 35
End: 2020-06-24

## 2020-06-24 VITALS
TEMPERATURE: 99.2 F | SYSTOLIC BLOOD PRESSURE: 110 MMHG | BODY MASS INDEX: 35.47 KG/M2 | HEIGHT: 67 IN | HEART RATE: 68 BPM | DIASTOLIC BLOOD PRESSURE: 84 MMHG | RESPIRATION RATE: 20 BRPM | WEIGHT: 226 LBS | OXYGEN SATURATION: 99 %

## 2020-06-24 DIAGNOSIS — J34.829 NASAL VALVE COLLAPSE: ICD-10-CM

## 2020-06-24 DIAGNOSIS — I10 BENIGN ESSENTIAL HYPERTENSION: ICD-10-CM

## 2020-06-24 DIAGNOSIS — Z01.818 PREOP GENERAL PHYSICAL EXAM: Primary | ICD-10-CM

## 2020-06-24 DIAGNOSIS — J34.2 DEVIATED NASAL SEPTUM: ICD-10-CM

## 2020-06-24 DIAGNOSIS — J32.9 CHRONIC RECURRENT SINUSITIS: ICD-10-CM

## 2020-06-24 DIAGNOSIS — J33.9 NASAL POLYP: ICD-10-CM

## 2020-06-24 DIAGNOSIS — J45.20 MILD INTERMITTENT ASTHMA WITHOUT COMPLICATION: ICD-10-CM

## 2020-06-24 PROBLEM — Z01.419 ENCOUNTER FOR ROUTINE GYNECOLOGICAL EXAMINATION: Status: RESOLVED | Noted: 2018-02-09 | Resolved: 2020-06-24

## 2020-06-24 PROBLEM — Z30.40 ENCOUNTER FOR SURVEILLANCE OF CONTRACEPTIVES: Status: RESOLVED | Noted: 2017-08-02 | Resolved: 2020-06-24

## 2020-06-24 LAB
ANION GAP SERPL CALCULATED.3IONS-SCNC: 8 MMOL/L (ref 3–14)
BUN SERPL-MCNC: 13 MG/DL (ref 7–25)
CALCIUM SERPL-MCNC: 9.4 MG/DL (ref 8.6–10.3)
CHLORIDE SERPL-SCNC: 108 MMOL/L (ref 98–107)
CO2 SERPL-SCNC: 22 MMOL/L (ref 21–31)
CREAT SERPL-MCNC: 0.75 MG/DL (ref 0.6–1.2)
GFR SERPL CREATININE-BSD FRML MDRD: 87 ML/MIN/{1.73_M2}
GLUCOSE SERPL-MCNC: 91 MG/DL (ref 70–105)
HCG UR QL: NEGATIVE
HGB BLD-MCNC: 12.9 G/DL (ref 11.7–15.7)
POTASSIUM SERPL-SCNC: 4 MMOL/L (ref 3.5–5.1)
SODIUM SERPL-SCNC: 138 MMOL/L (ref 134–144)

## 2020-06-24 PROCEDURE — 36415 COLL VENOUS BLD VENIPUNCTURE: CPT | Mod: ZL | Performed by: PHYSICIAN ASSISTANT

## 2020-06-24 PROCEDURE — 99214 OFFICE O/P EST MOD 30 MIN: CPT | Performed by: PHYSICIAN ASSISTANT

## 2020-06-24 PROCEDURE — 80048 BASIC METABOLIC PNL TOTAL CA: CPT | Mod: ZL | Performed by: PHYSICIAN ASSISTANT

## 2020-06-24 PROCEDURE — G0463 HOSPITAL OUTPT CLINIC VISIT: HCPCS

## 2020-06-24 PROCEDURE — 81025 URINE PREGNANCY TEST: CPT | Mod: ZL | Performed by: PHYSICIAN ASSISTANT

## 2020-06-24 PROCEDURE — 85018 HEMOGLOBIN: CPT | Mod: ZL | Performed by: PHYSICIAN ASSISTANT

## 2020-06-24 ASSESSMENT — MIFFLIN-ST. JEOR: SCORE: 1742.76

## 2020-06-24 ASSESSMENT — LIFESTYLE VARIABLES: TOBACCO_USE: 1

## 2020-06-24 NOTE — TELEPHONE ENCOUNTER
Recommend seeing derm and if they are concerned they would remove or send to me for office removal       The patient was notified of this.

## 2020-06-24 NOTE — ANESTHESIA PREPROCEDURE EVALUATION
Anesthesia Pre-Procedure Evaluation    Patient: Radha Christensen   MRN: [id number] : 1985          Preoperative Diagnosis: Nasal polyp [J33.9]  Deviated nasal septum [J34.2]  Chronic recurrent sinusitis [J32.9]  Nasal valve collapse [J34.89]    Procedure(s):  Bilateral Endoscopic Sinus Surgery with polypectomy(PROPEL)  Septoplasty Turbinate Reduction  Nasal Valve Repair(LATERA)    Past Medical History:   Diagnosis Date     Personal history of other medical treatment (CODE)      2, Para 2     Past Surgical History:   Procedure Laterality Date     ESOPHAGOSCOPY, GASTROSCOPY, DUODENOSCOPY (EGD), COMBINED N/A 2019    Procedure: ESOPHAGOGASTRODUODENOSCOPY, WITH BIOPSY;  Surgeon: Emigdio Goldberg MD;  Location:  OR     [facility]          Anesthesia Evaluation     . Pt has had prior anesthetic. Type: MAC    No history of anesthetic complications          ROS/MED HX    ENT/Pulmonary:     (+)tobacco use, Past use asthma Treatment: Inhaler prn,  , . .    Neurologic: Comment: Hx of concussion    (+)migraines,     Cardiovascular:  - neg cardiovascular ROS       METS/Exercise Tolerance:  >4 METS   Hematologic:  - neg hematologic  ROS       Musculoskeletal:  - neg musculoskeletal ROS       GI/Hepatic: Comment: Hx etoh abuse        Renal/Genitourinary:  - ROS Renal section negative       Endo:     (+) Obesity, .      Psychiatric: Comment: Dysthymic disorder    (+) psychiatric history depression      Infectious Disease:  - neg infectious disease ROS       Malignancy:      - no malignancy   Other: Comment: Hx alcohol abuse   - neg other ROS                      Physical Exam      Airway   Mallampati: II  TM distance: >3 FB  Neck ROM: full    Dental     Cardiovascular   Rhythm and rate: regular and normal      Pulmonary    breath sounds clear to auscultation            Lab Results   Component Value Date    WBC 6.0 2019    HGB 13.1 2019    HCT 40.4 2019     2019      "08/07/2019    POTASSIUM 3.6 08/07/2019    CHLORIDE 109 (H) 08/07/2019    CO2 25 08/07/2019    BUN 11 08/07/2019    CR 0.73 08/07/2019     (H) 08/07/2019    PREETI 8.7 08/07/2019    ALBUMIN 4.4 08/07/2019    PROTTOTAL 7.3 08/07/2019    ALT 17 08/07/2019    AST 20 08/07/2019    ALKPHOS 76 08/07/2019    BILITOTAL 0.3 08/07/2019    LIPASE 73 08/07/2019    INR 0.95 08/07/2019    T4 0.64 05/25/2016    HCG Negative 08/27/2019       Preop Vitals  BP Readings from Last 3 Encounters:   05/28/20 117/80   04/29/20 134/82   12/19/19 122/80    Pulse Readings from Last 3 Encounters:   05/28/20 75   04/29/20 80   12/19/19 70      Resp Readings from Last 3 Encounters:   05/28/20 18   04/29/20 16   12/19/19 16    SpO2 Readings from Last 3 Encounters:   05/28/20 99%   04/29/20 98%   12/19/19 99%      Temp Readings from Last 1 Encounters:   05/28/20 99.1  F (37.3  C) (Tympanic)    Ht Readings from Last 1 Encounters:   05/28/20 1.702 m (5' 7\")      Wt Readings from Last 1 Encounters:   05/28/20 99.8 kg (220 lb)    Estimated body mass index is 34.46 kg/m  as calculated from the following:    Height as of 5/28/20: 1.702 m (5' 7\").    Weight as of 5/28/20: 99.8 kg (220 lb).       Anesthesia Plan      History & Physical Review      ASA Status:  3 .    NPO Status:  > 8 hours    Plan for General with Intravenous induction. Maintenance will be Balanced.    PONV prophylaxis:  Ondansetron (or other 5HT-3) and Dexamethasone or Solumedrol  H and P 6-24-20        Postoperative Care  Postoperative pain management:  IV analgesics and Oral pain medications.      Consents  Anesthetic plan, risks, benefits and alternatives discussed with:  Patient.  Use of blood products discussed: No .   .                 SHANTAL Courtney CRNA  "

## 2020-06-24 NOTE — PATIENT INSTRUCTIONS
Patient should take the following medications the morning of surgery with a small sip of water: lisinopril and albuterol inhaler if needed.  Patient was instructed to hold the following medications the morning of surgery: hold all other medications.     Patient was advised to call our office and the surgical services with any change in condition or new symptoms if they were to develop between today and their surgical date.  Especially any cardiopulmonary symptoms or symptoms concerning for an infection.     Discontinue aspirin, aleve, naproxen and ibuprofen 7 days prior to surgery to reduce bleeding risk.  It is fine to take tylenol the week before surgery.  Hold vitamins and herbal remedies for 7 days before surgery.

## 2020-06-24 NOTE — PROGRESS NOTES
----------------- PREOPERATIVE EXAM ------------------  6/24/2020    SUBJECTIVE:  Radha Christensen is a 35 year old female here for preoperative optimization.    I was asked to see Radha Christensen by Dr. Sanchez for a preoperative optimization due to history of asthma and hypertension.     Patient has a history of chronic pansinusitis, deviated nasal septum, nasal turbinate hypertrophy, nasal valve collapse and perennial allergic rhinitis.  Patient is scheduled for surgery with the nasal polyp, deviated nasal septum, chronic recurrent sinusitis and nasal valve collapse.      Patient has history of hypertension.  Currently well controlled with his medication.  No side effects noted with medication.    Patient has history of asthma.  Currently well controlled.  No acute concerns at this time.    No recent cough or cold symptoms.  No fevers, chills, sore throat, ear pain, chest pain, palpitations, problems breathing, stomachaches, nausea, vomiting, diarrhea, constipation, blood in stool or urine, dysuria, frequency, urgency.  Patient has tolerated surgery well in the past.    Nursing Notes:   Harriett Miller LPN  6/24/2020  1:10 PM  Signed  Date of Surgery: 7/1/20  Type of Surgery:   Bilateral Endoscopic Sinus Surgery with polypectomy(PROPEL)  Bilateral  General    Septoplasty Turbinate Reduction  N/A  General    Nasal Valve Repair(LATERA)      Surgeon: DR. Sanchez  Hospital:  [facility]  Fax:     Fever/Chills or other infectious symptoms in past month: NO  >10lb weight loss in past two months: NO  O2 SAT: 99    Health Care Directive/Code status:  FULL  Hx of blood transfusions:   NO   Td up to date:  2014  History of VRE/MRSA:  NO Date:    Preoperative Evaluation: Obstructive Sleep Apnea screening    S: Snore -  Do you snore loudly? (louder than talking or loud enough to be heard through closed doors)NO  T: Tired - Do you often feel tired, fatigued, or sleepy during the daytime?YES  O: Observed - Has anyone  "ever observed you stop breathing during your sleep?NO  P: Pressure - Do you have or are you being treated for high blood pressure?YES  B: BMI - BMI greater than 35kg/m2?No  A: Age - Age over 50 years old?NO  N: Neck - Neck circumference greater than 40 cm?NO  G: Gender - Gender: Male?NO    Total number of \"YES\" responses:  2    Scoring: Low risk of DYLLAN 0-2  At Risk of DYLLAN: >3 High Risk of DYLLAN: 5-8    Yolanda Miller LPN ...... 6/24/2020 1:02 PM        Patient Active Problem List    Diagnosis Date Noted     Nasal polyp 05/28/2020     Priority: Medium     Added automatically from request for surgery [id number]       Deviated nasal septum 05/28/2020     Priority: Medium     Added automatically from request for surgery [id number]       Chronic recurrent sinusitis 05/28/2020     Priority: Medium     Added automatically from request for surgery [id number]       Nasal valve collapse 05/28/2020     Priority: Medium     Added automatically from request for surgery [id number]       Acne, mild 02/09/2018     Priority: Medium     Dysthymic disorder 02/09/2018     Priority: Medium     Asthma 02/09/2018     Priority: Medium     Overview:   exercise induced/mild intermittent       Contraceptive management 02/09/2018     Priority: Medium     Family history of diabetes mellitus (DM) 08/02/2017     Priority: Medium     Cervical high risk HPV (human papillomavirus) test positive 09/21/2016     Priority: Medium     Migraine headache 12/07/2011     Priority: Medium     Other abnormal Papanicolaou smear of cervix and cervical HPV(795.09) 10/31/2011     Priority: Medium     Alcohol abuse 10/19/2011     Priority: Medium     Tobacco abuse 10/19/2011     Priority: Medium     Concussion 06/14/2011     Priority: Medium     Overview:   no loss of consciousness from softball injury         Past Medical History:   Diagnosis Date     Concussion 6/14/2011    Overview:  no loss of consciousness from softball injury     Personal history of other medical " treatment (CODE)      2, Para 2       Past Surgical History:   Procedure Laterality Date     ESOPHAGOSCOPY, GASTROSCOPY, DUODENOSCOPY (EGD), COMBINED N/A 2019    Procedure: ESOPHAGOGASTRODUODENOSCOPY, WITH BIOPSY;  Surgeon: Emigdio Goldberg MD;  Location: [facility] OR     wisdom teeth          Current Outpatient Medications   Medication Sig Dispense Refill     albuterol (PROAIR HFA/PROVENTIL HFA/VENTOLIN HFA) 108 (90 Base) MCG/ACT inhaler Inhale 2 puffs into the lungs every 4 hours as needed for shortness of breath / dyspnea 1 Inhaler 1     budesonide (PULMICORT) 0.5 MG/2ML neb solution Squirt entire vial into previously made john med saline bottle, mix, and irrigate each nostril until entire bottle empty.  Do this twice daily. 3 Box 11     DIFFERIN 0.1 % external gel APPLY EXTERNALLY TO THE AFFECTED AREA AT BEDTIME 45 g 3     escitalopram (LEXAPRO) 20 MG tablet TAKE 1 TABLET(20 MG) BY MOUTH DAILY 90 tablet 3     famotidine (PEPCID) 20 MG tablet Take 1 tablet (20 mg) by mouth 2 times daily 60 tablet 3     lisinopril (ZESTRIL) 10 MG tablet TAKE 1 TABLET(10 MG) BY MOUTH DAILY 90 tablet 3     loratadine (CLARITIN) 10 MG tablet Take 10 mg by mouth daily       predniSONE (DELTASONE) 20 MG tablet Take 1 tablet (20 mg) by mouth daily 4 tablet 0     topiramate (TOPAMAX) 25 MG tablet Take 25 mg by mouth 2 times daily         Recent use of ibuprofen, aspirin or aleve: No     Allergies:  Allergies   Allergen Reactions     Cefaclor Unknown       Latex allergy  No    Family History   Problem Relation Age of Onset     Heart Disease Maternal Grandfather 50        Heart Disease     Diabetes Maternal Grandfather         Diabetes     Chronic Obstructive Pulmonary Disease Maternal Grandfather         COPD     Diabetes Mother         Diabetes     Thyroid Disease Mother         Thyroid Disease     Heart Disease Mother      Heart Disease Maternal Uncle      Thyroid Disease Brother         Thyroid Disease     Diabetes Brother  38        Diabetes     Diabetes Maternal Uncle         Diabetes     Heart Disease Maternal Uncle      Heart Disease Maternal Grandmother      Breast Cancer No family hx of         Cancer-breast     Ovarian Cancer No family hx of         Cancer-ovarian     Prostate Cancer No family hx of         Cancer-prostate     Other - See Comments No family hx of         Stroke     Colon Cancer No family hx of         Cancer-colon     Blood Disease No family hx of         Blood Disease       Denies family hx of bleeding tendencies, anesthesia complications, or other problems with surgery.      Social History     Tobacco Use     Smoking status: Former Smoker     Years: 10.00     Types: Cigarettes     Last attempt to quit: 2013     Years since quittin.4     Smokeless tobacco: Never Used   Substance Use Topics     Alcohol use: Not Currently     Comment: Quit 2019         ROS:    surgical:  patient denies previous complications from prior surgeries including but not limited to prolonged bleeding, anesthesia complications, dysrhythmias, surgical wound infections, or prolonged hospital stay.      REVIEW OF SYSTEMS:  A comprehensive review of systems was negative except foritems noted in HPI/Subjective.    Constitutional: Negative for fever, chills and fatigue .   Eyes: Negative for irritation  and redness .  Ears, nose, mouth, throat, and face: Negative for ear drainage, nasal congestion, sore throat and hoarseness .  Respiratory: Negative for cough, sputum production , hemoptysis, dyspnea  and wheezing .  Cardiovascular: Negative for chest discomfort, palpitations and lightheadedness .  Gastrointestinal: Negative for dysphagia, nausea, vomiting, melena, diarrhea, constipation and abdominal pain.  Genitourinary: Negative for frequency, dysuria, urinary incontinence, hematuria.  Integument/breast: Negative for rash.   Hematologic/lymphatic: Negative for easy bruising, bleeding, lymphadenopathy and petechiae.  "  Musculoskeletal: Negative for myalgias and arthralgias .  Neurological: Negative for headaches, dizziness, vertigo, seizures and weakness.   Psychiatric: Negative for anxiety, depression, panic attacks and suicidal ideations.       -------------------------------------------------------------    PHYSICAL EXAM:  /84 (BP Location: Right arm, Patient Position: Sitting, Cuff Size: Adult Large)   Pulse 68   Temp 99.2  F (37.3  C)   Resp 20   Ht 1.702 m (5' 7\")   Wt 102.5 kg (226 lb)   LMP 05/14/2020   SpO2 99%   BMI 35.40 kg/m      EXAM:  General Appearance: Pleasant, alert, appropriate appearance for age. No acute distress  Head Exam: Normal. Normocephalic, atraumatic.  Eye Exam: Normal external eye, conjunctiva, lids, cornea. ZEKE.  Ear Exam: Normal TM's bilaterally. Normal auditory canals and external ears. Non-tender.  Nose Exam: Normal external nose, mucus membranes, and septum.  OroPharynx Exam: Dental hygiene adequate. Normal buccal mucosa. Normal pharynx.  Neck Exam: Supple, no masses or nodes., no lymphadenopathy  Thyroid Exam: Normal., No nodules or enlargement., normal to palpation  Chest/Respiratory Exam: Normal chest wall and respirations. Clear to auscultation.  Cardiovascular Exam: Regular rate and rhythm. S1, S2, no murmur, click, gallop, or rubs.  Gastrointestinal Exam: Soft, nontender, no abnormal masses or organomegaly. BS x 4.  Lymphatic Exam: Normal.  Musculoskeletal Exam: Back is straight and non-tender, full ROM of upper and lower extremities.  Skin: no rash or abnormalities  Neurologic Exam: symmetric DTRs, normal gross motor movement, tone, and coordination. No tremor.  Psychiatric Exam: Alert and oriented, appropriate affect.    PHQ Depression Screen  PHQ-9 SCORE 8/2/2017 1/24/2019 12/19/2019   PHQ-9 Total Score 2 0 4       Patient can walk up a flight of stairs without becoming short of breath or having chest pain: YES   Patient is able to tolerate greater than 4 METs of " activity without any cardiopulmonary symptoms.    LABS:    Results for orders placed or performed in visit on 06/24/20   Pregnancy, Urine (HCG)     Status: None   Result Value Ref Range    HCG Qual Urine Negative NEG^Negative   Basic Metabolic Panel     Status: Abnormal   Result Value Ref Range    Sodium 138 134 - 144 mmol/L    Potassium 4.0 3.5 - 5.1 mmol/L    Chloride 108 (H) 98 - 107 mmol/L    Carbon Dioxide 22 21 - 31 mmol/L    Anion Gap 8 3 - 14 mmol/L    Glucose 91 70 - 105 mg/dL    Urea Nitrogen 13 7 - 25 mg/dL    Creatinine 0.75 0.60 - 1.20 mg/dL    GFR Estimate 87 >60 mL/min/[1.73_m2]    GFR Estimate If Black >90 >60 mL/min/[1.73_m2]    Calcium 9.4 8.6 - 10.3 mg/dL   Hemoglobin     Status: None   Result Value Ref Range    Hemoglobin 12.9 11.7 - 15.7 g/dL          CXR:  Not necessary    EKG: Not necessary  ---------------------------------------------------------------    No family history of problems with bleeding or anesthetia.    ASA PS class 2. Patient's perioperative risk is minimized and no further cardiopulmonary workup is neccesary.  Please contact the office with any questions or concerns.      ICD-10-CM    1. Preop general physical exam  Z01.818 Hemoglobin     Basic Metabolic Panel     Pregnancy, Urine (HCG)     Basic Metabolic Panel     Hemoglobin   2. Nasal polyp  J33.9    3. Deviated nasal septum  J34.2    4. Chronic recurrent sinusitis  J32.9    5. Nasal valve collapse  J34.89    6. Mild intermittent asthma without complication  J45.20    7. Benign essential hypertension  I10 Hemoglobin     Basic Metabolic Panel     Pregnancy, Urine (HCG)     Basic Metabolic Panel     Hemoglobin         ASSESSEMNT AND PLAN:  1.  Preoperative history and physical   consults:  None  Patient is approved for the surgery.  No concerns.     For above listed surgery and anesthesia:    - Patient is Low risk for perioperative complications.    Patient was administered the following vaccines today: none.  Completed labs  today: Hemoglobin, BMP and UPT, no concerns.    PRE OP RECOMMENDATIONS:  Patient is on chronic pain medications no   Patient is on antiplatlet/anticoagulation no   Other medications that need adjustment perioperatively no     Patient Instructions   Patient should take the following medications the morning of surgery with a small sip of water: lisinopril and albuterol inhaler if needed.  Patient was instructed to hold the following medications the morning of surgery: hold all other medications.     Patient was advised to call our office and the surgical services with any change in condition or new symptoms if they were to develop between today and their surgical date.  Especially any cardiopulmonary symptoms or symptoms concerning for an infection.     Discontinue aspirin, aleve, naproxen and ibuprofen 7 days prior to surgery to reduce bleeding risk.  It is fine to take tylenol the week before surgery.  Hold vitamins and herbal remedies for 7 days before surgery.         Other:  Patient was advised to call our office and the surgical services with any change in condition or new symptoms if they were to develop between today and their surgical date.  Especially any cardiopulmonary symptoms or symptoms concerning for an infection.     PRE OP RECOMMENDATIONS:  Discontinue ASA 7 days prior to reduce bleeding risk and Discontinue NSAIDS 7 days prior to procedure to reduce bleeding risk    Greater than 25 minutes were spent in counseling and coordination of care.    Sheila Uriostegui PA-C PA-C..................6/24/2020 12:49 PM

## 2020-06-24 NOTE — NURSING NOTE
"Date of Surgery: 7/1/20  Type of Surgery:   Bilateral Endoscopic Sinus Surgery with polypectomy(PROPEL)  Bilateral  General    Septoplasty Turbinate Reduction  N/A  General    Nasal Valve Repair(LATERA)      Surgeon: DR. Sanchez  [facility]  Fax:     Fever/Chills or other infectious symptoms in past month: NO  >10lb weight loss in past two months: NO  O2 SAT: 99    Health Care Directive/Code status:  FULL  Hx of blood transfusions:   NO   Td up to date:  2014  History of VRE/MRSA:  NO Date:    Preoperative Evaluation: Obstructive Sleep Apnea screening    S: Snore -  Do you snore loudly? (louder than talking or loud enough to be heard through closed doors)NO  T: Tired - Do you often feel tired, fatigued, or sleepy during the daytime?YES  O: Observed - Has anyone ever observed you stop breathing during your sleep?NO  P: Pressure - Do you have or are you being treated for high blood pressure?YES  B: BMI - BMI greater than 35kg/m2?No  A: Age - Age over 50 years old?NO  N: Neck - Neck circumference greater than 40 cm?NO  G: Gender - Gender: Male?NO    Total number of \"YES\" responses:  2    Scoring: Low risk of DYLLAN 0-2  At Risk of DYLLAN: >3 High Risk of DYLLAN: 5-8    Yolanda Miller LPN ...... 6/24/2020 1:02 PM      "

## 2020-06-24 NOTE — TELEPHONE ENCOUNTER
This patient is having sinus surgery on 7/1/20. She has some moles on the top of her head and on her neck/upper back. She is wondering if you can remove these while in surgery. Please Advise.

## 2020-06-26 DIAGNOSIS — Z01.818 PRE-OPERATIVE EXAMINATION: Primary | ICD-10-CM

## 2020-06-28 ENCOUNTER — ALLIED HEALTH/NURSE VISIT (OUTPATIENT)
Dept: FAMILY MEDICINE | Facility: OTHER | Age: 35
End: 2020-06-28
Payer: COMMERCIAL

## 2020-06-28 DIAGNOSIS — Z01.818 PRE-OPERATIVE EXAMINATION: ICD-10-CM

## 2020-06-28 PROCEDURE — C9803 HOPD COVID-19 SPEC COLLECT: HCPCS

## 2020-06-28 PROCEDURE — U0003 INFECTIOUS AGENT DETECTION BY NUCLEIC ACID (DNA OR RNA); SEVERE ACUTE RESPIRATORY SYNDROME CORONAVIRUS 2 (SARS-COV-2) (CORONAVIRUS DISEASE [COVID-19]), AMPLIFIED PROBE TECHNIQUE, MAKING USE OF HIGH THROUGHPUT TECHNOLOGIES AS DESCRIBED BY CMS-2020-01-R: HCPCS | Mod: ZL | Performed by: OTOLARYNGOLOGY

## 2020-06-29 LAB
SARS-COV-2 PCR COMMENT: NORMAL
SARS-COV-2 RNA SPEC QL NAA+PROBE: NEGATIVE
SARS-COV-2 RNA SPEC QL NAA+PROBE: NORMAL
SARS-COV-2 RNA SPEC QL NAA+PROBE: NOT DETECTED
SPECIMEN SOURCE: NORMAL

## 2020-07-01 ENCOUNTER — HOSPITAL ENCOUNTER (OUTPATIENT)
Facility: HOSPITAL | Age: 35
Discharge: HOME | End: 2020-07-01
Attending: OTOLARYNGOLOGY | Admitting: OTOLARYNGOLOGY
Payer: COMMERCIAL

## 2020-07-01 ENCOUNTER — ANESTHESIA (OUTPATIENT)
Dept: SURGERY | Facility: HOSPITAL | Age: 35
End: 2020-07-01
Payer: COMMERCIAL

## 2020-07-01 VITALS
OXYGEN SATURATION: 97 % | DIASTOLIC BLOOD PRESSURE: 76 MMHG | HEART RATE: 93 BPM | HEIGHT: 67 IN | SYSTOLIC BLOOD PRESSURE: 145 MMHG | BODY MASS INDEX: 35.47 KG/M2 | TEMPERATURE: 98.7 F | WEIGHT: 226 LBS | RESPIRATION RATE: 16 BRPM

## 2020-07-01 DIAGNOSIS — J34.829 NASAL VALVE COLLAPSE: ICD-10-CM

## 2020-07-01 DIAGNOSIS — J32.9 CHRONIC RECURRENT SINUSITIS: ICD-10-CM

## 2020-07-01 DIAGNOSIS — J33.9 NASAL POLYP: ICD-10-CM

## 2020-07-01 DIAGNOSIS — J34.2 DEVIATED NASAL SEPTUM: ICD-10-CM

## 2020-07-01 DIAGNOSIS — Z98.890 S/P FESS (FUNCTIONAL ENDOSCOPIC SINUS SURGERY): Primary | ICD-10-CM

## 2020-07-01 PROCEDURE — 25000132 ZZH RX MED GY IP 250 OP 250 PS 637: Performed by: OTOLARYNGOLOGY

## 2020-07-01 PROCEDURE — 99135 ANES COMP CTRLD HYPOTENSION: CPT | Performed by: NURSE ANESTHETIST, CERTIFIED REGISTERED

## 2020-07-01 PROCEDURE — 37000008 ZZH ANESTHESIA TECHNICAL FEE, 1ST 30 MIN: Performed by: OTOLARYNGOLOGY

## 2020-07-01 PROCEDURE — 31255 NSL/SINS NDSC W/TOT ETHMDCT: CPT | Mod: 50 | Performed by: OTOLARYNGOLOGY

## 2020-07-01 PROCEDURE — 88304 TISSUE EXAM BY PATHOLOGIST: CPT | Mod: TC | Performed by: OTOLARYNGOLOGY

## 2020-07-01 PROCEDURE — 37000009 ZZH ANESTHESIA TECHNICAL FEE, EACH ADDTL 15 MIN: Performed by: OTOLARYNGOLOGY

## 2020-07-01 PROCEDURE — 25000128 H RX IP 250 OP 636: Performed by: NURSE ANESTHETIST, CERTIFIED REGISTERED

## 2020-07-01 PROCEDURE — 25000125 ZZHC RX 250: Performed by: OTOLARYNGOLOGY

## 2020-07-01 PROCEDURE — 71000027 ZZH RECOVERY PHASE 2 EACH 15 MINS: Performed by: OTOLARYNGOLOGY

## 2020-07-01 PROCEDURE — 40000305 ZZH STATISTIC PRE PROC ASSESS I: Performed by: OTOLARYNGOLOGY

## 2020-07-01 PROCEDURE — 25000125 ZZHC RX 250

## 2020-07-01 PROCEDURE — 31267 ENDOSCOPY MAXILLARY SINUS: CPT | Mod: 50 | Performed by: OTOLARYNGOLOGY

## 2020-07-01 PROCEDURE — 25800030 ZZH RX IP 258 OP 636: Performed by: NURSE ANESTHETIST, CERTIFIED REGISTERED

## 2020-07-01 PROCEDURE — 31257 NSL/SINS NDSC TOT W/SPHENDT: CPT | Performed by: NURSE ANESTHETIST, CERTIFIED REGISTERED

## 2020-07-01 PROCEDURE — 25000566 ZZH SEVOFLURANE, EA 15 MIN: Performed by: NURSE ANESTHETIST, CERTIFIED REGISTERED

## 2020-07-01 PROCEDURE — 36000058 ZZH SURGERY LEVEL 3 EA 15 ADDTL MIN: Performed by: OTOLARYNGOLOGY

## 2020-07-01 PROCEDURE — 27210794 ZZH OR GENERAL SUPPLY STERILE: Performed by: OTOLARYNGOLOGY

## 2020-07-01 PROCEDURE — 30999 UNLISTED PROCEDURE NOSE: CPT | Performed by: OTOLARYNGOLOGY

## 2020-07-01 PROCEDURE — 61782 SCAN PROC CRANIAL EXTRA: CPT | Performed by: OTOLARYNGOLOGY

## 2020-07-01 PROCEDURE — 30520 REPAIR OF NASAL SEPTUM: CPT | Performed by: OTOLARYNGOLOGY

## 2020-07-01 PROCEDURE — 36000056 ZZH SURGERY LEVEL 3 1ST 30 MIN: Performed by: OTOLARYNGOLOGY

## 2020-07-01 PROCEDURE — C9046 COCAINE HCL NASAL SOLUTION: HCPCS | Performed by: OTOLARYNGOLOGY

## 2020-07-01 PROCEDURE — 25000125 ZZHC RX 250: Performed by: NURSE ANESTHETIST, CERTIFIED REGISTERED

## 2020-07-01 PROCEDURE — L8699 PROSTHETIC IMPLANT NOS: HCPCS | Performed by: OTOLARYNGOLOGY

## 2020-07-01 PROCEDURE — 71000014 ZZH RECOVERY PHASE 1 LEVEL 2 FIRST HR: Performed by: OTOLARYNGOLOGY

## 2020-07-01 PROCEDURE — 27110028 ZZH OR GENERAL SUPPLY NON-STERILE: Performed by: OTOLARYNGOLOGY

## 2020-07-01 PROCEDURE — 25000128 H RX IP 250 OP 636: Performed by: OTOLARYNGOLOGY

## 2020-07-01 DEVICE — LATERA-24MM ABSORBABLE NASAL IMPLANT: Type: IMPLANTABLE DEVICE | Site: NOSE | Status: FUNCTIONAL

## 2020-07-01 RX ORDER — HYDROCODONE BITARTRATE AND ACETAMINOPHEN 7.5; 325 MG/1; MG/1
1 TABLET ORAL EVERY 6 HOURS PRN
Qty: 10 TABLET | Refills: 0 | Status: SHIPPED | OUTPATIENT
Start: 2020-07-01 | End: 2020-07-21

## 2020-07-01 RX ORDER — OXYMETAZOLINE HYDROCHLORIDE 0.05 G/100ML
SPRAY NASAL
Status: COMPLETED
Start: 2020-07-01 | End: 2020-07-01

## 2020-07-01 RX ORDER — FENTANYL CITRATE 50 UG/ML
25-50 INJECTION, SOLUTION INTRAMUSCULAR; INTRAVENOUS
Status: DISCONTINUED | OUTPATIENT
Start: 2020-07-01 | End: 2020-07-01 | Stop reason: HOSPADM

## 2020-07-01 RX ORDER — COCAINE HYDROCHLORIDE 40 MG/ML
SOLUTION NASAL
Status: DISCONTINUED
Start: 2020-07-01 | End: 2020-07-01 | Stop reason: HOSPADM

## 2020-07-01 RX ORDER — DEXAMETHASONE SODIUM PHOSPHATE 10 MG/ML
INJECTION, SOLUTION INTRAMUSCULAR; INTRAVENOUS PRN
Status: DISCONTINUED | OUTPATIENT
Start: 2020-07-01 | End: 2020-07-01

## 2020-07-01 RX ORDER — SODIUM CHLORIDE, SODIUM LACTATE, POTASSIUM CHLORIDE, CALCIUM CHLORIDE 600; 310; 30; 20 MG/100ML; MG/100ML; MG/100ML; MG/100ML
INJECTION, SOLUTION INTRAVENOUS CONTINUOUS
Status: DISCONTINUED | OUTPATIENT
Start: 2020-07-01 | End: 2020-07-01 | Stop reason: HOSPADM

## 2020-07-01 RX ORDER — ONDANSETRON 2 MG/ML
4 INJECTION INTRAMUSCULAR; INTRAVENOUS EVERY 30 MIN PRN
Status: DISCONTINUED | OUTPATIENT
Start: 2020-07-01 | End: 2020-07-01 | Stop reason: HOSPADM

## 2020-07-01 RX ORDER — ONDANSETRON 2 MG/ML
INJECTION INTRAMUSCULAR; INTRAVENOUS PRN
Status: DISCONTINUED | OUTPATIENT
Start: 2020-07-01 | End: 2020-07-01

## 2020-07-01 RX ORDER — HYDROMORPHONE HYDROCHLORIDE 1 MG/ML
.3-.5 INJECTION, SOLUTION INTRAMUSCULAR; INTRAVENOUS; SUBCUTANEOUS EVERY 10 MIN PRN
Status: DISCONTINUED | OUTPATIENT
Start: 2020-07-01 | End: 2020-07-01 | Stop reason: HOSPADM

## 2020-07-01 RX ORDER — MEPERIDINE HYDROCHLORIDE 25 MG/ML
12.5 INJECTION INTRAMUSCULAR; INTRAVENOUS; SUBCUTANEOUS
Status: DISCONTINUED | OUTPATIENT
Start: 2020-07-01 | End: 2020-07-01 | Stop reason: HOSPADM

## 2020-07-01 RX ORDER — HYDROCODONE BITARTRATE AND ACETAMINOPHEN 7.5; 325 MG/1; MG/1
1 TABLET ORAL EVERY 6 HOURS PRN
Status: DISCONTINUED | OUTPATIENT
Start: 2020-07-01 | End: 2020-07-01 | Stop reason: HOSPADM

## 2020-07-01 RX ORDER — GLYCOPYRROLATE 0.2 MG/ML
INJECTION, SOLUTION INTRAMUSCULAR; INTRAVENOUS PRN
Status: DISCONTINUED | OUTPATIENT
Start: 2020-07-01 | End: 2020-07-01

## 2020-07-01 RX ORDER — COCAINE HYDROCHLORIDE 40 MG/ML
SOLUTION NASAL PRN
Status: DISCONTINUED | OUTPATIENT
Start: 2020-07-01 | End: 2020-07-01 | Stop reason: HOSPADM

## 2020-07-01 RX ORDER — FENTANYL CITRATE 50 UG/ML
INJECTION, SOLUTION INTRAMUSCULAR; INTRAVENOUS PRN
Status: DISCONTINUED | OUTPATIENT
Start: 2020-07-01 | End: 2020-07-01

## 2020-07-01 RX ORDER — PROPOFOL 10 MG/ML
INJECTION, EMULSION INTRAVENOUS PRN
Status: DISCONTINUED | OUTPATIENT
Start: 2020-07-01 | End: 2020-07-01

## 2020-07-01 RX ORDER — TRIAMCINOLONE ACETONIDE 40 MG/ML
INJECTION, SUSPENSION INTRA-ARTICULAR; INTRAMUSCULAR PRN
Status: DISCONTINUED | OUTPATIENT
Start: 2020-07-01 | End: 2020-07-01 | Stop reason: HOSPADM

## 2020-07-01 RX ORDER — LIDOCAINE HYDROCHLORIDE AND EPINEPHRINE 10; 10 MG/ML; UG/ML
INJECTION, SOLUTION INFILTRATION; PERINEURAL PRN
Status: DISCONTINUED | OUTPATIENT
Start: 2020-07-01 | End: 2020-07-01 | Stop reason: HOSPADM

## 2020-07-01 RX ORDER — EPHEDRINE SULFATE 50 MG/ML
INJECTION, SOLUTION INTRAMUSCULAR; INTRAVENOUS; SUBCUTANEOUS PRN
Status: DISCONTINUED | OUTPATIENT
Start: 2020-07-01 | End: 2020-07-01

## 2020-07-01 RX ORDER — OXYMETAZOLINE HYDROCHLORIDE 0.05 G/100ML
3 SPRAY NASAL
Status: COMPLETED | OUTPATIENT
Start: 2020-07-01 | End: 2020-07-01

## 2020-07-01 RX ORDER — ALBUTEROL SULFATE 0.83 MG/ML
2.5 SOLUTION RESPIRATORY (INHALATION) EVERY 4 HOURS PRN
Status: DISCONTINUED | OUTPATIENT
Start: 2020-07-01 | End: 2020-07-01 | Stop reason: HOSPADM

## 2020-07-01 RX ORDER — ONDANSETRON 4 MG/1
4 TABLET, ORALLY DISINTEGRATING ORAL EVERY 30 MIN PRN
Status: DISCONTINUED | OUTPATIENT
Start: 2020-07-01 | End: 2020-07-01 | Stop reason: HOSPADM

## 2020-07-01 RX ORDER — HYDRALAZINE HYDROCHLORIDE 20 MG/ML
2.5-5 INJECTION INTRAMUSCULAR; INTRAVENOUS EVERY 10 MIN PRN
Status: DISCONTINUED | OUTPATIENT
Start: 2020-07-01 | End: 2020-07-01 | Stop reason: HOSPADM

## 2020-07-01 RX ORDER — NALOXONE HYDROCHLORIDE 0.4 MG/ML
.1-.4 INJECTION, SOLUTION INTRAMUSCULAR; INTRAVENOUS; SUBCUTANEOUS
Status: DISCONTINUED | OUTPATIENT
Start: 2020-07-01 | End: 2020-07-01 | Stop reason: HOSPADM

## 2020-07-01 RX ADMIN — FENTANYL CITRATE 100 MCG: 50 INJECTION, SOLUTION INTRAMUSCULAR; INTRAVENOUS at 11:21

## 2020-07-01 RX ADMIN — OXYMETAZOLINE HYDROCHLORIDE 3 SPRAY: 0.05 SPRAY NASAL at 10:52

## 2020-07-01 RX ADMIN — Medication 5 MG: at 12:08

## 2020-07-01 RX ADMIN — FENTANYL CITRATE 50 MCG: 50 INJECTION INTRAMUSCULAR; INTRAVENOUS at 13:58

## 2020-07-01 RX ADMIN — ROCURONIUM BROMIDE 10 MG: 10 INJECTION INTRAVENOUS at 11:32

## 2020-07-01 RX ADMIN — OXYMETAZOLINE HCL 3 SPRAY: 0.05 SPRAY NASAL at 10:42

## 2020-07-01 RX ADMIN — OXYMETAZOLINE HYDROCHLORIDE 3 SPRAY: 0.05 SPRAY NASAL at 11:03

## 2020-07-01 RX ADMIN — PROCHLORPERAZINE EDISYLATE 10 MG: 5 INJECTION INTRAMUSCULAR; INTRAVENOUS at 13:53

## 2020-07-01 RX ADMIN — ONDANSETRON 4 MG: 2 INJECTION INTRAMUSCULAR; INTRAVENOUS at 13:00

## 2020-07-01 RX ADMIN — GLYCOPYRROLATE 0.2 MG: 0.2 INJECTION, SOLUTION INTRAMUSCULAR; INTRAVENOUS at 12:05

## 2020-07-01 RX ADMIN — PROPOFOL 150 MG: 10 INJECTION, EMULSION INTRAVENOUS at 11:27

## 2020-07-01 RX ADMIN — HYDROCODONE BITARTRATE AND ACETAMINOPHEN 1 TABLET: 7.5; 325 TABLET ORAL at 14:45

## 2020-07-01 RX ADMIN — OXYMETAZOLINE HYDROCHLORIDE 3 SPRAY: 0.05 SPRAY NASAL at 10:42

## 2020-07-01 RX ADMIN — MIDAZOLAM 2 MG: 1 INJECTION INTRAMUSCULAR; INTRAVENOUS at 11:19

## 2020-07-01 RX ADMIN — DEXAMETHASONE SODIUM PHOSPHATE 12 MG: 10 INJECTION, SOLUTION INTRAMUSCULAR; INTRAVENOUS at 11:35

## 2020-07-01 RX ADMIN — ONDANSETRON 4 MG: 2 INJECTION INTRAMUSCULAR; INTRAVENOUS at 13:31

## 2020-07-01 RX ADMIN — FENTANYL CITRATE 50 MCG: 50 INJECTION INTRAMUSCULAR; INTRAVENOUS at 13:33

## 2020-07-01 RX ADMIN — Medication 100 MG: at 11:28

## 2020-07-01 RX ADMIN — SODIUM CHLORIDE, POTASSIUM CHLORIDE, SODIUM LACTATE AND CALCIUM CHLORIDE: 600; 310; 30; 20 INJECTION, SOLUTION INTRAVENOUS at 13:13

## 2020-07-01 RX ADMIN — SODIUM CHLORIDE, POTASSIUM CHLORIDE, SODIUM LACTATE AND CALCIUM CHLORIDE: 600; 310; 30; 20 INJECTION, SOLUTION INTRAVENOUS at 11:03

## 2020-07-01 ASSESSMENT — MIFFLIN-ST. JEOR: SCORE: 1742.76

## 2020-07-01 NOTE — ANESTHESIA POSTPROCEDURE EVALUATION
Patient: Radha Christensen    Procedure(s):  Bilateral Endoscopic Sinus Surgery with polypectomy  Septoplasty Turbinate Reduction  Nasal Valve Repair(LATERA)    Diagnosis:Nasal polyp [J33.9]  Deviated nasal septum [J34.2]  Chronic recurrent sinusitis [J32.9]  Nasal valve collapse [J34.89]  Diagnosis Additional Information: No value filed.    Anesthesia Type:  General    Note:  Anesthesia Post Evaluation    Patient location during evaluation: Bedside  Patient participation: Able to fully participate in evaluation  Level of consciousness: awake and alert  Pain management: adequate  Airway patency: patent  Cardiovascular status: acceptable and stable  Respiratory status: acceptable and room air  Hydration status: acceptable  PONV: none     Anesthetic complications: None          Last vitals:  Vitals:    07/01/20 1410 07/01/20 1415 07/01/20 1429   BP: 133/79 145/76    Pulse:  93    Resp: 16     Temp: 98.7  F (37.1  C)     SpO2: 97% 97% 97%         Electronically Signed By: SHANTAL Joseph CRNA  July 1, 2020  2:35 PM

## 2020-07-01 NOTE — OR NURSING
Patient and responsible adult given discharge instructions with no questions regarding instructions. Isaac score 19. Pain level 2/10.  Discharged from unit via wheelchair. Patient discharged to home with mom.

## 2020-07-01 NOTE — OP NOTE
Otolaryngology Operative Note     Pre-op Diagnosis: Chronic pansinusitis, deviated nasal septum, bilateral inferior turbinate hypertrophy, bilateral nasal valve collapse  Post-op Diagnosis:  same  Procedures:    1.  Bilateral total ethmoidectomy  2.  Bilateral maxillary antrostomy with tissue removal  3.  Septoplasty with cartilage reinsertion  4.  Bilateral nasal valve repair  5.  Bilateral submucous reduction inferior turbinates    Sinus procedures performed with MediSens navigation    Surgeon:  Karen Sanchez D.O.  Anesthesia:  General endotracheal  EBL:  25 ml  Findings: Filling retention cyst of the right maxillary sinus, thickened bone with microabscesses throughout the anterior and posterior ethmoid cavity  Complications:  none  Condition:  stable     Description of the Procedure    After surgical consent was obtained the patient was brought back to the operating room and laid in a comfortable and supine position.  The patient was administered a general anesthetic by a member of anesthesia.  The table was turned 180 degrees.  The patient was draped in the normal clean fashion and a timeout was taken.  The bed was placed into reverse Trendelenburg positioning.  A timeout was taken.  Cocaine pledgets were placed into the nares for several minutes and removed.  The lateral nasal wall, septum, middle turbinates and inferior turbinates were anesthetized with 1% lidocaine with 1-100,000 epinephrine.    I then anesthetized the inferior turbinates bilaterally with 1% lidocaine with 1-100,000 of epinephrine.  I placed a nasal speculum and advanced to the Coblator wand into the left inferior turbinate with the coagulation setting at 2.  I then used Coblation a setting of 5 and held each site for 10 seconds I withdrew the wand using coagulation.  The inferior turbinate was lateralized using a Davidson.  There is improved access to inferior meatus.      The contralateral right inferior turbinate reduction was  performed with similar findings and results.        Next days a 30 degree endoscope and entered the right nasal cavity.  She has a narrow nasal cavity and a pediatric 30 degree endoscope was used the middle turbinate was medialized with a [place].  Identified the uncinate process and reflected this anterior and removed with a backbiting Blakesley and the microdebrider blade.  The natural ostium of the maxillary sinus was identified and enlarged with the backbiter as well as the microdebrider blade.  The  orbit was preserved throughout.  I then entered the maxillary sinus with a curved suction.  There is a filling mucus retention cyst which was removed with maxillary sinus forceps .  The underlying mucosa is flat and healthy.  The sinus was irrigated with Ancef and saline and gently suctioned.    Next identified the ethmoid bulla and entered this inferior medial.  I used the microdebrider to remove the anterior ethmoid cells.    The lamina was preserved throughout.  I then entered the ground lamella inferior medial and entered the posterior ethmoid cavity.  There are thickened septations throughout the anterior and posterior ethmoid cavity with microabscesses throughout.  Identified the skull base and operated from a posterior to anterior fashion removing bony septations with Blakesley forceps and the microdebrider blade into the ethmoid cavity was clear irrigation and suctioning was similarly performed next I turned my attention to the.     I then performed  the septoplasty.  I made a right hemitransfixion incision and dissected across the caudal cartilage and raised the left anterior inferior and anterior posterior mucoperiosteal and perichondrial flap.  I then made an incision in the quadrangular cartilage preserving over 1 cm of intact dorsal caudal strut and dissected the opposite mucoperichondrial and periosteal flaps.  The obstructive cartilage was removed with a swivel blade morselized and handed off the field  for reinsertion.  I used a V chisel and Blakesley forceps as well as a D knife to remove obstructive portions of the perpendicular palliate of the ethmoid and maxillary crest until the septum was midline and intact.  I replaced the straightened quadrangular cartilage into the midline pocket and secured this in a horizontal running 4-0 Vicryl suture.  The incision was closed with 4-0 chromic.  The septum is intact and midline.      At this point I was able to visualize the left uncinate process which was removed in a similar fashion and proceeded with the entire left total ethmoidectomy and maxillary antrostomy in a similar fashion with similar results.  On the left side there is not a maxillary sinus cyst but there is similar thickened bone throughout the ethmoid cavity with microabscesses.  The sinuses were irrigated with Ancef saline and gently suctioned hemostasis is adequate      Attention was turned to the nasal valve repair.  I previously demarcated the point of maximum external nasal valve collapse in same-day surgery.  I used the planning guide and laid this over the nasal sidewall on the right then the left sides of the nose.  The distal demarcation was placed just above the ascending process of the maxilla about senior living between the height of the dorsum and the medial canthus.  I then demarcated the proximal site just above the supra alar crease and made a third dot just above the nostril.  I then used a double skin hook to htea the nostril.  I injected a small amount of 1% lidocaine with 1-100,000 of epinephrine into the vestibule.  I then everted the nostril again and placed the needle of the guide perpendicular to the septum to enter the correct plane.  I then brought the double skin hook and retracted this inferior and advanced the needle in the sub-SMAS plane in the supra periosteal and supraperichondrial plane along the nasal sidewall.  I then advanced the implant.  I held the implant tight against  the nasal sidewall as I withdrew the guide.  I then evaluated the nose there is no implant show or evidence of extrusion.  There is no bleeding.  I similarly placed the left nasal valve implant in a similar fashion with similar results.  Hemostasis is adequate.    While protecting the eyes, I applied benzoin across across the nasal dorsum followed by half-inch Steri-Strips.    The patient was then handed back over to anesthesia awakened and brought to the recovery room in stable condition having tolerated the procedure well

## 2020-07-01 NOTE — DISCHARGE INSTRUCTIONS
Post-Anesthesia Patient Instructions    IMMEDIATELY FOLLOWING SURGERY:  Do not drive or operate machinery for the first twenty four hours after surgery.  Do not make any important decisions for twenty four hours after surgery or while taking narcotic pain medications or sedatives.  If you develop intractable nausea and vomiting or a severe headache please notify your doctor immediately.    FOLLOW-UP:  Please make an appointment with your surgeon as instructed. You do not need to follow up with anesthesia unless specifically instructed to do so.    WOUND CARE INSTRUCTIONS (if applicable):  Keep a dry clean dressing on the anesthesia/puncture wound site if there is drainage.  Once the wound has quit draining you may leave it open to air.  Generally you should leave the bandage intact for twenty four hours unless there is drainage.  If the epidural site drains for more than 36-48 hours please call the anesthesia department.    QUESTIONS?:  Please feel free to call your physician or the hospital  if you have any questions, and they will be happy to assist you.   Instructions for Sinus Surgery    Recovery - Everyone recovers differently from a general anesthetic.  Symptoms such as fatigue, nausea, light-headedness, and sometimes a low grade fever (up to 100 degrees) are not unusual.  As your body removes the anesthetic drugs from circulation, these symptoms will resolve.  Your nose will be sore after surgery, and you may even have symptoms similar to a sinus infection with headache, congestion, and pressure.  These will resolve with healing.  For several days you may experience bloody drainage from the nose, please use the drip pad as necessary for this.  If there is persistent bleeding, please call the office during business hours or the on call ENT physician after hours.  There are no diet restrictions after sinus surgery, and you can resume your home medications.      Please do not blow your nose until 1  week after surgery.  At 1 week you may gently blow your nose, unless otherwise indicated by Dr. Sanchez.     Limit your activity to no strenuous activities until I see you for the first follow-up visit in approximately 2 weeks.      Medications - You were sent home with narcotic pain medication.  If you can tolerate the discomfort during your recovery by using just plain Tylenol or ibuprofen (advil), please do so.  However, do not hesitate to use the stronger pain medication if needed.  If you were sent home with an antibiotic, it is primarily used to help the healing process.  If it causes loose bowel movements or other signs of intolerance, it is appropriate to discontinue it.  By far the most important measure you can take to speed recovery, and maximize the chances of long term success of sinus surgery is using the sinus rinses at least three time per day for the first month after surgery.       Start John Med saline irrigation tomorrow and use at least 5 times daily.     I recommend 2 john med bottles, one to add the budesonide to and irrigate with the budesonide rinse twice a day for 2 months.    In the other john med bottle use only the saline solution, and irrigate with this at least 3 additional times daily.    Perform gentle irrigation for the first week.  Starting 1 week after surgery, you should increase the volume of john med saline irrigation to each nostril, continuing to use the rinses in an alternating fashion at least 5 times daily.  You cannot use too much of the john med saline, but please limit budesonide rinses to twice daily.    At 2 weeks after surgery, you may also restart nasal steroids (flonase, nasonex, etc).        Complications - Problems related to sinus surgery almost always are detected during the operation, and special instruction will be given in that situation.  However, unexpected things can happen, and are all related to the structures around the sinus cavities.  Symptoms  that should alert you to a possible problem include: severe eye pain or eye swelling, persistent heavy bleeding from the nose, and high fevers with headache and neck pain.  Any of these symptoms should be called into my office or to the on call ENT if after hours.  The most common non-emergency complication of sinus surgery is the formation of scar tissue which can re-block the sinuses.  This is addressed below.    Follow-up -      See Dr. Sanchez in 3 weeks    As you have noted, there are quite a few follow-up visits after sinus surgery.  This is done to aggressively manage the most common complication of this technique, which is scar tissue blocking the sinuses.  These visits will require the examination of your nose and possibly removal of crusts of dry mucous and blood, with possible removal of early scar tissue.  Please prepare for these visits by using your sinus rinses.    If there are any questions or issues with the above, or if there are other issues that concern you, always feel free to call the clinic and I am happy to speak with you as soon as I can.    Karen Sanchez D.O.  Otolaryngology/Head and Neck Surgery  Allergy    [phone] office

## 2020-07-01 NOTE — ANESTHESIA CARE TRANSFER NOTE
Patient: Radha Christensen    Procedure(s):  Bilateral Endoscopic Sinus Surgery with polypectomy  Septoplasty Turbinate Reduction  Nasal Valve Repair(LATERA)    Diagnosis: Nasal polyp [J33.9]  Deviated nasal septum [J34.2]  Chronic recurrent sinusitis [J32.9]  Nasal valve collapse [J34.89]  Diagnosis Additional Information: No value filed.    Anesthesia Type:   General     Note:  Airway :Face Mask  Patient transferred to:PACU  Handoff Report: Identifed the Patient, Identified the Reponsible Provider, Reviewed the pertinent medical history, Discussed the surgical course, Reviewed Intra-OP anesthesia mangement and issues during anesthesia, Set expectations for post-procedure period and Allowed opportunity for questions and acknowledgement of understanding      Vitals: (Last set prior to Anesthesia Care Transfer)    CRNA VITALS  7/1/2020 1244 - 7/1/2020 1325      7/1/2020             Resp Rate (observed):  (!) 2    Resp Rate (set):  8                Electronically Signed By: SHANTAL Brody CRNA  July 1, 2020  1:25 PM

## 2020-07-06 LAB — COPATH REPORT: NORMAL

## 2020-07-17 ENCOUNTER — MYC MEDICAL ADVICE (OUTPATIENT)
Dept: OTOLARYNGOLOGY | Facility: OTHER | Age: 35
End: 2020-07-17

## 2020-07-20 ENCOUNTER — MYC MEDICAL ADVICE (OUTPATIENT)
Dept: FAMILY MEDICINE | Facility: OTHER | Age: 35
End: 2020-07-20

## 2020-07-20 NOTE — TELEPHONE ENCOUNTER
Please have her set up a telephone appointment to discuss her symptoms along with the medication.   Sheila Uriostegui PA-C.......... 7/20/2020 10:13 AM

## 2020-07-21 ENCOUNTER — VIRTUAL VISIT (OUTPATIENT)
Dept: FAMILY MEDICINE | Facility: OTHER | Age: 35
End: 2020-07-21
Attending: PHYSICIAN ASSISTANT
Payer: COMMERCIAL

## 2020-07-21 DIAGNOSIS — F41.9 ANXIETY: Primary | ICD-10-CM

## 2020-07-21 PROCEDURE — 99441 ZZC PHYSICIAN TELEPHONE EVALUATION 5-10 MIN: CPT | Performed by: PHYSICIAN ASSISTANT

## 2020-07-21 RX ORDER — LORAZEPAM 0.5 MG/1
0.5 TABLET ORAL EVERY 6 HOURS PRN
Qty: 4 TABLET | Refills: 0 | Status: SHIPPED | OUTPATIENT
Start: 2020-07-21 | End: 2022-09-30

## 2020-07-21 NOTE — PROGRESS NOTES
"Radha Christensen is a 35 year old female who is being evaluated via a billable telephone visit.      The patient has been notified of following:     \"This telephone visit will be conducted via a call between you and your physician/provider. We have found that certain health care needs can be provided without the need for a physical exam.  This service lets us provide the care you need with a short phone conversation.  If a prescription is necessary we can send it directly to your pharmacy.  If lab work is needed we can place an order for that and you can then stop by our lab to have the test done at a later time.    Telephone visits are billed at different rates depending on your insurance coverage. During this emergency period, for some insurers they may be billed the same as an in-person visit.  Please reach out to your insurance provider with any questions.    If during the course of the call the physician/provider feels a telephone visit is not appropriate, you will not be charged for this service.\"    Patient has given verbal consent for Telephone visit?  Yes    What phone number would you like to be contacted at? [phone]    How would you like to obtain your AVS? Robert Ramírez     Radha Christensen is a 35 year old female who presents via phone visit today for the following health issues:    HPI    Patient has history of flight anxiety.  She is currently going out to [place] to see her dad and brother.  With previous airplane flights she has had to use lorazepam with a flight.  She has tolerated the medication well.  No side effects noted.  Has a  to and from the airport.  No other acute concerns at this time.      Patient Active Problem List   Diagnosis     Other abnormal Papanicolaou smear of cervix and cervical HPV(795.09)     Acne, mild     Alcohol abuse     Dysthymic disorder     Asthma     Cervical high risk HPV (human papillomavirus) test positive     Concussion     Contraceptive " management     Family history of diabetes mellitus (DM)     Migraine headache     Tobacco abuse     Nasal polyp     Deviated nasal septum     Chronic recurrent sinusitis     Nasal valve collapse     Past Surgical History:   Procedure Laterality Date     ENDOSCOPIC SINUS SURGERY Bilateral 2020    Procedure: Bilateral Endoscopic Sinus Surgery with polypectomy;  Surgeon: Karen Sanchez MD;  Location: [facility]     ESOPHAGOSCOPY, GASTROSCOPY, DUODENOSCOPY (EGD), COMBINED N/A 2019    Procedure: ESOPHAGOGASTRODUODENOSCOPY, WITH BIOPSY;  Surgeon: Emigdio Goldberg MD;  Location: [facility]     RECONSTRUCT NOSE AND SEPTUM (FUNCTIONAL) Bilateral 2020    Procedure: Nasal Valve Repair(LATERA);  Surgeon: Karen Sanchez MD;  Location: [facility]     SEPTOPLASTY, TURBINOPLASTY, COMBINED N/A 2020    Procedure: Septoplasty Turbinate Reduction;  Surgeon: Karen Sanchez MD;  Location: [facility]     wisdom teeth          Social History     Tobacco Use     Smoking status: Former Smoker     Years: 10.00     Types: Cigarettes     Last attempt to quit: 2013     Years since quittin.5     Smokeless tobacco: Never Used   Substance Use Topics     Alcohol use: Not Currently     Comment: Quit 2019     Family History   Problem Relation Age of Onset     Heart Disease Maternal Grandfather 50        Heart Disease     Diabetes Maternal Grandfather         Diabetes     Chronic Obstructive Pulmonary Disease Maternal Grandfather         COPD     Diabetes Mother         Diabetes     Thyroid Disease Mother         Thyroid Disease     Heart Disease Mother      Heart Disease Maternal Uncle      Thyroid Disease Brother         Thyroid Disease     Diabetes Brother 38        Diabetes     Diabetes Maternal Uncle         Diabetes     Heart Disease Maternal Uncle      Heart Disease Maternal Grandmother      Breast Cancer No family hx of         Cancer-breast     Ovarian Cancer No family hx of         Cancer-ovarian      Prostate Cancer No family hx of         Cancer-prostate     Other - See Comments No family hx of         Stroke     Colon Cancer No family hx of         Cancer-colon     Blood Disease No family hx of         Blood Disease         Current Outpatient Medications   Medication Sig Dispense Refill     albuterol (PROAIR HFA/PROVENTIL HFA/VENTOLIN HFA) 108 (90 Base) MCG/ACT inhaler Inhale 2 puffs into the lungs every 4 hours as needed for shortness of breath / dyspnea 1 Inhaler 1     budesonide (PULMICORT) 0.5 MG/2ML neb solution Squirt entire vial into previously made john med saline bottle, mix, and irrigate each nostril until entire bottle empty.  Do this twice daily. 3 Box 11     DIFFERIN 0.1 % external gel APPLY EXTERNALLY TO THE AFFECTED AREA AT BEDTIME 45 g 3     escitalopram (LEXAPRO) 20 MG tablet TAKE 1 TABLET(20 MG) BY MOUTH DAILY 90 tablet 3     famotidine (PEPCID) 20 MG tablet Take 1 tablet (20 mg) by mouth 2 times daily 60 tablet 3     lisinopril (ZESTRIL) 10 MG tablet TAKE 1 TABLET(10 MG) BY MOUTH DAILY 90 tablet 3     loratadine (CLARITIN) 10 MG tablet Take 10 mg by mouth daily       topiramate (TOPAMAX) 25 MG tablet Take 25 mg by mouth 2 times daily       Allergies   Allergen Reactions     Cefaclor Unknown     BP Readings from Last 3 Encounters:   07/01/20 145/76   06/24/20 110/84   05/28/20 117/80    Wt Readings from Last 3 Encounters:   07/01/20 102.5 kg (226 lb)   06/24/20 102.5 kg (226 lb)   05/28/20 99.8 kg (220 lb)                    Reviewed and updated as needed this visit by Provider         Review of Systems   Constitutional, HEENT, cardiovascular, pulmonary, gi and gu systems are negative, except as otherwise noted.       Objective   Reported vitals:  There were no vitals taken for this visit.   healthy, alert and no distress  PSYCH: Alert and oriented times 3; coherent speech, normal   rate and volume, able to articulate logical thoughts, able   to abstract reason, no tangential thoughts, no  hallucinations   or delusions  Her affect is normal  RESP: No cough, no audible wheezing, able to talk in full sentences  Remainder of exam unable to be completed due to telephone visits    Diagnostic Test Results:  none         Assessment/Plan:    1. Anxiety  Patient was given a prescription for lorazepam 0.5 mg tablets quantity 4 with 0 refills to use with the airplane flight.  Gave side effect profile.  Recheck as needed.    Patient was given the side effect profile and the safety concerns with using the controlled medication prescription.   Patient should not share the controlled medication with other people.    website was reviewed and printed.     - LORazepam (ATIVAN) 0.5 MG tablet; Take 1 tablet (0.5 mg) by mouth every 6 hours as needed for anxiety . Encouraged to take 1 hour prior to the airplane flight  Dispense: 4 tablet; Refill: 0    No follow-ups on file.      Phone call duration:  6 minutes    Sheila Uriostegui PA-C

## 2020-08-06 ENCOUNTER — ALLIED HEALTH/NURSE VISIT (OUTPATIENT)
Dept: FAMILY MEDICINE | Facility: OTHER | Age: 35
End: 2020-08-06
Attending: NURSE PRACTITIONER
Payer: COMMERCIAL

## 2020-08-06 VITALS — WEIGHT: 220 LBS | HEIGHT: 67 IN | BODY MASS INDEX: 34.53 KG/M2

## 2020-08-06 DIAGNOSIS — Z20.822 COVID-19 RULED OUT: Primary | ICD-10-CM

## 2020-08-06 DIAGNOSIS — R05.9 COUGH: ICD-10-CM

## 2020-08-06 DIAGNOSIS — Z20.822 EXPOSURE TO COVID-19 VIRUS: ICD-10-CM

## 2020-08-06 DIAGNOSIS — R05.9 COUGH: Primary | ICD-10-CM

## 2020-08-06 PROCEDURE — 99207 ZZC NO CHARGE NURSE ONLY: CPT

## 2020-08-06 PROCEDURE — 99441 ZZC PHYSICIAN TELEPHONE EVALUATION 5-10 MIN: CPT | Performed by: PHYSICIAN ASSISTANT

## 2020-08-06 PROCEDURE — C9803 HOPD COVID-19 SPEC COLLECT: HCPCS

## 2020-08-06 PROCEDURE — U0003 INFECTIOUS AGENT DETECTION BY NUCLEIC ACID (DNA OR RNA); SEVERE ACUTE RESPIRATORY SYNDROME CORONAVIRUS 2 (SARS-COV-2) (CORONAVIRUS DISEASE [COVID-19]), AMPLIFIED PROBE TECHNIQUE, MAKING USE OF HIGH THROUGHPUT TECHNOLOGIES AS DESCRIBED BY CMS-2020-01-R: HCPCS | Mod: ZL | Performed by: PHYSICIAN ASSISTANT

## 2020-08-06 ASSESSMENT — MIFFLIN-ST. JEOR: SCORE: 1715.54

## 2020-08-06 NOTE — PROGRESS NOTES
"Radha Christensen is a 35 year old female who is being evaluated via a billable telephone visit.      The patient has been notified of following:     \"This telephone visit will be conducted via a call between you and your physician/provider. We have found that certain health care needs can be provided without the need for a physical exam.  This service lets us provide the care you need with a short phone conversation.  If a prescription is necessary we can send it directly to your pharmacy.  If lab work is needed we can place an order for that and you can then stop by our lab to have the test done at a later time.    Telephone visits are billed at different rates depending on your insurance coverage. During this emergency period, for some insurers they may be billed the same as an in-person visit.  Please reach out to your insurance provider with any questions.    If during the course of the call the physician/provider feels a telephone visit is not appropriate, you will not be charged for this service.\"    Patient has given verbal consent for Telephone visit?  Yes    What phone number would you like to be contacted at? [phone]    How would you like to obtain your AVS? Mail a copy    Subjective     Radha Christensen is a 35 year old female who presents via phone visit today for the following health issues:    HPI    Patient is contacted via telephone for consideration of COVID testing. Patient notes she was recently in prolonged close contact with her father for 9 days about one week ago and he recently tested positive for COVID. Her father was symptomatic with a fever and not feeling well the day prior to dropping her off to return home. Patient has since developed an on and off dry cough, headache, and some nausea. Has been taking Tums for stomach which has been helping. Patient has a history of asthma that has been well controlled with albuterol inhaler. Denies fever/chills, sore throat, shortness of breath, wheezing, " muscle or body aches, headaches, rash.       PAST MEDICAL HISTORY:   Past Medical History:   Diagnosis Date     Concussion 2011    Overview:  no loss of consciousness from softball injury     Personal history of other medical treatment (CODE)      2, Para 2       PAST SURGICAL HISTORY:   Past Surgical History:   Procedure Laterality Date     ENDOSCOPIC SINUS SURGERY Bilateral 2020    Procedure: Bilateral Endoscopic Sinus Surgery with polypectomy;  Surgeon: Karen Sanchez MD;  Location: [facility]     ESOPHAGOSCOPY, GASTROSCOPY, DUODENOSCOPY (EGD), COMBINED N/A 2019    Procedure: ESOPHAGOGASTRODUODENOSCOPY, WITH BIOPSY;  Surgeon: Emigdio Goldberg MD;  Location: [facility]     RECONSTRUCT NOSE AND SEPTUM (FUNCTIONAL) Bilateral 2020    Procedure: Nasal Valve Repair(LATERA);  Surgeon: Karen Sanchez MD;  Location: [facility]     SEPTOPLASTY, TURBINOPLASTY, COMBINED N/A 2020    Procedure: Septoplasty Turbinate Reduction;  Surgeon: Karen Sanchez MD;  Location: [facility]     wisdom teeth          FAMILY HISTORY:   Family History   Problem Relation Age of Onset     Heart Disease Maternal Grandfather 50        Heart Disease     Diabetes Maternal Grandfather         Diabetes     Chronic Obstructive Pulmonary Disease Maternal Grandfather         COPD     Diabetes Mother         Diabetes     Thyroid Disease Mother         Thyroid Disease     Heart Disease Mother      Heart Disease Maternal Uncle      Thyroid Disease Brother         Thyroid Disease     Diabetes Brother 38        Diabetes     Diabetes Maternal Uncle         Diabetes     Heart Disease Maternal Uncle      Heart Disease Maternal Grandmother      Breast Cancer No family hx of         Cancer-breast     Ovarian Cancer No family hx of         Cancer-ovarian     Prostate Cancer No family hx of         Cancer-prostate     Other - See Comments No family hx of         Stroke     Colon Cancer No family hx of         Cancer-colon      Blood Disease No family hx of         Blood Disease       SOCIAL HISTORY:   Social History     Tobacco Use     Smoking status: Former Smoker     Years: 10.00     Types: Cigarettes     Last attempt to quit: 2013     Years since quittin.6     Smokeless tobacco: Never Used   Substance Use Topics     Alcohol use: Not Currently     Comment: Quit 2019        Allergies   Allergen Reactions     Cefaclor Unknown     Current Outpatient Medications   Medication     albuterol (PROAIR HFA/PROVENTIL HFA/VENTOLIN HFA) 108 (90 Base) MCG/ACT inhaler     budesonide (PULMICORT) 0.5 MG/2ML neb solution     DIFFERIN 0.1 % external gel     escitalopram (LEXAPRO) 20 MG tablet     famotidine (PEPCID) 20 MG tablet     lisinopril (ZESTRIL) 10 MG tablet     loratadine (CLARITIN) 10 MG tablet     LORazepam (ATIVAN) 0.5 MG tablet     topiramate (TOPAMAX) 25 MG tablet     No current facility-administered medications for this visit.          Reviewed and updated as needed this visit by Provider         Review of Systems   Constitutional, HEENT, cardiovascular, pulmonary, gi and gu systems are negative, except as otherwise noted.       Objective   Reported vitals:  There were no vitals taken for this visit.   healthy, alert and no distress  PSYCH: Alert and oriented times 3; coherent speech, normal   rate and volume, able to articulate logical thoughts, able   to abstract reason, no tangential thoughts, no hallucinations   or delusions  Her affect is normal  RESP: No cough, no audible wheezing, able to talk in full sentences  Remainder of exam unable to be completed due to telephone visits          Assessment/Plan:  1. COVID-19 ruled out    2. Cough    3. Exposure to COVID-19 virus      Patient meets criteria for COVID-19 testing. They are informed to self quarantine until results are available. They have been provided information to complete curbside testing. Will notify with results. If positive, patient is to self-quarantine at home  for 14 days. If symptoms worsen, they were told they need to have further re-evaluation. Encouraged symptomatic management.       Phone call duration:  5 minutes    Justine Sharp PA-C

## 2020-08-06 NOTE — NURSING NOTE
Patient was in contact with someone that was positive with COVID. Patient's father tested positive after they spent 9 days with him.  Ann Schultz LPN ....................  8/6/2020   8:11 AM

## 2020-08-07 LAB
SARS-COV-2 RNA SPEC QL NAA+PROBE: ABNORMAL
SPECIMEN SOURCE: ABNORMAL

## 2020-08-08 ENCOUNTER — TELEPHONE (OUTPATIENT)
Dept: FAMILY MEDICINE | Facility: OTHER | Age: 35
End: 2020-08-08

## 2020-08-08 NOTE — TELEPHONE ENCOUNTER
"Coronavirus (COVID-19) Notification    Caller Name (Patient, parent, daughter/son, grandparent, etc)  Radha Christensen    Reason for call  Notify of Positive Coronavirus (COVID-19) lab results, assess symptoms,  review [facility] recommendations    Lab Result    Lab test:  2019-nCoV rRt-PCR or SARS-CoV-2 PCR    Oropharyngeal AND/OR nasopharyngeal swabs is POSITIVE for 2019-nCoV RNA/SARS-COV-2 PCR (COVID-19 virus)    RN Recommendations/Instructions per [facility] Coronavirus COVID-19 recommendations    Brief introduction script  Introduce self then review script:  \"I am calling on behalf of [employer].  We were notified that your Coronavirus test (COVID-19) for was POSITIVE for the virus.  I have some information to relay to you but first I wanted to mention that the [place] Dept of Health will be contacting you shortly [it's possible MD already called Patient] to talk to you more about how you are feeling and other people you have had contact with who might now also have the virus.  Also, [facility] is Partnering with the [facility] for Covid-19 research, you may be contacted directly by research staff.\"    Assessment (Inquire about Patient's current symptoms)   Assessment   Current Symptoms at time of phone call: (if no symptoms, document No symptoms] Mild cough but feeling okay.   Symptoms onset (if applicable) 8/14/20     If at time of call, Patients symptoms hare worsened, the Patient should contact 911 or have someone drive them to Emergency Dept promptly:      If Patient calling 911, inform 911 personal that you have tested positive for the Coronavirus (COVID-19).  Place mask on and await 911 to arrive.    If Emergency Dept, If possible, please have another adult drive you to the Emergency Dept but you need to wear mask when in contact with other people.      Review information with Patient    Your result was positive. This means you have COVID-19 (coronavirus).  We have sent you a " letter that reviews the information that I'll be reviewing with you now.    How can I protect others?    If you have symptoms: stay home and away from others (self-isolate) until:    You've had no fever--and no medicine that reduces fever--for 3 full days (72 hours). And      Your other symptoms have gotten better. For example, your cough or breathing has improved. And     At least 10 days have passed since your symptoms started.    If you don't have symptoms: Stay home and away from others (self-isolate) until at least 10 days have passed since your first positive COVID-19 test. (Date test collected)    During this time:    Stay in your own room, including for meals. Use your own bathroom if you can.    Stay away from others in your home. No hugging, kissing or shaking hands. No visitors.     Don't go to work, school or anywhere else.     Clean  high touch  surfaces often (doorknobs, counters, handles, etc.). Use a household cleaning spray or wipes. You'll find a full list on the EPA website at www.epa.gov/pesticide-registration/list-n-disinfectants-use-against-sars-cov-2.     Cover your mouth and nose with a mask, tissue or washcloth to avoid spreading germs.    Wash your hands and face often with soap and water.    Caregivers in these groups are at risk for severe illness due to COVID-19:  o People 65 years and older  o People who live in a nursing home or long-term care facility  o People with chronic disease (lung, heart, cancer, diabetes, kidney, liver, immunologic)  o People who have a weakened immune system, including those who:  - Are in cancer treatment  - Take medicine that weakens the immune system, such as corticosteroids  - Had a bone marrow or organ transplant  - Have an immune deficiency  - Have poorly controlled HIV or AIDS  - Are obese (body mass index of 40 or higher)  - Smoke regularly    Caregivers should wear gloves while washing dishes, handling laundry and cleaning bedrooms and  bathrooms.    Wash and dry laundry with special caution. Don't shake dirty laundry, and use the warmest water setting you can.    If you have a weakened immune system, ask your doctor about other actions you should take.    For more tips, go to www.cdc.gov/coronavirus/2019-ncov/downloads/10Things.pdf.    You should not go back to work until you meet the guidelines above for ending your home isolation. You should meet these along with any other guidelines that your employer has.    Employers: This document serves as formal notice of your employee's medical guidelines for going back to work. They must meet the above guidelines before going back to work in person.    How can I take care of myself?    1. Get lots of rest. Drink extra fluids (unless a doctor has told you not to).    2. Take Tylenol (acetaminophen) for fever or pain. If you have liver or kidney problems, ask your family doctor if it's okay to take Tylenol.     Take either:     650 mg (two 325 mg pills) every 4 to 6 hours, or     1,000 mg (two 500 mg pills) every 8 hours as needed.     Note: Don't take more than 3,000 mg in one day. Acetaminophen is found in many medicines (both prescribed and over-the-counter medicines). Read all labels to be sure you don't take too much.    For children, check the Tylenol bottle for the right dose (based on their age or weight).    3. If you have other health problems (like cancer, heart failure, an organ transplant or severe kidney disease): Call your specialty clinic if you don't feel better in the next 2 days.    4. Know when to call 911: Emergency warning signs include:    Trouble breathing or shortness of breath    Pain or pressure in the chest that doesn't go away    Feeling confused like you haven't felt before, or not being able to wake up    Bluish-colored lips or face    5. Sign up for [employer]. We know it's scary to hear that you have COVID-19. We want to track your symptoms to make sure you're okay over  the next 2 weeks. Please look for an email from [employer]--this is a free, online program that we'll use to keep in touch. To sign up, follow the link in the email. Learn more at [email or web address].    Where can I get more information?    [facility]: [email or web address]    Coronavirus Basics: www.health.[facility].[place]./diseases/coronavirus/basics.html    What to Do If You're Sick: www.cdc.gov/coronavirus/2019-ncov/about/steps-when-sick.html    Ending Home Isolation: www.cdc.gov/coronavirus/2019-ncov/hcp/disposition-in-home-patients.html     Caring for Someone with COVID-19: www.cdc.gov/coronavirus/2019-ncov/if-you-are-sick/care-for-someone.html     [facility] clinical trials (COVID-19 research studies): [email or web address]-clinical-trials     A Positive COVID-19 letter will be sent via [employer] or the mail.    [Name]  Karen Madera, MSN, RN

## 2020-08-12 ENCOUNTER — MYC MEDICAL ADVICE (OUTPATIENT)
Dept: FAMILY MEDICINE | Facility: OTHER | Age: 35
End: 2020-08-12

## 2020-09-15 ENCOUNTER — TELEPHONE (OUTPATIENT)
Dept: OTOLARYNGOLOGY | Facility: OTHER | Age: 35
End: 2020-09-15

## 2020-09-15 NOTE — TELEPHONE ENCOUNTER
Left message to schedule follow up if she would like to get in sooner she could see dinorah zuñiga is out till oct right now.

## 2020-09-18 ENCOUNTER — TELEPHONE (OUTPATIENT)
Dept: FAMILY MEDICINE | Facility: OTHER | Age: 35
End: 2020-09-18

## 2020-09-18 NOTE — TELEPHONE ENCOUNTER
Writer called regarding Topamax RX. See note below.     I haven't seen the patient for migraines. I will refill for 3 months, but she should make a clinic appointment with me during this time for med check. Thanks! -CAN Antunez LPN on 9/18/2020 at 8:44 AM

## 2020-09-18 NOTE — TELEPHONE ENCOUNTER
Called and left message for patient to please return call. See note below.     Sally Antunez LPN on 9/18/2020 at 4:20 PM

## 2020-09-18 NOTE — TELEPHONE ENCOUNTER
Patient called and verified patient full name and . Notified patient of below. She is going to be calling and scheduling an PX soon.     Sally Antunez LPN on 2020 at 4:29 PM

## 2020-10-07 NOTE — PROGRESS NOTES
Otolaryngology Progress Note          Radha Christensen is a 35 year old female presents for her first postoperative visit status post endoscopic sinus surgery on 8/6/20  Hx of perennial allergic rhinitis , taking claritin  Intradermal testing 3/28/19 reviewed    She no-showed for prior appt    Pre-op Diagnosis: Chronic pansinusitis, deviated nasal septum, bilateral inferior turbinate hypertrophy, bilateral nasal valve collapse  Post-op Diagnosis:  same  Procedures:    1.  Bilateral total ethmoidectomy  2.  Bilateral maxillary antrostomy with tissue removal  3.  Septoplasty with cartilage reinsertion  4.  Bilateral nasal valve repair  5.  Bilateral submucous reduction inferior turbinates    Findings: Filling retention cyst of the right maxillary sinus, thickened bone with microabscesses throughout the anterior and posterior ethmoid cavity    Radha is breathing well out of her nose and has no complaints   she denies congestion or rhinorrhea, no allergy concerns other than recent exposure to her kids dog which caused temporary congestion she was exposed last night    There is been no bleeding or pain    She used her budesonide irrigations for the first month and now has been using NeilMed saline twice a day  She has not needed Flonase or Nasonex  No Eos    Post op SNOT 10  Pre op SNOT 72    Physical Exam  /80   Pulse 64   Temp 98.7  F (37.1  C) (Tympanic)   Wt 102.1 kg (225 lb)   SpO2 98%   BMI 35.24 kg/m    General - The patient is well nourished and well developed, and appears to have good nutritional status.  Alert and oriented to person and place, interactive.  Head and Face - Normocephalic and atraumatic, with no gross asymmetry noted of the contour of the facial features.  The facial nerve is intact, with strong symmetric movements.  Neck-no palpable lymphadenopathy or thyroid mass.  Trachea is midline.  Eyes - Extraocular movements intact.   Nose - nasal valve implants in good position bilaterally  without extrusion photos taken there is resolution of her nasal valve collapse no internal or external nasal valve collapse nasal mucosa is pink and moist with no abnormal mucus.  The septum was grossly midline and non-obstructive, turbinates of normal size and position.  No polyps, masses, or purulence noted on examination.  Mouth - Examination of the oral cavity shows pink, healthy, moist mucosa.  No lesions or ulceration noted.  The dentition are in good repair.  The tongue is mobile and midline.  Throat - The walls of the oropharynx were smooth, pink, moist, symmetric, and had no lesions or ulcerations.  The tonsillar pillars and soft palate were symmetric.  The uvula was midline on elevation.        To evaluate the nose and sinuses in the post operative state, I performed rigid nasal endoscopy. The nose was anesthetized with home afrin or topical lidocaine and neosynephrine in the office.    I began with the LEFT side using a 0 degree rigid nasal endoscope, and then similarly examined the RIGHT side    Findings:  Inferior turbinates:  Lateralized  Septum midline, intact  Middle turbinate and middle meatus:  No purulence, no polyposis, no synechiae  Antrostomy clear bilaterally  Ethmoid cavity small amount of viscous mucus on the right anterior ethmoid and mild mucosal edema of the right anterior ethmoid cavity I also divided a small amount of viscous secretions from the right antrostomy  Mucosa is  healthy throughout without polyps nor polypoid degeneration  SER clear  NP clear    Impression/Plan  Radha Christensen is a 35 year old female    ICD-10-CM    1. S/P FESS (functional endoscopic sinus surgery)  Z98.890    2. Perennial allergic rhinitis  J30.89          Radha has healed well following septoplasty and endoscopic sinus surgery and is breathing well out of her nose  Nasal valve implants in good position    Continue budesonide rinses twice daily for 1 month, then as needed  Continue claritin    I would like  to see her back for any episodes of acute sinusitis that do not respond to oral antibiotics or other ENT concerns          Karen Sanchez D.O.  Otolaryngology/Head and Neck Surgery  Allergy                     not applicable

## 2020-10-08 ENCOUNTER — OFFICE VISIT (OUTPATIENT)
Dept: OTOLARYNGOLOGY | Facility: OTHER | Age: 35
End: 2020-10-08
Attending: OTOLARYNGOLOGY
Payer: COMMERCIAL

## 2020-10-08 VITALS
TEMPERATURE: 98.7 F | SYSTOLIC BLOOD PRESSURE: 110 MMHG | OXYGEN SATURATION: 98 % | HEART RATE: 64 BPM | DIASTOLIC BLOOD PRESSURE: 80 MMHG | BODY MASS INDEX: 35.24 KG/M2 | WEIGHT: 225 LBS

## 2020-10-08 DIAGNOSIS — J30.89 PERENNIAL ALLERGIC RHINITIS: ICD-10-CM

## 2020-10-08 DIAGNOSIS — Z98.890 S/P FESS (FUNCTIONAL ENDOSCOPIC SINUS SURGERY): Primary | ICD-10-CM

## 2020-10-08 PROBLEM — J32.9 CHRONIC RECURRENT SINUSITIS: Status: RESOLVED | Noted: 2020-05-28 | Resolved: 2020-10-08

## 2020-10-08 PROBLEM — J34.2 DEVIATED NASAL SEPTUM: Status: RESOLVED | Noted: 2020-05-28 | Resolved: 2020-10-08

## 2020-10-08 PROBLEM — J33.9 NASAL POLYP: Status: RESOLVED | Noted: 2020-05-28 | Resolved: 2020-10-08

## 2020-10-08 PROBLEM — J34.829 NASAL VALVE COLLAPSE: Status: RESOLVED | Noted: 2020-05-28 | Resolved: 2020-10-08

## 2020-10-08 PROCEDURE — 31231 NASAL ENDOSCOPY DX: CPT | Performed by: OTOLARYNGOLOGY

## 2020-10-08 PROCEDURE — G0463 HOSPITAL OUTPT CLINIC VISIT: HCPCS | Mod: 25

## 2020-10-08 PROCEDURE — 31237 NSL/SINS NDSC SURG BX POLYPC: CPT | Performed by: OTOLARYNGOLOGY

## 2020-10-08 PROCEDURE — 99213 OFFICE O/P EST LOW 20 MIN: CPT | Mod: 25 | Performed by: OTOLARYNGOLOGY

## 2020-10-08 ASSESSMENT — PAIN SCALES - GENERAL: PAINLEVEL: NO PAIN (0)

## 2020-10-08 NOTE — PATIENT INSTRUCTIONS
Thank you for allowing Dr. Sanchez and our ENT team to participate in your care.  If your medications are too expensive, please give the nurse a call.  We can possibly change this medication.  If you have a scheduling or an appointment question please contact our Health Unit Coordinator at their direct line [phone].   ALL nursing questions or concerns can be directed to your ENT nurse at: [phone] - Nesha    Continue budesonide rinses twice daily for 1 month, then as needed

## 2020-10-08 NOTE — LETTER
10/8/2020         RE: Radha Christensen  [address]  [place] [address]        Dear Colleague,    Thank you for referring your patient, Radha Christensen, to the [facility]. Please see a copy of my visit note below.    Otolaryngology Progress Note          Radha Christensen is a 35 year old female presents for her first postoperative visit status post endoscopic sinus surgery on 8/6/20  Hx of perennial allergic rhinitis , taking claritin  Intradermal testing 3/28/19 reviewed    She no-showed for prior appt    Pre-op Diagnosis: Chronic pansinusitis, deviated nasal septum, bilateral inferior turbinate hypertrophy, bilateral nasal valve collapse  Post-op Diagnosis:  same  Procedures:    1.  Bilateral total ethmoidectomy  2.  Bilateral maxillary antrostomy with tissue removal  3.  Septoplasty with cartilage reinsertion  4.  Bilateral nasal valve repair  5.  Bilateral submucous reduction inferior turbinates    Findings: Filling retention cyst of the right maxillary sinus, thickened bone with microabscesses throughout the anterior and posterior ethmoid cavity    Radha is breathing well out of her nose and has no complaints   she denies congestion or rhinorrhea, no allergy concerns other than recent exposure to her kids dog which caused temporary congestion she was exposed last night    There is been no bleeding or pain    She used her budesonide irrigations for the first month and now has been using NeilMed saline twice a day  She has not needed Flonase or Nasonex  No Eos    Post op SNOT 10  Pre op SNOT 72    Physical Exam  /80   Pulse 64   Temp 98.7  F (37.1  C) (Tympanic)   Wt 102.1 kg (225 lb)   SpO2 98%   BMI 35.24 kg/m    General - The patient is well nourished and well developed, and appears to have good nutritional status.  Alert and oriented to person and place, interactive.  Head and Face - Normocephalic and atraumatic, with no gross asymmetry noted of the contour  of the facial features.  The facial nerve is intact, with strong symmetric movements.  Neck-no palpable lymphadenopathy or thyroid mass.  Trachea is midline.  Eyes - Extraocular movements intact.   Nose - nasal valve implants in good position bilaterally without extrusion photos taken there is resolution of her nasal valve collapse no internal or external nasal valve collapse nasal mucosa is pink and moist with no abnormal mucus.  The septum was grossly midline and non-obstructive, turbinates of normal size and position.  No polyps, masses, or purulence noted on examination.  Mouth - Examination of the oral cavity shows pink, healthy, moist mucosa.  No lesions or ulceration noted.  The dentition are in good repair.  The tongue is mobile and midline.  Throat - The walls of the oropharynx were smooth, pink, moist, symmetric, and had no lesions or ulcerations.  The tonsillar pillars and soft palate were symmetric.  The uvula was midline on elevation.        To evaluate the nose and sinuses in the post operative state, I performed rigid nasal endoscopy. The nose was anesthetized with home afrin or topical lidocaine and neosynephrine in the office.    I began with the LEFT side using a 0 degree rigid nasal endoscope, and then similarly examined the RIGHT side    Findings:  Inferior turbinates:  Lateralized  Septum midline, intact  Middle turbinate and middle meatus:  No purulence, no polyposis, no synechiae  Antrostomy clear bilaterally  Ethmoid cavity small amount of viscous mucus on the right anterior ethmoid and mild mucosal edema of the right anterior ethmoid cavity I also divided a small amount of viscous secretions from the right antrostomy  Mucosa is  healthy throughout without polyps nor polypoid degeneration  SER clear  NP clear    Impression/Plan  Radha Christensen is a 35 year old female    ICD-10-CM    1. S/P FESS (functional endoscopic sinus surgery)  Z98.890    2. Perennial allergic rhinitis  J30.89           Radha has healed well following septoplasty and endoscopic sinus surgery and is breathing well out of her nose  Nasal valve implants in good position    Continue budesonide rinses twice daily for 1 month, then as needed  Continue claritin    I would like to see her back for any episodes of acute sinusitis that do not respond to oral antibiotics or other ENT concerns          Karen Sanchez D.O.  Otolaryngology/Head and Neck Surgery  Allergy                        Again, thank you for allowing me to participate in the care of your patient.        Sincerely,        Karen Sanchez MD

## 2020-10-08 NOTE — NURSING NOTE
"Chief Complaint   Patient presents with     Surgical Followup     S/P FESS, Septoplasty, Turbinate Reduction, Nasal Valve Repair on 7/1/20       Initial /80   Pulse 64   Temp 98.7  F (37.1  C) (Tympanic)   Wt 102.1 kg (225 lb)   SpO2 98%   BMI 35.24 kg/m   Estimated body mass index is 35.24 kg/m  as calculated from the following:    Height as of 8/6/20: 1.702 m (5' 7\").    Weight as of this encounter: 102.1 kg (225 lb).  Medication Reconciliation: complete  Jo Moscoso LPN  "

## 2020-10-19 DIAGNOSIS — F41.1 GAD (GENERALIZED ANXIETY DISORDER): ICD-10-CM

## 2020-10-19 RX ORDER — ESCITALOPRAM OXALATE 20 MG/1
TABLET ORAL
Qty: 90 TABLET | Refills: 3 | OUTPATIENT
Start: 2020-10-19

## 2020-10-19 NOTE — TELEPHONE ENCOUNTER
Disp Refills Start End FLORENCIA   escitalopram (LEXAPRO) 20 MG tablet 90 tablet 3 3/29/2020  No   Sig: TAKE 1 TABLET(20 MG) BY MOUTH DAILY       LOV: 7/21/2020  Future Office visit:    Next 5 appointments (look out 90 days)    Oct 28, 2020  8:40 AM  PHYSICAL with Myrna Fulton MD  [facility] [address]  [place] [address]  [phone]        Redundant refill request refused: Too soon: Refills should be available at pharmacy.    Jie Shelton RN  ....................  10/19/2020   3:55 PM

## 2020-10-20 ENCOUNTER — MYC MEDICAL ADVICE (OUTPATIENT)
Dept: FAMILY MEDICINE | Facility: OTHER | Age: 35
End: 2020-10-20

## 2020-10-20 DIAGNOSIS — F41.1 GAD (GENERALIZED ANXIETY DISORDER): ICD-10-CM

## 2020-10-20 RX ORDER — ESCITALOPRAM OXALATE 20 MG/1
TABLET ORAL
Qty: 90 TABLET | Refills: 3 | OUTPATIENT
Start: 2020-10-20

## 2020-10-20 RX ORDER — ESCITALOPRAM OXALATE 20 MG/1
20 TABLET ORAL DAILY
Qty: 90 TABLET | Refills: 3 | Status: CANCELLED | OUTPATIENT
Start: 2020-10-20

## 2020-10-20 NOTE — TELEPHONE ENCOUNTER
Disp Refills Start End FLORENCIA   escitalopram (LEXAPRO) 20 MG tablet 90 tablet 3 3/29/2020  No   Sig: TAKE 1 TABLET(20 MG) BY MOUTH DAILY       Redundant refill request refused: Too soon:  Unable to complete prescription refill per RN Medication Refill Policy.................... Jie Shelton RN ....................  10/20/2020   10:23 AM

## 2020-10-20 NOTE — TELEPHONE ENCOUNTER
"Pt sent the following [employer] message:  Thank you for getting back to me but that may be a conversation for the \"refill nurses\" and [facility] to have instead of denying me my Rx twice, leaving me to call the pharmacy for an emergency fill until my appointment next week as I am OUT and should not just stop taking this Rx. My bottle says no refills on it   if this could be taken care of by either straightening it out with the pharmacy or letting me know what I need to do, I'd appreciate it.    Per chart review, refill request was refused once yesterday on 10/19 and twice today, as too soon. Last prescription for escitalopram (LEXAPRO) 20 MG tablet was ordered by Dr. Brayan Rivers for #90, R-3  to [id number]. Called and spoke with Chato at [facility], after verifying Pt's last name and . He states, \"For some reason, our system closed this prescription out. Pt would have #180 remaining (6 month supply).\" Runnit verbalized plan to put the prescription back into the system and contact Pt, with this information. Pt notified of this plan via [employer] response:    Radha,     Per review of your chart, the last prescription for escitalopram (LEXAPRO) 20 MG tablet was ordered by Dr. Brayan Rivers for #90, R-3  to [facility] #[id number]. Per Chato at [facility],  \"For some reason, our system closed this prescription out. Patient should have #180 remaining (6 month supply).\" Chato verbalized plan to put the prescription back into the system and contact you, with this information.      We sincerely apologize for the inconvenience and hope you have a better day.     Refill RN  [facility]    Krysten Martinez RN .............. 10/20/2020  3:51 PM          "

## 2020-10-20 NOTE — TELEPHONE ENCOUNTER
[facility] sent Rx request for the following:   escitalopram (LEXAPRO) 20 MG tablet   Sig: TAKE 1 TABLET(20 MG) BY MOUTH DAILY    Last Prescription Date:   03/29/2020  Last Fill Qty/Refills:         90, R-3    Last Office Visit:              12/19/2019 (Dnoaldo)   Future Office visit:           10/28/2020 (Claire Samuels)    Redundant refill request refused: Too soon:  Unable to complete prescription refill per RN Medication Refill Policy.................... Fifi Hurtado RN ....................  10/20/2020   12:34 PM

## 2020-10-28 ENCOUNTER — OFFICE VISIT (OUTPATIENT)
Dept: FAMILY MEDICINE | Facility: OTHER | Age: 35
End: 2020-10-28
Attending: FAMILY MEDICINE
Payer: COMMERCIAL

## 2020-10-28 VITALS
HEIGHT: 67 IN | RESPIRATION RATE: 16 BRPM | HEART RATE: 80 BPM | OXYGEN SATURATION: 98 % | SYSTOLIC BLOOD PRESSURE: 124 MMHG | BODY MASS INDEX: 35.85 KG/M2 | WEIGHT: 228.4 LBS | TEMPERATURE: 97.2 F | DIASTOLIC BLOOD PRESSURE: 80 MMHG

## 2020-10-28 DIAGNOSIS — F33.8 SEASONAL AFFECTIVE DISORDER (H): ICD-10-CM

## 2020-10-28 DIAGNOSIS — Z51.81 ENCOUNTER FOR THERAPEUTIC DRUG MONITORING: ICD-10-CM

## 2020-10-28 DIAGNOSIS — Z00.00 ROUTINE GENERAL MEDICAL EXAMINATION AT A HEALTH CARE FACILITY: Primary | ICD-10-CM

## 2020-10-28 DIAGNOSIS — I10 ESSENTIAL HYPERTENSION: ICD-10-CM

## 2020-10-28 DIAGNOSIS — L70.0 CYSTIC ACNE: ICD-10-CM

## 2020-10-28 DIAGNOSIS — G43.909 MIGRAINE WITHOUT STATUS MIGRAINOSUS, NOT INTRACTABLE, UNSPECIFIED MIGRAINE TYPE: ICD-10-CM

## 2020-10-28 DIAGNOSIS — Z13.220 SCREENING CHOLESTEROL LEVEL: ICD-10-CM

## 2020-10-28 PROCEDURE — G0463 HOSPITAL OUTPT CLINIC VISIT: HCPCS

## 2020-10-28 PROCEDURE — 90686 IIV4 VACC NO PRSV 0.5 ML IM: CPT

## 2020-10-28 PROCEDURE — 99395 PREV VISIT EST AGE 18-39: CPT | Performed by: FAMILY MEDICINE

## 2020-10-28 RX ORDER — TOPIRAMATE 25 MG/1
12.5 TABLET, FILM COATED ORAL DAILY
Qty: 90 TABLET | Refills: 0 | COMMUNITY
Start: 2020-10-28 | End: 2021-01-18

## 2020-10-28 RX ORDER — SPIRONOLACTONE 25 MG/1
25 TABLET ORAL DAILY
Qty: 30 TABLET | Refills: 0 | Status: SHIPPED | OUTPATIENT
Start: 2020-10-28 | End: 2020-11-24

## 2020-10-28 RX ORDER — BUDESONIDE 0.5 MG/2ML
INHALANT ORAL
COMMUNITY
Start: 2020-10-18 | End: 2020-12-30

## 2020-10-28 RX ORDER — CHOLECALCIFEROL (VITAMIN D3) 50 MCG
1 TABLET ORAL DAILY
COMMUNITY
Start: 2020-10-28

## 2020-10-28 ASSESSMENT — PAIN SCALES - GENERAL: PAINLEVEL: NO PAIN (0)

## 2020-10-28 ASSESSMENT — MIFFLIN-ST. JEOR: SCORE: 1753.65

## 2020-10-28 ASSESSMENT — PATIENT HEALTH QUESTIONNAIRE - PHQ9: SUM OF ALL RESPONSES TO PHQ QUESTIONS 1-9: 2

## 2020-10-28 NOTE — NURSING NOTE
Chief Complaint   Patient presents with     Physical     Has a list of questions and concerns. Last pap was 2016.     Medication Reconciliation: complete    Sally Antunez LPN

## 2020-10-28 NOTE — PATIENT INSTRUCTIONS
Preventive Health Recommendations  Female Ages 26 - 39  Yearly exam:   See your health care provider every year in order to    Review health changes.     Discuss preventive care.      Review your medicines if you your doctor has prescribed any.    Until age 30: Get a Pap test every three years (more often if you have had an abnormal result).    After age 30: Talk to your doctor about whether you should have a Pap test every 3 years or have a Pap test with HPV screening every 5 years.   You do not need a Pap test if your uterus was removed (hysterectomy) and you have not had cancer.  You should be tested each year for STDs (sexually transmitted diseases), if you're at risk.   Talk to your provider about how often to have your cholesterol checked.  If you are at risk for diabetes, you should have a diabetes test (fasting glucose).  Shots: Get a flu shot each year. Get a tetanus shot every 10 years.   Nutrition:     Eat at least 5 servings of fruits and vegetables each day.    Eat whole-grain bread, whole-wheat pasta and brown rice instead of white grains and rice.    Get adequate Calcium and Vitamin D.     Lifestyle    Exercise at least 150 minutes a week (30 minutes a day, 5 days of the week). This will help you control your weight and prevent disease.    Limit alcohol to one drink per day.    No smoking.     Wear sunscreen to prevent skin cancer.    See your dentist every six months for an exam and cleaning.    Patient Education     Urinary Incontinence, Female (Adult)  Urinary incontinence means loss of control of the bladder. This problem affects many women, especially as they get older. If you have incontinence, you may be embarrassed to ask for help. But know that this problem can be treated.  Types of Incontinence  There are different types of incontinence. Two of the main types are described here. You can have more than one type.    Stress incontinence. With this type, urine leaks when pressure (stress) is  put on the bladder. This may happen when you cough, sneeze, or laugh. Stress incontinence most often occurs because the pelvic floor muscles that support the bladder and urethra are weak. This can happen after pregnancy and vaginal childbirth or a hysterectomy. It can also be due to excess body weight or hormone changes.    Urge incontinence (also called overactive bladder). With this type, a sudden urge to urinate is felt often. This may happen even though there may not be much urine in the bladder. The need to urinate often during the night is common. Urge incontinence most often occurs because of bladder spasms. This may be due to bladder irritation or infection. Damage to bladder nerves or pelvic muscles, constipation, and certain medicines can also lead to urge incontinence.  Treatment of urinary incontinence depends on the cause. Further evaluation is needed to find the type you have. This will likely include an exam and certain tests. Based on the results, you and your healthcare provider can then plan treatment. Until a diagnosis is made, the home care tips below can help relieve symptoms.  Home care    Do pelvic floor muscle exercises, if they are prescribed. The pelvic floor muscles help support the bladder and urethra. Many women find that their symptoms improve when doing special exercises that strengthen these muscles. To do the exercises contract the muscles you would use to stop your stream of urine, but do this when you re not urinating. Hold for 10 seconds, then relax. Repeat 10 to 20 times in a row, at least 3 times a day. Your provider may give you other instructions for how to do the exercises and how often.    Keep a bladder diary. This helps track how often and how much you urinate over a set period of time. Bring this diary with you to your next visit with the provider. The information can help your provider learn more about your bladder problem.    Lose weight, if advised to by your provider.  Excess weight puts pressure on the bladder. Your provider can help you create a weight-loss plan that s right for you. This may include exercising more and making certain diet changes.    Don't consume foods and drinks that may irritate the bladder. These can include alcohol and caffeinated drinks.    Quit smoking. Smoking and other tobacco use can lead to chronic cough that strains the pelvic floor muscles. Smoking may also damage the bladder and urethra. Talk with your provider about treatments or methods you can use to quit smoking.    If drinking large amounts of fluid causes you to have symptoms, you may be advised to limit your fluid intake. You may also be advised to drink most of your fluids during the day and to limit fluids at night.    If you re worried about urine leakage or accidents, you may wear absorbent pads to catch urine. Change the pads often. This helps reduce discomfort. It may also reduce the risk of skin or bladder infections.  Follow-up care  Follow up with your healthcare provider, or as directed. It may take some to find the right treatment for your problem. Your treatment plan may include special therapies or medicines. Certain procedures or surgery may also be options. Be sure to discuss any questions you have with your provider.  When to seek medical advice  Call the healthcare provider right away if any of these occur:    Fever of 100.4 F (38 C) or higher, or as directed by your provider    Bladder pain or fullness    Abdominal swelling    Nausea or vomiting    Back pain    Weakness, dizziness or fainting  Date Last Reviewed: 10/1/2017    9163-9268 [employer]. [address] [place] [address], [place] [address]. All rights reserved. This information is not intended as a substitute for professional medical care. Always follow your healthcare professional's instructions.           Patient Education   Kegel Exercises  What are Kegel exercises?  Kegels are exercises that tighten and  release the pelvic muscles. These muscles wrap around both the anus and urethra (the tube that carries urine out of the body).  To find these muscles, try to stop and start the flow of urine while using the toilet.  Kegel exercises may:    Give you greater bladder control (stop or prevent urine from leaking).    Give you greater bowel control.    Increase pleasure during sex.    Ease childbirth for pregnant women.    Help men regain bladder control after prostate cancer treatment.  How can I test my muscle strength?  As you urinate (pass water), try to stop your stream of urine: Tighten the muscle around your urethra. If you can stop the stream, then you have good muscle control.  To maintain good control, you need to exercise these muscles regularly.  If you cannot stop your stream, Kegels can help you improve your muscle control.  How do I do Kegel exercises?  1. Squeeze and lift the muscles around the urethra. (You should feel the muscles lift near the urethra or tighten in your rectum.)  2. Hold them tight as you count to 5 or 10.  3. Then, slowly relax these muscles as you count to 5 or 10.  4. Repeat five times.  Do these exercises three times a day: Five times in the morning, five times in the afternoon and five times at night.  Where to exercise  You may do the exercises anywhere and anytime. For instance:    At red traffic lights.    During TV commercials.    During coffee breaks.    While waiting for the bus.    At the grocery store.    When brushing your teeth.    During sex (for women).  Common mistakes  Don't use the muscles in your stomach, legs or buttocks. Your leg and buttock muscles should not move during these exercises.  To check your stomach muscles, put your hand on your stomach when you do your Kegels. If you feel your stomach move, then you are using the wrong muscles.  Can these exercises hurt me?  No, these exercises cannot harm you in any way. You should find them relaxing and easy.  Back  or stomach pain may mean you are using your stomach muscles.  If you get headaches and your chest muscles are tense, you are likely holding your breath. Try to breathe normally during your Kegels.  When will I notice a change?  If you have weak bladder control, you will notice a change after four to six weeks of daily exercise. After three months, you will see an even bigger difference.  Make these exercises a habit: Tighten the muscles when you walk, before you cough, as you stand up and on the way to the bathroom.  For informational purposes only. Not to replace the advice of your health care provider. Copyright   1981, 2009 [facility]. All rights reserved. [employer] [id number] - REV 06/16.       Patient Education     Kegel Exercises  Kegel exercises don t need special clothing or equipment. They re easy to learn and simple to do. And if you do them right, no one can tell you re doing them, so they can be done almost anywhere. Your healthcare provider, nurse, or physical therapist can answer any questions you have and help you get started.    A weak pelvic floor  The pelvic floor muscles may weaken due to aging, pregnancy and vaginal childbirth, injury, surgery, chronic cough, or lack of exercise. If the pelvic floor is weak, your bladder and other pelvic organs may sag out of place. The urethra may also open too easily and allow urine to leak out. Kegel exercises can help you strengthen your pelvic floor muscles. Then they can better support the pelvic organs and control urine flow.  How Kegel exercises are done  Try each of the Kegel exercises described below. When you re doing them, try not to move your leg, buttock, or stomach muscles:    Contract as if you were stopping your urine stream. But do it when you re not urinating.    Tighten your rectum as if trying not to pass gas. Contract your anus, but don t move your buttocks.    You may place a finger or 2 in the vagina and squeeze your finger with  your vagina to learn which muscles to tighten.  Try to hold each Kegel for a slow count to 5. You probably won t be able to hold them for that long at first. But keep practicing. It will get easier as your pelvic floor gets stronger. Eventually, special weights that you place in your vagina may be recommended to help make your Kegels even more effective. Visit your healthcare provider if you have difficulties doing Kegel exercises.  Helpful hints  Here are some tips to follow:    Do your Kegels as often as you can. The more you do them, the faster you ll feel the results.    Pick an activity you do often as a reminder. For instance, do your Kegels every time you sit down.    Tighten your pelvic floor before you sneeze, get up from a chair, cough, laugh, or lift. This protects your pelvic floor from injury and can help prevent urine leakage.   Date Last Reviewed: 10/1/2017    7308-7877 [employer]. [address] [place] [address], [place] [address]. All rights reserved. This information is not intended as a substitute for professional medical care. Always follow your healthcare professional's instructions.

## 2020-10-28 NOTE — PROGRESS NOTES
SUBJECTIVE:   CC: Radha Christensen is an 35 year old woman who presents for preventive health visit, migraines and new complaint of cystic acne.      Patient has been advised of split billing requirements and indicates understanding: Yes  Healthy Habits:    Do you get at least three servings of calcium containing foods daily (dairy, green leafy vegetables, etc.)? yes    Amount of exercise or daily activities, outside of work: 3 day(s) per week    Problems taking medications regularly No    Medication side effects: No    Have you had an eye exam in the past two years? yes    Do you see a dentist twice per year? yes    Do you have sleep apnea, excessive snoring or daytime drowsiness?no    Contraception: no, condoms prn. Two children. Is .  Risk for STI?: no  Last pap: 2016  Any hx of abnormal paps:  Yes, hx of HPV+ and CIN1, 2016 was normal  FH of early CA?: no  Tobacco?: used to, quit 2016  Calcium intake: adequate   Last mammo: @38  Immunizations: UTD  No hx of skin cancers/biopsies    Periods: regular  Breast concerns: none    # Migraines: Topamax 25mg daily. Used to get HA often, increased after concussion. Only gets HA w/ cycles now, occasionally. Will decrease dose to 12.5mg daily.    # HTN: Lisinopril 10mg. Compliant w/ meds. No SE. No high Bps. Can feel in her head when high.    # Cyctic acne. HX of treatment w/ Tetra, azithro, accutane, differin gel. Clindamycin. Has not tried spironolactone. We will try that today. Check potassium monthly [id number].    # Urinary incontinence. Occasionally needs to wear pad for exercising or laughing/coughing. No other symptoms. Recommended regular Kegals.     # Panic attacks/fear of flying: Hasn't needed ativan. Hasn't been in crowds.    # Sinus surgery 7/1. Allergies better. Decreased HA.    Patient has done really well with her GERD, depression/anxiety, and abstinence from alcohol.  These issues are all stable.    Today's PHQ-2 Score:   PHQ-2 ( 1999 [employer]) 10/28/2020  "2020   Q1: Little interest or pleasure in doing things 0 0   Q2: Feeling down, depressed or hopeless 0 0   PHQ-2 Score 0 0     Abuse: Current or Past(Physical, Sexual or Emotional)- No  Do you feel safe in your environment? Yes    Social History     Tobacco Use     Smoking status: Former Smoker     Years: 10.00     Types: Cigarettes     Quit date: 2013     Years since quittin.8     Smokeless tobacco: Never Used   Substance Use Topics     Alcohol use: Not Currently     Comment: Quit 2019     If you drink alcohol do you typically have >3 drinks per day or >7 drinks per week? No     Reviewed and updated as needed this visit by clinical staff  Tobacco  Allergies  Meds            Reviewed and updated as needed this visit by Provider                ROS:  + worsening cystic acne plus sweating, no hair growth. Constitutional, HEENT, cardiovascular, pulmonary, GI and  systems are negative, except as otherwise noted.    OBJECTIVE:   /80 (BP Location: Right arm, Patient Position: Sitting, Cuff Size: Adult Regular)   Pulse 80   Temp 97.2  F (36.2  C) (Tympanic)   Resp 16   Ht 1.702 m (5' 7\")   Wt 103.6 kg (228 lb 6.4 oz)   LMP 10/15/2020   SpO2 98%   Breastfeeding No   BMI 35.77 kg/m    BP Readings from Last 6 Encounters:   10/28/20 124/80   10/08/20 110/80   20 145/76   20 110/84   20 117/80   20 134/82     Wt Readings from Last 4 Encounters:   10/28/20 103.6 kg (228 lb 6.4 oz)   10/08/20 102.1 kg (225 lb)   20 99.8 kg (220 lb)   20 102.5 kg (226 lb)     EXAM:  GENERAL: healthy, alert and no distress  EYES: Eyes grossly normal to inspection, PERRL and conjunctivae and sclerae normal  HENT: ear canals and TM's normal, nose and mouth without ulcers or lesions  NECK: no adenopathy, no asymmetry, masses, or scars and thyroid normal to palpation  RESP: lungs clear to auscultation - no rales, rhonchi or wheezes  Breasts: deferred   CV: regular rate and rhythm, " normal S1 S2, no S3 or S4, no murmur, click or rub, no peripheral edema and peripheral pulses strong  ABDOMEN: soft, nontender, no hepatosplenomegaly, no masses and bowel sounds normal  MS: no gross musculoskeletal defects noted, no edema  SKIN: no suspicious rashes, Top right scalp w/ 8mm circular, mid back fallraised lesion with an irregular dark brown pigmentation.  Upper mid back with 6mm circular, raised lesion with an irregular dark brown pigmentation.  NEURO: Normal strength and tone, mentation intact and speech normal  PSYCH: mentation appears normal, affect normal/bright  LYMPH: no cervical, supraclavicular, axillary, or inguinal adenopathy  Pelvic exam: deferred     ASSESSMENT/PLAN:   1. Routine general medical examination at a health care facility    2. Screening cholesterol level  - Lipid Panel; Future    3. Migraine without status migrainosus, not intractable, unspecified migraine type  -well controlled  -trial of lower dose down from 25mg   - topiramate (TOPAMAX) 25 MG tablet; Take 0.5 tablets (12.5 mg) by mouth daily  Dispense: 90 tablet; Refill: 0    4. Cystic acne  -has tried several topical options, and acutane   -in light of increased sweating, we will trial spironolactone   - spironolactone (ALDACTONE) 25 MG tablet; Take 1 tablet (25 mg) by mouth daily  Dispense: 30 tablet; Refill: 0    5. Essential hypertension  - spironolactone (ALDACTONE) 25 MG tablet; Take 1 tablet (25 mg) by mouth daily  Dispense: 30 tablet; Refill: 0  -stop lisinopril 10mg  -check blood pressure daily, if elevated, we will increase dose of spironolactone     6. Encounter for therapeutic drug monitoring  -check potassium   - Basic Metabolic Panel; Future    7. Screening for malignant neoplasm of cervix  - due 2021    8. Seasonal affective disorder (H)  - vitamin D3 (CHOLECALCIFEROL) 50 mcg (2000 units) tablet; Take 1 tablet (50 mcg) by mouth daily     9. BMI 35  -continue regular workouts and healthy diet    RTC for concern  "of skin lesion, 8mm biopsy skin lesion removal on abdomen.  RTC in 4 weeks for spironolactone/acne monitoring.    Patient has been advised of split billing requirements and indicates understanding: Yes  COUNSELING:   Reviewed preventive health counseling, as reflected in patient instructions    Estimated body mass index is 35.77 kg/m  as calculated from the following:    Height as of this encounter: 1.702 m (5' 7\").    Weight as of this encounter: 103.6 kg (228 lb 6.4 oz).    Weight management plan: Discussed healthy diet and exercise guidelines    She reports that she quit smoking about 7 years ago. Her smoking use included cigarettes. She quit after 10.00 years of use. She has never used smokeless tobacco.    Counseling Resources:  ATP IV Guidelines  Pooled Cohorts Equation Calculator  Breast Cancer Risk Calculator  BRCA-Related Cancer Risk Assessment: FHS-7 Tool  FRAX Risk Assessment  ICSI Preventive Guidelines  Dietary Guidelines for Americans, 2010  USDA's MyPlate  ASA Prophylaxis  Lung CA Screening    Myrna Fulton MD  [facility]    The author of this note documented a reason for not sharing it with the patient.    "

## 2020-10-29 ASSESSMENT — ASTHMA QUESTIONNAIRES: ACT_TOTALSCORE: 22

## 2020-11-11 ENCOUNTER — OFFICE VISIT (OUTPATIENT)
Dept: FAMILY MEDICINE | Facility: OTHER | Age: 35
End: 2020-11-11
Attending: FAMILY MEDICINE
Payer: COMMERCIAL

## 2020-11-11 VITALS
SYSTOLIC BLOOD PRESSURE: 120 MMHG | TEMPERATURE: 98.6 F | BODY MASS INDEX: 35.9 KG/M2 | OXYGEN SATURATION: 98 % | WEIGHT: 229.2 LBS | RESPIRATION RATE: 16 BRPM | DIASTOLIC BLOOD PRESSURE: 74 MMHG | HEART RATE: 83 BPM

## 2020-11-11 DIAGNOSIS — L81.9 ATYPICAL PIGMENTED SKIN LESION: Primary | ICD-10-CM

## 2020-11-11 DIAGNOSIS — Z00.00 NORMAL BREAST EXAM: ICD-10-CM

## 2020-11-11 DIAGNOSIS — R73.01 IFG (IMPAIRED FASTING GLUCOSE): ICD-10-CM

## 2020-11-11 DIAGNOSIS — Z13.220 SCREENING CHOLESTEROL LEVEL: ICD-10-CM

## 2020-11-11 DIAGNOSIS — Z12.4 SCREENING FOR MALIGNANT NEOPLASM OF CERVIX: ICD-10-CM

## 2020-11-11 DIAGNOSIS — Z51.81 ENCOUNTER FOR THERAPEUTIC DRUG MONITORING: ICD-10-CM

## 2020-11-11 DIAGNOSIS — Z01.419 ENCOUNTER FOR CERVICAL PAP SMEAR WITH PELVIC EXAM: ICD-10-CM

## 2020-11-11 LAB
ANION GAP SERPL CALCULATED.3IONS-SCNC: 6 MMOL/L (ref 3–14)
BUN SERPL-MCNC: 13 MG/DL (ref 7–25)
CALCIUM SERPL-MCNC: 9.1 MG/DL (ref 8.6–10.3)
CHLORIDE SERPL-SCNC: 107 MMOL/L (ref 98–107)
CHOLEST SERPL-MCNC: 128 MG/DL
CO2 SERPL-SCNC: 25 MMOL/L (ref 21–31)
CREAT SERPL-MCNC: 0.74 MG/DL (ref 0.6–1.2)
GFR SERPL CREATININE-BSD FRML MDRD: 88 ML/MIN/{1.73_M2}
GLUCOSE SERPL-MCNC: 120 MG/DL (ref 70–105)
HDLC SERPL-MCNC: 35 MG/DL (ref 23–92)
LDLC SERPL CALC-MCNC: 70 MG/DL
NONHDLC SERPL-MCNC: 93 MG/DL
POTASSIUM SERPL-SCNC: 4.2 MMOL/L (ref 3.5–5.1)
SODIUM SERPL-SCNC: 138 MMOL/L (ref 134–144)
TRIGL SERPL-MCNC: 114 MG/DL

## 2020-11-11 PROCEDURE — 80048 BASIC METABOLIC PNL TOTAL CA: CPT | Mod: ZL | Performed by: FAMILY MEDICINE

## 2020-11-11 PROCEDURE — 36415 COLL VENOUS BLD VENIPUNCTURE: CPT | Mod: ZL | Performed by: FAMILY MEDICINE

## 2020-11-11 PROCEDURE — 11104 PUNCH BX SKIN SINGLE LESION: CPT

## 2020-11-11 PROCEDURE — 88305 TISSUE EXAM BY PATHOLOGIST: CPT

## 2020-11-11 PROCEDURE — 88142 CYTOPATH C/V THIN LAYER: CPT | Performed by: FAMILY MEDICINE

## 2020-11-11 PROCEDURE — 11105 PUNCH BX SKIN EA SEP/ADDL: CPT

## 2020-11-11 PROCEDURE — 87624 HPV HI-RISK TYP POOLED RSLT: CPT | Mod: ZL | Performed by: FAMILY MEDICINE

## 2020-11-11 PROCEDURE — 11104 PUNCH BX SKIN SINGLE LESION: CPT | Performed by: FAMILY MEDICINE

## 2020-11-11 PROCEDURE — 80061 LIPID PANEL: CPT | Mod: ZL | Performed by: FAMILY MEDICINE

## 2020-11-11 PROCEDURE — 11105 PUNCH BX SKIN EA SEP/ADDL: CPT | Performed by: FAMILY MEDICINE

## 2020-11-11 PROCEDURE — G0463 HOSPITAL OUTPT CLINIC VISIT: HCPCS

## 2020-11-11 PROCEDURE — G0123 SCREEN CERV/VAG THIN LAYER: HCPCS | Performed by: FAMILY MEDICINE

## 2020-11-11 PROCEDURE — 99213 OFFICE O/P EST LOW 20 MIN: CPT | Mod: 25 | Performed by: FAMILY MEDICINE

## 2020-11-11 ASSESSMENT — PAIN SCALES - GENERAL: PAINLEVEL: NO PAIN (0)

## 2020-11-11 NOTE — PROGRESS NOTES
Nursing Notes:   Sally Antunez LPN  11/11/2020  9:48 AM  Signed  Chief Complaint   Patient presents with     Gyn Exam     Pap smear from last visit along with skin lesion. Fasting labs done this morning.     Medication Reconciliation: complete    Sally Antunez LPN       SUBJECTIVE:  HPI: Radha Christensen is a 35 year old female, who returns to clinic for her and breast exam, in addition to biopsy of 2 suspicious skin lesions.  She also completed her blood work this morning which was reviewed in person today.  An A1c was added due to impaired fasting glucose.  Her dose of Topamax 25 mg daily for her migraines was decreased by half at our last visit, and she reports doing well without any new migraines.    Last pap: 2016 Any hx of abnormal paps:  Yes, hx of HPV+ and CIN1, 2016 was normal    FHx: DM    Allergies:  Allergies   Allergen Reactions     Cefaclor Unknown     ROS:  Constitutional, HEENT, cardiovascular, pulmonary, GI and  systems are negative, except as otherwise noted.    Past medical, surgical, and family history reviewed and updated as appropriate in the chart.  Relevant social history listed in HPI.    OBJECTIVE:  /74 (BP Location: Right arm, Patient Position: Sitting, Cuff Size: Adult Regular)   Pulse 83   Temp 98.6  F (37  C) (Tympanic)   Resp 16   Wt 104 kg (229 lb 3.2 oz)   LMP 10/15/2020   SpO2 98%   Breastfeeding No   BMI 35.90 kg/m      EXAM:  CONSTITUTIONAL:  Alert, cooperative, NAD.  EYES: No scleral icterus.  PERRLA.  Conjunctiva clear.  ENT/MOUTH: External ears and nose normal.  TMs normal.  Moist mucous membranes. Oropharynx clear.    ENDO: No thyromegaly or thyroid nodules.  LYMPH:  No cervical or supraclavicular LA.    BREASTS: No skin abnormalities, no erythema.  No discrete masses.  No nipple discharge, no axillary, supra- or infraclavicular LA.   CARDIOVASCULAR: Regular, S1, S2.  No S3 or S4.  No murmur/gallop/rub.  No peripheral edema.  RESPIRATORY: CTA bilaterally, no  wheezes, rhonchi or rales.  GI: Bowel sounds wnl.  Soft, nontender, nondistended.  No masses or HSM.  No rebound or guarding.  : Vulva: normal, no lesions or discharge  Urethral meatus: normal size and location, no lesions or discharge  Urethra: no tenderness or masses  Bladder: no fullness or tenderness  Vagina: normal appearance, no abnormal discharge, no lesions.  No evidence of cystocele or rectocele.  Cervix: normal appearance, no lesions, no abnormal discharge, no cervical motion tenderness  Uterus: normal size and position, mobile, non-tender  Pap smear obtained: yes  Adnexa: no palpable masses bilaterally  MSKEL: Grossly normal ROM.  No clubbing.  INTEGUMENTARY:  Warm, dry.  See procedure note for skin lesion removal.  NEUROLOGIC: Facies symmetric.  Grossly normal movement and tone.  No tremor.  PSYCHIATRIC: Affect normal.  Speech fluent.  Though content linear.    Orders Only on 11/11/2020   Component Date Value Ref Range Status     Cholesterol 11/11/2020 128  <200 mg/dL Final     Triglycerides 11/11/2020 114  <150 mg/dL Final     HDL Cholesterol 11/11/2020 35  23 - 92 mg/dL Final     LDL Cholesterol Calculated 11/11/2020 70  <100 mg/dL Final    Desirable:       <100 mg/dl     Non HDL Cholesterol 11/11/2020 93  <130 mg/dL Final     Sodium 11/11/2020 138  134 - 144 mmol/L Final     Potassium 11/11/2020 4.2  3.5 - 5.1 mmol/L Final     Chloride 11/11/2020 107  98 - 107 mmol/L Final     Carbon Dioxide 11/11/2020 25  21 - 31 mmol/L Final     Anion Gap 11/11/2020 6  3 - 14 mmol/L Final     Glucose 11/11/2020 120* 70 - 105 mg/dL Final     Urea Nitrogen 11/11/2020 13  7 - 25 mg/dL Final     Creatinine 11/11/2020 0.74  0.60 - 1.20 mg/dL Final     GFR Estimate 11/11/2020 88  >60 mL/min/[1.73_m2] Final     GFR Estimate If Black 11/11/2020 >90  >60 mL/min/[1.73_m2] Final     Calcium 11/11/2020 9.1  8.6 - 10.3 mg/dL Final       ASSESSEMENT AND PLAN:    1. IFG (impaired fasting glucose)  - Hemoglobin A1c; Future    2.  Screening for malignant neoplasm of cervix  - Pap Screen Thin Prep with HPV - recommended age 30 - 65 years (select HPV order below)    3. Atypical pigmented skin lesion  -See procedure note  - Surgical pathology exam    4. Encounter for cervical Pap smear with pelvic exam    5. Normal breast exam    Migraines well controlled return to clinic in  4 weeks to monitor acne and spironolactone treatment and repeat BMP.    Myrna Fulton MD  [facility]

## 2020-11-11 NOTE — PROCEDURES
Punch Biopsy (x2) Procedure Note     SUBJECTIVE:  35 year old female presents for biopsy of two skin lesions that were previously evaluated. The scalp lesion has been present for several years, which she thinks is growing. It is very bothersome to her, getting caught in her hairbrush, and causing embarrassment. The lesion on her upper back has been present also for a few years and she notes it has been getting bigger.  She denies any bleeding or ulcerations of either skin lesion site.  She denies pain of either site.    OBJECTIVE:  Blood pressure 120/74, pulse 83, temperature 98.6  F (37  C), temperature source Tympanic, resp. rate 16, weight 104 kg (229 lb 3.2 oz), last menstrual period 10/15/2020, SpO2 98 %, not currently breastfeeding.  Top right scalp w/ 8mm circular, mid back fallraised lesion with an irregular dark brown pigmentation.  Upper mid back with 6mm circular, raised lesion with an irregular dark brown pigmentation.    ASSESSMENT:  Atypical skin lesions.    PROCEDURE:  The pathophysiology of skin lesions and skin cancers was discussed with the patient. The risks, benefits and alternatives to biopsy of the lesion were discussed with the patient, including the risks of infection, scarring, bruising, bleeding and the possible need for further procedures. The patient expressed understanding. Verbal consent obtained.    Alcohol pad was used to cleanse the skin in the area of the lesion, 1 at a time starting with a scalp lesion. 1% Lidocaine with epinephrine was infiltrated in the skin and subcutaneous tissue in the area of the lesion. Chloraprep was used to then cleanse the skin in the area of the lesion. When appropriate anesthesia had been achieved, the lesion was biopsied in 1 area with an 8mm punch for the scalp, and a 6 mm punch for the upper back.  The lesions were removed in their entirety.  The skin edges were reapproximated using 3-0 Ethilon, with 3 sutures in the scalp and 2 sutures for the  upper back. Sterile dressing was applied. The specimen was labelled and sent to pathology for evaluation. The procedure was well tolerated without complications. Patient was given post procedure instructions and denied further questions.     PLAN:   1. Instructed to keep the wound dry and covered for 24-48h and clean thereafter.   2. Warning signs of infection were reviewed.   3. Recommended that the patient use OTC ibuprofen/acetaminophen as needed for pain.   4. Return to discuss pathology findings and for suture removal in 5-7 days.    Myrna Fulton MD  Family Practice/[facility] AND [facility]

## 2020-11-11 NOTE — NURSING NOTE
Chief Complaint   Patient presents with     Gyn Exam     Pap smear from last visit along with skin lesion. Fasting labs done this morning.     Medication Reconciliation: complete    Sally Antunez LPN

## 2020-11-12 PROCEDURE — G0123 SCREEN CERV/VAG THIN LAYER: HCPCS | Performed by: FAMILY MEDICINE

## 2020-11-17 DIAGNOSIS — K21.9 GASTROESOPHAGEAL REFLUX DISEASE WITHOUT ESOPHAGITIS: ICD-10-CM

## 2020-11-17 RX ORDER — FAMOTIDINE 20 MG/1
TABLET, FILM COATED ORAL
Qty: 60 TABLET | Refills: 1 | Status: SHIPPED | OUTPATIENT
Start: 2020-11-17 | End: 2021-03-24

## 2020-11-17 NOTE — TELEPHONE ENCOUNTER
Sangita DEGROOT sent Rx request for the following:   famotidine (PEPCID) 20 MG tablet   Sig: TAKE 1 TABLET(20 MG) BY MOUTH TWICE DAILY   Last Prescription Date:   11/6/2019  Last Fill Qty/Refills:         60, R-3    Last Office Visit:              10/28/2020 (Donaldo)  Future Office visit:           11/18/2020 ( Donaldo)       H2 Blockers Protocol Passed - 11/17/2020  7:58 AM        Prescription approved per [facility] Refill Protocol.  Arely Ordaz RN ....................  11/17/2020   4:05 PM

## 2020-11-18 ENCOUNTER — OFFICE VISIT (OUTPATIENT)
Dept: FAMILY MEDICINE | Facility: OTHER | Age: 35
End: 2020-11-18
Attending: FAMILY MEDICINE
Payer: COMMERCIAL

## 2020-11-18 VITALS
BODY MASS INDEX: 35.74 KG/M2 | TEMPERATURE: 98.4 F | WEIGHT: 228.2 LBS | SYSTOLIC BLOOD PRESSURE: 138 MMHG | HEART RATE: 91 BPM | DIASTOLIC BLOOD PRESSURE: 78 MMHG | RESPIRATION RATE: 18 BRPM | OXYGEN SATURATION: 98 %

## 2020-11-18 DIAGNOSIS — D22.4 MELANOCYTIC NEVUS OF SCALP: ICD-10-CM

## 2020-11-18 DIAGNOSIS — Z48.02 ENCOUNTER FOR REMOVAL OF SUTURES: Primary | ICD-10-CM

## 2020-11-18 DIAGNOSIS — R73.01 IFG (IMPAIRED FASTING GLUCOSE): ICD-10-CM

## 2020-11-18 DIAGNOSIS — D22.5 COMPOUND NEVUS OF BACK: ICD-10-CM

## 2020-11-18 LAB
COPATH REPORT: NORMAL
HBA1C MFR BLD: 4.9 % (ref 4–6)
PAP: NORMAL

## 2020-11-18 PROCEDURE — 36415 COLL VENOUS BLD VENIPUNCTURE: CPT | Mod: ZL | Performed by: FAMILY MEDICINE

## 2020-11-18 PROCEDURE — G0463 HOSPITAL OUTPT CLINIC VISIT: HCPCS

## 2020-11-18 PROCEDURE — 99213 OFFICE O/P EST LOW 20 MIN: CPT | Performed by: FAMILY MEDICINE

## 2020-11-18 PROCEDURE — 83036 HEMOGLOBIN GLYCOSYLATED A1C: CPT | Mod: ZL | Performed by: FAMILY MEDICINE

## 2020-11-18 ASSESSMENT — PAIN SCALES - GENERAL: PAINLEVEL: NO PAIN (0)

## 2020-11-18 NOTE — PROGRESS NOTES
"Nursing Notes:   Lynnette Michael LPN  11/18/2020 10:19 AM  Signed          Lynnette Michael LPN  11/18/2020 10:19 AM  Signed  Chief Complaint   Patient presents with     Suture Removal     Sites healing well. No signs or symptoms of infection.     Initial /78 (BP Location: Right arm, Patient Position: Sitting, Cuff Size: Adult Regular)   Pulse 91   Temp 98.4  F (36.9  C) (Tympanic)   Resp 18   Wt 103.5 kg (228 lb 3.2 oz)   LMP 11/12/2020   SpO2 98%   Breastfeeding No   BMI 35.74 kg/m   Estimated body mass index is 35.74 kg/m  as calculated from the following:    Height as of 10/28/20: 1.702 m (5' 7\").    Weight as of this encounter: 103.5 kg (228 lb 3.2 oz).  Medication Reconciliation: complete    Lynnette Michael LPN         SUBJECTIVE:  HPI: Radha Christensen is a 35 year old female here for pathology results, suture removal and lab results review.    Patient denies any symptoms or of infection.  She notes some mild itchiness around the incision sites but otherwise is doing well.    Allergies:  Allergies   Allergen Reactions     Cefaclor Unknown       ROS:  Constitutional, HEENT, cardiovascular, pulmonary, GI and  systems are negative, except as otherwise noted.    Past medical, surgical, and family history reviewed and updated as appropriate in the chart.  Relevant social history listed in HPI.    OBJECTIVE:  /78 (BP Location: Right arm, Patient Position: Sitting, Cuff Size: Adult Regular)   Pulse 91   Temp 98.4  F (36.9  C) (Tympanic)   Resp 18   Wt 103.5 kg (228 lb 3.2 oz)   LMP 11/12/2020   SpO2 98%   Breastfeeding No   BMI 35.74 kg/m    BP Readings from Last 6 Encounters:   11/18/20 138/78   11/11/20 120/74   10/28/20 124/80   10/08/20 110/80   07/01/20 145/76   06/24/20 110/84       EXAM:  Constitutional: Alert. No acute distress. Well-groomed, well-hydrated and well-nourished.  Appears stated age.  Head: Normocephalic, atraumatic.  Eyes: EOMI, anicteric, PERRL  Respiratory: " Non-labored respirations.  Skin: Warm, dry.  2 sutures removed from the top of her left superior scalp, with significant surrounding scab formation.  One suture removed from her superior mid back.  Incisions were clean and dry.  Scalp incision does have some mild granulation tissue.  Musculoskeletal: Moves arms and legs equally and normally.  Normal tone and strength throughout.  Neurologic: A+Ox3. CN 2-12 grossly intact. Normal gait. No focal motor or sensory deficits. No tremor.  Psychiatric: Does not appear anxious or depressed.  Appropriate affect and insight.    Office Visit on 11/11/2020   Component Date Value Ref Range Status     PAP 11/12/2020 NIL   Final     Copath Report 11/12/2020    Final                    Value:  Patient Name: LOS SAWYER  MR#: [id number]  Specimen #: [id number]  Collected: 11/12/2020  Received: 11/16/2020  Reported: 11/18/2020 08:02  Ordering Phy(s): BASHIR DUBOSE    For improved result formatting, select 'View Enhanced Report Format' under   Linked Documents section.    SPECIMEN/STAIN PROCESS:  Thin Prep Pap Screen - GICH (ThinPrep)       Pap Stain (GICH) x 1, HPV ordered x 1    SOURCE: Cervical, endocervical  ----------------------------------------------------------------   Thin Prep Pap Screen - GICH (ThinPrep)  SPECIMEN ADEQUACY:  Satisfactory for evaluation.  -Transformation zone component present.    CYTOLOGIC INTERPRETATION:    Negative for intraepithelial lesion or malignancy    Electronically signed out by:  NINA Taylor (ASCP)    Processed and screened at [facility]    CLINICAL HISTORY:  LMP: 10/2020  Previous normal pap  Date of Last Pap: 2016,    Papanicolaou Test Limitations:  Cervical cytology is a screening test w                          ith   limited sensitivity; regular  screening is critical for cancer prevention; Pap tests are primarily   effective for the diagnosis/prevention of  squamous cell carcinoma, not  adenocarcinomas or other cancers.    TESTING LAB LOCATION:  [facility]  [address] [place] [address] [phone]    COLLECTION SITE:  Client:  [facility]  Location: FP (B)       Orders Only on 11/11/2020   Component Date Value Ref Range Status     Cholesterol 11/11/2020 128  <200 mg/dL Final     Triglycerides 11/11/2020 114  <150 mg/dL Final     HDL Cholesterol 11/11/2020 35  23 - 92 mg/dL Final     LDL Cholesterol Calculated 11/11/2020 70  <100 mg/dL Final    Desirable:       <100 mg/dl     Non HDL Cholesterol 11/11/2020 93  <130 mg/dL Final     Sodium 11/11/2020 138  134 - 144 mmol/L Final     Potassium 11/11/2020 4.2  3.5 - 5.1 mmol/L Final     Chloride 11/11/2020 107  98 - 107 mmol/L Final     Carbon Dioxide 11/11/2020 25  21 - 31 mmol/L Final     Anion Gap 11/11/2020 6  3 - 14 mmol/L Final     Glucose 11/11/2020 120* 70 - 105 mg/dL Final     Urea Nitrogen 11/11/2020 13  7 - 25 mg/dL Final     Creatinine 11/11/2020 0.74  0.60 - 1.20 mg/dL Final     GFR Estimate 11/11/2020 88  >60 mL/min/[1.73_m2] Final     GFR Estimate If Black 11/11/2020 >90  >60 mL/min/[1.73_m2] Final     Calcium 11/11/2020 9.1  8.6 - 10.3 mg/dL Final         ASSESSEMENT AND PLAN:    1. Encounter for removal of sutures  - REMOVAL OF SUTURES    2. IFG (impaired fasting glucose)  - Hemoglobin A1c    3. Compound nevus of back  4. Melanocytic nevus of scalp  -Biopsy results discussed with patient  -No further work-up or excisions warranted  -Discussed annual skin checks    5.  Elevated blood pressure  -Asymptomatic  -Patient will measure her blood pressures twice daily at home for 7 days and [facility].    6.  Depression anxiety   -additionally the patient asks if I would be willing to sign a form for her apartment endorsing a support animal.  -The form needs to indicate that she is disabled due to her mental health issues.  -I said I would be agreeable.  She  will bring the form to clinic.    Lab results reviewed with patient.  The majority of the 15 minute patient visit was spent counseling the patient on the above issue/s and reviewing lab results separate from the suture removal procedure.    Myrna Fulton MD  [facility] AND [facility]

## 2020-11-18 NOTE — NURSING NOTE
"Chief Complaint   Patient presents with     Suture Removal     Sites healing well. No signs or symptoms of infection.     Initial /78 (BP Location: Right arm, Patient Position: Sitting, Cuff Size: Adult Regular)   Pulse 91   Temp 98.4  F (36.9  C) (Tympanic)   Resp 18   Wt 103.5 kg (228 lb 3.2 oz)   LMP 11/12/2020   SpO2 98%   Breastfeeding No   BMI 35.74 kg/m   Estimated body mass index is 35.74 kg/m  as calculated from the following:    Height as of 10/28/20: 1.702 m (5' 7\").    Weight as of this encounter: 103.5 kg (228 lb 3.2 oz).  Medication Reconciliation: complete    Lynnette Michael LPN      "

## 2020-11-19 ENCOUNTER — MYC MEDICAL ADVICE (OUTPATIENT)
Dept: FAMILY MEDICINE | Facility: OTHER | Age: 35
End: 2020-11-19

## 2020-11-19 DIAGNOSIS — I10 ESSENTIAL HYPERTENSION: Primary | ICD-10-CM

## 2020-11-19 RX ORDER — LISINOPRIL 10 MG/1
10 TABLET ORAL DAILY
Qty: 30 TABLET | Refills: 0 | Status: SHIPPED | OUTPATIENT
Start: 2020-11-19 | End: 2020-12-09

## 2020-11-20 ENCOUNTER — MYC MEDICAL ADVICE (OUTPATIENT)
Dept: FAMILY MEDICINE | Facility: OTHER | Age: 35
End: 2020-11-20

## 2020-11-20 LAB
FINAL DIAGNOSIS: NORMAL
HPV HR 12 DNA CVX QL NAA+PROBE: NEGATIVE
HPV16 DNA SPEC QL NAA+PROBE: NEGATIVE
HPV18 DNA SPEC QL NAA+PROBE: NEGATIVE
SPECIMEN DESCRIPTION: NORMAL
SPECIMEN SOURCE CVX/VAG CYTO: NORMAL

## 2020-11-22 DIAGNOSIS — L70.0 CYSTIC ACNE: ICD-10-CM

## 2020-11-22 DIAGNOSIS — I10 ESSENTIAL HYPERTENSION: ICD-10-CM

## 2020-11-24 NOTE — TELEPHONE ENCOUNTER
Disp Refills Start End FLORENCIA   spironolactone (ALDACTONE) 25 MG tablet 30 tablet 0 10/28/2020 11/27/2020 --   Sig - Route: Take 1 tablet (25 mg) by mouth daily - Oral       LOV: 11/18/2020  Future Office visit:  No future appointment scheduled at this time. Patient was seen a couple times in clinic since 10/28/2020.    Routing refill request to provider for review/approval.    Jie Shelton RN  ....................  11/24/2020   11:06 AM

## 2020-11-25 RX ORDER — SPIRONOLACTONE 25 MG/1
TABLET ORAL
Qty: 30 TABLET | Refills: 0 | Status: SHIPPED | OUTPATIENT
Start: 2020-11-25 | End: 2020-12-09

## 2020-11-25 NOTE — TELEPHONE ENCOUNTER
Patient needs a clinic visit the second week of December to monitor her BMP and response to medication. -CCN

## 2020-12-09 ENCOUNTER — OFFICE VISIT (OUTPATIENT)
Dept: FAMILY MEDICINE | Facility: OTHER | Age: 35
End: 2020-12-09
Attending: FAMILY MEDICINE
Payer: COMMERCIAL

## 2020-12-09 VITALS
DIASTOLIC BLOOD PRESSURE: 100 MMHG | WEIGHT: 228.2 LBS | OXYGEN SATURATION: 98 % | SYSTOLIC BLOOD PRESSURE: 144 MMHG | HEART RATE: 102 BPM | TEMPERATURE: 97.6 F | BODY MASS INDEX: 35.74 KG/M2 | RESPIRATION RATE: 16 BRPM

## 2020-12-09 DIAGNOSIS — L70.0 CYSTIC ACNE: ICD-10-CM

## 2020-12-09 DIAGNOSIS — T50.0X5A ADVERSE EFFECT OF SPIRONOLACTONE, INITIAL ENCOUNTER: Primary | ICD-10-CM

## 2020-12-09 DIAGNOSIS — I10 ESSENTIAL HYPERTENSION: ICD-10-CM

## 2020-12-09 LAB
ANION GAP SERPL CALCULATED.3IONS-SCNC: 7 MMOL/L (ref 3–14)
BUN SERPL-MCNC: 10 MG/DL (ref 7–25)
CALCIUM SERPL-MCNC: 9.2 MG/DL (ref 8.6–10.3)
CHLORIDE SERPL-SCNC: 105 MMOL/L (ref 98–107)
CO2 SERPL-SCNC: 27 MMOL/L (ref 21–31)
CREAT SERPL-MCNC: 0.65 MG/DL (ref 0.6–1.2)
GFR SERPL CREATININE-BSD FRML MDRD: >90 ML/MIN/{1.73_M2}
GLUCOSE SERPL-MCNC: 93 MG/DL (ref 70–105)
POTASSIUM SERPL-SCNC: 4.3 MMOL/L (ref 3.5–5.1)
SODIUM SERPL-SCNC: 139 MMOL/L (ref 134–144)

## 2020-12-09 PROCEDURE — G0463 HOSPITAL OUTPT CLINIC VISIT: HCPCS

## 2020-12-09 PROCEDURE — 36415 COLL VENOUS BLD VENIPUNCTURE: CPT | Mod: ZL | Performed by: FAMILY MEDICINE

## 2020-12-09 PROCEDURE — 99214 OFFICE O/P EST MOD 30 MIN: CPT | Performed by: FAMILY MEDICINE

## 2020-12-09 PROCEDURE — 80048 BASIC METABOLIC PNL TOTAL CA: CPT | Mod: ZL | Performed by: FAMILY MEDICINE

## 2020-12-09 RX ORDER — LISINOPRIL 10 MG/1
30 TABLET ORAL DAILY
Qty: 30 TABLET | Refills: 0 | COMMUNITY
Start: 2020-12-09 | End: 2021-01-06

## 2020-12-09 RX ORDER — DROSPIRENONE AND ETHINYL ESTRADIOL 0.02-3(28)
1 KIT ORAL DAILY
Qty: 84 TABLET | Refills: 3 | Status: SHIPPED | OUTPATIENT
Start: 2020-12-09 | End: 2021-10-18

## 2020-12-09 RX ORDER — TRETINOIN 0.5 MG/G
CREAM TOPICAL AT BEDTIME
Qty: 45 G | Refills: 0 | Status: SHIPPED | OUTPATIENT
Start: 2020-12-09 | End: 2021-07-15

## 2020-12-09 ASSESSMENT — PAIN SCALES - GENERAL: PAINLEVEL: NO PAIN (0)

## 2020-12-09 NOTE — PROGRESS NOTES
Nursing Notes:   Sally Antunez LPN  12/9/2020  9:53 AM  Sign at exiting of workspace  Chief Complaint   Patient presents with     RECHECK     Is not sure about the new medication. States she doesn't feel right. She doesn't like the way it makes her feel. Does think it has helped with acne. Has BP log.     Medication Reconciliation: complete    Sally TRUONG. INES Antunez       SUBJECTIVE:  HPI: Radha Christensen is a 35 year old female here for medication recheck for blood pressure and cystic acne.  She was started on spironolactone at her last visit and lisinopril 10 mg was stopped.  She reports adverse side effect of feeling her heart beating in her chest that is noticeable and bothersome.  She also notes feeling slightly dizzy on one occasion and needing to lay down.  Her blood pressure at that time was 114/80 and she did eat a snack in case she was hypoglycemic but did not feel any better.  We will check her BMP today.  Patient does not want to continue with spironolactone due to the side effect.  She would prefer pursuing another option like go CP and topical cream.    Blood pressure log reviewed.  Readings have been in the 130s to 140s systolic.  Only one systolic reading was 114.  Diastolic averages have been in the 80s.  She did restart lisinopril 20 mg, and blood pressures were still in that same range, with the average well above 120/80.     # Cyctic acne. HX of treatment w/ Tetra, azithro, accutane, differin gel. Clindamycin.  Spironolactone.     Patient has done really well with her GERD, depression/anxiety, and abstinence from alcohol.  These issues are all stable.  Migraines are also stable.  Topamax dose was decreased from 25 to 12.5 mg daily.  Scalp biopsy site has healed nicely.    Allergies:  Allergies   Allergen Reactions     Cefaclor Unknown     ROS:  1 episode of dizziness and feeling her heartbeat in her chest.  Constitutional, HEENT, cardiovascular, pulmonary, GI and  systems are negative, except as  otherwise noted.    Past medical, surgical, and family history reviewed and updated as appropriate in the chart.  Relevant social history listed in HPI.    Past Medical History:   Diagnosis Date     Concussion 2011    Overview:  no loss of consciousness from softball injury     Personal history of other medical treatment (CODE)      2, Para 2       Past Surgical History:   Procedure Laterality Date     ENDOSCOPIC SINUS SURGERY Bilateral 2020    Procedure: Bilateral Endoscopic Sinus Surgery with polypectomy;  Surgeon: Karen Sanchez MD;  Location: [facility] OR     ESOPHAGOSCOPY, GASTROSCOPY, DUODENOSCOPY (EGD), COMBINED N/A 2019    Procedure: ESOPHAGOGASTRODUODENOSCOPY, WITH BIOPSY;  Surgeon: Emigdio Goldberg MD;  Location: [facility] OR     RECONSTRUCT NOSE AND SEPTUM (FUNCTIONAL) Bilateral 2020    Procedure: Nasal Valve Repair(LATERA);  Surgeon: Karen Sanchez MD;  Location: [facility] OR     SEPTOPLASTY, TURBINOPLASTY, COMBINED N/A 2020    Procedure: Septoplasty Turbinate Reduction;  Surgeon: Karen Sanchez MD;  Location: [facility] OR     wisdom teeth          Family History   Problem Relation Age of Onset     Heart Disease Maternal Grandfather 50        Heart Disease     Diabetes Maternal Grandfather         Diabetes     Chronic Obstructive Pulmonary Disease Maternal Grandfather         COPD     Diabetes Mother         Diabetes     Thyroid Disease Mother         Thyroid Disease     Heart Disease Mother      Heart Disease Maternal Uncle      Thyroid Disease Brother         Thyroid Disease     Diabetes Brother 38        Diabetes     Diabetes Maternal Uncle         Diabetes     Heart Disease Maternal Uncle      Heart Disease Maternal Grandmother      Breast Cancer No family hx of         Cancer-breast     Ovarian Cancer No family hx of         Cancer-ovarian     Prostate Cancer No family hx of         Cancer-prostate     Other - See Comments No family hx of         Stroke     Colon Cancer  No family hx of         Cancer-colon     Blood Disease No family hx of         Blood Disease       Current Outpatient Medications   Medication     albuterol (PROAIR HFA/PROVENTIL HFA/VENTOLIN HFA) 108 (90 Base) MCG/ACT inhaler     budesonide (PULMICORT) 0.5 MG/2ML neb solution     DIFFERIN 0.1 % external gel     drospirenone-ethinyl estradiol (DILSHAD) 3-0.02 MG tablet     escitalopram (LEXAPRO) 20 MG tablet     famotidine (PEPCID) 20 MG tablet     lisinopril (ZESTRIL) 10 MG tablet     loratadine (CLARITIN) 10 MG tablet     LORazepam (ATIVAN) 0.5 MG tablet     topiramate (TOPAMAX) 25 MG tablet     tretinoin (RETIN-A) 0.05 % external cream     vitamin D3 (CHOLECALCIFEROL) 50 mcg (2000 units) tablet     No current facility-administered medications for this visit.        Social History     Socioeconomic History     Marital status:      Spouse name: Not on file     Number of children: Not on file     Years of education: Not on file     Highest education level: Not on file   Occupational History     Not on file   Social Needs     Financial resource strain: Not on file     Food insecurity     Worry: Not on file     Inability: Not on file     Transportation needs     Medical: Not on file     Non-medical: Not on file   Tobacco Use     Smoking status: Former Smoker     Years: 10.00     Types: Cigarettes     Quit date: 2013     Years since quittin.9     Smokeless tobacco: Never Used   Substance and Sexual Activity     Alcohol use: Not Currently     Comment: Quit 2019     Drug use: No     Sexual activity: Not Currently     Partners: Male   Lifestyle     Physical activity     Days per week: Not on file     Minutes per session: Not on file     Stress: Not on file   Relationships     Social connections     Talks on phone: Not on file     Gets together: Not on file     Attends Latter day service: Not on file     Active member of club or organization: Not on file     Attends meetings of clubs or organizations: Not on  file     Relationship status: Not on file     Intimate partner violence     Fear of current or ex partner: Not on file     Emotionally abused: Not on file     Physically abused: Not on file     Forced sexual activity: Not on file   Other Topics Concern     Parent/sibling w/ CABG, MI or angioplasty before 65F 55M? Not Asked   Social History Narrative    two children.     Preload 03/01/2013       OBJECTIVE:  BP (!) 144/100 (BP Location: Right arm, Patient Position: Sitting, Cuff Size: Adult Regular)   Pulse 102   Temp 97.6  F (36.4  C) (Tympanic)   Resp 16   Wt 103.5 kg (228 lb 3.2 oz)   LMP 12/08/2020   SpO2 98%   Breastfeeding No   BMI 35.74 kg/m      BP Readings from Last 6 Encounters:   12/09/20 (!) 144/100   11/18/20 138/78   11/11/20 120/74   10/28/20 124/80   10/08/20 110/80   07/01/20 145/76     Wt Readings from Last 4 Encounters:   12/09/20 103.5 kg (228 lb 3.2 oz)   11/18/20 103.5 kg (228 lb 3.2 oz)   11/11/20 104 kg (229 lb 3.2 oz)   10/28/20 103.6 kg (228 lb 6.4 oz)     EXAM:  Constitutional: Alert. No acute distress. Well-groomed, well-hydrated and well-nourished.  Appears stated age.  Head: Normocephalic, atraumatic.  Eyes: EOMI, anicteric  Respiratory: Non-labored respirations  Skin: Face with scattered cystic lesions consistent with acne and erythema.  Slight improvement compared to last visit.  Musculoskeletal: Moves arms and legs equally and normally.  Psychiatric: Does not appear anxious or depressed.  Appropriate affect and insight.    ASSESSEMENT AND PLAN:    1. Adverse effect of spironolactone, initial encounter  -Patient's potassium is initially normal, we will recheck today to see if spironolactone had decreased her potassium  - Basic Metabolic Panel    2. Cystic acne  -Stop spironolactone due to side effect  -Instead we will use an OCP P and a topical retinol cream  - drospirenone-ethinyl estradiol (DILSHAD) 3-0.02 MG tablet; Take 1 tablet by mouth daily  Dispense: 84 tablet;  Refill: 3  - tretinoin (RETIN-A) 0.05 % external cream; Apply topically At Bedtime  Dispense: 45 g; Refill: 0  -Discussed using cream every other night initially to monitor for signs of irritation and then can increase to nightly applications as tolerated    3. Essential hypertension  -Not well controlled  -Increased dose from 20 mg to 30 mg daily  - lisinopril (ZESTRIL) 10 MG tablet; Take 3 tablets (30 mg) by mouth daily  Dispense: 30 tablet; Refill: 0  -Return to clinic in 1 to 2 weeks to monitor blood pressure and acne    Myrna Fulton MD  [facility]    Portions of this dictation were created using the Dragon Nuance voice recognition system. Proofreading was completed but there may be errors in text.

## 2020-12-09 NOTE — NURSING NOTE
Chief Complaint   Patient presents with     RECHECK     Is not sure about the new medication. States she doesn't feel right. She doesn't like the way it makes her feel. Does think it has helped with acne. Has BP log.     Medication Reconciliation: complete    Sally Antunez, LPN

## 2020-12-14 ENCOUNTER — TELEPHONE (OUTPATIENT)
Dept: FAMILY MEDICINE | Facility: OTHER | Age: 35
End: 2020-12-14

## 2020-12-14 NOTE — TELEPHONE ENCOUNTER
Called and verified patient full name and  with Shelly. She is wondering about paperwork that was faxed over for patient. Writer has not seen anything. Shelly will fax paperwork again.     Sally Antunez LPN on 2020 at 12:56 PM

## 2020-12-14 NOTE — TELEPHONE ENCOUNTER
Wondering if you got the fax that was sent over and if she can talk to you.  Please call janessa back.  Thank you Kailee Garcia on 12/14/2020 at 10:18 AM

## 2020-12-21 ENCOUNTER — MYC MEDICAL ADVICE (OUTPATIENT)
Dept: FAMILY MEDICINE | Facility: OTHER | Age: 35
End: 2020-12-21

## 2020-12-22 NOTE — TELEPHONE ENCOUNTER
I think she should have the accomodation. Please get the form back to me with which boxes should be checked and I will sign it. I will be in tomorrow morning for a delivery.    Thanks!  -CCN

## 2020-12-30 ENCOUNTER — OFFICE VISIT (OUTPATIENT)
Dept: FAMILY MEDICINE | Facility: OTHER | Age: 35
End: 2020-12-30
Attending: FAMILY MEDICINE
Payer: COMMERCIAL

## 2020-12-30 VITALS
TEMPERATURE: 97.9 F | HEART RATE: 80 BPM | SYSTOLIC BLOOD PRESSURE: 122 MMHG | OXYGEN SATURATION: 98 % | WEIGHT: 232.8 LBS | DIASTOLIC BLOOD PRESSURE: 80 MMHG | BODY MASS INDEX: 36.46 KG/M2 | RESPIRATION RATE: 16 BRPM

## 2020-12-30 DIAGNOSIS — L70.0 CYSTIC ACNE: ICD-10-CM

## 2020-12-30 DIAGNOSIS — I10 ESSENTIAL HYPERTENSION: Primary | ICD-10-CM

## 2020-12-30 PROCEDURE — G0463 HOSPITAL OUTPT CLINIC VISIT: HCPCS

## 2020-12-30 PROCEDURE — 99214 OFFICE O/P EST MOD 30 MIN: CPT | Performed by: FAMILY MEDICINE

## 2020-12-30 ASSESSMENT — PAIN SCALES - GENERAL: PAINLEVEL: NO PAIN (0)

## 2020-12-30 NOTE — PROGRESS NOTES
Nursing Notes:   Sally Antunez LPN  12/30/2020  9:03 AM  Sign at exiting of workspace  Chief Complaint   Patient presents with     Recheck Medication     Has BP log with her. Has been taking 20 mg Lisinopril. Also has paperwork from housing to redo.      Medication Reconciliation: complete    Sally Antunez LPN       SUBJECTIVE:  HPI: Radha Christensen is a 35 year old female here for medication recheck for blood pressure and cystic acne.      Hypertension : Home blood pressures have been between 120 and 130 systolic.  She has been compliant with lisinopril 20 mg and has not increased to the 30 yet.  We stop spironolactone (for blood pressure and acne) due to side effects of feeling her heartbeat in her chest which was bothersome to her.  She denies any symptoms of high blood pressure or side effects of her current medications.    # Cyctic acne. HX of treatment w/ Tetra, azithro, accutane, differin gel. Clindamycin.  Spironolactone. Prior authorization for Retin-A has not been approved, but she has been going to center [facility] for a facial first 1 3 weeks ago, and they have plans to repeat her facial every 4 to 6 weeks and then to use a skin pen on her scarring from her cystic acne.  She does think her skin looks better today.    Patient has done really well with her GERD, depression/anxiety, and abstinence from alcohol.  These issues are all stable.  Migraines are also stable.  Topamax dose was decreased from 25 to 12.5 mg daily.  Scalp biopsy site has healed nicely.    Allergies:  Allergies   Allergen Reactions     Cefaclor Unknown     ROS:  Constitutional, HEENT, cardiovascular, pulmonary, GI and  systems are negative, except as otherwise noted.    Past medical, surgical, and family history reviewed and updated as appropriate in the chart.  Relevant social history listed in HPI.    Past Medical History:   Diagnosis Date     Concussion 6/14/2011    Overview:  no loss of consciousness from softball injury      Personal history of other medical treatment (CODE)      2, Para 2       Past Surgical History:   Procedure Laterality Date     ENDOSCOPIC SINUS SURGERY Bilateral 2020    Procedure: Bilateral Endoscopic Sinus Surgery with polypectomy;  Surgeon: Karen Sanchez MD;  Location: [facility]     ESOPHAGOSCOPY, GASTROSCOPY, DUODENOSCOPY (EGD), COMBINED N/A 2019    Procedure: ESOPHAGOGASTRODUODENOSCOPY, WITH BIOPSY;  Surgeon: Emigdio Goldberg MD;  Location: [facility]     RECONSTRUCT NOSE AND SEPTUM (FUNCTIONAL) Bilateral 2020    Procedure: Nasal Valve Repair(LATERA);  Surgeon: Karen Sanchez MD;  Location: [facility]     SEPTOPLASTY, TURBINOPLASTY, COMBINED N/A 2020    Procedure: Septoplasty Turbinate Reduction;  Surgeon: Karen Sanchez MD;  Location: [facility] OR     wisdom teeth          Family History   Problem Relation Age of Onset     Heart Disease Maternal Grandfather 50        Heart Disease     Diabetes Maternal Grandfather         Diabetes     Chronic Obstructive Pulmonary Disease Maternal Grandfather         COPD     Diabetes Mother         Diabetes     Thyroid Disease Mother         Thyroid Disease     Heart Disease Mother      Heart Disease Maternal Uncle      Thyroid Disease Brother         Thyroid Disease     Diabetes Brother 38        Diabetes     Diabetes Maternal Uncle         Diabetes     Heart Disease Maternal Uncle      Heart Disease Maternal Grandmother      Breast Cancer No family hx of         Cancer-breast     Ovarian Cancer No family hx of         Cancer-ovarian     Prostate Cancer No family hx of         Cancer-prostate     Other - See Comments No family hx of         Stroke     Colon Cancer No family hx of         Cancer-colon     Blood Disease No family hx of         Blood Disease       Current Outpatient Medications   Medication     albuterol (PROAIR HFA/PROVENTIL HFA/VENTOLIN HFA) 108 (90 Base) MCG/ACT inhaler     drospirenone-ethinyl estradiol (DILSHAD) 3-0.02 MG tablet      escitalopram (LEXAPRO) 20 MG tablet     famotidine (PEPCID) 20 MG tablet     lisinopril (ZESTRIL) 10 MG tablet     loratadine (CLARITIN) 10 MG tablet     LORazepam (ATIVAN) 0.5 MG tablet     topiramate (TOPAMAX) 25 MG tablet     tretinoin (RETIN-A) 0.05 % external cream     vitamin D3 (CHOLECALCIFEROL) 50 mcg (2000 units) tablet     No current facility-administered medications for this visit.        Social History     Socioeconomic History     Marital status:      Spouse name: Not on file     Number of children: Not on file     Years of education: Not on file     Highest education level: Not on file   Occupational History     Not on file   Social Needs     Financial resource strain: Not on file     Food insecurity     Worry: Not on file     Inability: Not on file     Transportation needs     Medical: Not on file     Non-medical: Not on file   Tobacco Use     Smoking status: Former Smoker     Years: 10.00     Types: Cigarettes     Quit date: 2013     Years since quittin.0     Smokeless tobacco: Never Used   Substance and Sexual Activity     Alcohol use: Not Currently     Comment: Quit 2019     Drug use: No     Sexual activity: Not Currently     Partners: Male   Lifestyle     Physical activity     Days per week: Not on file     Minutes per session: Not on file     Stress: Not on file   Relationships     Social connections     Talks on phone: Not on file     Gets together: Not on file     Attends Buddhist service: Not on file     Active member of club or organization: Not on file     Attends meetings of clubs or organizations: Not on file     Relationship status: Not on file     Intimate partner violence     Fear of current or ex partner: Not on file     Emotionally abused: Not on file     Physically abused: Not on file     Forced sexual activity: Not on file   Other Topics Concern     Parent/sibling w/ CABG, MI or angioplasty before 65F 55M? Not Asked   Social History Narrative    two  children.     Preload 03/01/2013       OBJECTIVE:  /80 (BP Location: Right arm, Patient Position: Sitting, Cuff Size: Adult Regular)   Pulse 80   Temp 97.9  F (36.6  C) (Tympanic)   Resp 16   Wt 105.6 kg (232 lb 12.8 oz)   LMP 12/08/2020   SpO2 98%   Breastfeeding No   BMI 36.46 kg/m      BP Readings from Last 6 Encounters:   12/30/20 122/80   12/09/20 (!) 144/100   11/18/20 138/78   11/11/20 120/74   10/28/20 124/80   10/08/20 110/80     Wt Readings from Last 4 Encounters:   12/30/20 105.6 kg (232 lb 12.8 oz)   12/09/20 103.5 kg (228 lb 3.2 oz)   11/18/20 103.5 kg (228 lb 3.2 oz)   11/11/20 104 kg (229 lb 3.2 oz)     EXAM:  Constitutional: Alert. No acute distress. Well-groomed, well-hydrated and well-nourished.  Appears stated age.  Head: Normocephalic, atraumatic.  Eyes: EOMI, anicteric  Respiratory: Non-labored respirations  Skin: Face with scattered cystic lesions consistent with acne and erythema.  Slight improvement compared to last visit.  Musculoskeletal: Moves arms and legs equally and normally.  Psychiatric: Does not appear anxious or depressed.  Appropriate affect and insight.    ASSESSEMENT AND PLAN:      1. Essential hypertension  -Improved but not well controlled  -Increased dose from 20 mg to 30 mg daily  - lisinopril (ZESTRIL) 10 MG tablet; Take 3 tablets (30 mg) by mouth daily  Dispense: 30 tablet; Refill: 0  -Return to clinic in 3 weeks to monitor blood pressure and acne    2. Cystic acne  -Stop spironolactone due to side effect  -Continue monthly facials  - drospirenone-ethinyl estradiol (DILSHAD) 3-0.02 MG tablet; Take 1 tablet by mouth daily  Dispense: 84 tablet; Refill: 3  -Prior authorization pending for tretinoin (RETIN-A) 0.05 % external cream; Apply topically At Bedtime  Dispense: 45 g; Refill: 0  -Discussed using cream every other night initially to monitor for signs of irritation and then can increase to nightly applications as tolerated    Return to clinic in 3 weeks  to monitor blood pressure.    The majority of the 25 minute patient visit was spent counseling the patient on the above issue/s.    Myrna Fulton MD  [facility]    Portions of this dictation were created using the Dragon Nuance voice recognition system. Proofreading was completed but there may be errors in text.

## 2020-12-30 NOTE — NURSING NOTE
Chief Complaint   Patient presents with     Recheck Medication     Has BP log with her. Has been taking 20 mg Lisinopril. Also has paperwork from housing to redo.      Medication Reconciliation: complete    Sally Antunez LPN

## 2021-01-06 DIAGNOSIS — I10 ESSENTIAL HYPERTENSION: ICD-10-CM

## 2021-01-06 RX ORDER — LISINOPRIL 10 MG/1
30 TABLET ORAL DAILY
Qty: 30 TABLET | Refills: 0 | Status: SHIPPED | OUTPATIENT
Start: 2021-01-06 | End: 2021-01-18

## 2021-01-06 NOTE — TELEPHONE ENCOUNTER
[facility] sent Rx request for the following:   lisinopril (ZESTRIL) 10 MG tablet  Sig:TAKE 1 TABLET(10 MG) BY MOUTH DAILY    Last Prescription Date:   12/09/2020  Last Fill Qty/Refills:         30, R-0    Last Office Visit:              12/30/2020 ([facility])   Future Office visit:           01/20/2021   ACE Inhibitors (Including Combos) Protocol Passed - 1/6/2021  8:01 AM   Per LOV 12/30/2020:  1. Essential hypertension  -Improved but not well controlled  -Increased dose from 20 mg to 30 mg daily  - lisinopril (ZESTRIL) 10 MG tablet; Take 3 tablets (30 mg) by mouth daily  Dispense: 30 tablet; Refill: 0  -Return to clinic in 3 weeks to monitor blood pressure and acne    Sig differing from current directions of Rx, not previously approved by PCP. Rx will not reach upcoming appointment. Will route for review. Unable to complete prescription refill per RN Medication Refill Policy.................... Fifi Hurtado RN ....................  1/6/2021   11:33 AM

## 2021-01-18 DIAGNOSIS — I10 ESSENTIAL HYPERTENSION: ICD-10-CM

## 2021-01-18 DIAGNOSIS — G43.909 MIGRAINE WITHOUT STATUS MIGRAINOSUS, NOT INTRACTABLE, UNSPECIFIED MIGRAINE TYPE: ICD-10-CM

## 2021-01-18 RX ORDER — LISINOPRIL 10 MG/1
TABLET ORAL
Qty: 90 TABLET | Refills: 0 | Status: SHIPPED | OUTPATIENT
Start: 2021-01-18 | End: 2021-01-20 | Stop reason: DRUGHIGH

## 2021-01-18 RX ORDER — TOPIRAMATE 25 MG/1
TABLET, FILM COATED ORAL
Qty: 90 TABLET | Refills: 0 | Status: SHIPPED | OUTPATIENT
Start: 2021-01-18 | End: 2021-06-09

## 2021-01-18 NOTE — TELEPHONE ENCOUNTER
" Disp Refills Start End FLORENCIA   topiramate (TOPAMAX) 25 MG tablet 90 tablet 0 10/28/2020  No   Sig - Route: Take 0.5 tablets (12.5 mg) by mouth daily - Oral   Class: Historical      Disp Refills Start End FLORENCIA   lisinopril (ZESTRIL) 10 MG tablet 30 tablet 0 1/6/2021  No   Sig - Route: Take 3 tablets (30 mg) by mouth daily - Oral       LOV: 12/30/2020  Future Office visit:    Next 5 appointments (look out 90 days)    Jan 20, 2021  8:20 AM  SHORT with Myrna Fulton MD  [facility] ([facility]) [address]  [place] [id number] [phone]        Routing refill request to provider for review/approval because:  Medication is reported/historical  Upcoming appointment    Requested Prescriptions   Pending Prescriptions Disp Refills     lisinopril (ZESTRIL) 10 MG tablet [Pharmacy Med Name: LISINOPRIL 10MG TABLETS] 30 tablet 0     Sig: TAKE 3 TABLETS(30 MG) BY MOUTH DAILY       ACE Inhibitors (Including Combos) Protocol Passed - 1/18/2021  8:42 AM        Passed - Blood pressure under 140/90 in past 12 months     BP Readings from Last 3 Encounters:   12/30/20 122/80   12/09/20 (!) 144/100   11/18/20 138/78                 Passed - Recent (12 mo) or future (30 days) visit within the authorizing provider's specialty     Patient has had an office visit with the authorizing provider or a provider within the authorizing providers department within the previous 12 mos or has a future within next 30 days. See \"Patient Info\" tab in inbasket, or \"Choose Columns\" in Meds & Orders section of the refill encounter.              Passed - Medication is active on med list        Passed - Patient is age 18 or older        Passed - No active pregnancy on record        Passed - Normal serum creatinine on file in past 12 months     Recent Labs   Lab Test 12/09/20  1029   CR 0.65       Ok to refill medication if creatinine is low          Passed - Normal serum potassium on file " "in past 12 months     Recent Labs   Lab Test 12/09/20  1029   POTASSIUM 4.3             Passed - No positive pregnancy test within past 12 months           topiramate (TOPAMAX) 25 MG tablet [Pharmacy Med Name: TOPIRAMATE 25MG TABLETS] 90 tablet 0     Sig: TAKE 1 TABLET BY MOUTH EVERY DAY       Anti-Seizure Meds Protocol  Failed - 1/18/2021  8:42 AM        Failed - Review Authorizing provider's last note.      Refer to last progress notes: confirm request is for original authorizing provider (cannot be through other providers).          Passed - Recent (12 mo) or future (30 days) visit within the authorizing provider's specialty     Patient has had an office visit with the authorizing provider or a provider within the authorizing providers department within the previous 12 mos or has a future within next 30 days. See \"Patient Info\" tab in inbasket, or \"Choose Columns\" in Meds & Orders section of the refill encounter.              Passed - Normal CBC on file in past 26 months     Recent Labs   Lab Test 06/24/20  1335 08/07/19  0802 05/25/16  1653 05/25/16  1653   WBC  --  6.0   < >  --    GICHWBC  --   --   --  6.5   RBC  --  4.66   < >  --    GICHRBC  --   --   --  4.77   HGB 12.9 13.1   < > 13.2   HCT  --  40.4   < > 40.5   PLT  --  254   < > 256    < > = values in this interval not displayed.                 Passed - Normal ALT or AST on file in past 26 months     Recent Labs   Lab Test 08/07/19  0802 08/02/17  1642 08/02/17  1642   ALT 17   < >  --    GICHALT  --   --  16    < > = values in this interval not displayed.     Recent Labs   Lab Test 08/07/19  0802 08/02/17  1642 08/02/17  1642   AST 20   < >  --    GICHAST  --   --  13    < > = values in this interval not displayed.             Passed - Normal platelet count on file in past 26 months     Recent Labs   Lab Test 08/07/19  0802                  Passed - Medication is active on med list        Passed - No active pregnancy on record        Passed - " No positive pregnancy test in last 12 months         Unable to complete prescription refill per RN Medication Refill Policy.................... Jie Shelton RN ....................  1/18/2021   3:58 PM

## 2021-01-20 ENCOUNTER — OFFICE VISIT (OUTPATIENT)
Dept: FAMILY MEDICINE | Facility: OTHER | Age: 36
End: 2021-01-20
Attending: FAMILY MEDICINE
Payer: COMMERCIAL

## 2021-01-20 VITALS
DIASTOLIC BLOOD PRESSURE: 82 MMHG | TEMPERATURE: 98.1 F | WEIGHT: 232 LBS | BODY MASS INDEX: 36.34 KG/M2 | HEART RATE: 84 BPM | RESPIRATION RATE: 16 BRPM | SYSTOLIC BLOOD PRESSURE: 118 MMHG | OXYGEN SATURATION: 98 %

## 2021-01-20 DIAGNOSIS — I10 ESSENTIAL HYPERTENSION: Primary | ICD-10-CM

## 2021-01-20 PROCEDURE — 99213 OFFICE O/P EST LOW 20 MIN: CPT | Performed by: FAMILY MEDICINE

## 2021-01-20 PROCEDURE — G0463 HOSPITAL OUTPT CLINIC VISIT: HCPCS

## 2021-01-20 RX ORDER — LISINOPRIL 40 MG/1
40 TABLET ORAL DAILY
Qty: 90 TABLET | Refills: 1 | Status: SHIPPED | OUTPATIENT
Start: 2021-01-20 | End: 2021-03-03

## 2021-01-20 ASSESSMENT — PAIN SCALES - GENERAL: PAINLEVEL: NO PAIN (0)

## 2021-01-20 NOTE — NURSING NOTE
Chief Complaint   Patient presents with     Recheck Medication     See BP log.     Medication Reconciliation: complete    Sally Antunez LPN

## 2021-01-20 NOTE — PROGRESS NOTES
Nursing Notes:   Sally Antunez LPN  2021  8:36 AM  Signed  Chief Complaint   Patient presents with     Recheck Medication     See BP log.     Medication Reconciliation: complete    Sally Antunez LPN       SUBJECTIVE:  HPI: Radha Christensen is a 35 year old female here for medication recheck for blood pressure.    # Hypertension: She reports feeling good today and has been compliant with her increased dose of lisinopril from 20 mg to 30 mg daily.  She was out for acute total of 3 days.  She does have a blood pressure log with her today and her averages have been in the high 130s for systolics with a range of of 122-149.  Spironolactone has been stopped.    # Cyctic acne.   Stable.  HX of treatment w/ Tetra, azithro, accutane, differin gel. Clindamycin.  Spironolactone. Prior authorization for Retin-A has not been approved.  [facility] 4 to 6 weeks and is using skin peels for scarring from her cystic acne.  She notes increased evenness in her skin.    Patient has done really well with her GERD, depression/anxiety, and abstinence from alcohol.  These issues are all stable.  Migraines are also stable.  Topamax dose was reduced to 12.5 mg and she continues to do well with no increase of her migraines.    Allergies:  Allergies   Allergen Reactions     Cefaclor Unknown     ROS:  Constitutional, HEENT, cardiovascular, pulmonary, GI and  systems are negative, except as otherwise noted.    Past medical, surgical, and family history reviewed and updated as appropriate in the chart.  Relevant social history listed in HPI.    Past Medical History:   Diagnosis Date     Concussion 2011    Overview:  no loss of consciousness from softball injury     Personal history of other medical treatment (CODE)      2, Para 2       Past Surgical History:   Procedure Laterality Date     ENDOSCOPIC SINUS SURGERY Bilateral 2020    Procedure: Bilateral Endoscopic Sinus Surgery  with polypectomy;  Surgeon: Karen Sanchez MD;  Location: [facility]     ESOPHAGOSCOPY, GASTROSCOPY, DUODENOSCOPY (EGD), COMBINED N/A 8/27/2019    Procedure: ESOPHAGOGASTRODUODENOSCOPY, WITH BIOPSY;  Surgeon: Emigdio Goldberg MD;  Location: [facility]     RECONSTRUCT NOSE AND SEPTUM (FUNCTIONAL) Bilateral 7/1/2020    Procedure: Nasal Valve Repair(LATERA);  Surgeon: Karen Sanchez MD;  Location: [facility]     SEPTOPLASTY, TURBINOPLASTY, COMBINED N/A 7/1/2020    Procedure: Septoplasty Turbinate Reduction;  Surgeon: Karen Sanchez MD;  Location: [facility]     wisdom teeth          Family History   Problem Relation Age of Onset     Heart Disease Maternal Grandfather 50        Heart Disease     Diabetes Maternal Grandfather         Diabetes     Chronic Obstructive Pulmonary Disease Maternal Grandfather         COPD     Diabetes Mother         Diabetes     Thyroid Disease Mother         Thyroid Disease     Heart Disease Mother      Heart Disease Maternal Uncle      Thyroid Disease Brother         Thyroid Disease     Diabetes Brother 38        Diabetes     Diabetes Maternal Uncle         Diabetes     Heart Disease Maternal Uncle      Heart Disease Maternal Grandmother      Breast Cancer No family hx of         Cancer-breast     Ovarian Cancer No family hx of         Cancer-ovarian     Prostate Cancer No family hx of         Cancer-prostate     Other - See Comments No family hx of         Stroke     Colon Cancer No family hx of         Cancer-colon     Blood Disease No family hx of         Blood Disease       Current Outpatient Medications   Medication     albuterol (PROAIR HFA/PROVENTIL HFA/VENTOLIN HFA) 108 (90 Base) MCG/ACT inhaler     drospirenone-ethinyl estradiol (DILSHAD) 3-0.02 MG tablet     escitalopram (LEXAPRO) 20 MG tablet     famotidine (PEPCID) 20 MG tablet     lisinopril (ZESTRIL) 40 MG tablet     loratadine (CLARITIN) 10 MG tablet     LORazepam (ATIVAN) 0.5 MG tablet     topiramate (TOPAMAX) 25 MG  tablet     tretinoin (RETIN-A) 0.05 % external cream     vitamin D3 (CHOLECALCIFEROL) 50 mcg (2000 units) tablet     No current facility-administered medications for this visit.        Social History     Socioeconomic History     Marital status:      Spouse name: Not on file     Number of children: Not on file     Years of education: Not on file     Highest education level: Not on file   Occupational History     Not on file   Social Needs     Financial resource strain: Not on file     Food insecurity     Worry: Not on file     Inability: Not on file     Transportation needs     Medical: Not on file     Non-medical: Not on file   Tobacco Use     Smoking status: Former Smoker     Years: 10.00     Types: Cigarettes     Quit date: 2013     Years since quittin.0     Smokeless tobacco: Never Used   Substance and Sexual Activity     Alcohol use: Not Currently     Comment: Quit 2019     Drug use: No     Sexual activity: Not Currently     Partners: Male   Lifestyle     Physical activity     Days per week: Not on file     Minutes per session: Not on file     Stress: Not on file   Relationships     Social connections     Talks on phone: Not on file     Gets together: Not on file     Attends Anabaptism service: Not on file     Active member of club or organization: Not on file     Attends meetings of clubs or organizations: Not on file     Relationship status: Not on file     Intimate partner violence     Fear of current or ex partner: Not on file     Emotionally abused: Not on file     Physically abused: Not on file     Forced sexual activity: Not on file   Other Topics Concern     Parent/sibling w/ CABG, MI or angioplasty before 65F 55M? Not Asked   Social History Narrative    two children.     Preload 2013       OBJECTIVE:  /82 (BP Location: Right arm, Patient Position: Sitting, Cuff Size: Adult Regular)   Pulse 84   Temp 98.1  F (36.7  C) (Tympanic)   Resp 16   Wt 105.2 kg (232 lb)    LMP 01/07/2021   SpO2 98%   Breastfeeding No   BMI 36.34 kg/m      BP Readings from Last 6 Encounters:   01/20/21 118/82   12/30/20 122/80   12/09/20 (!) 144/100   11/18/20 138/78   11/11/20 120/74   10/28/20 124/80     Wt Readings from Last 4 Encounters:   01/20/21 105.2 kg (232 lb)   12/30/20 105.6 kg (232 lb 12.8 oz)   12/09/20 103.5 kg (228 lb 3.2 oz)   11/18/20 103.5 kg (228 lb 3.2 oz)     EXAM:  Constitutional: Alert. No acute distress. Well-groomed, well-hydrated and well-nourished.  Appears stated age.  Head: Normocephalic, atraumatic.  Eyes: EOMI, anicteric  Respiratory: Non-labored respirations.  Clear to auscultation bilaterally  -Cardiovascular: Regular rate and rhythm no murmurs  Skin: Face with scattered cystic lesions, less visible.  Slight improvement compared to last visit.  Musculoskeletal: Moves arms and legs equally and normally.  Psychiatric: Does not appear anxious or depressed.  Appropriate affect and insight.    ASSESSEMENT AND PLAN:      1. Essential hypertension  -Improved but not well controlled at home  -Increased dose from 30 mg to 40 mg daily  -Patient has refills and will take for 10 mg tablets at a time  -When she is due for refill she can  the lisinopril 40 mg tablets  -Continue checking blood pressures daily  -Weight has been stable    2. Cystic acne  -Continue monthly facials  -Drospirenone-ethinyl estradiol (DILSHAD) 3-0.02 MG tablet; Take 1 tablet by mouth daily  Dispense: 84 tablet; Refill: 3  -Prior authorization pending for tretinoin (RETIN-A) 0.05 % external cream; Apply topically At Bedtime  Dispense: 45 g; Refill: 0  -Discussed using cream every other night initially to monitor for signs of irritation and then can increase to nightly applications as tolerated    Return to clinic in 3 weeks to monitor blood pressure.    The majority of the 29 minute patient visit was spent counseling the patient on the above issue/s.    MD GRAND CARLEE Ferguson  [facility]    Portions of this dictation were created using the Dragon Nuance voice recognition system. Proofreading was completed but there may be errors in text.

## 2021-02-10 ENCOUNTER — OFFICE VISIT (OUTPATIENT)
Dept: FAMILY MEDICINE | Facility: OTHER | Age: 36
End: 2021-02-10
Attending: FAMILY MEDICINE
Payer: COMMERCIAL

## 2021-02-10 VITALS
RESPIRATION RATE: 16 BRPM | OXYGEN SATURATION: 99 % | DIASTOLIC BLOOD PRESSURE: 80 MMHG | TEMPERATURE: 98.9 F | SYSTOLIC BLOOD PRESSURE: 132 MMHG | BODY MASS INDEX: 36.62 KG/M2 | WEIGHT: 233.8 LBS | HEART RATE: 102 BPM

## 2021-02-10 DIAGNOSIS — J01.90 ACUTE SINUSITIS WITH SYMPTOMS > 10 DAYS: ICD-10-CM

## 2021-02-10 DIAGNOSIS — I10 ESSENTIAL HYPERTENSION: Primary | ICD-10-CM

## 2021-02-10 PROCEDURE — G0463 HOSPITAL OUTPT CLINIC VISIT: HCPCS

## 2021-02-10 PROCEDURE — 99214 OFFICE O/P EST MOD 30 MIN: CPT | Performed by: FAMILY MEDICINE

## 2021-02-10 RX ORDER — LISINOPRIL 10 MG/1
TABLET ORAL
COMMUNITY
Start: 2021-01-18 | End: 2021-03-03

## 2021-02-10 ASSESSMENT — PAIN SCALES - GENERAL: PAINLEVEL: MILD PAIN (2)

## 2021-02-10 NOTE — NURSING NOTE
Chief Complaint   Patient presents with     RECHECK     Has BP readings. Also has a sinus infection. Nasal congestion, pressure and headaches. No fevers.     Medication Reconciliation: complete    Sally Antunez LPN

## 2021-02-10 NOTE — PROGRESS NOTES
Nursing Notes:   Sally Antunez LPN  2/10/2021  1:44 PM  Signed  Chief Complaint   Patient presents with     RECHECK     Has BP readings. Also has a sinus infection. Nasal congestion, pressure and headaches. No fevers.     Medication Reconciliation: complete    Sally Antunez LPN       SUBJECTIVE:  HPI: Radha Christensen is a 35 year old female here for medication recheck for blood pressure, and new issue with acute sinusitis.     # Hypertension: Bps have been in the 130s systolic, with a few 140s (low). Diastolic Bps in the 70s. Denies symptoms. Has been compliant with 40mg lisinopril daily.  Has continued to stay active.  She does note to me that she drinks [employer] lift off teas each morning for the past few months, which she uses in place of coffee, as she is very sensitive to caffeine.  She wonders if those can cause increased blood pressures.    # Sinusitis: Patient notes symptoms of a sinus infection for the past 14 days including nasal congestion and thick yellowish-greenish discharge, pressure in her maxillary and frontal sinuses and associated headaches.  She denies any fevers.  She is postop sinus infection from August and has done very well since that time without any sinus infections.  In the acute postop.  She was doing daily sinus rinses but has stopped that as per recommendations from her ENT surgeon.    Allergies:  Allergies   Allergen Reactions     Cefaclor Unknown     ROS:  Constitutional, HEENT, cardiovascular, pulmonary, GI and  systems are negative, except as otherwise noted.    Past medical, surgical, and family history reviewed and updated as appropriate in the chart.  Relevant social history listed in HPI.    Past Medical History:   Diagnosis Date     Concussion 2011    Overview:  no loss of consciousness from softball injury     Personal history of other medical treatment (CODE)      2, Para 2       Past Surgical History:   Procedure Laterality Date     ENDOSCOPIC SINUS  SURGERY Bilateral 7/1/2020    Procedure: Bilateral Endoscopic Sinus Surgery with polypectomy;  Surgeon: Karen Sanchez MD;  Location: [facility]     ESOPHAGOSCOPY, GASTROSCOPY, DUODENOSCOPY (EGD), COMBINED N/A 8/27/2019    Procedure: ESOPHAGOGASTRODUODENOSCOPY, WITH BIOPSY;  Surgeon: Emigdio Goldberg MD;  Location: [facility] OR     RECONSTRUCT NOSE AND SEPTUM (FUNCTIONAL) Bilateral 7/1/2020    Procedure: Nasal Valve Repair(LATERA);  Surgeon: Karen Sanchez MD;  Location: [facility]     SEPTOPLASTY, TURBINOPLASTY, COMBINED N/A 7/1/2020    Procedure: Septoplasty Turbinate Reduction;  Surgeon: Karen Sanchez MD;  Location: [facility] OR     wisdom teeth          Family History   Problem Relation Age of Onset     Heart Disease Maternal Grandfather 50        Heart Disease     Diabetes Maternal Grandfather         Diabetes     Chronic Obstructive Pulmonary Disease Maternal Grandfather         COPD     Diabetes Mother         Diabetes     Thyroid Disease Mother         Thyroid Disease     Heart Disease Mother      Heart Disease Maternal Uncle      Thyroid Disease Brother         Thyroid Disease     Diabetes Brother 38        Diabetes     Diabetes Maternal Uncle         Diabetes     Heart Disease Maternal Uncle      Heart Disease Maternal Grandmother      Breast Cancer No family hx of         Cancer-breast     Ovarian Cancer No family hx of         Cancer-ovarian     Prostate Cancer No family hx of         Cancer-prostate     Other - See Comments No family hx of         Stroke     Colon Cancer No family hx of         Cancer-colon     Blood Disease No family hx of         Blood Disease       Current Outpatient Medications   Medication     albuterol (PROAIR HFA/PROVENTIL HFA/VENTOLIN HFA) 108 (90 Base) MCG/ACT inhaler     amoxicillin-clavulanate (AUGMENTIN) 875-125 MG tablet     drospirenone-ethinyl estradiol (DILSHAD) 3-0.02 MG tablet     escitalopram (LEXAPRO) 20 MG tablet     famotidine (PEPCID) 20 MG tablet     lisinopril  (ZESTRIL) 40 MG tablet     loratadine (CLARITIN) 10 MG tablet     LORazepam (ATIVAN) 0.5 MG tablet     topiramate (TOPAMAX) 25 MG tablet     tretinoin (RETIN-A) 0.05 % external cream     vitamin D3 (CHOLECALCIFEROL) 50 mcg (2000 units) tablet     lisinopril (ZESTRIL) 10 MG tablet     No current facility-administered medications for this visit.        Social History     Socioeconomic History     Marital status:      Spouse name: Not on file     Number of children: Not on file     Years of education: Not on file     Highest education level: Not on file   Occupational History     Not on file   Social Needs     Financial resource strain: Not on file     Food insecurity     Worry: Not on file     Inability: Not on file     Transportation needs     Medical: Not on file     Non-medical: Not on file   Tobacco Use     Smoking status: Former Smoker     Years: 10.00     Types: Cigarettes     Quit date: 2013     Years since quittin.1     Smokeless tobacco: Never Used   Substance and Sexual Activity     Alcohol use: Not Currently     Comment: Quit 2019     Drug use: No     Sexual activity: Not Currently     Partners: Male   Lifestyle     Physical activity     Days per week: Not on file     Minutes per session: Not on file     Stress: Not on file   Relationships     Social connections     Talks on phone: Not on file     Gets together: Not on file     Attends Gnosticism service: Not on file     Active member of club or organization: Not on file     Attends meetings of clubs or organizations: Not on file     Relationship status: Not on file     Intimate partner violence     Fear of current or ex partner: Not on file     Emotionally abused: Not on file     Physically abused: Not on file     Forced sexual activity: Not on file   Other Topics Concern     Parent/sibling w/ CABG, MI or angioplasty before 65F 55M? Not Asked   Social History Narrative    two children.     Preload 2013     OBJECTIVE:  BP  132/80 (BP Location: Right arm, Patient Position: Sitting, Cuff Size: Adult Regular)   Pulse 102   Temp 98.9  F (37.2  C) (Tympanic)   Resp 16   Wt 106.1 kg (233 lb 12.8 oz)   SpO2 99%   Breastfeeding No   BMI 36.62 kg/m      BP Readings from Last 6 Encounters:   02/10/21 132/80   01/20/21 118/82   12/30/20 122/80   12/09/20 (!) 144/100   11/18/20 138/78   11/11/20 120/74     Wt Readings from Last 4 Encounters:   02/10/21 106.1 kg (233 lb 12.8 oz)   01/20/21 105.2 kg (232 lb)   12/30/20 105.6 kg (232 lb 12.8 oz)   12/09/20 103.5 kg (228 lb 3.2 oz)     EXAM:  Constitutional: Alert. No acute distress. Well-groomed, well-hydrated and well-nourished.  Appears stated age.  Head: Normocephalic, atraumatic.  Frontal and maxillary sinuses are tender to palpation.  Ears: No tenderness on palpation or tugging of the tragus.  Ear canals normal and no wax.  TMs clear with normal bony landmarks of the inner ear, with slight bulging of the TMs bilaterally.  Eyes: EOMI, anicteric  Respiratory: Non-labored respirations.  Clear to auscultation bilaterally  -Cardiovascular: Regular rate and rhythm no murmurs  Musculoskeletal: Moves arms and legs equally and normally.  Psychiatric: Does not appear anxious or depressed.  Appropriate affect and insight.    ASSESSEMENT AND PLAN:    1. Essential hypertension  -Improved but not consistently below goal at home  -Patient will stop the lift off tea drink from [employer] (which has caffeine and other supplements)  -Continue checking blood pressures daily and continue taking lisinopril 40 mg daily  -Weight has been stable  -Schedule visit in 3 to 4 weeks to monitor progress    2.  Acute sinusitis  -Decrease frequency of sinus infection since her sinus surgery August 2020  -Today she meets criteria for bacterial sinus infection  -We will treat with antibiotic and we also discussed doing daily sinus rinses  -If symptoms do not improve within a week she will contact her ENT surgeon    The  majority of the 35 minute patient visit was spent counseling the patient on the above issue/s.    Myrna Fulton MD  [facility]    Portions of this dictation were created using the Dragon Nuance voice recognition system. Proofreading was completed but there may be errors in text.

## 2021-03-03 ENCOUNTER — VIRTUAL VISIT (OUTPATIENT)
Dept: FAMILY MEDICINE | Facility: OTHER | Age: 36
End: 2021-03-03
Attending: FAMILY MEDICINE
Payer: COMMERCIAL

## 2021-03-03 VITALS — DIASTOLIC BLOOD PRESSURE: 87 MMHG | SYSTOLIC BLOOD PRESSURE: 133 MMHG

## 2021-03-03 DIAGNOSIS — L70.0 CYSTIC ACNE: ICD-10-CM

## 2021-03-03 DIAGNOSIS — I10 ESSENTIAL HYPERTENSION: ICD-10-CM

## 2021-03-03 PROCEDURE — 99441 PR PHYSICIAN TELEPHONE EVALUATION 5-10 MIN: CPT | Performed by: FAMILY MEDICINE

## 2021-03-03 PROCEDURE — G0463 HOSPITAL OUTPT CLINIC VISIT: HCPCS | Mod: TEL

## 2021-03-03 RX ORDER — LISINOPRIL 40 MG/1
40 TABLET ORAL DAILY
Qty: 90 TABLET | Refills: 1 | Status: SHIPPED | OUTPATIENT
Start: 2021-03-03 | End: 2022-02-07

## 2021-03-03 ASSESSMENT — PAIN SCALES - GENERAL: PAINLEVEL: NO PAIN (0)

## 2021-03-03 NOTE — NURSING NOTE
"Chief Complaint   Patient presents with     RECHECK     Recheck of BP. States that she stopped drinking the \"LiftOff Tea\" and her readings have been better. States that they have been in the 130's-120's over 80's.     Medication Reconciliation: complete    Sally Antunez, MANDYN    "

## 2021-03-03 NOTE — PROGRESS NOTES
Radha is a 36 year old who is being evaluated via a billable telephone visit.      What phone number would you like to be contacted at? [phone]  How would you like to obtain your AVS? MyChart    Assessment & Plan     Essential hypertension  - improved, now well controlled   - lisinopril (ZESTRIL) 40 MG tablet; Take 1 tablet (40 mg) by mouth daily  - 6 mo refill provided, RTC then    Cystic acne  -still waiting for PA for retinA    Myrna Fulton MD  [facility]    Subjective   Radha is a 36 year old who presents for the following health issues:    HPI   Consistently stopped drinking lift off teas in the AMs. Bps have improved since that time. Now in the 120s/80s. Consistent w/ meds.     130s until 2/15  129/88   130s/80s occassionally  2/27 118/86  133/87  117/89    Sinus infection improved w/ abx.    Review of Systems   Constitutional, HEENT, cardiovascular, pulmonary, GI and  systems are negative, except as otherwise noted.      Objective         Vitals:  No vitals were obtained today due to virtual visit.    Physical Exam   healthy, alert and no distress  PSYCH: Alert and oriented times 3; coherent speech, normal   rate and volume, able to articulate logical thoughts, able   to abstract reason, no tangential thoughts, no hallucinations   or delusions  Her affect is normal  RESP: No cough, no audible wheezing, able to talk in full sentences  Remainder of exam unable to be completed due to telephone visits        Phone call duration: 8 minutes

## 2021-03-23 DIAGNOSIS — K21.9 GASTROESOPHAGEAL REFLUX DISEASE WITHOUT ESOPHAGITIS: ICD-10-CM

## 2021-03-23 NOTE — TELEPHONE ENCOUNTER
[facility]   famotidine (PEPCID) 20 MG tablet  Sig:TAKE 1 TABLET(20 MG) BY MOUTH TWICE DAILY    Last Prescription Date:   11/17/2020  Last Fill Qty/Refills:         60, R-1    Last Office Visit:              02/10/2021 ([place])   Future Office visit:           None noted   H2 Blockers Protocol Passed - 3/23/2021  9:01 AM        Passed - Patient is age 12 or older        Passed - Recent (12 mo) or future (30 days) visit within the authorizing provider's specialty     Patient has had an office visit with the authorizing provider or a provider within the authorizing providers department within the previous 12 mos or has a future within next 30 days. See \"Patient Info\" tab in inbasket, or \"Choose Columns\" in Meds & Orders section of the refill encounter.              Passed - Medication is active on med list         Previously prescribed via Ermelinda Nunez NP. Routing for PCP review. Unable to complete prescription refill per RN Medication Refill Policy.................... Fifi Black RN ....................  3/23/2021   10:23 AM        "

## 2021-03-24 RX ORDER — FAMOTIDINE 20 MG/1
TABLET, FILM COATED ORAL
Qty: 60 TABLET | Refills: 1 | Status: SHIPPED | OUTPATIENT
Start: 2021-03-24 | End: 2021-12-14

## 2021-03-30 DIAGNOSIS — F41.1 GAD (GENERALIZED ANXIETY DISORDER): ICD-10-CM

## 2021-03-30 RX ORDER — ESCITALOPRAM OXALATE 20 MG/1
20 TABLET ORAL DAILY
Qty: 90 TABLET | Refills: 3 | Status: SHIPPED | OUTPATIENT
Start: 2021-03-30 | End: 2022-02-16

## 2021-03-30 NOTE — TELEPHONE ENCOUNTER
" Disp Refills Start End FLORENCIA   escitalopram (LEXAPRO) 20 MG tablet 90 tablet 3 3/29/2020  No   Sig: TAKE 1 TABLET(20 MG) BY MOUTH DAILY       LOV: 3/3/2021  Future Office visit: No future appointment scheduled at this time.     Requested Prescriptions   Pending Prescriptions Disp Refills     escitalopram (LEXAPRO) 20 MG tablet 90 tablet 3     Sig: Take 1 tablet (20 mg) by mouth daily       SSRIs Protocol Passed - 3/30/2021  9:38 AM        Passed - Recent (12 mo) or future (30 days) visit within the authorizing provider's specialty     Patient has had an office visit with the authorizing provider or a provider within the authorizing providers department within the previous 12 mos or has a future within next 30 days. See \"Patient Info\" tab in inbasket, or \"Choose Columns\" in Meds & Orders section of the refill encounter.              Passed - Medication is active on med list        Passed - Patient is age 18 or older        Passed - No active pregnancy on record        Passed - No positive pregnancy test in last 12 months         Jie Shelton RN  ....................  3/30/2021   12:26 PM      "

## 2021-05-27 ENCOUNTER — OFFICE VISIT (OUTPATIENT)
Dept: FAMILY MEDICINE | Facility: OTHER | Age: 36
End: 2021-05-27
Attending: FAMILY MEDICINE
Payer: COMMERCIAL

## 2021-05-27 VITALS
RESPIRATION RATE: 16 BRPM | BODY MASS INDEX: 36.21 KG/M2 | TEMPERATURE: 98.7 F | WEIGHT: 231.2 LBS | OXYGEN SATURATION: 99 % | DIASTOLIC BLOOD PRESSURE: 76 MMHG | SYSTOLIC BLOOD PRESSURE: 132 MMHG | HEART RATE: 65 BPM

## 2021-05-27 DIAGNOSIS — M77.10 LATERAL EPICONDYLITIS, UNSPECIFIED LATERALITY: ICD-10-CM

## 2021-05-27 DIAGNOSIS — E66.01 MORBID OBESITY (H): ICD-10-CM

## 2021-05-27 PROCEDURE — G0463 HOSPITAL OUTPT CLINIC VISIT: HCPCS

## 2021-05-27 PROCEDURE — 99213 OFFICE O/P EST LOW 20 MIN: CPT | Performed by: FAMILY MEDICINE

## 2021-05-27 ASSESSMENT — PAIN SCALES - GENERAL: PAINLEVEL: MILD PAIN (3)

## 2021-05-27 NOTE — PATIENT INSTRUCTIONS
Patient Education     Understanding Lateral Epicondylitis     Tendons are strong bands of tissue that connect muscles to bones. Lateral epicondylitis affects the tendons that connect muscles in the forearm to the lateral epicondyle. This is the bony knob on the outer side of the elbow. The condition occurs if the extensor tendons of the wrist become painful and swollen (irritated). This can cause pain in the elbow, forearm, and wrist. Because the condition is sometimes caused by playing tennis, it's also known as  tennis elbow.     How to say it  [id number]   What causes lateral epicondylitis?  The condition most often occurs because of overuse. This can be from any activity that repeatedly puts stress on the forearm extensor muscles or tendons and wrist. For instance, playing tennis, lifting weights, cutting meat, painting, and typing can all cause the condition. Wear and tear of the tendons from aging or an injury to the tendons can also cause the condition.   Symptoms of lateral epicondylitis  The most common symptom is pain. You may feel it on the outer side of the elbow and down the back of the forearm. It may be worse when moving or using the elbow, forearm, or wrist. You may also feel pain when gripping or lifting things.   Treatment for lateral epicondylitis  Treatments may include:    Resting the elbow, forearm, and wrist. You ll need to avoid movements that can make your symptoms worse. You also may need to avoid certain sports and types of work for a time. This helps relieve symptoms and prevent further damage to the tendons.    Changing the action that caused the problem. For instance, if the tendons were damaged from playing tennis, it may help to change your playing technique or use different equipment. This helps prevent further damage to the tendons.    Using cold packs. Putting an ice pack on the injured area can help reduce pain and swelling.    Taking pain medicines. Taking  prescription or over-the-counter pain medicines may help reduce pain and swelling.      Wearing a brace. This helps reduce strain on the muscles and tendons in the forearm, which may relieve symptoms. It's very important to wear the brace properly.    Doing exercises and physical therapy. These help improve strength and range of motion in the elbow, forearm, and wrist.    Getting shots of medicine into the injured area. These may help relieve symptoms for a time.    Having surgery. This may be an option if other treatments fail to relieve symptoms. In many cases, the surgeon removes the damaged tissue.  Possible complications of lateral epicondylitis  If the tendons involved don t heal properly, symptoms may return or get worse. To help prevent this, follow your treatment plan as directed.   When to call your healthcare provider  Call your healthcare provider right away if you have any of these:    Fever of 100.4 F (38 C) or higher, or as directed by your provider    Chills    Redness, swelling, or warmth in the elbow or forearm that gets worse    Symptoms that don t get better with treatment, or get worse    New symptoms  [employer] last reviewed this educational content on 6/1/2019 2000-2021 [employer]. All rights reserved. This information is not intended as a substitute for professional medical care. Always follow your healthcare professional's instructions.           Patient Education     Treating Tennis Elbow    Your treatment will depend on how inflamed your tendon is. The goal is to ease your symptoms and help you regain full use of your elbow.  Rest and medicine  Wear a tennis elbow splint to let the inflamed tendon rest and heal. It must be worn correctly. It should be placed down the arm past the painful area of the elbow. It can make symptoms worse if it's directly over the inflamed tendon. Take stress off the tendon by using your other hand or changing your . Take NSAIDs  (nonsteroidal anti-inflammatory drugs) and use ice to ease pain and swelling.  Exercises and therapy  Your healthcare provider may give you an exercise program. He or she may send you to a physical therapist. That expert will teach you how to gently stretch and strengthen the muscles around your elbow.  Anti-inflammatory shots  Your healthcare provider may give you shots of an anti-inflammatory medicine such as cortisone. This helps ease swelling. You may have more pain at first. But your elbow should feel better in a few days.  If surgery is needed  If your symptoms go on for a long time, or other treatments don t work, your healthcare provider may recommend surgery. Surgery repairs the inflamed tendon.  [employer] last reviewed this educational content on 1/1/2018 2000-2021 [employer]. All rights reserved. This information is not intended as a substitute for professional medical care. Always follow your healthcare professional's instructions.

## 2021-05-27 NOTE — NURSING NOTE
Chief Complaint   Patient presents with     Pain     L elbow     L elbow pain for about 4 weeks or so. No known injury. Applying ice and heat with some relief. Also taking ibuprofen. No swelling.     Medication Reconciliation: complete    Sally Antunez LPN

## 2021-05-27 NOTE — PROGRESS NOTES
Nursing Notes:   Sally Antunez LPN  5/27/2021  2:56 PM  Signed  Chief Complaint   Patient presents with     Pain     L elbow     L elbow pain for about 4 weeks or so. No known injury. Applying ice and heat with some relief. Also taking ibuprofen. No swelling.     Medication Reconciliation: complete    Sally TRUONG. INES Antunez         SUBJECTIVE:  HPI: Radha Christensen is a 36 year old female here for left elbow pain for the past 3 to 4 weeks.    Pain is rated 3 out of 10.  Throbs and aches. Feels like its on the bone. Doesn't hurt with touch. Ice/ heat have helped. At home holds in an upward position. Over the four weeks hasn't gotten better. No fevers, chills, other joint pain. Some radiation up the arm. Right handed. Does lean onto her left side while working. Hurts with pressure. Normal ROM. No surgery/injuries.     Blood pressures have been well controlled at home since last visit.    Allergies:  Allergies   Allergen Reactions     Cefaclor Unknown     ROS:  Constitutional, HEENT, cardiovascular, pulmonary, GI and  systems are negative, except as otherwise noted.    Past medical, surgical, and family history reviewed and updated as appropriate in the chart.  Relevant social history listed in HPI.    OBJECTIVE:  /76 (BP Location: Right arm, Patient Position: Sitting, Cuff Size: Adult Regular)   Pulse 65   Temp 98.7  F (37.1  C) (Tympanic)   Resp 16   Wt 104.9 kg (231 lb 3.2 oz)   SpO2 99%   Breastfeeding No   BMI 36.21 kg/m      EXAM:  Constitutional: No acute distress. Well-groomed, well-hydrated and well-nourished.  Appears stated age.  Head: Normocephalic, atraumatic.  Eyes: EOMI, anicteric  Respiratory: Non-labored respirations.  Skin: Warm, dry, intact.  No concerning rashes or lesions.  Musculoskeletal: Moves arms and legs equally and normally.  Normal tone and strength throughout.  Pain over the left lateral epicondyle, radiating slightly down towards the wrist.  No joint effusion or bony  deformities.  Normal range of motion.  No joint warmth.  Neurologic: A+Ox3. CN 2-12 grossly intact. Normal gait. No focal motor or sensory deficits. No tremor.  Psychiatric: Does not appear anxious or depressed.  Appropriate affect and insight.    ASSESSMENT/PLAN:   1. Lateral epicondylitis, unspecified laterality  Comment: Handout given and discussed with patient.  Plan: Supportive care for 4 to 6 weeks.  Tennis elbow brace recommended.  If no improvement consider physical therapy.    2. Morbid obesity (H)  -Known issue that I take into account for their medical decision management  -No current exacerbations or new concerns      Myrna Fulton MD  [facility]

## 2021-06-07 ENCOUNTER — MYC MEDICAL ADVICE (OUTPATIENT)
Dept: FAMILY MEDICINE | Facility: OTHER | Age: 36
End: 2021-06-07

## 2021-06-09 ENCOUNTER — OFFICE VISIT (OUTPATIENT)
Dept: FAMILY MEDICINE | Facility: OTHER | Age: 36
End: 2021-06-09
Attending: PHYSICIAN ASSISTANT
Payer: COMMERCIAL

## 2021-06-09 ENCOUNTER — HOSPITAL ENCOUNTER (OUTPATIENT)
Dept: GENERAL RADIOLOGY | Facility: OTHER | Age: 36
End: 2021-06-09
Attending: PHYSICIAN ASSISTANT
Payer: COMMERCIAL

## 2021-06-09 VITALS
BODY MASS INDEX: 35.87 KG/M2 | DIASTOLIC BLOOD PRESSURE: 70 MMHG | SYSTOLIC BLOOD PRESSURE: 132 MMHG | RESPIRATION RATE: 16 BRPM | WEIGHT: 229 LBS | TEMPERATURE: 98.4 F | OXYGEN SATURATION: 97 % | HEART RATE: 100 BPM

## 2021-06-09 DIAGNOSIS — R53.83 FATIGUE, UNSPECIFIED TYPE: ICD-10-CM

## 2021-06-09 DIAGNOSIS — R14.0 ABDOMINAL BLOATING WITH CRAMPS: Primary | ICD-10-CM

## 2021-06-09 DIAGNOSIS — N92.6 IRREGULAR MENSES: ICD-10-CM

## 2021-06-09 DIAGNOSIS — R14.0 ABDOMINAL BLOATING WITH CRAMPS: ICD-10-CM

## 2021-06-09 DIAGNOSIS — R10.9 ABDOMINAL BLOATING WITH CRAMPS: ICD-10-CM

## 2021-06-09 DIAGNOSIS — R10.9 ABDOMINAL BLOATING WITH CRAMPS: Primary | ICD-10-CM

## 2021-06-09 LAB
ALBUMIN UR-MCNC: NEGATIVE MG/DL
ANION GAP SERPL CALCULATED.3IONS-SCNC: 7 MMOL/L (ref 3–14)
APPEARANCE UR: CLEAR
BASOPHILS # BLD AUTO: 0 10E9/L (ref 0–0.2)
BASOPHILS NFR BLD AUTO: 0.4 %
BILIRUB UR QL STRIP: NEGATIVE
BUN SERPL-MCNC: 17 MG/DL (ref 7–25)
CALCIUM SERPL-MCNC: 8.9 MG/DL (ref 8.6–10.3)
CHLORIDE SERPL-SCNC: 108 MMOL/L (ref 98–107)
CO2 SERPL-SCNC: 23 MMOL/L (ref 21–31)
COLOR UR AUTO: YELLOW
CREAT SERPL-MCNC: 0.69 MG/DL (ref 0.6–1.2)
DIFFERENTIAL METHOD BLD: NORMAL
EOSINOPHIL # BLD AUTO: 0.2 10E9/L (ref 0–0.7)
EOSINOPHIL NFR BLD AUTO: 2.5 %
ERYTHROCYTE [DISTWIDTH] IN BLOOD BY AUTOMATED COUNT: 12.7 % (ref 10–15)
GFR SERPL CREATININE-BSD FRML MDRD: >90 ML/MIN/{1.73_M2}
GLUCOSE SERPL-MCNC: 97 MG/DL (ref 70–105)
GLUCOSE UR STRIP-MCNC: NEGATIVE MG/DL
HCG UR QL: NEGATIVE
HCT VFR BLD AUTO: 37.3 % (ref 35–47)
HGB BLD-MCNC: 12.2 G/DL (ref 11.7–15.7)
HGB UR QL STRIP: NEGATIVE
IMM GRANULOCYTES # BLD: 0 10E9/L (ref 0–0.4)
IMM GRANULOCYTES NFR BLD: 0.4 %
KETONES UR STRIP-MCNC: NEGATIVE MG/DL
LEUKOCYTE ESTERASE UR QL STRIP: NEGATIVE
LYMPHOCYTES # BLD AUTO: 1.6 10E9/L (ref 0.8–5.3)
LYMPHOCYTES NFR BLD AUTO: 21.5 %
MCH RBC QN AUTO: 28 PG (ref 26.5–33)
MCHC RBC AUTO-ENTMCNC: 32.7 G/DL (ref 31.5–36.5)
MCV RBC AUTO: 86 FL (ref 78–100)
MONOCYTES # BLD AUTO: 0.5 10E9/L (ref 0–1.3)
MONOCYTES NFR BLD AUTO: 6.3 %
NEUTROPHILS # BLD AUTO: 5.3 10E9/L (ref 1.6–8.3)
NEUTROPHILS NFR BLD AUTO: 68.9 %
NITRATE UR QL: NEGATIVE
PH UR STRIP: 6 PH (ref 5–7)
PLATELET # BLD AUTO: 279 10E9/L (ref 150–450)
POTASSIUM SERPL-SCNC: 4.5 MMOL/L (ref 3.5–5.1)
RBC # BLD AUTO: 4.36 10E12/L (ref 3.8–5.2)
SODIUM SERPL-SCNC: 138 MMOL/L (ref 134–144)
SOURCE: NORMAL
SP GR UR STRIP: 1.01 (ref 1–1.03)
TSH SERPL DL<=0.05 MIU/L-ACNC: 1.46 IU/ML (ref 0.34–5.6)
UROBILINOGEN UR STRIP-MCNC: NORMAL MG/DL (ref 0–2)
WBC # BLD AUTO: 7.6 10E9/L (ref 4–11)

## 2021-06-09 PROCEDURE — G0463 HOSPITAL OUTPT CLINIC VISIT: HCPCS

## 2021-06-09 PROCEDURE — 74019 RADEX ABDOMEN 2 VIEWS: CPT

## 2021-06-09 PROCEDURE — 81025 URINE PREGNANCY TEST: CPT | Mod: ZL | Performed by: PHYSICIAN ASSISTANT

## 2021-06-09 PROCEDURE — 80048 BASIC METABOLIC PNL TOTAL CA: CPT | Mod: ZL | Performed by: PHYSICIAN ASSISTANT

## 2021-06-09 PROCEDURE — 99213 OFFICE O/P EST LOW 20 MIN: CPT | Performed by: PHYSICIAN ASSISTANT

## 2021-06-09 PROCEDURE — 84443 ASSAY THYROID STIM HORMONE: CPT | Mod: ZL | Performed by: PHYSICIAN ASSISTANT

## 2021-06-09 PROCEDURE — 36415 COLL VENOUS BLD VENIPUNCTURE: CPT | Mod: ZL | Performed by: PHYSICIAN ASSISTANT

## 2021-06-09 PROCEDURE — G0463 HOSPITAL OUTPT CLINIC VISIT: HCPCS | Mod: 25

## 2021-06-09 PROCEDURE — 81003 URINALYSIS AUTO W/O SCOPE: CPT | Performed by: PHYSICIAN ASSISTANT

## 2021-06-09 PROCEDURE — 85025 COMPLETE CBC W/AUTO DIFF WBC: CPT | Mod: ZL | Performed by: PHYSICIAN ASSISTANT

## 2021-06-09 ASSESSMENT — PAIN SCALES - GENERAL: PAINLEVEL: MODERATE PAIN (4)

## 2021-06-09 NOTE — PATIENT INSTRUCTIONS
Constipation - Encouraged increase of water and apple, pear and prune juice to decrease symptoms and discomfort.  Use age appropriate over the counter medications such as stool softeners or miralax for constipation relief.  Encouraged soft stools. Return to clinic with change/worsening of symptoms.   Encouraged to try miralax 1 cap up to twice daily as needed over the next 3 days.       Patient Education     Constipation (Adult)  Constipation means that you have bowel movements that are less frequent than usual. Stools often become very hard and difficult to pass.  Constipation is very common. At some point in life, it affects almost everyone. Since everyone's bowel habits are different, what is constipation to one person may not be to another. Your healthcare provider may do tests to diagnose constipation. It depends on what he or she finds when evaluating you.    Symptoms of constipation include:    Abdominal pain    Bloating    Vomiting    Painful bowel movements    Itching, swelling, bleeding, or pain around the anus  Causes  Constipation can have many causes. These include:    Diet low in fiber    Too much dairy    Not drinking enough liquids    Lack of exercise or physical activity (especially true for older adults)    Changes in lifestyle or daily routine, including pregnancy, aging, work, and travel    Frequent use or misuse of laxatives    Ignoring the urge to have a bowel movement or delaying it until later    Medicines, such as certain prescription pain medicines, iron supplements, antacids, certain antidepressants, and calcium supplements    Diseases like irritable bowel syndrome, bowel obstructions, stroke, diabetes, thyroid disease, Parkinson disease, hemorrhoids, and colon cancer  Complications  Potential complications of constipation can include:    Hemorrhoids    Rectal bleeding from hemorrhoids or anal fissures (skin tears)    Hernias    Dependency on laxatives    Chronic constipation    Fecal  impaction, a severe form of constipation in which a large amount of hard stool is in your rectum that you can't pass    Bowel obstruction or perforation  Home care  All treatment should be done after talking with your healthcare provider. This is especially true if you have another medical problems, are taking prescription medicines, or are an older adult. Treatment most often involves lifestyle changes. You may also need medicines. Your healthcare provider will tell you which will work best for you. Follow the advice below to help avoid this problem in the future.  Lifestyle changes  These lifestyle changes can help prevent constipation:    Diet. Eat a high-fiber diet, with fresh fruit and vegetables, and reduce dairy intake, meats, and processed foods    Fluids. It's important to get enough fluids each day. Drink plenty of water when you eat more fiber. If you are on diet that limits the amount of fluid you can have, talk about this with your healthcare provider.    Regular exercise. Check with your healthcare provider first.  Medicines  Take any medicines as directed. Some laxatives are safe to use only every now and then. Others can be taken on a regular basis. While laxatives don't cause bowel dependence, they are treating the symptoms. So your constipation may return if you don't make other changes. Talk with your healthcare provider or pharmacist if you have questions.  Prescription pain medicines can cause constipation. If you are taking this kind of medicine, ask your healthcare provider if you should also take a stool softener.  Medicines you may take to treat constipation include:    Fiber supplements    Stool softeners    Laxatives    Enemas    Rectal suppositories  Follow-up care  Follow up with your healthcare provider if symptoms don't get better in the next few days. You may need to have more tests or see a specialist.  Call 911  Call 911 if any of these occur:    Trouble breathing    Stiff, rigid  abdomen that is severely painful to touch    Confusion    Fainting or loss of consciousness    Rapid heart rate    Chest pain  When to seek medical advice  Call your healthcare provider right away if any of these occur:    Fever of 100.4 F (38 C) or higher, or as directed by your healthcare provider    Failure to resume normal bowel movements    Pain in your abdomen or back gets worse    Nausea or vomiting    Swelling in your abdomen    Blood in the stool    Black, tarry stool    Involuntary weight loss    Weakness  [employer] last reviewed this educational content on 6/1/2018 2000-2021 [employer]. All rights reserved. This information is not intended as a substitute for professional medical care. Always follow your healthcare professional's instructions.           Patient Education     Treating Constipation  Constipation is a common and often uncomfortable problem. Constipation means you have bowel movements fewer than 3 times per week. Or that you strain to pass hard, dry stool. It can last a short time. Or it can be a problem that never seems to go away. The good news is that it can often be treated and controlled.   Eat more fiber  One of the best ways to help treat constipation is to increase your fiber intake. You can do this either through diet or by using fiber supplements. Fiber (in whole grains, fruits, and vegetables) adds bulk and absorbs water to soften the stool. This helps the stool pass through the colon more easily. When you increase your fiber intake, do it slowly to prevent side effects such as bloating. Also increase the amount of water that you drink. Eating more of these foods can add fiber to your diet:     High-fiber cereals    Whole grains, bran, and brown rice    Vegetables such as carrots, broccoli, and greens    Fresh fruits (especially apples, pears, and dried fruits such as raisins and apricots)    Nuts and legumes (especially beans such as lentils, kidney beans, and  lima beans)  Get physically active  Exercise helps improve the working of your colon which helps ease constipation. Try to get some physical activity every day. If you haven t been active for a while, talk with your healthcare provider before starting again.     Laxatives  Your healthcare provider may suggest an over-the-counter product to help ease your constipation. He or she may suggest the use of bulk-forming agents or laxatives. Laxatives, if used as directed, are common and safe. Follow directions carefully when using them. See your provider for new-onset constipation, or long-term constipation, to rule out other causes such as medicines or thyroid disease.   [employer] last reviewed this educational content on 6/1/2019 2000-2021 [employer]. All rights reserved. This information is not intended as a substitute for professional medical care. Always follow your healthcare professional's instructions.           Patient Education     Eating a High-Fiber Diet  Fiber is what gives strength and structure to plants. Most grains, beans, vegetables, and fruits contain fiber. Foods rich in fiber are often low in calories and fat, and they fill you up more. They may also reduce your risks for certain health problems. To find out the amount of fiber in canned, packaged, or frozen foods, read the Nutrition Facts label. It tells you how much fiber is in one serving.    Types of fiber and their benefits  There are two types of fiber: insoluble and soluble. They both aid digestion and help you maintain a healthy weight.    Insoluble fiber. This is found in whole grains, cereals, certain fruits and vegetables such as apple skin, corn, and carrots. Insoluble fiber may prevent constipation and reduce the risk for certain types of cancer. It's called insoluble because it doesn't dissolve in water.    Soluble fiber. This type of fiber is in oats, beans, and certain fruits and vegetables such as strawberries and  peas. Soluble fiber can reduce cholesterol, which may help lower the risk for heart disease. It also helps control blood sugar levels.  Look for high-fiber foods  Try these foods to add fiber to your diet:    Whole-grain breads and cereals. Try to eat 6 to 8 ounces a day. Include wheat and oat bran cereals, whole-wheat muffins or toast, and corn tortillas in your meals.    Fruits. Try to eat 2 cups a day. Apples, oranges, strawberries, pears, and bananas are good sources. (Note: Fruit juice is low in fiber.)    Vegetables. Try to eat at least 2.5 cups a day. Add asparagus, carrots, broccoli, peas, and corn to your meals.    Beans. One cup of cooked lentils gives you over 15 grams of fiber. Try navy beans, lentils, and chickpeas.    Seeds. A small handful of seeds gives you about 3 grams of fiber. Try sunflower or joaquina seeds.  Keep track of your fiber  Keep track of how much fiber you eat. Start by reading food labels. Then eat a variety of foods high in fiber. As you start to eat more fiber, ask your healthcare provider how much water you should be drinking to keep your digestive system working smoothly.  Aim for a certain amount of fiber in your diet each day. The daily value for fiber is 25 grams a day. But some experts advise that women under 50 eat 25 to 28 grams per day and that men under 50 eat 30 to 38 grams per day. After age 50, your daily fiber needs drop to 22 grams for women and 28 grams for men.  Before you reach for the fiber supplements, think about this. Fiber is found naturally in healthy whole foods. It gives you that feeling of fullness after you eat. Taking fiber supplements or eating fiber-enriched foods will not give you this full feeling.  Your fiber intake is a good measure for the quality of your overall diet. If you are missing out on your daily amount of fiber, you may be lacking other important nutrients as well.  [employer] last reviewed this educational content on 7/1/2019 2000-2021  [employer]. All rights reserved. This information is not intended as a substitute for professional medical care. Always follow your healthcare professional's instructions.

## 2021-06-09 NOTE — NURSING NOTE
Chief Complaint   Patient presents with     Gas     Bloating and cramping for about a week now. Belly distended. No diarrhea or constipation. Normal gas. Has beent aking Levy for 2 days.     Medication Reconciliation: complete    Sally Antunez, LPN

## 2021-06-09 NOTE — PROGRESS NOTES
Nursing Notes:   Sally Antunez LPN  2021 10:50 AM  Signed  Chief Complaint   Patient presents with     Gas     Bloating and cramping for about a week now. Belly distended. No diarrhea or constipation. Normal gas. Has beent aking Levy for 2 days.     Medication Reconciliation: complete    Sally Antunez LPN        HPI:    Radha Christensen is a 36 year old female who presents for bloating and cramping for about a week now.  She is concerned that her belly feels a little distended.  Does not feel like she is having a lot of constipation or diarrhea.  Has had increased gas.  Has been taking Beano over the last 2 days.  Noticed a rash on her upper belly this morning.  It is not itchy or raised.  Having soft bowel movements.  Typically goes once in the morning.  Gassy from time to time.  Has a little lower belly cramping.  Currently taking birth control pills.  Last menstrual cycle was on .  Missed her April menstrual cycle.  Her last period was light.    Mom and brother have history of thyroid disease.  Patient would like labs to rule out concerns.    Past Medical History:   Diagnosis Date     Concussion 2011    Overview:  no loss of consciousness from softball injury     Personal history of other medical treatment (CODE)      2, Para 2       Past Surgical History:   Procedure Laterality Date     ENDOSCOPIC SINUS SURGERY Bilateral 2020    Procedure: Bilateral Endoscopic Sinus Surgery with polypectomy;  Surgeon: Karen Sanchez MD;  Location: [facility]     ESOPHAGOSCOPY, GASTROSCOPY, DUODENOSCOPY (EGD), COMBINED N/A 2019    Procedure: ESOPHAGOGASTRODUODENOSCOPY, WITH BIOPSY;  Surgeon: Emigdio Goldberg MD;  Location:  OR     RECONSTRUCT NOSE AND SEPTUM (FUNCTIONAL) Bilateral 2020    Procedure: Nasal Valve Repair(LATERA);  Surgeon: Karen Sanchez MD;  Location: [facility]     SEPTOPLASTY, TURBINOPLASTY, COMBINED N/A 2020    Procedure: Septoplasty Turbinate Reduction;   Surgeon: Karen Sanchez MD;  Location: [facility]     wisdom teeth          Family History   Problem Relation Age of Onset     Heart Disease Maternal Grandfather 50        Heart Disease     Diabetes Maternal Grandfather         Diabetes     Chronic Obstructive Pulmonary Disease Maternal Grandfather         COPD     Diabetes Mother         Diabetes     Thyroid Disease Mother         Thyroid Disease     Heart Disease Mother      Heart Disease Maternal Uncle      Thyroid Disease Brother         Thyroid Disease     Diabetes Brother 38        Diabetes     Diabetes Maternal Uncle         Diabetes     Heart Disease Maternal Uncle      Heart Disease Maternal Grandmother      Breast Cancer No family hx of         Cancer-breast     Ovarian Cancer No family hx of         Cancer-ovarian     Prostate Cancer No family hx of         Cancer-prostate     Other - See Comments No family hx of         Stroke     Colon Cancer No family hx of         Cancer-colon     Blood Disease No family hx of         Blood Disease       Social History     Tobacco Use     Smoking status: Former Smoker     Years: 10.00     Types: Cigarettes     Quit date: 2013     Years since quittin.4     Smokeless tobacco: Never Used   Substance Use Topics     Alcohol use: Not Currently     Comment: Quit 2019       Current Outpatient Medications   Medication Sig Dispense Refill     albuterol (PROAIR HFA/PROVENTIL HFA/VENTOLIN HFA) 108 (90 Base) MCG/ACT inhaler Inhale 2 puffs into the lungs every 4 hours as needed for shortness of breath / dyspnea 1 Inhaler 1     escitalopram (LEXAPRO) 20 MG tablet Take 1 tablet (20 mg) by mouth daily 90 tablet 3     famotidine (PEPCID) 20 MG tablet TAKE 1 TABLET(20 MG) BY MOUTH TWICE DAILY 60 tablet 1     lisinopril (ZESTRIL) 40 MG tablet Take 1 tablet (40 mg) by mouth daily 90 tablet 1     loratadine (CLARITIN) 10 MG tablet Take 10 mg by mouth daily       LORazepam (ATIVAN) 0.5 MG tablet Take 1 tablet (0.5 mg) by mouth  every 6 hours as needed for anxiety . Encouraged to take 1 hour prior to the airplane flight 4 tablet 0     tretinoin (RETIN-A) 0.05 % external cream Apply topically At Bedtime 45 g 0     vitamin D3 (CHOLECALCIFEROL) 50 mcg (2000 units) tablet Take 1 tablet (50 mcg) by mouth daily       drospirenone-ethinyl estradiol (DILSHAD) 3-0.02 MG tablet Take 1 tablet by mouth daily 84 tablet 3       Allergies   Allergen Reactions     Cefaclor Unknown       REVIEW OF SYSTEMS:  Refer to HPI.    EXAM:   Vitals:    /70 (BP Location: Right arm, Patient Position: Sitting, Cuff Size: Adult Regular)   Pulse 100   Temp 98.4  F (36.9  C) (Tympanic)   Resp 16   Wt 103.9 kg (229 lb)   LMP 05/27/2021   SpO2 97%   Breastfeeding No   BMI 35.87 kg/m      General Appearance: Pleasant, alert, appropriate appearance for age. No acute distress  Chest/Respiratory Exam: Normal chest wall and respirations. Clear to auscultation.  Cardiovascular Exam: Regular rate and rhythm. S1, S2, no murmur, click, gallop, or rubs.  Gastrointestinal Exam: Soft, no masses or organomegaly. Normal BS x 4.  No rebound tenderness or guarding appreciated.  No CVA tenderness to palpation.  Mild abdominal pain with palpation throughout the abdomen.  Skin: Lightly erythematous pinpoint papules appreciated on the superior abdomen across the belly.  No open wound, pus, drainage, warmth.  Psychiatric Exam: Alert and oriented - appropriate affect.    PHQ Depression Screen  PHQ-9 SCORE 1/24/2019 12/19/2019 10/28/2020   PHQ-9 Total Score 0 4 2     Labs:   Results for orders placed or performed during the hospital encounter of 06/09/21   XR Abdomen 2 Views     Status: None    Narrative    PROCEDURE: XR ABDOMEN 2VIEWS 6/9/2021 12:01 PM    HISTORY: Abdominal bloating with cramps; Abdominal bloating with  cramps; Irregular menses; Fatigue, unspecified type    COMPARISONS: None.    TECHNIQUE: Flat and upright    FINDINGS: The intestinal gas pattern is normal. There is  no  extraluminal gas or pathologic intra-abdominal calcifications.         Impression    IMPRESSION: Normal abdominal gas pattern    BENNIE IBARRA MD   UA reflex to Microscopic     Status: None   Result Value Ref Range    Color Urine Yellow     Appearance Urine Clear     Glucose Urine Negative NEG^Negative mg/dL    Bilirubin Urine Negative NEG^Negative    Ketones Urine Negative NEG^Negative mg/dL    Specific Gravity Urine 1.007 1.003 - 1.035    Blood Urine Negative NEG^Negative    pH Urine 6.0 5.0 - 7.0 pH    Protein Albumin Urine Negative NEG^Negative mg/dL    Urobilinogen mg/dL Normal 0.0 - 2.0 mg/dL    Nitrite Urine Negative NEG^Negative    Leukocyte Esterase Urine Negative NEG^Negative    Source Midstream Urine    Results for orders placed or performed in visit on 06/09/21   Pregnancy, Urine (HCG)     Status: None   Result Value Ref Range    HCG Qual Urine Negative NEG^Negative   TSH Reflex GH     Status: None   Result Value Ref Range    TSH Reflex 1.46 0.34 - 5.60 IU/mL   Basic Metabolic Panel     Status: Abnormal   Result Value Ref Range    Sodium 138 134 - 144 mmol/L    Potassium 4.5 3.5 - 5.1 mmol/L    Chloride 108 (H) 98 - 107 mmol/L    Carbon Dioxide 23 21 - 31 mmol/L    Anion Gap 7 3 - 14 mmol/L    Glucose 97 70 - 105 mg/dL    Urea Nitrogen 17 7 - 25 mg/dL    Creatinine 0.69 0.60 - 1.20 mg/dL    GFR Estimate >90 >60 mL/min/[1.73_m2]    GFR Estimate If Black >90 >60 mL/min/[1.73_m2]    Calcium 8.9 8.6 - 10.3 mg/dL   CBC and Differential     Status: None   Result Value Ref Range    WBC 7.6 4.0 - 11.0 10e9/L    RBC Count 4.36 3.8 - 5.2 10e12/L    Hemoglobin 12.2 11.7 - 15.7 g/dL    Hematocrit 37.3 35.0 - 47.0 %    MCV 86 78 - 100 fl    MCH 28.0 26.5 - 33.0 pg    MCHC 32.7 31.5 - 36.5 g/dL    RDW 12.7 10.0 - 15.0 %    Platelet Count 279 150 - 450 10e9/L    Diff Method Automated Method     % Neutrophils 68.9 %    % Lymphocytes 21.5 %    % Monocytes 6.3 %    % Eosinophils 2.5 %    % Basophils 0.4 %    %  Immature Granulocytes 0.4 %    Absolute Neutrophil 5.3 1.6 - 8.3 10e9/L    Absolute Lymphocytes 1.6 0.8 - 5.3 10e9/L    Absolute Monocytes 0.5 0.0 - 1.3 10e9/L    Absolute Eosinophils 0.2 0.0 - 0.7 10e9/L    Absolute Basophils 0.0 0.0 - 0.2 10e9/L    Abs Immature Granulocytes 0.0 0 - 0.4 10e9/L      ASSESSMENT AND PLAN:      ICD-10-CM    1. Abdominal bloating with cramps  R14.0 Pregnancy, Urine (HCG)    R10.9 CBC and Differential     Basic Metabolic Panel     TSH Reflex GH     XR Abdomen 2 Views     TSH Reflex GH     Basic Metabolic Panel     CBC and Differential     CANCELED: UA reflex to Microscopic   2. Irregular menses  N92.6 Pregnancy, Urine (HCG)     CBC and Differential     Basic Metabolic Panel     TSH Reflex GH     XR Abdomen 2 Views     TSH Reflex GH     Basic Metabolic Panel     CBC and Differential     CANCELED: UA reflex to Microscopic   3. Fatigue, unspecified type  R53.83 Pregnancy, Urine (HCG)     CBC and Differential     Basic Metabolic Panel     TSH Reflex GH     XR Abdomen 2 Views     TSH Reflex GH     Basic Metabolic Panel     CBC and Differential     CANCELED: UA reflex to Microscopic         Completed abdominal xray.  I personally reviewed the xray. I found mild constipation concerns appreciated upon initial read of xray.  Final read pending by radiology.    Complete labs to rule out concerns including CBC, BMP, TSH, UPT, and urinalysis.  Labs are unremarkable.  No infection concerns are appreciated.    Symptoms likely due to mild constipation.  Constipation - Encouraged increase of water and apple, pear and prune juice to decrease symptoms and discomfort.  Use age appropriate over the counter medications such as stool softeners or miralax for constipation relief.  Encouraged soft stools. Return to clinic with change/worsening of symptoms.   Encouraged to try miralax 1 cap up to twice daily as needed over the next 3 days.     Gave warning signs and symptoms.  Recheck as needed.  Gave patient  education.    Patient Instructions   Constipation - Encouraged increase of water and apple, pear and prune juice to decrease symptoms and discomfort.  Use age appropriate over the counter medications such as stool softeners or miralax for constipation relief.  Encouraged soft stools. Return to clinic with change/worsening of symptoms.   Encouraged to try miralax 1 cap up to twice daily as needed over the next 3 days.       Patient Education     Constipation (Adult)  Constipation means that you have bowel movements that are less frequent than usual. Stools often become very hard and difficult to pass.  Constipation is very common. At some point in life, it affects almost everyone. Since everyone's bowel habits are different, what is constipation to one person may not be to another. Your healthcare provider may do tests to diagnose constipation. It depends on what he or she finds when evaluating you.    Symptoms of constipation include:    Abdominal pain    Bloating    Vomiting    Painful bowel movements    Itching, swelling, bleeding, or pain around the anus  Causes  Constipation can have many causes. These include:    Diet low in fiber    Too much dairy    Not drinking enough liquids    Lack of exercise or physical activity (especially true for older adults)    Changes in lifestyle or daily routine, including pregnancy, aging, work, and travel    Frequent use or misuse of laxatives    Ignoring the urge to have a bowel movement or delaying it until later    Medicines, such as certain prescription pain medicines, iron supplements, antacids, certain antidepressants, and calcium supplements    Diseases like irritable bowel syndrome, bowel obstructions, stroke, diabetes, thyroid disease, Parkinson disease, hemorrhoids, and colon cancer  Complications  Potential complications of constipation can include:    Hemorrhoids    Rectal bleeding from hemorrhoids or anal fissures (skin tears)    Hernias    Dependency on  laxatives    Chronic constipation    Fecal impaction, a severe form of constipation in which a large amount of hard stool is in your rectum that you can't pass    Bowel obstruction or perforation  Home care  All treatment should be done after talking with your healthcare provider. This is especially true if you have another medical problems, are taking prescription medicines, or are an older adult. Treatment most often involves lifestyle changes. You may also need medicines. Your healthcare provider will tell you which will work best for you. Follow the advice below to help avoid this problem in the future.  Lifestyle changes  These lifestyle changes can help prevent constipation:    Diet. Eat a high-fiber diet, with fresh fruit and vegetables, and reduce dairy intake, meats, and processed foods    Fluids. It's important to get enough fluids each day. Drink plenty of water when you eat more fiber. If you are on diet that limits the amount of fluid you can have, talk about this with your healthcare provider.    Regular exercise. Check with your healthcare provider first.  Medicines  Take any medicines as directed. Some laxatives are safe to use only every now and then. Others can be taken on a regular basis. While laxatives don't cause bowel dependence, they are treating the symptoms. So your constipation may return if you don't make other changes. Talk with your healthcare provider or pharmacist if you have questions.  Prescription pain medicines can cause constipation. If you are taking this kind of medicine, ask your healthcare provider if you should also take a stool softener.  Medicines you may take to treat constipation include:    Fiber supplements    Stool softeners    Laxatives    Enemas    Rectal suppositories  Follow-up care  Follow up with your healthcare provider if symptoms don't get better in the next few days. You may need to have more tests or see a specialist.  Call 911  Call 911 if any of these  occur:    Trouble breathing    Stiff, rigid abdomen that is severely painful to touch    Confusion    Fainting or loss of consciousness    Rapid heart rate    Chest pain  When to seek medical advice  Call your healthcare provider right away if any of these occur:    Fever of 100.4 F (38 C) or higher, or as directed by your healthcare provider    Failure to resume normal bowel movements    Pain in your abdomen or back gets worse    Nausea or vomiting    Swelling in your abdomen    Blood in the stool    Black, tarry stool    Involuntary weight loss    Weakness  [employer] last reviewed this educational content on 6/1/2018 2000-2021 [employer]. All rights reserved. This information is not intended as a substitute for professional medical care. Always follow your healthcare professional's instructions.           Patient Education     Treating Constipation  Constipation is a common and often uncomfortable problem. Constipation means you have bowel movements fewer than 3 times per week. Or that you strain to pass hard, dry stool. It can last a short time. Or it can be a problem that never seems to go away. The good news is that it can often be treated and controlled.   Eat more fiber  One of the best ways to help treat constipation is to increase your fiber intake. You can do this either through diet or by using fiber supplements. Fiber (in whole grains, fruits, and vegetables) adds bulk and absorbs water to soften the stool. This helps the stool pass through the colon more easily. When you increase your fiber intake, do it slowly to prevent side effects such as bloating. Also increase the amount of water that you drink. Eating more of these foods can add fiber to your diet:     High-fiber cereals    Whole grains, bran, and brown rice    Vegetables such as carrots, broccoli, and greens    Fresh fruits (especially apples, pears, and dried fruits such as raisins and apricots)    Nuts and legumes (especially  beans such as lentils, kidney beans, and lima beans)  Get physically active  Exercise helps improve the working of your colon which helps ease constipation. Try to get some physical activity every day. If you haven t been active for a while, talk with your healthcare provider before starting again.     Laxatives  Your healthcare provider may suggest an over-the-counter product to help ease your constipation. He or she may suggest the use of bulk-forming agents or laxatives. Laxatives, if used as directed, are common and safe. Follow directions carefully when using them. See your provider for new-onset constipation, or long-term constipation, to rule out other causes such as medicines or thyroid disease.   [employer] last reviewed this educational content on 6/1/2019 2000-2021 [employer]. All rights reserved. This information is not intended as a substitute for professional medical care. Always follow your healthcare professional's instructions.           Patient Education     Eating a High-Fiber Diet  Fiber is what gives strength and structure to plants. Most grains, beans, vegetables, and fruits contain fiber. Foods rich in fiber are often low in calories and fat, and they fill you up more. They may also reduce your risks for certain health problems. To find out the amount of fiber in canned, packaged, or frozen foods, read the Nutrition Facts label. It tells you how much fiber is in one serving.    Types of fiber and their benefits  There are two types of fiber: insoluble and soluble. They both aid digestion and help you maintain a healthy weight.    Insoluble fiber. This is found in whole grains, cereals, certain fruits and vegetables such as apple skin, corn, and carrots. Insoluble fiber may prevent constipation and reduce the risk for certain types of cancer. It's called insoluble because it doesn't dissolve in water.    Soluble fiber. This type of fiber is in oats, beans, and certain fruits and  vegetables such as strawberries and peas. Soluble fiber can reduce cholesterol, which may help lower the risk for heart disease. It also helps control blood sugar levels.  Look for high-fiber foods  Try these foods to add fiber to your diet:    Whole-grain breads and cereals. Try to eat 6 to 8 ounces a day. Include wheat and oat bran cereals, whole-wheat muffins or toast, and corn tortillas in your meals.    Fruits. Try to eat 2 cups a day. Apples, oranges, strawberries, pears, and bananas are good sources. (Note: Fruit juice is low in fiber.)    Vegetables. Try to eat at least 2.5 cups a day. Add asparagus, carrots, broccoli, peas, and corn to your meals.    Beans. One cup of cooked lentils gives you over 15 grams of fiber. Try navy beans, lentils, and chickpeas.    Seeds. A small handful of seeds gives you about 3 grams of fiber. Try sunflower or joaquina seeds.  Keep track of your fiber  Keep track of how much fiber you eat. Start by reading food labels. Then eat a variety of foods high in fiber. As you start to eat more fiber, ask your healthcare provider how much water you should be drinking to keep your digestive system working smoothly.  Aim for a certain amount of fiber in your diet each day. The daily value for fiber is 25 grams a day. But some experts advise that women under 50 eat 25 to 28 grams per day and that men under 50 eat 30 to 38 grams per day. After age 50, your daily fiber needs drop to 22 grams for women and 28 grams for men.  Before you reach for the fiber supplements, think about this. Fiber is found naturally in healthy whole foods. It gives you that feeling of fullness after you eat. Taking fiber supplements or eating fiber-enriched foods will not give you this full feeling.  Your fiber intake is a good measure for the quality of your overall diet. If you are missing out on your daily amount of fiber, you may be lacking other important nutrients as well.  [employer] last reviewed this educational  content on 7/1/2019 2000-2021 [employer]. All rights reserved. This information is not intended as a substitute for professional medical care. Always follow your healthcare professional's instructions.                Sheila Uriostegui PA-C PA-C..................6/9/2021 10:48 AM

## 2021-07-12 ENCOUNTER — MYC MEDICAL ADVICE (OUTPATIENT)
Dept: FAMILY MEDICINE | Facility: OTHER | Age: 36
End: 2021-07-12

## 2021-07-15 ENCOUNTER — VIRTUAL VISIT (OUTPATIENT)
Dept: FAMILY MEDICINE | Facility: OTHER | Age: 36
End: 2021-07-15
Attending: PHYSICIAN ASSISTANT
Payer: COMMERCIAL

## 2021-07-15 VITALS — WEIGHT: 230 LBS | BODY MASS INDEX: 36.02 KG/M2

## 2021-07-15 DIAGNOSIS — J01.90 ACUTE SINUSITIS WITH SYMPTOMS > 10 DAYS: Primary | ICD-10-CM

## 2021-07-15 PROCEDURE — 99441 PR PHYSICIAN TELEPHONE EVALUATION 5-10 MIN: CPT | Performed by: PHYSICIAN ASSISTANT

## 2021-07-15 ASSESSMENT — PAIN SCALES - GENERAL: PAINLEVEL: MILD PAIN (3)

## 2021-07-15 ASSESSMENT — PATIENT HEALTH QUESTIONNAIRE - PHQ9: SUM OF ALL RESPONSES TO PHQ QUESTIONS 1-9: 4

## 2021-07-15 NOTE — PROGRESS NOTES
Radha is a 36 year old who is being evaluated via a billable telephone visit.      What phone number would you like to be contacted at? [phone]  How would you like to obtain your AVS? [facility]    Assessment & Plan   Problem List Items Addressed This Visit     None      Visit Diagnoses     Acute sinusitis with symptoms > 10 days    -  Primary    Relevant Medications    amoxicillin-clavulanate (AUGMENTIN) 875-125 MG tablet    Other Relevant Orders    Symptomatic COVID-19 Virus (Coronavirus) by PCR Nasopharyngeal           Patient was started on Augmentin for treatment of an acute sinus infection.  Please Covid-19 order for drive-through testing.  Gave patient education.  Encourage fluids and rest.  Return as needed for recheck if symptoms not coming down or worsening if needed.    Return if symptoms worsen or fail to improve.     Sheila Uriostegui PA-C  [facility]    Subjective   Radha is a 36 year old who presents for the following health issues     HPI     Patient is concerned about a sinus infection: She has had sinus pressure, headache, and congestion that started the weekend of July 4.  She has been using saline rinses, daily Claritin, and warm washcloths on her sinuses and ears.  Not getting any relief.  Has been using ibuprofen which is not helping.  Her nose is congested and she is having postnasal drip.  No cough, shortness of breath, wheezing, fevers, chills.  Ears feel full however they are not painful.  Has been taking MiraLAX daily along with a stool softener as needed.  Drinking a lot of water.  Getting more fiber in her diet.    Review of Systems   Constitutional, HEENT, cardiovascular, pulmonary, gi and gu systems are negative, except as otherwise noted.      Objective             Physical Exam   healthy, alert and no distress  PSYCH: Alert and oriented times 3; coherent speech, normal   rate and volume, able to articulate logical thoughts, able   to abstract reason, no  tangential thoughts, no hallucinations   or delusions  Her affect is normal  RESP: No cough, no audible wheezing, able to talk in full sentences  Remainder of exam unable to be completed due to telephone visits        Phone call duration: 7 minutes

## 2021-07-15 NOTE — PATIENT INSTRUCTIONS
Ordered COVID-19 test to rule out infection concerns.     Sinus infection -started on Augmentin antibiotic for treatment.  Antibiotic has been sent to pharmacy. Please take full course of antibiotic even if symptoms have completely resolved. This helps prevent against antibiotic resistance.     May use symptomatic care with tylenol or ibuprofen. May use cough syrup or cough drops. Using a humidifier works well to break up the congestion. You can also sleep propped up on a couple pillows to decrease symptoms at night.     Use a Neti Pot/sinusflush (Jones Med Sinus Rinse) 3 times daily to irrigate sinuses/mucosal tissue.     Sudafed or mucinex work well for congestion.   If you choose pseudoephedrine, use for only 5-7 days AS DIRECTED. Speak to your pharmacist if you have any concerns about your medications. Mayalso use decongestant nasal spray, but only for 3 days MAXIMUM.    Please take tylenol as needed up to 4 times daily.  Treat symptomatically with warm salt water gargles.  Lozenges, Tylenol, Advil or Aleve as needed. Frequent swallows of cool liquid.  Oatmeal coats the throat and some patients find it soothes the pain.     Monitor for any fevers or chills. Return in 7-10 days if not feeling better. Please call clinic with any questions or concerns. Please take in a lot of fluids and get rest.     You will need to be evaluated if you start to experience:  Fever higher than 102.5 F (39.2 C)   Sudden and severe pain in the face and head   Trouble seeing or seeing double   Trouble thinking clearly   Swelling or redness around 1 or both eyes   Trouble breathing or a stiff neck    * If you are a smoker, try to quit *    Call 9-1-1 or go to the emergency room if you:  Have trouble breathing   Are drooling because you cannot swallow your saliva   Have swelling of the neck or tongue   Cannot move your neck or have trouble opening your mouth

## 2021-07-16 ASSESSMENT — ASTHMA QUESTIONNAIRES: ACT_TOTALSCORE: 25

## 2021-10-04 ENCOUNTER — MYC MEDICAL ADVICE (OUTPATIENT)
Dept: FAMILY MEDICINE | Facility: OTHER | Age: 36
End: 2021-10-04

## 2021-10-04 ENCOUNTER — IMMUNIZATION (OUTPATIENT)
Dept: FAMILY MEDICINE | Facility: OTHER | Age: 36
End: 2021-10-04
Attending: FAMILY MEDICINE
Payer: COMMERCIAL

## 2021-10-04 VITALS
DIASTOLIC BLOOD PRESSURE: 90 MMHG | HEART RATE: 72 BPM | TEMPERATURE: 98.9 F | SYSTOLIC BLOOD PRESSURE: 142 MMHG | BODY MASS INDEX: 36.84 KG/M2 | WEIGHT: 235.2 LBS | RESPIRATION RATE: 16 BRPM | OXYGEN SATURATION: 98 %

## 2021-10-04 DIAGNOSIS — I10 ESSENTIAL HYPERTENSION: Primary | ICD-10-CM

## 2021-10-04 DIAGNOSIS — R06.83 SNORING: ICD-10-CM

## 2021-10-04 PROCEDURE — 99215 OFFICE O/P EST HI 40 MIN: CPT | Performed by: FAMILY MEDICINE

## 2021-10-04 PROCEDURE — G0463 HOSPITAL OUTPT CLINIC VISIT: HCPCS | Mod: 25

## 2021-10-04 PROCEDURE — 91300 PR COVID VAC PFIZER DIL RECON 30 MCG/0.3 ML IM: CPT

## 2021-10-04 PROCEDURE — G0463 HOSPITAL OUTPT CLINIC VISIT: HCPCS

## 2021-10-04 RX ORDER — HYDROCHLOROTHIAZIDE 12.5 MG/1
12.5 TABLET ORAL DAILY
Qty: 30 TABLET | Refills: 1 | Status: SHIPPED | OUTPATIENT
Start: 2021-10-04 | End: 2022-02-16

## 2021-10-04 ASSESSMENT — PAIN SCALES - GENERAL: PAINLEVEL: NO PAIN (0)

## 2021-10-04 NOTE — NURSING NOTE
Chief Complaint   Patient presents with     Recheck Medication     multiple concerns      Has multiple concerns today. A few being blood pressure, abd bloating, irregular menses despite being on BC. Has a list of concerns.     Medication Reconciliation: complete    Sally Antunez LPN

## 2021-10-04 NOTE — PROGRESS NOTES
Nursing Notes:   Sally Antunez LPN  10/4/2021  8:46 AM  Sign at exiting of workspace  Chief Complaint   Patient presents with     Recheck Medication     multiple concerns      Has multiple concerns today. A few being blood pressure, abd bloating, irregular menses despite being on BC. Has a list of concerns.     Medication Reconciliation: complete    Sally Antunez LPN         SUBJECTIVE:  HPI: Radha Christensen is a 36 year old female here for for concerns of blood pressure elevation, bloating, also worsening of her mood prior to her periods and bowel concerns.    # Hypertension: Bps have been in the high 130s systolic, with a few 140s (low). Diastolic Bps in the high 80s with some in the 90s. Denies symptoms. Has been compliant with 40mg lisinopril daily.  Has continued to stay active, but is frustrated as she is not losing weight.    She did cut out Herbalife drink to see if that would help, which did for a while but now she notes worsening blood pressures.  She denies any chest pain or shortness of breath.      #Snoring: Patient does report her daughter has commented about her noisy breathing when she sleeps.  She does also say that she wakes up several times per night.  Sometimes she wakes up and notes slight chest pressure with the racing heart.  She has never had any sleep studies in the past.    #Depression anxiety: She is seeing Jerica Patel [facility] every other week and things are going very well.  She does report being fairly duval 1 week prior to her period and the week of her period.    #Bowel complaints: Patient does also report feeling bloated and gassy around her periods.  She did cut out milk from her diet with some improvement.  She does note ongoing issues with constipation and reports diet high in she is.      Allergies:  Allergies   Allergen Reactions     Cefaclor Unknown     ROS:  Constitutional, HEENT, cardiovascular, pulmonary, GI and  systems are negative, except as  otherwise noted.    Past medical, surgical, and family history reviewed and updated as appropriate in the chart.  Relevant social history listed in HPI.    OBJECTIVE:  BP (!) 142/90 (BP Location: Right arm, Patient Position: Sitting, Cuff Size: Adult Regular)   Pulse 72   Temp 98.9  F (37.2  C) (Tympanic)   Resp 16   Wt 106.7 kg (235 lb 3.2 oz)   LMP 08/23/2021   SpO2 98%   Breastfeeding No   BMI 36.84 kg/m    BP Readings from Last 6 Encounters:   10/04/21 (!) 142/90   06/09/21 132/70   05/27/21 132/76   03/03/21 133/87   02/10/21 132/80   01/20/21 118/82     Wt Readings from Last 4 Encounters:   10/04/21 106.7 kg (235 lb 3.2 oz)   07/15/21 104.3 kg (230 lb)   06/09/21 103.9 kg (229 lb)   05/27/21 104.9 kg (231 lb 3.2 oz)     EXAM:  Constitutional: No acute distress. Well-groomed, well-hydrated and well-nourished.  Appears stated age.  Head: Normocephalic, atraumatic.  Eyes: anicteric, PERRL  Respiratory: Non-labored respirations.  Skin: Warm, dry, intact.  No concerning rashes or lesions.  Musculoskeletal: Moves arms and legs equally and normally.  Normal tone and strength throughout.  Neurologic: A+Ox3. CN 2-12 grossly intact. Normal gait. No focal motor or sensory deficits. No tremor.  Psychiatric: Does not appear anxious or depressed.  Appropriate affect and insight.    MADELINE-7 SCORE 5/25/2016 8/2/2017 8/6/2019   Total Score 1 5 4       PHQ 12/19/2019 10/28/2020 7/15/2021   PHQ-9 Total Score 4 2 4   Q9: Thoughts of better off dead/self-harm past 2 weeks Not at all Not at all Not at all       ASSESSMENT/PLAN:   1. Essential hypertension  Comment: Not well controlled with 40 mg lisinopril.    Plan: change lisinopril to the evening with her OCP and add hydrochlorothiazide in the morning.  - hydrochlorothiazide (HYDRODIURIL) 12.5 MG tablet; Take 1 tablet (12.5 mg) by mouth daily  Dispense: 30 tablet; Refill: 1  -Continue daily blood pressure checks  -Monitor for signs of increased urination  -Return to clinic  for recheck in 2 to 3 weeks    2. Snoring  3.  Obesity, BMI 36  Comment: Several of the patient's symptoms such as trouble sleeping at night, daytime fatigue, obesity and trouble losing weight might be attributable to sleep apnea.  She has never had any sleep studies in the past.  Plan:  - SLEEP EVALUATION & MANAGEMENT REFERRAL - ADULT -; Future  -Further management of symptoms based on sleep study results.    4.  Mood fluctuations  -Continue by monthly therapy  -Continue to encourage regular exercise and healthy nutrition    5.  IBS symptoms  -Congratulated her on cutting out milk  -Patient will also cut out cheese such as her daily cheese sticks and replaced with a healthy snack such as veggies and hummus and/or fruit and nuts  -Continue to monitor bloating and gassiness and constipation    A total of 40 minutes were spent on this encounter, including prep time, visit time with the patient, and post visit work including documentation time.    Myrna Fulton MD  [facility] AND hospitals

## 2021-10-05 ENCOUNTER — MYC REFILL (OUTPATIENT)
Dept: FAMILY MEDICINE | Facility: OTHER | Age: 36
End: 2021-10-05

## 2021-10-05 DIAGNOSIS — L70.0 CYSTIC ACNE: ICD-10-CM

## 2021-10-07 ENCOUNTER — E-VISIT (OUTPATIENT)
Dept: FAMILY MEDICINE | Facility: OTHER | Age: 36
End: 2021-10-07
Payer: COMMERCIAL

## 2021-10-07 DIAGNOSIS — J01.90 ACUTE SINUSITIS WITH SYMPTOMS > 10 DAYS: Primary | ICD-10-CM

## 2021-10-07 PROCEDURE — 99422 OL DIG E/M SVC 11-20 MIN: CPT | Performed by: FAMILY MEDICINE

## 2021-10-07 RX ORDER — DROSPIRENONE AND ETHINYL ESTRADIOL 0.02-3(28)
1 KIT ORAL DAILY
Qty: 84 TABLET | Refills: 3 | OUTPATIENT
Start: 2021-10-07

## 2021-10-07 NOTE — TELEPHONE ENCOUNTER
We did not get a chance to talk about her sinus symptoms given her other pressing concerns.  Which she be willing to do an E- visit with me today or tomorrow to assess those symptoms?  Otherwise I would recommend being seen in the rapid clinic or with us in clinic next week.  Please let me know if she has any questions.    Thank you,  -Myrna Fulton MD

## 2021-10-07 NOTE — TELEPHONE ENCOUNTER
The Patient sent Rx request for the following:      Requested Prescriptions   Pending Prescriptions Disp Refills     drospirenone-ethinyl estradiol (DILSHAD) 3-0.02 MG tablet 84 tablet 3     Sig: Take 1 tablet by mouth daily       Contraceptives Protocol Passed - 10/7/2021  4:08 PM          Last Prescription Date:   12/9/2020  Last Fill Qty/Refills:         84, R-3  Last Office Visit:              10/4/2021   Future Office visit:           10/22/2021    Redundant refill request refused: Too soon:    Kamila Hurtado RN ....................  10/7/2021   4:11 PM

## 2021-10-07 NOTE — PATIENT INSTRUCTIONS
Dear Radha Christensen    After reviewing your responses, I've been able to diagnose you with?a sinus infection caused likely by bacteria.?     Based on your responses and diagnosis, I have prescribed Augmentin to treat your symptoms. I have sent this to your pharmacy.?     It is also important to stay well hydrated, get lots of rest and take over-the-counter decongestants,?tylenol?or ibuprofen if you?are able to?take those medications per your primary care provider to help relieve discomfort.?     It is important that you take?all of?your prescribed medication even if your symptoms are improving after a few doses.? Taking?all of?your medicine helps prevent the symptoms from returning.?     If your symptoms worsen, you develop severe headache, vomiting, high fever (>102), or are not improving in 7 days, please contact your primary care provider for an appointment or visit any of our convenient Walk-in Care or Urgent Care Centers to be seen which can be found on our website?here.?     You have already had 2 prior sinus infections this year, given your prior sinus surgeries, if you have 1 more this year we should consider having you see your ENT provider.    Thanks again for choosing?us?as your health care partner,?   ?  Myrna Fulton MD?     Sinusitis (Antibiotic Treatment)    The sinuses are air-filled spaces within the bones of the face. They connect to the inside of the nose. Sinusitis is an inflammation of the tissue that lines the sinuses. Sinusitis can occur during a cold. It can also happen due to allergies to pollens and other particles in the air. Sinusitis can cause symptoms of sinus congestion and a feeling of fullness. A sinus infection causes fever, headache, and facial pain. There is often green or yellow fluid draining from the nose or into the back of the throat (post-nasal drip). You have been given antibiotics to treat this condition.   Home care    Take the full course of antibiotics as  instructed. Don't stop taking them, even when you feel better.    Drink plenty of water, hot tea, and other liquids as directed by the healthcare provider. This may help thin nasal mucus. It also may help your sinuses drain fluids.    Heat may help soothe painful areas of your face. Use a towel soaked in hot water. Or,  the shower and direct the warm spray onto your face. Using a vaporizer along with a menthol rub at night may also help soothe symptoms.     An expectorant with guaifenesin may help thin nasal mucus and help your sinuses drain fluids. Talk with your provider or pharmacists before taking an over-the-counter (OTC) medicine if you have any questions about it or its side effects..    You can use an OTC decongestant, unless a similar medicine was prescribed to you. Nasal sprays work the fastest. Use one that contains phenylephrine or oxymetazoline. First blow your nose gently. Then use the spray. Don't use these medicines more often than directed on the label. If you do, your symptoms may get worse. You may also take pills that contain pseudoephedrine. Don t use products that combine multiple medicines. This is because side effects may be increased. Read labels. You can also ask the pharmacist for help. (People with high blood pressure should not use decongestants. They can raise blood pressure.) Talk with your provider or pharmacist if you have any questions about the medicine..    OTC antihistamines may help if allergies contributed to your sinusitis. Talk with your provider or pharmacist if you have any questions about the medicine..    Don't use nasal rinses or irrigation during an acute sinus infection, unless your healthcare provider tells you to. Rinsing may spread the infection to other areas in your sinuses.    Use acetaminophen or ibuprofen to control pain, unless another pain medicine was prescribed to you. If you have chronic liver or kidney disease or ever had a stomach ulcer, talk  with your healthcare provider before using these medicines. Never give aspirin to anyone under age 18 who is ill with a fever. It may cause severe liver damage.    Don't smoke. This can make symptoms worse.    Follow-up care  Follow up with your healthcare provider, or as advised.   When to seek medical advice  Call your healthcare provider if any of these occur:     Facial pain or headache that gets worse    Stiff neck    Unusual drowsiness or confusion    Swelling of your forehead or eyelids    Symptoms don't go away in 10 days    Vision problems, such as blurred or double vision    Fever of 100.4 F (38 C) or higher, or as directed by your healthcare provider  Call 911  Call 911 if any of these occur:     Seizure    Trouble breathing    Feeling dizzy or faint    Fingernails, skin or lips look blue, purple , or gray  Prevention  Here are steps you can take to help prevent an infection:     Keep good hand washing habits.    Don t have close contact with people who have sore throats, colds, or other upper respiratory infections.    Don t smoke, and stay away from secondhand smoke.    Stay up to date with of your vaccines.  [employer] last reviewed this educational content on 12/1/2019 2000-2021 [employer]. All rights reserved. This information is not intended as a substitute for professional medical care. Always follow your healthcare professional's instructions.

## 2021-10-09 ENCOUNTER — HEALTH MAINTENANCE LETTER (OUTPATIENT)
Age: 36
End: 2021-10-09

## 2021-10-14 DIAGNOSIS — L70.0 CYSTIC ACNE: ICD-10-CM

## 2021-10-18 RX ORDER — DROSPIRENONE AND ETHINYL ESTRADIOL 0.02-3(28)
1 KIT ORAL DAILY
Qty: 84 TABLET | Refills: 3 | Status: SHIPPED | OUTPATIENT
Start: 2021-10-18 | End: 2022-02-16

## 2021-10-18 NOTE — TELEPHONE ENCOUNTER
" Disp Refills Start End FLORENCIA   drospirenone-ethinyl estradiol (DILSHAD) 3-0.02 MG tablet 84 tablet 3 12/9/2020 3/9/2021 --   Sig - Route: Take 1 tablet by mouth daily - Oral       LOV: 10/4//2021  Future Office visit:    Next 5 appointments (look out 90 days)    Oct 22, 2021  8:00 AM  Office Visit with Myrna Fulton MD  [facility] ([facility] ) [address]  [place] [address]  [phone]        Routing refill request to provider for review/approval because:  Upcoming appointment    Requested Prescriptions   Pending Prescriptions Disp Refills     drospirenone-ethinyl estradiol (DILSHAD) 3-0.02 MG tablet [Pharmacy Med Name: DROSPIRENONE/ETHY EST 3/0.02MG T 28] 84 tablet 3     Sig: TAKE 1 TABLET BY MOUTH DAILY       Contraceptives Protocol Passed - 10/14/2021 10:24 AM        Passed - Patient is not a current smoker if age is 35 or older        Passed - Recent (12 mo) or future (30 days) visit within the authorizing provider's specialty     Patient has had an office visit with the authorizing provider or a provider within the authorizing providers department within the previous 12 mos or has a future within next 30 days. See \"Patient Info\" tab in inbasket, or \"Choose Columns\" in Meds & Orders section of the refill encounter.              Passed - Medication is active on med list        Passed - No active pregnancy on record        Passed - No positive pregnancy test in past 12 months         Unable to complete prescription refill per RN Medication Refill Policy.................... Jie Shelton RN ....................  10/18/2021   12:18 PM        "

## 2021-10-22 ENCOUNTER — OFFICE VISIT (OUTPATIENT)
Dept: FAMILY MEDICINE | Facility: OTHER | Age: 36
End: 2021-10-22
Attending: FAMILY MEDICINE
Payer: COMMERCIAL

## 2021-10-22 VITALS
TEMPERATURE: 98.1 F | SYSTOLIC BLOOD PRESSURE: 124 MMHG | RESPIRATION RATE: 16 BRPM | BODY MASS INDEX: 36.65 KG/M2 | OXYGEN SATURATION: 99 % | DIASTOLIC BLOOD PRESSURE: 76 MMHG | WEIGHT: 234 LBS | HEART RATE: 104 BPM

## 2021-10-22 DIAGNOSIS — I10 ESSENTIAL HYPERTENSION: Primary | ICD-10-CM

## 2021-10-22 PROCEDURE — G0463 HOSPITAL OUTPT CLINIC VISIT: HCPCS

## 2021-10-22 PROCEDURE — 99213 OFFICE O/P EST LOW 20 MIN: CPT | Performed by: FAMILY MEDICINE

## 2021-10-22 ASSESSMENT — PAIN SCALES - GENERAL: PAINLEVEL: NO PAIN (0)

## 2021-10-22 NOTE — PROGRESS NOTES
Nursing Notes:   Sally Antunez LPN  10/22/2021  8:16 AM  Signed  Chief Complaint   Patient presents with     RECHECK     BP and constipation     Has BP log.     Medication Reconciliation: complete    Sally Antunez LPN         SUBJECTIVE:  HPI: Radha Christensen is a 36 year old female here for blood pressure recheck.    # Hypertension: Bps have been in the high 130s systolic, with a few 140s (low). Diastolic Bps in the high 80s with some in the 90s. Denies symptoms. Has been compliant with 40mg lisinopril daily.  Has continued to stay active, but is frustrated as she is not losing weight. She denies any chest pain or shortness of breath. Hydrochlorothiazide 12.5 mg was added at her last visit and patient has been compliant with the new medication and denies side effects. She reports blood pressures at home have been in the systolics of 120s on average and diastolics have been in the 70s to 80s.    Constipation has resolved with eliminating cheese from her diet and substituting in eggs, yogurt's, fruit and almonds.    Sinus infection also resolved with antibiotics from her ER visit.    Allergies:  Allergies   Allergen Reactions     Cefaclor Unknown     ROS:  Constitutional, HEENT, cardiovascular, pulmonary, GI and  systems are negative, except as otherwise noted.    Past medical, surgical, and family history reviewed and updated as appropriate in the chart.  Relevant social history listed in HPI.    OBJECTIVE:  /76 (BP Location: Right arm, Patient Position: Sitting, Cuff Size: Adult Regular)   Pulse 104   Temp 98.1  F (36.7  C) (Tympanic)   Resp 16   Wt 106.1 kg (234 lb)   LMP  (LMP Unknown)   SpO2 99%   Breastfeeding No   BMI 36.65 kg/m    BP Readings from Last 6 Encounters:   10/22/21 124/76   10/04/21 (!) 142/90   06/09/21 132/70   05/27/21 132/76   03/03/21 133/87   02/10/21 132/80     Wt Readings from Last 4 Encounters:   10/22/21 106.1 kg (234 lb)   10/04/21 106.7 kg (235 lb 3.2 oz)   07/15/21  104.3 kg (230 lb)   06/09/21 103.9 kg (229 lb)     EXAM:  Constitutional: No acute distress. Well-groomed, well-hydrated and well-nourished.  Appears stated age.  Head: Normocephalic, atraumatic.  Eyes: anicteric, PERRL  Respiratory: Non-labored respirations.  Skin: Warm, dry, intact.  No concerning rashes or lesions.  Musculoskeletal: Moves arms and legs equally and normally.  Normal tone and strength throughout.  Neurologic: A+Ox3.  Psychiatric: Does not appear anxious or depressed.  Appropriate affect and insight.    ASSESSMENT/PLAN:   1. Essential hypertension  Comment: Now well controlled with 40 mg lisinopril and hydrochlorothiazide.  Plan: Continue lisinopril in the evening and hydrochlorothiazide in the morning.  -12-month refill will be provided    Return to clinic in 3 to 4 weeks for annual physical exam and medication refills.    Myrna Fulton MD  [facility] AND hospitals

## 2021-10-22 NOTE — NURSING NOTE
Chief Complaint   Patient presents with     RECHECK     BP and constipation     Has BP log.     Medication Reconciliation: complete    Sally Antunez LPN

## 2021-10-25 ENCOUNTER — IMMUNIZATION (OUTPATIENT)
Dept: FAMILY MEDICINE | Facility: OTHER | Age: 36
End: 2021-10-25
Attending: FAMILY MEDICINE
Payer: COMMERCIAL

## 2021-10-25 PROCEDURE — 91300 PR COVID VAC PFIZER DIL RECON 30 MCG/0.3 ML IM: CPT

## 2021-11-16 DIAGNOSIS — I10 ESSENTIAL HYPERTENSION: Primary | ICD-10-CM

## 2021-11-19 RX ORDER — HYDROCHLOROTHIAZIDE 25 MG/1
TABLET ORAL
Qty: 15 TABLET | Refills: 0 | Status: SHIPPED | OUTPATIENT
Start: 2021-11-19 | End: 2022-01-10

## 2021-11-19 NOTE — TELEPHONE ENCOUNTER
[facility] sent Rx request for the following:      Requested Prescriptions   Pending Prescriptions Disp Refills     hydrochlorothiazide (HYDRODIURIL) 25 MG tablet [Pharmacy Med Name: HYDROCHLOROTHIAZIDE 25MG TABLETS] 15 tablet      Sig: TAKE 1/2 TABLET BY MOUTH EVERY DAY       Diuretics (Including Combos) Protocol Passed - 11/16/2021  6:48 AM      Last Prescription Date:   10/4/2021  Last Fill Qty/Refills:         30, R-1    Last Office Visit:              10/22/21   Future Office visit:           Today  Routing refill request to provider for review/approval Arely Ordaz RN ....................  11/19/2021   10:24 AM

## 2021-12-04 ENCOUNTER — HEALTH MAINTENANCE LETTER (OUTPATIENT)
Age: 36
End: 2021-12-04

## 2021-12-09 DIAGNOSIS — K21.9 GASTROESOPHAGEAL REFLUX DISEASE WITHOUT ESOPHAGITIS: ICD-10-CM

## 2021-12-14 RX ORDER — FAMOTIDINE 20 MG/1
TABLET, FILM COATED ORAL
Qty: 60 TABLET | Refills: 1 | Status: SHIPPED | OUTPATIENT
Start: 2021-12-14 | End: 2022-02-16

## 2021-12-14 NOTE — TELEPHONE ENCOUNTER
" Disp Refills Start End FLORENCIA   famotidine (PEPCID) 20 MG tablet 60 tablet 1 3/24/2021  No   Sig: TAKE 1 TABLET(20 MG) BY MOUTH TWICE DAILY       LOV: 10/22/2021  Future Office visit: No future appointment scheduled at this time.         Requested Prescriptions   Pending Prescriptions Disp Refills     famotidine (PEPCID) 20 MG tablet [Pharmacy Med Name: FAMOTIDINE 20MG TABLETS] 60 tablet 1     Sig: TAKE 1 TABLET(20 MG) BY MOUTH TWICE DAILY       H2 Blockers Protocol Passed - 12/9/2021 10:18 AM        Passed - Patient is age 12 or older        Passed - Recent (12 mo) or future (30 days) visit within the authorizing provider's specialty     Patient has had an office visit with the authorizing provider or a provider within the authorizing providers department within the previous 12 mos or has a future within next 30 days. See \"Patient Info\" tab in inbasket, or \"Choose Columns\" in Meds & Orders section of the refill encounter.              Passed - Medication is active on med list           Jie Shelton RN  ....................  12/14/2021   8:52 AM      "

## 2022-01-07 DIAGNOSIS — I10 ESSENTIAL HYPERTENSION: ICD-10-CM

## 2022-01-10 RX ORDER — HYDROCHLOROTHIAZIDE 25 MG/1
TABLET ORAL
Qty: 15 TABLET | Refills: 0 | Status: SHIPPED | OUTPATIENT
Start: 2022-01-10 | End: 2022-02-08

## 2022-01-10 NOTE — TELEPHONE ENCOUNTER
[id number] sent Rx request for the following:      Requested Prescriptions   Pending Prescriptions Disp Refills     hydrochlorothiazide (HYDRODIURIL) 25 MG tablet [Pharmacy Med Name: HYDROCHLOROTHIAZIDE 25MG TABLETS] 15 tablet 0     Sig: TAKE 1/2 TABLET BY MOUTH EVERY DAY          Last Prescription Date:   11/19/2021  Last Fill Qty/Refills:         15, R-0  Last Office Visit:              10/22/2021   Future Office visit:           None    Prescription approved per [facility] Refill Protocol.  Marry Henderson RN on 1/10/2022 at 12:39 PM'

## 2022-02-06 DIAGNOSIS — I10 ESSENTIAL HYPERTENSION: ICD-10-CM

## 2022-02-07 DIAGNOSIS — I10 ESSENTIAL HYPERTENSION: ICD-10-CM

## 2022-02-07 RX ORDER — LISINOPRIL 40 MG/1
TABLET ORAL
Qty: 90 TABLET | Refills: 1 | Status: SHIPPED | OUTPATIENT
Start: 2022-02-07 | End: 2022-02-16

## 2022-02-07 NOTE — TELEPHONE ENCOUNTER
[facility] sent Rx request for the following:      LISINOPRIL 40MG TABLETS  Sig: TAKE 1 TABLET(40 MG) BY MOUTH DAILY      Last Prescription Date:   3/3/2021  Last Fill Qty/Refills:         90, R-1    Last Office Visit:              10/22/2021   Future Office visit:           None    Reminder letter sent to patient via [employer] to schedule an appt with a new PCP.    Prescription refilled per RN Medication Refill Policy.................... Guilherme Martinez RN ....................  2/7/2022   1:51 PM

## 2022-02-08 RX ORDER — HYDROCHLOROTHIAZIDE 25 MG/1
TABLET ORAL
Qty: 15 TABLET | Refills: 0 | Status: SHIPPED | OUTPATIENT
Start: 2022-02-08 | End: 2022-02-16

## 2022-02-08 NOTE — TELEPHONE ENCOUNTER
" Disp Refills Start End FLORENCIA   hydrochlorothiazide (HYDRODIURIL) 25 MG tablet 15 tablet 0 1/10/2022  No   Sig: TAKE 1/2 TABLET BY MOUTH EVERY DAY       LOV: 10/22/2021  Future Office visit:       Routing refill request to provider for review/approval because:  Received limited script.      Requested Prescriptions   Pending Prescriptions Disp Refills     hydrochlorothiazide (HYDRODIURIL) 25 MG tablet [Pharmacy Med Name: HYDROCHLOROTHIAZIDE 25MG TABLETS] 15 tablet 0     Sig: TAKE 1/2 TABLET BY MOUTH EVERY DAY       Diuretics (Including Combos) Protocol Passed - 2/7/2022 10:21 AM        Passed - Blood pressure under 140/90 in past 12 months     BP Readings from Last 3 Encounters:   10/22/21 124/76   10/04/21 (!) 142/90   06/09/21 132/70                 Passed - Recent (12 mo) or future (30 days) visit within the authorizing provider's specialty     Patient has had an office visit with the authorizing provider or a provider within the authorizing providers department within the previous 12 mos or has a future within next 30 days. See \"Patient Info\" tab in inbasket, or \"Choose Columns\" in Meds & Orders section of the refill encounter.              Passed - Medication is active on med list        Passed - Patient is age 18 or older        Passed - No active pregancy on record        Passed - Normal serum creatinine on file in past 12 months     Recent Labs   Lab Test 06/09/21  1109   CR 0.69              Passed - Normal serum potassium on file in past 12 months     Recent Labs   Lab Test 06/09/21  1109   POTASSIUM 4.5                    Passed - Normal serum sodium on file in past 12 months     Recent Labs   Lab Test 06/09/21  1109                 Passed - No positive pregnancy test in past 12 months         Unable to complete prescription refill per RN Medication Refill Policy.................... Jie Shelton RN ....................  2/8/2022   9:15 AM        "

## 2022-02-15 NOTE — PROGRESS NOTES
Assessment & Plan     1. Gastroesophageal reflux disease without esophagitis  Doing well with medication dose.  Refilled as below.  Follow-up as needed.  - famotidine (PEPCID) 20 MG tablet; TAKE 1 TABLET(20 MG) BY MOUTH TWICE DAILY  Dispense: 60 tablet; Refill: 11    2. MADELINE (generalized anxiety disorder)  Doing well with medication dose.  Refilled as below.  Follow-up as needed.  - escitalopram (LEXAPRO) 20 MG tablet; Take 1 tablet (20 mg) by mouth daily  Dispense: 90 tablet; Refill: 3    3. Essential hypertension  Blood pressure within goal.  Doing well with medication dose.  Refilled as below.  Follow-up as needed.  - lisinopril (ZESTRIL) 40 MG tablet; Take 1 tablet (40 mg) by mouth daily  Dispense: 90 tablet; Refill: 3  - hydrochlorothiazide (HYDRODIURIL) 12.5 MG tablet; Take 1 tablet (12.5 mg) by mouth daily  Dispense: 90 tablet; Refill: 3    4. Cystic acne  Doing well with medication dose.  Refilled as below.  Follow-up as needed.  - drospirenone-ethinyl estradiol (DILSHAD) 3-0.02 MG tablet; Take 1 tablet by mouth daily  Dispense: 84 tablet; Refill: 3      Return if symptoms worsen or fail to improve.    Justine Sharp PA-C  [facility]   Radha is a 36 year old who presents for the following health issues    HPI   Here for medication review.  Requesting refill of Pepcid for longstanding history of GERD.  Does notice improvement of symptoms when she is consistently taking this medication.  Has been out of this medication more recently and has noticed increasing heartburn/reflux symptoms.  Requesting refill of Lexapro for generalized anxiety.  Doing well with current medication and dose.  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  History of hypertension that is currently well controlled with lisinopril and hydrochlorothiazide.  BMP completed in June and was unremarkable.  No chest pain or pressure, palpitations,  dizziness, lightheadedness, syncope, headaches, vision change, shortness of breath.  Requesting refill of birth control as well.    Patient is also requesting to establish care today.     PAST MEDICAL HISTORY:   Past Medical History:   Diagnosis Date     Concussion 2011    Overview:  no loss of consciousness from softball injury     Personal history of other medical treatment (CODE)      2, Para 2       PAST SURGICAL HISTORY:   Past Surgical History:   Procedure Laterality Date     ENDOSCOPIC SINUS SURGERY Bilateral 2020    Procedure: Bilateral Endoscopic Sinus Surgery with polypectomy;  Surgeon: Karen Sanchez MD;  Location: [facility]     ESOPHAGOSCOPY, GASTROSCOPY, DUODENOSCOPY (EGD), COMBINED N/A 2019    Procedure: ESOPHAGOGASTRODUODENOSCOPY, WITH BIOPSY;  Surgeon: Emigdio Goldberg MD;  Location: [facility]     RECONSTRUCT NOSE AND SEPTUM (FUNCTIONAL) Bilateral 2020    Procedure: Nasal Valve Repair(LATERA);  Surgeon: Karen Sanchez MD;  Location: [facility]     SEPTOPLASTY, TURBINOPLASTY, COMBINED N/A 2020    Procedure: Septoplasty Turbinate Reduction;  Surgeon: Karen Sanchez MD;  Location: [facility] OR     wisdom teeth          FAMILY HISTORY:   Family History   Problem Relation Age of Onset     Heart Disease Maternal Grandfather 50        Heart Disease     Diabetes Maternal Grandfather         Diabetes     Chronic Obstructive Pulmonary Disease Maternal Grandfather         COPD     Diabetes Mother         Diabetes     Thyroid Disease Mother         Thyroid Disease     Heart Disease Mother      Heart Disease Maternal Uncle      Thyroid Disease Brother         Thyroid Disease     Diabetes Brother 38        Diabetes     Diabetes Maternal Uncle         Diabetes     Heart Disease Maternal Uncle      Heart Disease Maternal Grandmother      Breast Cancer No family hx of         Cancer-breast     Ovarian Cancer No family hx of         Cancer-ovarian     Prostate Cancer No family hx  "of         Cancer-prostate     Other - See Comments No family hx of         Stroke     Colon Cancer No family hx of         Cancer-colon     Blood Disease No family hx of         Blood Disease       SOCIAL HISTORY:   Social History     Tobacco Use     Smoking status: Former Smoker     Years: 10.00     Types: Cigarettes     Quit date: 2013     Years since quittin.1     Smokeless tobacco: Never Used   Substance Use Topics     Alcohol use: Not Currently     Comment: Quit 2019        Allergies   Allergen Reactions     Cefaclor Unknown     Current Outpatient Medications   Medication     albuterol (PROAIR HFA/PROVENTIL HFA/VENTOLIN HFA) 108 (90 Base) MCG/ACT inhaler     drospirenone-ethinyl estradiol (DILSHAD) 3-0.02 MG tablet     escitalopram (LEXAPRO) 20 MG tablet     famotidine (PEPCID) 20 MG tablet     hydrochlorothiazide (HYDRODIURIL) 12.5 MG tablet     lisinopril (ZESTRIL) 40 MG tablet     loratadine (CLARITIN) 10 MG tablet     vitamin D3 (CHOLECALCIFEROL) 50 mcg (2000 units) tablet     LORazepam (ATIVAN) 0.5 MG tablet     No current facility-administered medications for this visit.       Review of Systems   Per HPI        Objective    /78   Pulse 93   Temp 97.6  F (36.4  C)   Resp 12   Ht 1.702 m (5' 7\")   Wt 106.8 kg (235 lb 6.4 oz)   LMP 2022   SpO2 99%   Breastfeeding No   BMI 36.87 kg/m    Body mass index is 36.87 kg/m .  Physical Exam   General: Pleasant, in no apparent distress.  Cardiovascular: Regular rate and rhythm with S1 equal to S2. No murmurs, friction rubs, or gallops.   Respiratory: Lungs are resonant and clear to auscultation bilaterally. No wheezes, crackles, or rhonchi.  Psych: Appropriate mood and affect.            "

## 2022-02-16 ENCOUNTER — OFFICE VISIT (OUTPATIENT)
Dept: FAMILY MEDICINE | Facility: OTHER | Age: 37
End: 2022-02-16
Attending: PHYSICIAN ASSISTANT
Payer: COMMERCIAL

## 2022-02-16 VITALS
WEIGHT: 235.4 LBS | SYSTOLIC BLOOD PRESSURE: 132 MMHG | OXYGEN SATURATION: 99 % | TEMPERATURE: 97.6 F | BODY MASS INDEX: 36.95 KG/M2 | HEIGHT: 67 IN | HEART RATE: 93 BPM | RESPIRATION RATE: 12 BRPM | DIASTOLIC BLOOD PRESSURE: 78 MMHG

## 2022-02-16 DIAGNOSIS — I10 ESSENTIAL HYPERTENSION: ICD-10-CM

## 2022-02-16 DIAGNOSIS — L70.0 CYSTIC ACNE: ICD-10-CM

## 2022-02-16 DIAGNOSIS — K21.9 GASTROESOPHAGEAL REFLUX DISEASE WITHOUT ESOPHAGITIS: ICD-10-CM

## 2022-02-16 DIAGNOSIS — F41.1 GAD (GENERALIZED ANXIETY DISORDER): ICD-10-CM

## 2022-02-16 PROCEDURE — 99214 OFFICE O/P EST MOD 30 MIN: CPT | Performed by: PHYSICIAN ASSISTANT

## 2022-02-16 PROCEDURE — G0463 HOSPITAL OUTPT CLINIC VISIT: HCPCS

## 2022-02-16 RX ORDER — HYDROCHLOROTHIAZIDE 12.5 MG/1
12.5 TABLET ORAL DAILY
Qty: 90 TABLET | Refills: 3 | Status: SHIPPED | OUTPATIENT
Start: 2022-02-16 | End: 2022-02-17

## 2022-02-16 RX ORDER — FAMOTIDINE 20 MG/1
TABLET, FILM COATED ORAL
Qty: 60 TABLET | Refills: 11 | Status: SHIPPED | OUTPATIENT
Start: 2022-02-16 | End: 2024-08-14

## 2022-02-16 RX ORDER — LISINOPRIL 40 MG/1
40 TABLET ORAL DAILY
Qty: 90 TABLET | Refills: 3 | Status: SHIPPED | OUTPATIENT
Start: 2022-02-16 | End: 2023-05-04

## 2022-02-16 RX ORDER — DROSPIRENONE AND ETHINYL ESTRADIOL 0.02-3(28)
1 KIT ORAL DAILY
Qty: 84 TABLET | Refills: 3 | Status: SHIPPED | OUTPATIENT
Start: 2022-02-16 | End: 2022-11-10

## 2022-02-16 RX ORDER — ESCITALOPRAM OXALATE 20 MG/1
20 TABLET ORAL DAILY
Qty: 90 TABLET | Refills: 3 | Status: SHIPPED | OUTPATIENT
Start: 2022-02-16 | End: 2023-03-01

## 2022-02-16 ASSESSMENT — ANXIETY QUESTIONNAIRES
1. FEELING NERVOUS, ANXIOUS, OR ON EDGE: NOT AT ALL
7. FEELING AFRAID AS IF SOMETHING AWFUL MIGHT HAPPEN: NOT AT ALL
3. WORRYING TOO MUCH ABOUT DIFFERENT THINGS: NOT AT ALL
5. BEING SO RESTLESS THAT IT IS HARD TO SIT STILL: NOT AT ALL
IF YOU CHECKED OFF ANY PROBLEMS ON THIS QUESTIONNAIRE, HOW DIFFICULT HAVE THESE PROBLEMS MADE IT FOR YOU TO DO YOUR WORK, TAKE CARE OF THINGS AT HOME, OR GET ALONG WITH OTHER PEOPLE: NOT DIFFICULT AT ALL
2. NOT BEING ABLE TO STOP OR CONTROL WORRYING: NOT AT ALL
6. BECOMING EASILY ANNOYED OR IRRITABLE: NOT AT ALL
GAD7 TOTAL SCORE: 0

## 2022-02-16 ASSESSMENT — ASTHMA QUESTIONNAIRES
ACT_TOTALSCORE: 22
QUESTION_4 LAST FOUR WEEKS HOW OFTEN HAVE YOU USED YOUR RESCUE INHALER OR NEBULIZER MEDICATION (SUCH AS ALBUTEROL): ONCE A WEEK OR LESS
ACT_TOTALSCORE: 22
QUESTION_5 LAST FOUR WEEKS HOW WOULD YOU RATE YOUR ASTHMA CONTROL: WELL CONTROLLED
ACUTE_EXACERBATION_TODAY: NO
QUESTION_3 LAST FOUR WEEKS HOW OFTEN DID YOUR ASTHMA SYMPTOMS (WHEEZING, COUGHING, SHORTNESS OF BREATH, CHEST TIGHTNESS OR PAIN) WAKE YOU UP AT NIGHT OR EARLIER THAN USUAL IN THE MORNING: NOT AT ALL
QUESTION_1 LAST FOUR WEEKS HOW MUCH OF THE TIME DID YOUR ASTHMA KEEP YOU FROM GETTING AS MUCH DONE AT WORK, SCHOOL OR AT HOME: NONE OF THE TIME
QUESTION_2 LAST FOUR WEEKS HOW OFTEN HAVE YOU HAD SHORTNESS OF BREATH: ONCE OR TWICE A WEEK

## 2022-02-16 ASSESSMENT — PATIENT HEALTH QUESTIONNAIRE - PHQ9
SUM OF ALL RESPONSES TO PHQ QUESTIONS 1-9: 6
5. POOR APPETITE OR OVEREATING: NOT AT ALL

## 2022-02-16 ASSESSMENT — PAIN SCALES - GENERAL: PAINLEVEL: NO PAIN (0)

## 2022-02-16 NOTE — NURSING NOTE
Patient presents to clinic for medication refills and to establish care.  Ann Schultz LPN ....................  2/16/2022   8:21 AM

## 2022-02-17 ENCOUNTER — TRANSFERRED RECORDS (OUTPATIENT)
Dept: HEALTH INFORMATION MANAGEMENT | Facility: OTHER | Age: 37
End: 2022-02-17
Payer: COMMERCIAL

## 2022-02-17 ENCOUNTER — TELEPHONE (OUTPATIENT)
Dept: FAMILY MEDICINE | Facility: OTHER | Age: 37
End: 2022-02-17
Payer: COMMERCIAL

## 2022-02-17 DIAGNOSIS — I10 ESSENTIAL HYPERTENSION: Primary | ICD-10-CM

## 2022-02-17 DIAGNOSIS — R06.83 SNORING: Primary | ICD-10-CM

## 2022-02-17 DIAGNOSIS — G47.10 HYPERSOMNIA: ICD-10-CM

## 2022-02-17 RX ORDER — HYDROCHLOROTHIAZIDE 25 MG/1
12.5 TABLET ORAL DAILY
Qty: 45 TABLET | Refills: 3 | Status: SHIPPED | OUTPATIENT
Start: 2022-02-17 | End: 2023-03-23

## 2022-02-17 ASSESSMENT — ANXIETY QUESTIONNAIRES: GAD7 TOTAL SCORE: 0

## 2022-02-17 NOTE — TELEPHONE ENCOUNTER
Jeff from [facility] called regarding PA on patient hydrochlorothiazide - wondering if [facility] can send int 25mg tab and take 1/2 tab. Daily instead.     Karen Wheat on 2/17/2022 at 4:17 PM

## 2022-02-17 NOTE — TELEPHONE ENCOUNTER
Notified Jeff/Juan that new rx would be sent in that is formulary.  Ann Schultz LPN ....................  2/17/2022   4:34 PM

## 2022-03-28 ENCOUNTER — E-VISIT (OUTPATIENT)
Dept: FAMILY MEDICINE | Facility: OTHER | Age: 37
End: 2022-03-28
Attending: PHYSICIAN ASSISTANT
Payer: COMMERCIAL

## 2022-03-28 VITALS
RESPIRATION RATE: 16 BRPM | OXYGEN SATURATION: 98 % | SYSTOLIC BLOOD PRESSURE: 130 MMHG | BODY MASS INDEX: 36.31 KG/M2 | HEART RATE: 91 BPM | TEMPERATURE: 98.7 F | WEIGHT: 231.8 LBS | DIASTOLIC BLOOD PRESSURE: 90 MMHG

## 2022-03-28 DIAGNOSIS — J01.00 ACUTE NON-RECURRENT MAXILLARY SINUSITIS: Primary | ICD-10-CM

## 2022-03-28 DIAGNOSIS — R09.81 NASAL CONGESTION: Primary | ICD-10-CM

## 2022-03-28 PROCEDURE — 99214 OFFICE O/P EST MOD 30 MIN: CPT | Performed by: PHYSICIAN ASSISTANT

## 2022-03-28 PROCEDURE — G0463 HOSPITAL OUTPT CLINIC VISIT: HCPCS

## 2022-03-28 ASSESSMENT — PAIN SCALES - GENERAL: PAINLEVEL: MODERATE PAIN (5)

## 2022-03-28 NOTE — PROGRESS NOTES
Assessment & Plan     1. Acute non-recurrent maxillary sinusitis  Symptoms and exam consistent with sinusitis.  Will treat with Augmentin as below.  Encourage symptomatic management with over-the-counter sinus medications.  Follow-up as needed.  Patient is out of the 5-day quarantine since symptom onset so elected not to pursue PCR Covid testing.  - amoxicillin-clavulanate (AUGMENTIN) 875-125 MG tablet; Take 1 tablet by mouth 2 times daily  Dispense: 20 tablet; Refill: 0      Return if symptoms worsen or fail to improve.    Justine Sharp PA-C  [facility]    Desiree Garcia is a 37 year old who presents for the following health issues     History of Present Illness       Reason for visit:  Sinus issues and swollen glands  Symptom onset:  3-7 days ago  Symptoms include:  Sinus pressure, headache, plugged ears, swollen glands  Symptom intensity:  Moderate  Symptom progression:  Worsening  Had these symptoms before:  Yes  Has tried/received treatment for these symptoms:  Yes  Previous treatment was successful:  Yes  Prior treatment description:  Antibiotics  What makes it worse:  Not sure  What makes it better:  Resting or when in the shower... i can breathe    She eats 2-3 servings of fruits and vegetables daily.She consumes 1 sweetened beverage(s) daily.She exercises with enough effort to increase her heart rate 20 to 29 minutes per day.  She exercises with enough effort to increase her heart rate 3 or less days per week.   She is taking medications regularly.     Here for evaluation of maxillary sinus pain and pressure, nasal drainage, enlarged tonsils, sore throat, fatigue.  Symptoms initially began last Thursday with fatigue and chills.  Progressed to more sinus type symptoms and now her bilateral tonsils are enlarged with a couple sores on the left side.  She did take a home Covid test which was negative.  Patient does have underlying sinus/allergy issues but she feels like this is  different.  No documented fevers but has been struggling with chills.  No known sick contacts.    PAST MEDICAL HISTORY:   Past Medical History:   Diagnosis Date     Concussion 2011    Overview:  no loss of consciousness from softball injury     Personal history of other medical treatment (CODE)      2, Para 2       PAST SURGICAL HISTORY:   Past Surgical History:   Procedure Laterality Date     ENDOSCOPIC SINUS SURGERY Bilateral 2020    Procedure: Bilateral Endoscopic Sinus Surgery with polypectomy;  Surgeon: Karen Sanchez MD;  Location: [facility]     ESOPHAGOSCOPY, GASTROSCOPY, DUODENOSCOPY (EGD), COMBINED N/A 2019    Procedure: ESOPHAGOGASTRODUODENOSCOPY, WITH BIOPSY;  Surgeon: Emigdio Goldberg MD;  Location: [facility]     RECONSTRUCT NOSE AND SEPTUM (FUNCTIONAL) Bilateral 2020    Procedure: Nasal Valve Repair(LATERA);  Surgeon: Karen Sanchez MD;  Location: [facility]     SEPTOPLASTY, TURBINOPLASTY, COMBINED N/A 2020    Procedure: Septoplasty Turbinate Reduction;  Surgeon: Karen Sanchez MD;  Location: [facility]     wisdom teeth          FAMILY HISTORY:   Family History   Problem Relation Age of Onset     Heart Disease Maternal Grandfather 50        Heart Disease     Diabetes Maternal Grandfather         Diabetes     Chronic Obstructive Pulmonary Disease Maternal Grandfather         COPD     Diabetes Mother         Diabetes     Thyroid Disease Mother         Thyroid Disease     Heart Disease Mother      Heart Disease Maternal Uncle      Thyroid Disease Brother         Thyroid Disease     Diabetes Brother 38        Diabetes     Diabetes Maternal Uncle         Diabetes     Heart Disease Maternal Uncle      Heart Disease Maternal Grandmother      Breast Cancer No family hx of         Cancer-breast     Ovarian Cancer No family hx of         Cancer-ovarian     Prostate Cancer No family hx of         Cancer-prostate     Other - See Comments No family hx of         Stroke      Colon Cancer No family hx of         Cancer-colon     Blood Disease No family hx of         Blood Disease       SOCIAL HISTORY:   Social History     Tobacco Use     Smoking status: Former Smoker     Years: 10.00     Types: Cigarettes     Quit date: 2013     Years since quittin.2     Smokeless tobacco: Never Used   Substance Use Topics     Alcohol use: Not Currently     Comment: 1 time per month.        Allergies   Allergen Reactions     Cefaclor Unknown     Current Outpatient Medications   Medication     albuterol (PROAIR HFA/PROVENTIL HFA/VENTOLIN HFA) 108 (90 Base) MCG/ACT inhaler     amoxicillin-clavulanate (AUGMENTIN) 875-125 MG tablet     drospirenone-ethinyl estradiol (DILSHAD) 3-0.02 MG tablet     escitalopram (LEXAPRO) 20 MG tablet     famotidine (PEPCID) 20 MG tablet     hydrochlorothiazide (HYDRODIURIL) 25 MG tablet     lisinopril (ZESTRIL) 40 MG tablet     loratadine (CLARITIN) 10 MG tablet     LORazepam (ATIVAN) 0.5 MG tablet     vitamin D3 (CHOLECALCIFEROL) 50 mcg (2000 units) tablet     No current facility-administered medications for this visit.         Review of Systems   Per HPI        Objective    BP (!) 130/90 (BP Location: Right arm, Patient Position: Sitting, Cuff Size: Adult Regular)   Pulse 91   Temp 98.7  F (37.1  C) (Tympanic)   Resp 16   Wt 105.1 kg (231 lb 12.8 oz)   SpO2 98%   BMI 36.31 kg/m    Body mass index is 36.31 kg/m .  Physical Exam   General: Pleasant, in no apparent distress.  Eyes: Sclera are white and conjunctiva are clear bilaterally. Lacrimal apparatus free of erythema, edema, and discharge bilaterally.  Ears: External ears without erythema or edema. Tympanic membranes are pearly white and without erythema, scarring or perforations bilaterally. External auditory canals are free of foreign bodies, erythema, ulcers, and masses.  Nose: External nose is symmetrical and free of lesions and deformities.   Oropharynx: Oral mucosa is pink and without ulcers, nodules, and  white patches. Tongue is symmetrical, pink, and without masses or lesions. Pharynx is erythematous, symmetrical, and without lesions. Uvula is midline. Tonsils are erythematous, symmetrical, and without edema, ulcers, or exudates, and 2+ bilaterally.  Neck: Anterior cervical lymphadenopathy on inspection and palpation.  Cardiovascular: Regular rate and rhythm with S1 equal to S2. No murmurs, friction rubs, or gallops.   Respiratory: Lungs are resonant and clear to auscultation bilaterally. No wheezes, crackles, or rhonchi.  Psych: Appropriate mood and affect.

## 2022-03-28 NOTE — PATIENT INSTRUCTIONS
Thank you for choosing us for your care. I think an in-clinic visit would be best next steps based on your symptoms. Please schedule a clinic appointment; you won t be charged for this eVisit.      You can schedule an appointment right here in [facility], or call [phone]

## 2022-03-28 NOTE — NURSING NOTE
"Chief Complaint   Patient presents with     Sinus Problem     Fatigue     Patient presents to clinic with sinus pressure and swollen glands. On Saturday patient had diarrhea. On Sunday she noticed that both side of her neck were swollen and painful. Patient sates that she has had an increased in fatigueness.     Initial BP (!) 130/90 (BP Location: Right arm, Patient Position: Sitting, Cuff Size: Adult Regular)   Pulse 91   Temp 98.7  F (37.1  C) (Tympanic)   Resp 16   Wt 105.1 kg (231 lb 12.8 oz)   SpO2 98%   BMI 36.31 kg/m   Estimated body mass index is 36.31 kg/m  as calculated from the following:    Height as of 2/16/22: 1.702 m (5' 7\").    Weight as of this encounter: 105.1 kg (231 lb 12.8 oz).  Medication Reconciliation: complete  Juana Aguilar RN  FOOD SECURITY SCREENING QUESTIONS  The next two questions are to help us understand your food security.  If you are feeling you need any assistance in this area, we have resources available to support you today.    Hunger Vital Signs:  Within the past 12 months we worried whether our food would run out before we got money to buy more. Never  Within the past 12 months the food we bought just didn't last and we didn't have money to get more. Never      Juana Aguilar RN 3/28/2022 1:57 PM  "

## 2022-03-28 NOTE — TELEPHONE ENCOUNTER
Provider E-Visit time total (minutes): < 3 min  Needs to be seen.   Justine Sharp PA-C on 3/28/2022 at 8:37 AM

## 2022-04-01 ENCOUNTER — ALLIED HEALTH/NURSE VISIT (OUTPATIENT)
Dept: FAMILY MEDICINE | Facility: OTHER | Age: 37
End: 2022-04-01
Attending: PHYSICIAN ASSISTANT
Payer: COMMERCIAL

## 2022-04-01 DIAGNOSIS — J01.90 ACUTE SINUSITIS WITH SYMPTOMS > 10 DAYS: ICD-10-CM

## 2022-04-01 PROCEDURE — C9803 HOPD COVID-19 SPEC COLLECT: HCPCS

## 2022-04-01 PROCEDURE — U0003 INFECTIOUS AGENT DETECTION BY NUCLEIC ACID (DNA OR RNA); SEVERE ACUTE RESPIRATORY SYNDROME CORONAVIRUS 2 (SARS-COV-2) (CORONAVIRUS DISEASE [COVID-19]), AMPLIFIED PROBE TECHNIQUE, MAKING USE OF HIGH THROUGHPUT TECHNOLOGIES AS DESCRIBED BY CMS-2020-01-R: HCPCS | Mod: ZL

## 2022-04-02 LAB — SARS-COV-2 RNA RESP QL NAA+PROBE: NEGATIVE

## 2022-04-05 ENCOUNTER — THERAPY VISIT (OUTPATIENT)
Dept: SLEEP MEDICINE | Facility: OTHER | Age: 37
End: 2022-04-05
Attending: PHYSICIAN ASSISTANT
Payer: COMMERCIAL

## 2022-04-05 DIAGNOSIS — G47.33 OSA (OBSTRUCTIVE SLEEP APNEA): Primary | ICD-10-CM

## 2022-04-05 PROCEDURE — 95810 POLYSOM 6/> YRS 4/> PARAM: CPT

## 2022-04-10 NOTE — PROCEDURES
Patient/date of birth:  Radha Christensen, date of birth 1985    Sleep study date:  4/5/2022    Referring provider:  Dr. Inderjit Howard  Interpreting provider:  Dr. Luna Bauer    Indication:  The patient is a 37-year-old female presenting for a monitored sleep study.  She has an underlying history of hypertension and depression.  She has had non restorative sleep, daytime fatigue, snoring.  She has intermediate risk [place] score and an [place] Sleepiness Scale score of 8.  The patient's polysomnogram was reviewed epoch by epoch in detail.    Interpretation:  The patient was studied with a standard montage to assess for obstructive events.  Total recording time was 441 minutes with a total sleep time of 269 minutes.  Sleep efficiency was 61%.  Respiratory tracings showed a total of 2 apneas and hypopneas with an AHI of 0.4, RDI of 4.7, and desaturations as low as 91%.  She did not meet criteria for split night study.  EKG was unremarkable during the study.  EEG was unremarkable with all stages of sleep observed.  Periodic limb movement index was 0.    Impression:  Normal study with no evidence of sleep disordered breathing with an AHI of 0.4, RDI of 4.7, and desaturations to 91%.    Diagnosis:  Fatigue R53.83.  No evidence of sleep disordered breathing.    Recommendation:  The patient will follow up Dr. Howard.

## 2022-04-13 DIAGNOSIS — G47.33 OSA (OBSTRUCTIVE SLEEP APNEA): ICD-10-CM

## 2022-05-31 NOTE — PROGRESS NOTES
SUBJECTIVE:   CC: Radha Christensen is an 37 year old woman who presents for preventive health visit.     Healthy Habits:     Getting at least 3 servings of Calcium per day:  Yes    Bi-annual eye exam:  Yes    Dental care twice a year:  NO    Sleep apnea or symptoms of sleep apnea:  None    Diet:  Gluten-free/reduced    Frequency of exercise:  2-3 days/week    Duration of exercise:  15-30 minutes    Taking medications regularly:  Yes    Medication side effects:  None    PHQ-2 Total Score: 2    Additional concerns today:  Yes    Here for annual physical.  Patient does have some concern she would like discussed today.  Patient reports for the past several months she has been struggling with quite a few symptoms.  Patient reports she has been struggling more with hot flashes and sweats, increased tiredness and fatigue.  Has been struggling with changes in mood with being more easily irritated, start with individuals, struggle with concentration.  She has also noticed increase in headache frequency, hair thinning, chin hairs.  Reports her menstrual cycle has been quite irregular.  Reports her cycle is much shorter lasting only a few days and is often sporadic with sometimes skipping a month.  Patient also reports she has significant back cramps associated with her menstrual cycle that she had not previously had.  Patient reports the week before and the week during her period all of the symptoms are significantly increased.  She is continuing on Lexapro and following with a therapist for management of mental health.  Patient reports that her mother went through menopause in her mid 40s.    Also notes that she tested positive for a wheat intolerance.  She has been following a gluten-free diet for greater than 6 months and has lost 10 pounds.  She is happy with this.  Family history of diabetes, heart disease.  She would like screening for diabetes and lipid panel completed today.  Contraception: OCP  Risk for STI?:  No  Last pap: 2020, NIL HPV-  Any hx of abnormal paps:  Many years ago abnormal, have been normal since  FH of early CA?: No  Cholesterol/DM concerns/screening: Due  Tobacco?: Former  Calcium intake: Dietary  DEXA: Not indicated based on patient age and health status.   Last mammo:  Not indicated based on patient age and health status.  Colonoscopy:  Not indicated based on patient age and health status.  Hepatitis C screen: Low risk, patient declined  HIV screen: Low risk, patient declined  Immunizations: UTD      Today's PHQ-2 Score:   PHQ-2 (  [employer]) 2022   Q1: Little interest or pleasure in doing things 1   Q2: Feeling down, depressed or hopeless 1   PHQ-2 Score 2   PHQ-2 Total Score (12-17 Years)- Positive if 3 or more points; Administer PHQ-A if positive -   Q1: Little interest or pleasure in doing things Several days   Q2: Feeling down, depressed or hopeless Several days   PHQ-2 Score 2         Social History     Tobacco Use     Smoking status: Former Smoker     Years: 10.00     Types: Cigarettes     Quit date: 2013     Years since quittin.4     Smokeless tobacco: Never Used   Substance Use Topics     Alcohol use: Not Currently     Comment: 1 time per month.       Reviewed orders with patient.  Reviewed health maintenance and updated orders accordingly - Yes      Breast Cancer Screening:  Any new diagnosis of family breast, ovarian, or bowel cancer? No    FHS-7: No flowsheet data found.  Patient under 40 years of age: Routine Mammogram Screening not recommended.   Pertinent mammograms are reviewed under the imaging tab.    History of abnormal Pap smear:   Last 3 Pap and HPV Results:   PAP / HPV Latest Ref Rng & Units 2020   PAP (Historical) - NIL   HPV16 NEG:Negative Negative   HPV18 NEG:Negative Negative   HRHPV NEG:Negative Negative     PAP / HPV Latest Ref Rng & Units 2020   PAP (Historical) - NIL   HPV16 NEG:Negative Negative   HPV18 NEG:Negative Negative   HRHPV  "NEG:Negative Negative     Reviewed and updated as needed this visit by clinical staff   Tobacco  Allergies  Meds     Fam Hx  Soc Hx          Reviewed and updated as needed this visit by Provider         Fam Hx           Past Medical History:   Diagnosis Date     Concussion 2011    Overview:  no loss of consciousness from softball injury     Personal history of other medical treatment (CODE)      2, Para 2      Past Surgical History:   Procedure Laterality Date     ENDOSCOPIC SINUS SURGERY Bilateral 2020    Procedure: Bilateral Endoscopic Sinus Surgery with polypectomy;  Surgeon: Karen Sanchez MD;  Location: [facility]     ESOPHAGOSCOPY, GASTROSCOPY, DUODENOSCOPY (EGD), COMBINED N/A 2019    Procedure: ESOPHAGOGASTRODUODENOSCOPY, WITH BIOPSY;  Surgeon: Emigdio Goldberg MD;  Location: [facility]     RECONSTRUCT NOSE AND SEPTUM (FUNCTIONAL) Bilateral 2020    Procedure: Nasal Valve Repair(LATERA);  Surgeon: Karen Sanchez MD;  Location: [facility]     SEPTOPLASTY, TURBINOPLASTY, COMBINED N/A 2020    Procedure: Septoplasty Turbinate Reduction;  Surgeon: Karen Sanchez MD;  Location: [facility] OR     wisdom teeth        OB History   No obstetric history on file.       Review of Systems  Per HPI       OBJECTIVE:   /82   Pulse 70   Temp 98.1  F (36.7  C)   Resp 14   Ht 1.702 m (5' 7\")   Wt 100.8 kg (222 lb 3.2 oz)   LMP 2022   SpO2 98%   Breastfeeding No   BMI 34.80 kg/m    Physical Exam  GENERAL: healthy, alert and no distress  EYES: Eyes grossly normal to inspection, PERRL and conjunctivae and sclerae normal  HENT: ear canals and TM's normal, nose and mouth without ulcers or lesions  NECK: no adenopathy, no asymmetry, masses, or scars and thyroid normal to palpation  RESP: lungs clear to auscultation - no rales, rhonchi or wheezes  CV: regular rate and rhythm, normal S1 S2, no S3 or S4, no murmur, click or rub, no peripheral edema and peripheral pulses " strong  MS: no gross musculoskeletal defects noted, no edema  SKIN: no suspicious lesions or rashes  NEURO: Normal strength and tone, mentation intact and speech normal  PSYCH: mentation appears normal, affect normal/bright    Diagnostic Test Results:  Labs reviewed in Epic    ASSESSMENT/PLAN:       ICD-10-CM    1. Routine history and physical examination of adult  Z00.00 CBC and Differential     Basic Metabolic Panel     TSH Reflex [facility]     Lipid Panel     Hemoglobin A1c     CBC and Differential     Basic Metabolic Panel     TSH Reflex [facility]     Lipid Panel     Hemoglobin A1c   2. Fatigue, unspecified type  R53.83 CBC and Differential     Basic Metabolic Panel     TSH Reflex [facility]     Vitamin D Total     Vitamin B12     Iron Binding Panel     Ferritin     CBC and Differential     Basic Metabolic Panel     TSH Reflex [facility]     Vitamin D Total     Vitamin B12     Iron Binding Panel     Ferritin   3. Irregular menstrual cycle  N92.6 17 OH progesterone     Testosterone, Total     FSH     Luteinizing Hormone     DHEA sulfate     17 OH progesterone     Testosterone, Total     FSH     Luteinizing Hormone     DHEA sulfate   4. Screening for diabetes mellitus  Z13.1 Hemoglobin A1c     Hemoglobin A1c   5. Lipid screening  Z13.220 Lipid Panel     Lipid Panel     1. UTD on screening exams, immunizations. Lab work pending as above. Will notify with results. Encourage healthy diet and exercise.     2. Differential includes anemia, iron, vitamin, or electrolyte abnormality, renal dysfunction, diabetes, thyroid disease, hormonal related, other endocrinology disorder, mental health related, etc. Lab work pending as above. Will notify with results and treat if indicated. If all is within normal limits, consider adjusting Lexapro dose. Continue working with therapist.     3. Several month history of irregular menstrual cycle, shortened cycle, headaches, hot flashes, fatigue, hair thinning, facial hair, difficulties with weight loss, mood  "changes, back cramps. Lab work as above. Will notify with results and adjust treatment if indicated. May be related to hormonal cause such as menopause, PCOS, etc, endocrinology cause such as thyroid disease, or related to mental health and general physical health. Will treat based on labs. If all within normal limits, consider alternative OCP, follow up with OB/GYN.     4. A1c pending. Will notify with results and treat if indicated. Encourage healthy diet and exercise.     5. Lipid panel pending. Will notify with results and treat if indicated. Encourage healthy diet and exercise.       COUNSELING:  Reviewed preventive health counseling, as reflected in patient instructions    Estimated body mass index is 34.8 kg/m  as calculated from the following:    Height as of this encounter: 1.702 m (5' 7\").    Weight as of this encounter: 100.8 kg (222 lb 3.2 oz).    Weight management plan: Discussed healthy diet and exercise guidelines    She reports that she quit smoking about 9 years ago. Her smoking use included cigarettes. She quit after 10.00 years of use. She has never used smokeless tobacco.      Counseling Resources:  ATP IV Guidelines  Pooled Cohorts Equation Calculator  Breast Cancer Risk Calculator  BRCA-Related Cancer Risk Assessment: FHS-7 Tool  FRAX Risk Assessment  ICSI Preventive Guidelines  Dietary Guidelines for Americans, 2010  USDA's MyPlate  ASA Prophylaxis  Lung CA Screening    Justine Sharp PA-C  [facility]  "

## 2022-06-01 ENCOUNTER — OFFICE VISIT (OUTPATIENT)
Dept: FAMILY MEDICINE | Facility: OTHER | Age: 37
End: 2022-06-01
Attending: PHYSICIAN ASSISTANT
Payer: COMMERCIAL

## 2022-06-01 VITALS
RESPIRATION RATE: 14 BRPM | HEART RATE: 70 BPM | OXYGEN SATURATION: 98 % | BODY MASS INDEX: 34.88 KG/M2 | DIASTOLIC BLOOD PRESSURE: 82 MMHG | TEMPERATURE: 98.1 F | WEIGHT: 222.2 LBS | SYSTOLIC BLOOD PRESSURE: 128 MMHG | HEIGHT: 67 IN

## 2022-06-01 DIAGNOSIS — Z13.1 SCREENING FOR DIABETES MELLITUS: ICD-10-CM

## 2022-06-01 DIAGNOSIS — Z13.220 LIPID SCREENING: ICD-10-CM

## 2022-06-01 DIAGNOSIS — Z00.00 ROUTINE HISTORY AND PHYSICAL EXAMINATION OF ADULT: Primary | ICD-10-CM

## 2022-06-01 DIAGNOSIS — N92.6 IRREGULAR MENSTRUAL CYCLE: ICD-10-CM

## 2022-06-01 DIAGNOSIS — R53.83 FATIGUE, UNSPECIFIED TYPE: ICD-10-CM

## 2022-06-01 LAB
ANION GAP SERPL CALCULATED.3IONS-SCNC: 8 MMOL/L (ref 3–14)
BASOPHILS # BLD AUTO: 0 10E3/UL (ref 0–0.2)
BASOPHILS NFR BLD AUTO: 0 %
BUN SERPL-MCNC: 13 MG/DL (ref 7–25)
CALCIUM SERPL-MCNC: 9.2 MG/DL (ref 8.6–10.3)
CHLORIDE BLD-SCNC: 106 MMOL/L (ref 98–107)
CHOLEST SERPL-MCNC: 160 MG/DL
CO2 SERPL-SCNC: 24 MMOL/L (ref 21–31)
CREAT SERPL-MCNC: 0.86 MG/DL (ref 0.6–1.2)
DEPRECATED CALCIDIOL+CALCIFEROL SERPL-MC: 56 UG/L (ref 30–100)
EOSINOPHIL # BLD AUTO: 0.1 10E3/UL (ref 0–0.7)
EOSINOPHIL NFR BLD AUTO: 2 %
ERYTHROCYTE [DISTWIDTH] IN BLOOD BY AUTOMATED COUNT: 12.6 % (ref 10–15)
FASTING STATUS PATIENT QL REPORTED: NO
FERRITIN SERPL-MCNC: 30 NG/ML (ref 24–336)
FSH SERPL-ACNC: 7.6 PG/ML
GFR SERPL CREATININE-BSD FRML MDRD: 88 ML/MIN/1.73M2
GLUCOSE BLD-MCNC: 102 MG/DL (ref 70–105)
HBA1C MFR BLD: 5 % (ref 4–6.2)
HCT VFR BLD AUTO: 39.2 % (ref 35–47)
HDLC SERPL-MCNC: 47 MG/DL (ref 23–92)
HGB BLD-MCNC: 12.9 G/DL (ref 11.7–15.7)
IMM GRANULOCYTES # BLD: 0 10E3/UL
IMM GRANULOCYTES NFR BLD: 0 %
IRON SATN MFR SERPL: 9 % (ref 20–55)
IRON SERPL-MCNC: 50 UG/DL (ref 50–212)
LDLC SERPL CALC-MCNC: 77 MG/DL
LH SERPL-ACNC: 5.2 IU/L
LYMPHOCYTES # BLD AUTO: 1.4 10E3/UL (ref 0.8–5.3)
LYMPHOCYTES NFR BLD AUTO: 24 %
MCH RBC QN AUTO: 28.6 PG (ref 26.5–33)
MCHC RBC AUTO-ENTMCNC: 32.9 G/DL (ref 31.5–36.5)
MCV RBC AUTO: 87 FL (ref 78–100)
MONOCYTES # BLD AUTO: 0.2 10E3/UL (ref 0–1.3)
MONOCYTES NFR BLD AUTO: 4 %
NEUTROPHILS # BLD AUTO: 4 10E3/UL (ref 1.6–8.3)
NEUTROPHILS NFR BLD AUTO: 70 %
NONHDLC SERPL-MCNC: 113 MG/DL
NRBC # BLD AUTO: 0 10E3/UL
NRBC BLD AUTO-RTO: 0 /100
PLATELET # BLD AUTO: 331 10E3/UL (ref 150–450)
POTASSIUM BLD-SCNC: 4.2 MMOL/L (ref 3.5–5.1)
RBC # BLD AUTO: 4.51 10E6/UL (ref 3.8–5.2)
SODIUM SERPL-SCNC: 138 MMOL/L (ref 134–144)
TIBC SERPL-MCNC: 527.8 UG/DL (ref 245–400)
TRANSFERRIN SERPL-MCNC: 377 MG/DL (ref 203–362)
TRIGL SERPL-MCNC: 179 MG/DL
TSH SERPL DL<=0.005 MIU/L-ACNC: 1.13 MU/L (ref 0.4–4)
UIBC (UNSATURATED): 477.8 MG/DL
VIT B12 SERPL-MCNC: 259 PG/ML (ref 180–914)
WBC # BLD AUTO: 5.8 10E3/UL (ref 4–11)

## 2022-06-01 PROCEDURE — 80061 LIPID PANEL: CPT | Mod: ZL | Performed by: PHYSICIAN ASSISTANT

## 2022-06-01 PROCEDURE — 36415 COLL VENOUS BLD VENIPUNCTURE: CPT | Mod: ZL | Performed by: PHYSICIAN ASSISTANT

## 2022-06-01 PROCEDURE — 84443 ASSAY THYROID STIM HORMONE: CPT | Mod: ZL | Performed by: PHYSICIAN ASSISTANT

## 2022-06-01 PROCEDURE — 82728 ASSAY OF FERRITIN: CPT | Mod: ZL | Performed by: PHYSICIAN ASSISTANT

## 2022-06-01 PROCEDURE — G0463 HOSPITAL OUTPT CLINIC VISIT: HCPCS

## 2022-06-01 PROCEDURE — 83002 ASSAY OF GONADOTROPIN (LH): CPT | Mod: ZL | Performed by: PHYSICIAN ASSISTANT

## 2022-06-01 PROCEDURE — 83498 ASY HYDROXYPROGESTERONE 17-D: CPT | Mod: ZL | Performed by: PHYSICIAN ASSISTANT

## 2022-06-01 PROCEDURE — 83036 HEMOGLOBIN GLYCOSYLATED A1C: CPT | Mod: ZL | Performed by: PHYSICIAN ASSISTANT

## 2022-06-01 PROCEDURE — 99213 OFFICE O/P EST LOW 20 MIN: CPT | Mod: 25 | Performed by: PHYSICIAN ASSISTANT

## 2022-06-01 PROCEDURE — 83001 ASSAY OF GONADOTROPIN (FSH): CPT | Mod: ZL | Performed by: PHYSICIAN ASSISTANT

## 2022-06-01 PROCEDURE — 83550 IRON BINDING TEST: CPT | Mod: ZL | Performed by: PHYSICIAN ASSISTANT

## 2022-06-01 PROCEDURE — 82306 VITAMIN D 25 HYDROXY: CPT | Mod: ZL | Performed by: PHYSICIAN ASSISTANT

## 2022-06-01 PROCEDURE — 82627 DEHYDROEPIANDROSTERONE: CPT | Mod: ZL | Performed by: PHYSICIAN ASSISTANT

## 2022-06-01 PROCEDURE — 85025 COMPLETE CBC W/AUTO DIFF WBC: CPT | Mod: ZL | Performed by: PHYSICIAN ASSISTANT

## 2022-06-01 PROCEDURE — 82310 ASSAY OF CALCIUM: CPT | Mod: ZL | Performed by: PHYSICIAN ASSISTANT

## 2022-06-01 PROCEDURE — 84403 ASSAY OF TOTAL TESTOSTERONE: CPT | Mod: ZL | Performed by: PHYSICIAN ASSISTANT

## 2022-06-01 PROCEDURE — 99395 PREV VISIT EST AGE 18-39: CPT | Performed by: PHYSICIAN ASSISTANT

## 2022-06-01 PROCEDURE — 82607 VITAMIN B-12: CPT | Mod: ZL | Performed by: PHYSICIAN ASSISTANT

## 2022-06-01 ASSESSMENT — ENCOUNTER SYMPTOMS: HEADACHES: 1

## 2022-06-01 ASSESSMENT — PAIN SCALES - GENERAL: PAINLEVEL: NO PAIN (0)

## 2022-06-01 NOTE — NURSING NOTE
Patient presents to clinic for annual physical. She had concerns about fatigue, hot flashes, mood changes (has addressed with therapist) and they figured it out to be one week before menses and week of menses), urine leakage, trouble concentration, hair thinning, menses is irregular, and shorter.  Ann Schultz LPN ....................  6/1/2022   8:03 AM

## 2022-06-01 NOTE — PROGRESS NOTES
Otolaryngology Note         Chief Complaint:     Chief Complaint   Patient presents with     Ent Problem     otalgia bilateral, acute recurrent sinusitis. Referred by Ermelinda Nunez. Off and on sinus infections for about 1 year, ears and head are in pain right now.           History of Present Illness:     Radha Christensen is a 33 year old female  I was asked to see for evaluation of chronic sinusitis.The patient was sent here by  Ermelinda Nunez.  For the past year, has been having issues with her sinuses, treated 4-5 times this year for acute sinusitis, never having significant relief. Most recently 1/24/19 with doxycycline, currently on day 3, no significant relief in her nasal congestion/sinus pressure. This past infection, she has been having bilateral otalgia/pressure/fullness with no acute hearing changes or fluctuating hearing loss.    Over the past year, pretty consistent/constant facial pressure, bilateral maxillary and forehead pain/pressure, clear drainage, some strings of blood in the morning with no dripping/oozing epistaxis.   Hot/steamy showers, warm washcloth the most effective.   Flonase PRN (has caused some mild epistaxis).   Radha denies prior nasal surgery or trauma.    She denies any allergy testing.  There are no new environmental exposures in the home or at work.    Denies seasonal allergies. No sneezing or itchy/watery eyes or nasal sx come seasonal changes  Allergy to animals (cat, dog), will get stuffy, sneezing, itchy/watery eyes and flare up of her asthma. (more so dogs because she is around them the most). Takes PRN Zyrtec with animal exposure    History of migraines. Denies vision changes/nause with this, current symptoms not significant with her typical migraines.     1/24/19: Acute sinusitis, doxycycline  8/3/18: Telephone call with recurrent sinus symptoms, called in Azithromycin  5/1/18: Acute maxillary sinusitis, azithromycin  1/12/18: Acute maxillary sinusitis, augmentin    Thinks she  filled 1 refill of azithromycin in this time frame also.    Sinus CT 19:  IMPRESSION:  Aplastic frontal sinus.  Moderate lobulated right maxillary sinus mucosal thickening versus  retention cyst formation. Clear ostiomeatal units.   Mild S-shaped nasal septal deviation.         Medications:     Current Outpatient Rx   Medication Sig Dispense Refill     albuterol (PROAIR HFA/PROVENTIL HFA/VENTOLIN HFA) 108 (90 BASE) MCG/ACT Inhaler Inhale 2 puffs into the lungs every 4 hours as needed       doxycycline hyclate (VIBRA-TABS) 100 MG tablet Take 1 tablet (100 mg) by mouth 2 times daily for 10 days 20 tablet 0     escitalopram (LEXAPRO) 10 MG tablet TAKE 1 TABLET BY MOUTH EVERY DAY 90 tablet 3     fluticasone (FLONASE) 50 MCG/ACT nasal spray Spray 2 sprays into both nostrils daily 1 Bottle 11     minocycline (MINOCIN/DYNACIN) 100 MG capsule TAKE 1 CAPSULE BY MOUTH TWICE DAILY 120 capsule 3     norgestimate-ethinyl estradiol (PREVIFEM) 0.25-35 MG-MCG per tablet Take 1 tablet by mouth daily 84 tablet 5     polyethylene glycol (MIRALAX) powder Take 17 g (1 capful) by mouth daily 510 g 3     senna (SENOKOT) 8.6 MG tablet 2 tabs today and as needed up to once day for constaiption 120 tablet 0     topiramate (TOPAMAX) 25 MG tablet TAKE 1 TABLET BY MOUTH EVERY DAY 90 tablet 3            Allergies:     Allergies: Cefaclor          Past Medical History:     Past Medical History:   Diagnosis Date     Personal history of other medical treatment (CODE)      2, Para 2            Past Surgical History:     No past surgical history on file.    ENT family history reviewed         Social History:     Social History     Tobacco Use     Smoking status: Former Smoker     Types: Cigarettes     Last attempt to quit: 2013     Years since quittin.0     Smokeless tobacco: Never Used   Substance Use Topics     Alcohol use: Yes     Comment: Alcoholic Drinks/day: Occ     Drug use: No            Review of Systems:     ROS: See  "HPI         Physical Exam:     /82   Pulse 89   Temp 98.5  F (36.9  C) (Tympanic)   Ht 1.702 m (5' 7\")   Wt 103.9 kg (229 lb)   LMP 01/20/2019 (Exact Date)   SpO2 98%   BMI 35.87 kg/m      General - The patient is well nourished and well developed, and appears to have good nutritional status.  Alert and oriented to person and place, answers questions and cooperates with examination appropriately.   Head and Face - Normocephalic and atraumatic, with no gross asymmetry noted.  The facial nerve is intact, with strong symmetric movements.  Voice and Breathing - The patient was breathing comfortably without the use of accessory muscles. There was no wheezing, stridor. The patients voice was clear and strong, and had appropriate pitch and quality.  Ears - External ear normal. Canals are patent. Right tympanic membrane is intact without effusion, retraction or mass. Left tympanic membrane is intact without effusion, retraction or mass.  Eyes - Extraocular movements intact, and the pupils were reactive to light. Sclera were not icteric or injected, conjunctiva were pink and moist.  Mouth - Examination of the oral cavity showed pink, healthy oral mucosa. Dentition in good condition. No lesions or ulcerations noted. The tongue was mobile and midline.   Throat - The walls of the oropharynx were smooth, pink, moist, symmetric, and had no lesions or ulcerations.  The tonsillar pillars and soft palate were symmetric. The uvula was midline on elevation.    Neck - Normal midline excursion of the laryngotracheal complex during swallowing.  Full range of motion on passive movement.  Palpation of the occipital, submental, submandibular, internal jugular chain, and supraclavicular nodes did not demonstrate any abnormal lymph nodes or masses.  Palpation of the thyroid was soft and smooth, with no nodules or goiter appreciated.  The trachea was mobile and midline.  Nose - External contour is symmetric, no gross deflection or " scars.      To further evaluate the nasal cavity, I performed rigid nasal endoscopy.  I first sprayed the nasal cavity bilaterally with a mix of lidocaine and neosynephrine.  I used a 2.7mm,   30 degree rigid nasal endoscope for examination.    Septum irregular with hypervascularity noted left Keisselbach's Plexus, no active bleeding/oozing.     Left side:    Inferior turbinate hypertrophy. Scant clear/yellow/bloody drainage inferior meatus, collected for culture and debrided with #5 maxwell.  The left middle turbinate and middle meatus were clearly visualized and normal in appearance.  Going further back, the sphenoethmoid and frontal recess were normal in appearance.  The nasopharynx was unremarkable, and the eustachian tube opening on this side was unobstructed.  No abnormal secretions, purulence, or polyps were noted.    Right side:    Inferior turbinate hypertrophy. Scant clear secretions inferior meatus, collected for culture and debrided with #5 maxwell.  The right middle turbinate with hypertrophy and middle meatus were clearly visualized and normal in appearance.  Going further back, the sphenoethmoid and frontal recess was normal in appearance, scant clear secretions noted from sphenoethmoid recess.  The nasopharynx was unremarkable, and the eustachian tube opening on this side was unobstructed.  No abnormal secretions, purulence, or polyps were noted.    PROCEDURES  To evaluate the nose and sinuses , I performed rigid nasal endoscopy. I sprayed both nares with lidocaine and neosynephrine.     I began with the LEFT side using a 0 degree rigid nasal endoscope, and then similarly examined the RIGHT side    Options were explained to the patient regarding conservative measures versus nasal cautery in the clinic today.  The patient wished to proceed with cautery and packing.  Risks including infection, further bleeding, possible need for surgery, septal perforation were discussed and the patient consented.  I  anesthetized the nose with topical lidocaine and neosynepherine.  I then applied silver nitrate to the vessels, starting distally, and working my way back to the vessels  point of entry onto the nasal mucosa (left Keisselbach's Plexus).  The patient tolerated the procedure well.         Assessment and Plan:       ICD-10-CM    1. Dysfunction of both eustachian tubes H69.83    2. Chronic maxillary sinusitis J32.0    3. Allergy to animals J30.81    4. Nasal turbinate hypertrophy J34.3    5. Nasal congestion R09.81      The patient has been cauterized today for epistaxis.  I counseled them on keeping the head above the level of the heart at all times for the next week.  No picking or rubbing at the cauterized side of the nose, or blowing for 1 week.   No lifting bending or straining for 2 weeks, 10 pound weight limit.  Sneeze with mouth open.  The patient was counseled that in the event of heavy bleeding, to apply pressure to front of nose, lean forward and spit out blood.  Apply afrin nasal spray to side of bleed until is slows or stops.  If bleeding persists or is worrisome, present to the emergency room.    To prevent epistaxis:   Use over the counter nasal saline spray (ocean nasal spray)  3-4 x daily and before bed, then apply aquaphor ointment.   No bending, straining or lifting over 10 pounds.    Will call with sinus culture results once available. Continue doxycycline for now.       1. Budesonide nasal saline irrigation per instructions: Do twice daily x 1 month. Stop and notify me if any bleeding.   -Obtain Jones Med Sinus rinse over the counter.    -Use warm distilled water and 2 packets of the salt solution that comes with the bottle, dissolve in bottle up to the 240 mL jory.  -Add 1 vial of budesonide.  -Irrigate each side of your nose leaning over the sink, using 1/2 the volume of the bottle in each nostril every irrigation.  Irrigate 2 times daily.  -If additional rinses are needed/recommended, you may use  the plain Jones Med Sinus irrigation without the use of added budesonide.   2. Daily antihistamine x 2-3 weeks, stop at at that time if no improvement in your symptoms.  3. Plain Jones Med sinus rinse as needed throughout the day.   4. 2 weeks can start daily Flonase as directed, stop if any bleeding.  5. Complete allergy testing and I will see you after. Consider immunotherapy and/or sinus surgery (septoplasty/bilateral turbinate reduction/right maxillary sinus debridement) as necessary if ongoing symptoms/concerns.     Return sooner as needed.      Valeria Hurtado NP  ENT  [facility], [place]  [phone]     pt complaints of vomiting x 2 days. Also c/o belly pain and pain under the left breast.  States " I have a breast implant since 2013". Pt also states that she got Shingles vaccine 2-3 weeks ago.

## 2022-06-02 LAB — DHEA-S SERPL-MCNC: 126 UG/DL (ref 35–430)

## 2022-06-03 LAB — TESTOST SERPL-MCNC: 22 NG/DL (ref 8–60)

## 2022-06-06 LAB — 17OHP SERPL-MCNC: <10 NG/DL

## 2022-06-07 ENCOUNTER — MYC MEDICAL ADVICE (OUTPATIENT)
Dept: FAMILY MEDICINE | Facility: OTHER | Age: 37
End: 2022-06-07
Payer: COMMERCIAL

## 2022-06-17 ENCOUNTER — MYC MEDICAL ADVICE (OUTPATIENT)
Dept: FAMILY MEDICINE | Facility: OTHER | Age: 37
End: 2022-06-17
Payer: COMMERCIAL

## 2022-08-10 ENCOUNTER — APPOINTMENT (OUTPATIENT)
Dept: CT IMAGING | Facility: OTHER | Age: 37
End: 2022-08-10
Attending: EMERGENCY MEDICINE
Payer: COMMERCIAL

## 2022-08-10 ENCOUNTER — HOSPITAL ENCOUNTER (EMERGENCY)
Facility: OTHER | Age: 37
Discharge: HOME | End: 2022-08-10
Attending: EMERGENCY MEDICINE | Admitting: EMERGENCY MEDICINE
Payer: COMMERCIAL

## 2022-08-10 VITALS
SYSTOLIC BLOOD PRESSURE: 118 MMHG | OXYGEN SATURATION: 96 % | HEART RATE: 87 BPM | TEMPERATURE: 97.1 F | DIASTOLIC BLOOD PRESSURE: 78 MMHG | RESPIRATION RATE: 18 BRPM

## 2022-08-10 DIAGNOSIS — R10.31 RLQ ABDOMINAL PAIN: ICD-10-CM

## 2022-08-10 LAB
ALBUMIN UR-MCNC: NEGATIVE MG/DL
ANION GAP SERPL CALCULATED.3IONS-SCNC: 11 MMOL/L (ref 3–14)
APPEARANCE UR: CLEAR
BACTERIA #/AREA URNS HPF: ABNORMAL /HPF
BASOPHILS # BLD AUTO: 0 10E3/UL (ref 0–0.2)
BASOPHILS NFR BLD AUTO: 0 %
BILIRUB UR QL STRIP: NEGATIVE
BUN SERPL-MCNC: 13 MG/DL (ref 7–25)
CALCIUM SERPL-MCNC: 8.7 MG/DL (ref 8.6–10.3)
CHLORIDE BLD-SCNC: 108 MMOL/L (ref 98–107)
CO2 SERPL-SCNC: 21 MMOL/L (ref 21–31)
COLOR UR AUTO: YELLOW
CREAT SERPL-MCNC: 0.9 MG/DL (ref 0.6–1.2)
EOSINOPHIL # BLD AUTO: 0.2 10E3/UL (ref 0–0.7)
EOSINOPHIL NFR BLD AUTO: 2 %
ERYTHROCYTE [DISTWIDTH] IN BLOOD BY AUTOMATED COUNT: 12.6 % (ref 10–15)
GFR SERPL CREATININE-BSD FRML MDRD: 83 ML/MIN/1.73M2
GLUCOSE BLD-MCNC: 90 MG/DL (ref 70–105)
GLUCOSE UR STRIP-MCNC: NEGATIVE MG/DL
HCG UR QL: NEGATIVE
HCT VFR BLD AUTO: 35.2 % (ref 35–47)
HGB BLD-MCNC: 11.7 G/DL (ref 11.7–15.7)
HGB UR QL STRIP: ABNORMAL
IMM GRANULOCYTES # BLD: 0 10E3/UL
IMM GRANULOCYTES NFR BLD: 0 %
KETONES UR STRIP-MCNC: NEGATIVE MG/DL
LACTATE SERPL-SCNC: 2 MMOL/L (ref 0.7–2)
LEUKOCYTE ESTERASE UR QL STRIP: NEGATIVE
LYMPHOCYTES # BLD AUTO: 2.5 10E3/UL (ref 0.8–5.3)
LYMPHOCYTES NFR BLD AUTO: 29 %
MCH RBC QN AUTO: 28.9 PG (ref 26.5–33)
MCHC RBC AUTO-ENTMCNC: 33.2 G/DL (ref 31.5–36.5)
MCV RBC AUTO: 87 FL (ref 78–100)
MONOCYTES # BLD AUTO: 0.5 10E3/UL (ref 0–1.3)
MONOCYTES NFR BLD AUTO: 5 %
MUCOUS THREADS #/AREA URNS LPF: PRESENT /LPF
NEUTROPHILS # BLD AUTO: 5.4 10E3/UL (ref 1.6–8.3)
NEUTROPHILS NFR BLD AUTO: 64 %
NITRATE UR QL: NEGATIVE
NRBC # BLD AUTO: 0 10E3/UL
NRBC BLD AUTO-RTO: 0 /100
PH UR STRIP: 7 [PH] (ref 5–9)
PLATELET # BLD AUTO: 256 10E3/UL (ref 150–450)
POTASSIUM BLD-SCNC: 3.4 MMOL/L (ref 3.5–5.1)
RBC # BLD AUTO: 4.05 10E6/UL (ref 3.8–5.2)
RBC URINE: 2 /HPF
SODIUM SERPL-SCNC: 140 MMOL/L (ref 134–144)
SP GR UR STRIP: 1.01 (ref 1–1.03)
SQUAMOUS EPITHELIAL: 1 /HPF
UROBILINOGEN UR STRIP-MCNC: NORMAL MG/DL
WBC # BLD AUTO: 8.7 10E3/UL (ref 4–11)
WBC URINE: 2 /HPF

## 2022-08-10 PROCEDURE — 82310 ASSAY OF CALCIUM: CPT | Performed by: EMERGENCY MEDICINE

## 2022-08-10 PROCEDURE — 99283 EMERGENCY DEPT VISIT LOW MDM: CPT | Performed by: EMERGENCY MEDICINE

## 2022-08-10 PROCEDURE — 250N000011 HC RX IP 250 OP 636: Performed by: EMERGENCY MEDICINE

## 2022-08-10 PROCEDURE — 83605 ASSAY OF LACTIC ACID: CPT | Performed by: EMERGENCY MEDICINE

## 2022-08-10 PROCEDURE — 81001 URINALYSIS AUTO W/SCOPE: CPT | Performed by: EMERGENCY MEDICINE

## 2022-08-10 PROCEDURE — 250N000013 HC RX MED GY IP 250 OP 250 PS 637: Performed by: EMERGENCY MEDICINE

## 2022-08-10 PROCEDURE — 81025 URINE PREGNANCY TEST: CPT | Performed by: EMERGENCY MEDICINE

## 2022-08-10 PROCEDURE — 36415 COLL VENOUS BLD VENIPUNCTURE: CPT | Performed by: EMERGENCY MEDICINE

## 2022-08-10 PROCEDURE — 74177 CT ABD & PELVIS W/CONTRAST: CPT | Mod: TC

## 2022-08-10 PROCEDURE — 99285 EMERGENCY DEPT VISIT HI MDM: CPT | Mod: 25 | Performed by: EMERGENCY MEDICINE

## 2022-08-10 PROCEDURE — 85025 COMPLETE CBC W/AUTO DIFF WBC: CPT | Performed by: EMERGENCY MEDICINE

## 2022-08-10 RX ORDER — IOPAMIDOL 755 MG/ML
100 INJECTION, SOLUTION INTRAVASCULAR ONCE
Status: COMPLETED | OUTPATIENT
Start: 2022-08-10 | End: 2022-08-10

## 2022-08-10 RX ADMIN — IOPAMIDOL 100 ML: 755 INJECTION, SOLUTION INTRAVENOUS at 04:42

## 2022-08-10 RX ADMIN — IBUPROFEN 600 MG: 200 TABLET, FILM COATED ORAL at 04:49

## 2022-08-10 ASSESSMENT — ACTIVITIES OF DAILY LIVING (ADL)
ADLS_ACUITY_SCORE: 35
ADLS_ACUITY_SCORE: 35

## 2022-08-10 NOTE — ED TRIAGE NOTES
Arrived from home via private vehicle.  CC of abdominal pain, localized to RLQ.  No previous abdominal surgical hx.  The pain was noticed first around 0200 this morning, no change after bowel movement.  Pain is significantly worse with bumps in the car, some rebound tenderness is present.       Triage Assessment     Row Name 08/10/22 0327       Triage Assessment (Adult)    Airway WDL WDL       Respiratory WDL    Respiratory WDL WDL       Skin Circulation/Temperature WDL    Skin Circulation/Temperature WDL WDL       Cardiac WDL    Cardiac WDL WDL       Peripheral/Neurovascular WDL    Peripheral Neurovascular WDL WDL       Cognitive/Neuro/Behavioral WDL    Cognitive/Neuro/Behavioral WDL WDL

## 2022-08-10 NOTE — ED PROVIDER NOTES
Emergency Department Provider Note  : 1985 Age: 37 year old Sex: female MRN: [id number]    Chief Complaint   Patient presents with     Abdominal Pain       Medical Decision Making / Assessment / Plan   37 year old female presenting with right lower quadrant abdominal pain since the last couple of hours.  Appendicitis or ovarian etiology are high in the differential, I think appendicitis is more likely.  We will plan for basic labs, UA, UPT and a CT scan to rule out appendicitis.    ED Course as of 08/10/22 0653   Wed Aug 10, 2022   0613 Patient was feeling better, only received ibuprofen for pain.  Labs returned essentially unremarkable.  Waiting on her CT scan.   651 CT returned unremarkable for any acute pathology.  I explained these results to the patient.  I explained that usually when we do not find an answer, 1 of 2 things happens.  Either the symptoms get better and we do not really know what happened or the symptoms change or become more obvious with time.  I believe it is safe for her to discharge and follow-up with primary care as needed.         Final diagnoses:   RLQ abdominal pain       Jean Whittington MD  8/10/2022   Emergency Department    Desiree Garcia is a 37 year old female who presents with approximately 1 to 2 hours of right lower quadrant abdominal pain.  Patient was woken from sleep with suprapubic and right lower quadrant abdominal pain.  She felt like she needed to have a bowel movement.  While she was doing that she became very pale and diaphoretic and felt like she had to vomit.  She still has 7 out of 10 suprapubic and more so right lower quadrant abdominal pain.  No nausea currently.  She has never had anything like this before.  No prior abdominal surgeries.  Was feeling perfectly fine yesterday and when she went to bed last night.  No urinary frequency or dysuria.  No unusual vaginal bleeding or discharge.  Her last menstrual period was in .  She is on birth control  pills but her periods have been irregular for the past year.      I have reviewed the Medications, Allergies, Past Medical and Surgical History, and Social History in the [facility] and with family.    Review of Systems:  See HPI for pertinent positives and negatives. All other systems reviewed and found to be negative.      Objective   BP: 118/78  Pulse: 87  Temp: 97.1  F (36.2  C)  Resp: 18  SpO2: 96 %    Physical Exam:   General: awake and alert  Head: normocephalic, atraumatic  Eyes:conjugate gaze  ENT: moist membranes  Chest/Respiratory: normal resp effort  Cardiovascular: warm and well perfused  Abdominal: soft, non-distended, suprapubic and RLQ TTP, no CVAT  Extremities: mobility at baseline  Neurological: moving all extremities, normal speech, gait at baseline  Skin: warm and dry  Psychiatric: appropriate affect        Medical/Surgical History:  Past Medical History:   Diagnosis Date     Concussion 2011    Overview:  no loss of consciousness from softball injury     Personal history of other medical treatment (CODE)      2, Para 2     Past Surgical History:   Procedure Laterality Date     ENDOSCOPIC SINUS SURGERY Bilateral 2020    Procedure: Bilateral Endoscopic Sinus Surgery with polypectomy;  Surgeon: Karen Sanchez MD;  Location: [facility]     ESOPHAGOSCOPY, GASTROSCOPY, DUODENOSCOPY (EGD), COMBINED N/A 2019    Procedure: ESOPHAGOGASTRODUODENOSCOPY, WITH BIOPSY;  Surgeon: Emigdio Goldberg MD;  Location:  OR     RECONSTRUCT NOSE AND SEPTUM (FUNCTIONAL) Bilateral 2020    Procedure: Nasal Valve Repair(LATERA);  Surgeon: Karen Sanchez MD;  Location: [facility]     SEPTOPLASTY, TURBINOPLASTY, COMBINED N/A 2020    Procedure: Septoplasty Turbinate Reduction;  Surgeon: Karen Sanchez MD;  Location: [facility] OR     wisdom teeth          Medications:  No current facility-administered medications for this encounter.     Current Outpatient Medications   Medication      albuterol (PROAIR HFA/PROVENTIL HFA/VENTOLIN HFA) 108 (90 Base) MCG/ACT inhaler     amoxicillin-clavulanate (AUGMENTIN) 875-125 MG tablet     drospirenone-ethinyl estradiol (DILSHAD) 3-0.02 MG tablet     escitalopram (LEXAPRO) 20 MG tablet     famotidine (PEPCID) 20 MG tablet     hydrochlorothiazide (HYDRODIURIL) 25 MG tablet     lisinopril (ZESTRIL) 40 MG tablet     loratadine (CLARITIN) 10 MG tablet     LORazepam (ATIVAN) 0.5 MG tablet     vitamin D3 (CHOLECALCIFEROL) 50 mcg (2000 units) tablet       Allergies:  Cefaclor    Relevant labs, images, EKGs, Epic and outside hospital (if applicable) charts were reviewed. The findings, diagnosis, plan, and need for follow up were discussed with the patient/family. Nursing notes were reviewed.        Jean Whittington MD  08/10/22 0653       Jean Whittington MD  08/10/22 0653

## 2022-09-17 ENCOUNTER — HEALTH MAINTENANCE LETTER (OUTPATIENT)
Age: 37
End: 2022-09-17

## 2022-09-24 ENCOUNTER — OFFICE VISIT (OUTPATIENT)
Dept: FAMILY MEDICINE | Facility: OTHER | Age: 37
End: 2022-09-24
Attending: NURSE PRACTITIONER
Payer: COMMERCIAL

## 2022-09-24 VITALS
WEIGHT: 225.3 LBS | SYSTOLIC BLOOD PRESSURE: 130 MMHG | TEMPERATURE: 98.6 F | DIASTOLIC BLOOD PRESSURE: 78 MMHG | OXYGEN SATURATION: 97 % | HEART RATE: 80 BPM | RESPIRATION RATE: 14 BRPM | BODY MASS INDEX: 35.29 KG/M2

## 2022-09-24 DIAGNOSIS — J32.0 CHRONIC MAXILLARY SINUSITIS: Primary | ICD-10-CM

## 2022-09-24 DIAGNOSIS — R04.0 NASAL HEMORRHAGE: ICD-10-CM

## 2022-09-24 DIAGNOSIS — J32.0 RIGHT MAXILLARY SINUSITIS: ICD-10-CM

## 2022-09-24 PROCEDURE — G0463 HOSPITAL OUTPT CLINIC VISIT: HCPCS

## 2022-09-24 PROCEDURE — 99213 OFFICE O/P EST LOW 20 MIN: CPT | Performed by: NURSE PRACTITIONER

## 2022-09-24 RX ORDER — FLUOXETINE 10 MG/1
10 CAPSULE ORAL DAILY
COMMUNITY
Start: 2022-09-21

## 2022-09-24 RX ORDER — SERTRALINE HYDROCHLORIDE 25 MG/1
TABLET, FILM COATED ORAL
COMMUNITY
Start: 2022-08-11 | End: 2022-09-30

## 2022-09-24 RX ORDER — BUDESONIDE 0.5 MG/2ML
0.5 INHALANT ORAL 2 TIMES DAILY PRN
COMMUNITY
Start: 2022-09-24 | End: 2022-10-10

## 2022-09-24 ASSESSMENT — PAIN SCALES - GENERAL: PAINLEVEL: MODERATE PAIN (5)

## 2022-09-24 NOTE — PROGRESS NOTES
ASSESSMENT/PLAN:     I have reviewed the nursing notes.  I have reviewed the findings, diagnosis, plan and need for follow up with the patient.      1. Nasal hemorrhage    Occurred x 3 episodes since last night with passage of large amount of blood and clots, nasal drainage now clear with blood tinge.  Simple nasal exam without noted lesions or areas requiring cauterization.    Encouraged nasal hydration and moisture with saline nasal spray, humidifier, steamy shower, use of topical moisturizer applied inside nares (Aquaphor, Vasline, Coconut oil, antibiotic ointment), etc     2. Right maxillary sinusitis    - amoxicillin-clavulanate (AUGMENTIN) 875-125 MG tablet; Take 1 tablet by mouth 2 times daily for 10 days  Dispense: 20 tablet; Refill: 0    Continue daily to twice daily sinus rinses    Symptomatic treatment - Encouraged fluids, elevation, humidifier, saline nasal spray, sinus rinse/netti pot, use of topical moisturizer applied inside nares (Aquaphor, Vasline, Coconut oil, antibiotic ointment), etc     May use over-the-counter Tylenol or ibuprofen PRN    Discussed warning signs/symptoms indicative of need to f/u  Follow up if symptoms persist or worsen or concerns    3. Chronic maxillary sinusitis    - budesonide (PULMICORT) 0.5 MG/2ML neb solution; Spray 2 mLs (0.5 mg) in nostril 2 times daily as needed (sinus) Historical med      Continue daily to twice daily sinus rinses    Symptomatic treatment - Encouraged fluids, elevation, humidifier, saline nasal spray, sinus rinse/netti pot, use of topical moisturizer applied inside nares (Aquaphor, Vasline, Coconut oil, antibiotic ointment), etc     May use over-the-counter Tylenol or ibuprofen PRN    Follow up with ENT for regular visit       I explained my diagnostic considerations and recommendations to the patient, who voiced understanding and agreement with the treatment plan. All questions were answered. We discussed potential side effects of any prescribed or  recommended therapies, as well as expectations for response to treatments.    Heather Perera NP on 2022 at 4:57 PM    [facility]      SUBJECTIVE:   Radha Christensen is a 37 year old female who presents to clinic today for the following health issues:  Bloody nose and sinus infection    HPI  Chronic allergies and sinus infections.  Right side facial pain and pressure started yesterday.  Pain radiates to ear and teeth.  Blew nose during the night last night and she had a large amount of blood and clots, occurred 2 more times.  Continues to have thinner bloody drainage now.  No drainage from left nostril.  No left sided facial pain or swelling.  Post nasal drainage.  No sore throat.  Right sided headache yesterday.  Diffuse right sided sinus pressure today.  Chills last night.  No fevers.  Right ear pressure.  Fatigue started yesterday.  No cough, chest congestion or shortness of breath.  Hx of sinus surgery in   Took ibuprofen 600 mg this morning  Uses saline rinses twice every day.  Added Budeside     Past Medical History:   Diagnosis Date     Concussion 2011    Overview:  no loss of consciousness from softball injury     Personal history of other medical treatment (CODE)      2, Para 2     Past Surgical History:   Procedure Laterality Date     ENDOSCOPIC SINUS SURGERY Bilateral 2020    Procedure: Bilateral Endoscopic Sinus Surgery with polypectomy;  Surgeon: Karen Sanchez MD;  Location: [facility]     ESOPHAGOSCOPY, GASTROSCOPY, DUODENOSCOPY (EGD), COMBINED N/A 2019    Procedure: ESOPHAGOGASTRODUODENOSCOPY, WITH BIOPSY;  Surgeon: Emigdio Goldberg MD;  Location:  OR     RECONSTRUCT NOSE AND SEPTUM (FUNCTIONAL) Bilateral 2020    Procedure: Nasal Valve Repair(LATERA);  Surgeon: Karen Sanchez MD;  Location: [facility]     SEPTOPLASTY, TURBINOPLASTY, COMBINED N/A 2020    Procedure: Septoplasty Turbinate Reduction;  Surgeon: Karen Sanchez  MD;  Location: [facility] OR     wisdom teeth        Social History     Tobacco Use     Smoking status: Former Smoker     Years: 10.00     Types: Cigarettes     Quit date: 2013     Years since quittin.7     Smokeless tobacco: Never Used   Substance Use Topics     Alcohol use: Not Currently     Comment: 1 time per month.     Current Outpatient Medications   Medication Sig Dispense Refill     albuterol (PROAIR HFA/PROVENTIL HFA/VENTOLIN HFA) 108 (90 Base) MCG/ACT inhaler Inhale 2 puffs into the lungs every 4 hours as needed for shortness of breath / dyspnea 1 Inhaler 1     amoxicillin-clavulanate (AUGMENTIN) 875-125 MG tablet Take 1 tablet by mouth 2 times daily 20 tablet 0     drospirenone-ethinyl estradiol (DILSHAD) 3-0.02 MG tablet Take 1 tablet by mouth daily 84 tablet 3     escitalopram (LEXAPRO) 20 MG tablet Take 1 tablet (20 mg) by mouth daily 90 tablet 3     famotidine (PEPCID) 20 MG tablet TAKE 1 TABLET(20 MG) BY MOUTH TWICE DAILY 60 tablet 11     FLUoxetine (PROZAC) 10 MG capsule        hydrochlorothiazide (HYDRODIURIL) 25 MG tablet Take 0.5 tablets (12.5 mg) by mouth daily 45 tablet 3     lisinopril (ZESTRIL) 40 MG tablet Take 1 tablet (40 mg) by mouth daily 90 tablet 3     loratadine (CLARITIN) 10 MG tablet Take 10 mg by mouth daily       LORazepam (ATIVAN) 0.5 MG tablet Take 1 tablet (0.5 mg) by mouth every 6 hours as needed for anxiety . Encouraged to take 1 hour prior to the airplane flight 4 tablet 0     sertraline (ZOLOFT) 25 MG tablet TAKE 1 TABLET BY MOUTH AT BEDTIME ON DAYS 14 THROUGH 28 OF MENSTRUAL CYCLE FOR PMDD       vitamin D3 (CHOLECALCIFEROL) 50 mcg (2000 units) tablet Take 1 tablet (50 mcg) by mouth daily       Allergies   Allergen Reactions     Cefaclor Unknown         Past medical history, past surgical history, current medications and allergies reviewed and accurate to the best of my knowledge.        OBJECTIVE:     /78 (BP Location: Right arm, Patient Position: Sitting, Cuff Size:  Adult Regular)   Pulse 80   Temp 98.6  F (37  C) (Temporal)   Resp 14   Wt 102.2 kg (225 lb 4.8 oz)   LMP 08/28/2022   SpO2 97%   Breastfeeding No   BMI 35.29 kg/m    Body mass index is 35.29 kg/m .     Physical Exam  General Appearance: Well appearing adult female, appropriate appearance for age. No acute distress  Ears: Left TM intact with bony landmarks appreciated, no erythema, no effusion, no bulging, no purulence.  Right TM intact with bony landmarks appreciated, no erythema, no effusion, no bulging, no purulence.  Left auditory canal clear without drainage or bleeding.  Right auditory canal clear without drainage or bleeding.  Normal external ears, non tender.  Eyes: conjunctivae normal without erythema or irritation, corneas clear, no drainage or crusting, no eyelid swelling, pupils equal   Orophayrnx: moist mucous membranes, pharynx without erythema, tonsils without hypertrophy, tonsils without erythema, no tonsillar exudates, no oral lesions, no palate petechiae, no post nasal drip seen, no trismus, voice clear.    Sinuses:  Right maxillary area with swelling and sinus tenderness upon palpation.  No swelling or tenderness of the frontal or left maxillary sinuses  Nose:  Right nares with edema in upper portion, no visible bleeding or lesions of mucosa, clear blood tinged drainage.  Left nares without noted edema, bleeding, or drainage.    Neck: supple without adenopathy  Respiratory: normal chest wall and respirations.  Normal effort.  Clear to auscultation bilaterally, no wheezing, crackles or rhonchi.  No increased work of breathing.  No cough appreciated.  Cardiac: RRR with no murmurs  Musculoskeletal:  Equal movement of bilateral upper extremities.  Equal movement of bilateral lower extremities.  Normal gait.    Psychological: normal affect, alert, oriented, and pleasant.

## 2022-09-24 NOTE — NURSING NOTE
"Chief Complaint   Patient presents with     Sinus Problem     Possible sinus infection - blowing blood clots       Initial /78 (BP Location: Right arm, Patient Position: Sitting, Cuff Size: Adult Regular)   Pulse 80   Temp 98.6  F (37  C) (Temporal)   Resp 14   Wt 102.2 kg (225 lb 4.8 oz)   LMP 08/28/2022   SpO2 97%   Breastfeeding No   BMI 35.29 kg/m   Estimated body mass index is 35.29 kg/m  as calculated from the following:    Height as of 6/1/22: 1.702 m (5' 7\").    Weight as of this encounter: 102.2 kg (225 lb 4.8 oz).     Medication Reconciliation: complete      FOOD SECURITY SCREENING QUESTIONS:    The next two questions are to help us understand your food security.  If you are feeling you need any assistance in this area, we have resources available to support you today.    Hunger Vital Signs:  Within the past 12 months we worried whether our food would run out before we got money to buy more. Never  Within the past 12 months the food we bought just didn't last and we didn't have money to get more. Never        Advance care plan reviewed      Lynnette Michael LPN on 9/24/2022 at 10:32 AM      "

## 2022-09-29 NOTE — PROGRESS NOTES
Assessment & Plan     1. Situational anxiety  Continues to struggle with daily anxiety for which she continues on Lexapro and recent addition of Prozac on days 14 through 28 of cycle for management of PMDD recently diagnosed by psychiatrist.  Brother passed away this morning and would like to try an as needed anxiety medication short-term.  Has tolerated Ativan for repeat one-time prescription as outlined below.   reviewed appears appropriate.  - LORazepam (ATIVAN) 0.5 MG tablet; Take 1 tablet (0.5 mg) by mouth every 6 hours as needed for anxiety  Dispense: 15 tablet; Refill: 0    2. Irregular menstrual cycle  Continues to struggle with irregular menstrual cycle, associated mood swings and back cramps.  Lab work evaluation completed in June which was largely unrevealing.  We will pursue ultrasound for additional evaluation as well as transition off of combined oral contraceptive per patient request today.  If ultrasound is unremarkable patient would like to meet with OB/GYN for additional evaluation.  - US Pelvic Complete with Transvaginal; Future    3. PMDD (premenstrual dysphoric disorder)  Recent diagnosis from psychiatry.  Continues to work with them for management.  See above for additional details.    4. Cystic acne  History of cystic acne for which she had been well controlled with Maria Guadalupe.  Due to above mood changes patient would like to trial off of contraception for now and would like to try spironolactone for management of this.  Does have history of hypertension for which she continues on lisinopril so recommending she also monitor blood pressure at home.  If blood pressure is dropping too low in the cut back or discontinue lisinopril if spironolactone is well-tolerated and managing symptoms well.  - spironolactone (ALDACTONE) 25 MG tablet; Take 1 tablet (25 mg) by mouth daily  Dispense: 90 tablet; Refill: 1    5. Mild intermittent asthma without complication  Stable.  Refill as below.  - albuterol  (PROAIR HFA/PROVENTIL HFA/VENTOLIN HFA) 108 (90 Base) MCG/ACT inhaler; Inhale 2 puffs into the lungs every 4 hours as needed for shortness of breath / dyspnea  Dispense: 18 g; Refill: 3      No follow-ups on file.    Justine Sharp PA-C  [facility]    Subjective   Radha is a 37 year old, presenting for the following health issues:  RECHECK (After seeing psychiatrist/)      History of Present Illness       Back Pain:  She presents for follow up of back pain. Patient's back pain is a chronic problem.  Location of back pain:  Right lower back, left lower back, right middle of back and left middle of back  Description of back pain: cramping and dull ache  Back pain spreads: nowhere    Since patient first noticed back pain, pain is: gradually worsening  Does back pain interfere with her job:  Yes      She eats 2-3 servings of fruits and vegetables daily.She consumes 1 sweetened beverage(s) daily.She exercises with enough effort to increase her heart rate 20 to 29 minutes per day.  She exercises with enough effort to increase her heart rate 3 or less days per week.   She is taking medications regularly.     Here for evaluation of multiple concerns.  Patient has a longstanding history of irregular menses, associated back cramps, mood changes.  She was evaluated for this per her request in June and lab work was largely unrevealing.  She continues on Maria Guadalupe for contraceptive management as well as acne.  Reports more recently she was diagnosed with PMDD in addition to anxiety.  She is working with a psychiatrist regarding this.  She continues on daily Lexapro and Prozac 10 mg on days 14 through 28 of cycle was recently added.  She also discussed possibly discontinuing the contraceptive as it could be affecting her mood and trying something like spironolactone for management of her acne and possibly also her blood pressure.  She would like to make some of these changes today.  She did not have a pelvic  ultrasound completed during her last evaluation and would like to consider this today as well.    Patient also reports that her brother passed away this morning.  She is managing okay but has concerns with her anxiety and would like to trial an as needed anxiety medication on top of her daily medications.  She has tolerated Ativan as needed well in the past for situational anxiety with flying. She does have family and good support system in the area.     Also needing refill of albuterol inhaler for history of asthma.  Stable.    PAST MEDICAL HISTORY:   Past Medical History:   Diagnosis Date     Concussion 2011    Overview:  no loss of consciousness from softball injury     Personal history of other medical treatment (CODE)      2, Para 2       PAST SURGICAL HISTORY:   Past Surgical History:   Procedure Laterality Date     ENDOSCOPIC SINUS SURGERY Bilateral 2020    Procedure: Bilateral Endoscopic Sinus Surgery with polypectomy;  Surgeon: Karen Sanchez MD;  Location: [facility]     ESOPHAGOSCOPY, GASTROSCOPY, DUODENOSCOPY (EGD), COMBINED N/A 2019    Procedure: ESOPHAGOGASTRODUODENOSCOPY, WITH BIOPSY;  Surgeon: Emigdio Goldberg MD;  Location:  OR     RECONSTRUCT NOSE AND SEPTUM (FUNCTIONAL) Bilateral 2020    Procedure: Nasal Valve Repair(LATERA);  Surgeon: Karen Sanchez MD;  Location: [facility]     SEPTOPLASTY, TURBINOPLASTY, COMBINED N/A 2020    Procedure: Septoplasty Turbinate Reduction;  Surgeon: Karen Sanchez MD;  Location: [facility] OR     wisdom teeth          FAMILY HISTORY:   Family History   Problem Relation Age of Onset     Heart Disease Maternal Grandfather 50        Heart Disease     Diabetes Maternal Grandfather         Diabetes     Chronic Obstructive Pulmonary Disease Maternal Grandfather         COPD     Diabetes Mother         Diabetes     Thyroid Disease Mother         Thyroid Disease     Heart Disease Mother      Heart Disease Maternal Uncle      Thyroid  Disease Brother         Thyroid Disease     Diabetes Brother 38        Diabetes     Diabetes Maternal Uncle         Diabetes     Heart Disease Maternal Uncle      Heart Disease Maternal Grandmother      Breast Cancer No family hx of         Cancer-breast     Ovarian Cancer No family hx of         Cancer-ovarian     Prostate Cancer No family hx of         Cancer-prostate     Other - See Comments No family hx of         Stroke     Colon Cancer No family hx of         Cancer-colon     Blood Disease No family hx of         Blood Disease       SOCIAL HISTORY:   Social History     Tobacco Use     Smoking status: Former Smoker     Years: 10.00     Types: Cigarettes     Quit date: 2013     Years since quittin.7     Smokeless tobacco: Never Used   Substance Use Topics     Alcohol use: Not Currently     Comment: 1 time per month.        Allergies   Allergen Reactions     Cefaclor Unknown     Current Outpatient Medications   Medication     albuterol (PROAIR HFA/PROVENTIL HFA/VENTOLIN HFA) 108 (90 Base) MCG/ACT inhaler     amoxicillin-clavulanate (AUGMENTIN) 875-125 MG tablet     budesonide (PULMICORT) 0.5 MG/2ML neb solution     drospirenone-ethinyl estradiol (DILSHAD) 3-0.02 MG tablet     escitalopram (LEXAPRO) 20 MG tablet     famotidine (PEPCID) 20 MG tablet     FLUoxetine (PROZAC) 10 MG capsule     hydrochlorothiazide (HYDRODIURIL) 25 MG tablet     lisinopril (ZESTRIL) 40 MG tablet     loratadine (CLARITIN) 10 MG tablet     LORazepam (ATIVAN) 0.5 MG tablet     spironolactone (ALDACTONE) 25 MG tablet     vitamin D3 (CHOLECALCIFEROL) 50 mcg (2000 units) tablet     No current facility-administered medications for this visit.         Review of Systems   Per HPI        Objective    /88   Pulse 109   Temp 98.3  F (36.8  C) (Temporal)   Resp 16   Wt 102.5 kg (226 lb)   LMP 2022   SpO2 98%   Breastfeeding No   BMI 35.40 kg/m    Body mass index is 35.4 kg/m .  Physical Exam   General: Pleasant, in no  apparent distress.  : Not repeated today  Psych: Appropriate mood and affect.

## 2022-09-30 ENCOUNTER — OFFICE VISIT (OUTPATIENT)
Dept: FAMILY MEDICINE | Facility: OTHER | Age: 37
End: 2022-09-30
Attending: PHYSICIAN ASSISTANT
Payer: COMMERCIAL

## 2022-09-30 VITALS
OXYGEN SATURATION: 98 % | HEART RATE: 109 BPM | RESPIRATION RATE: 16 BRPM | TEMPERATURE: 98.3 F | BODY MASS INDEX: 35.4 KG/M2 | SYSTOLIC BLOOD PRESSURE: 134 MMHG | DIASTOLIC BLOOD PRESSURE: 88 MMHG | WEIGHT: 226 LBS

## 2022-09-30 DIAGNOSIS — F41.8 SITUATIONAL ANXIETY: Primary | ICD-10-CM

## 2022-09-30 DIAGNOSIS — N92.6 IRREGULAR MENSTRUAL CYCLE: ICD-10-CM

## 2022-09-30 DIAGNOSIS — L70.0 CYSTIC ACNE: ICD-10-CM

## 2022-09-30 DIAGNOSIS — J45.20 MILD INTERMITTENT ASTHMA WITHOUT COMPLICATION: ICD-10-CM

## 2022-09-30 DIAGNOSIS — F32.81 PMDD (PREMENSTRUAL DYSPHORIC DISORDER): ICD-10-CM

## 2022-09-30 PROCEDURE — 99214 OFFICE O/P EST MOD 30 MIN: CPT | Performed by: PHYSICIAN ASSISTANT

## 2022-09-30 PROCEDURE — G0463 HOSPITAL OUTPT CLINIC VISIT: HCPCS

## 2022-09-30 RX ORDER — SPIRONOLACTONE 25 MG/1
25 TABLET ORAL DAILY
Qty: 90 TABLET | Refills: 1 | Status: SHIPPED | OUTPATIENT
Start: 2022-09-30 | End: 2022-11-10 | Stop reason: SINTOL

## 2022-09-30 RX ORDER — ALBUTEROL SULFATE 90 UG/1
2 AEROSOL, METERED RESPIRATORY (INHALATION) EVERY 4 HOURS PRN
Qty: 18 G | Refills: 3 | Status: SHIPPED | OUTPATIENT
Start: 2022-09-30 | End: 2024-08-14

## 2022-09-30 RX ORDER — LORAZEPAM 0.5 MG/1
0.5 TABLET ORAL EVERY 6 HOURS PRN
Qty: 15 TABLET | Refills: 0 | Status: SHIPPED | OUTPATIENT
Start: 2022-09-30 | End: 2023-09-28 | Stop reason: ALTCHOICE

## 2022-09-30 ASSESSMENT — ANXIETY QUESTIONNAIRES
GAD7 TOTAL SCORE: 15
1. FEELING NERVOUS, ANXIOUS, OR ON EDGE: NEARLY EVERY DAY
6. BECOMING EASILY ANNOYED OR IRRITABLE: NEARLY EVERY DAY
5. BEING SO RESTLESS THAT IT IS HARD TO SIT STILL: NOT AT ALL
IF YOU CHECKED OFF ANY PROBLEMS ON THIS QUESTIONNAIRE, HOW DIFFICULT HAVE THESE PROBLEMS MADE IT FOR YOU TO DO YOUR WORK, TAKE CARE OF THINGS AT HOME, OR GET ALONG WITH OTHER PEOPLE: EXTREMELY DIFFICULT
7. FEELING AFRAID AS IF SOMETHING AWFUL MIGHT HAPPEN: NEARLY EVERY DAY
3. WORRYING TOO MUCH ABOUT DIFFERENT THINGS: NEARLY EVERY DAY
2. NOT BEING ABLE TO STOP OR CONTROL WORRYING: NEARLY EVERY DAY
GAD7 TOTAL SCORE: 15

## 2022-09-30 ASSESSMENT — PATIENT HEALTH QUESTIONNAIRE - PHQ9
5. POOR APPETITE OR OVEREATING: NOT AT ALL
SUM OF ALL RESPONSES TO PHQ QUESTIONS 1-9: 15

## 2022-09-30 ASSESSMENT — ASTHMA QUESTIONNAIRES
QUESTION_2 LAST FOUR WEEKS HOW OFTEN HAVE YOU HAD SHORTNESS OF BREATH: ONCE OR TWICE A WEEK
QUESTION_5 LAST FOUR WEEKS HOW WOULD YOU RATE YOUR ASTHMA CONTROL: WELL CONTROLLED
QUESTION_4 LAST FOUR WEEKS HOW OFTEN HAVE YOU USED YOUR RESCUE INHALER OR NEBULIZER MEDICATION (SUCH AS ALBUTEROL): ONCE A WEEK OR LESS
ACUTE_EXACERBATION_TODAY: NO
ACT_TOTALSCORE: 21
ACT_TOTALSCORE: 21
QUESTION_1 LAST FOUR WEEKS HOW MUCH OF THE TIME DID YOUR ASTHMA KEEP YOU FROM GETTING AS MUCH DONE AT WORK, SCHOOL OR AT HOME: NONE OF THE TIME
QUESTION_3 LAST FOUR WEEKS HOW OFTEN DID YOUR ASTHMA SYMPTOMS (WHEEZING, COUGHING, SHORTNESS OF BREATH, CHEST TIGHTNESS OR PAIN) WAKE YOU UP AT NIGHT OR EARLIER THAN USUAL IN THE MORNING: ONCE OR TWICE

## 2022-09-30 ASSESSMENT — PAIN SCALES - GENERAL: PAINLEVEL: NO PAIN (0)

## 2022-09-30 NOTE — PROGRESS NOTES
{PROVIDER CHARTING PREFERENCE:770586}    Desiree Garcia is a 37 year old, presenting for the following health issues:  RECHECK (After seeing psychiatrist/)      History of Present Illness       Back Pain:  She presents for follow up of back pain. Patient's back pain is a chronic problem.  Location of back pain:  Right lower back, left lower back, right middle of back and left middle of back  Description of back pain: cramping and dull ache  Back pain spreads: nowhere    Since patient first noticed back pain, pain is: gradually worsening  Does back pain interfere with her job:  Yes      She eats 2-3 servings of fruits and vegetables daily.She consumes 1 sweetened beverage(s) daily.She exercises with enough effort to increase her heart rate 20 to 29 minutes per day.  She exercises with enough effort to increase her heart rate 3 or less days per week.   She is taking medications regularly.       {SUPERLIST (Optional):040684}  {additonal problems for provider to add (Optional):975987}    Review of Systems   {ROS COMP (Optional):840816}      Objective    /88   Pulse 109   Temp 98.3  F (36.8  C) (Temporal)   Resp 16   Wt 102.5 kg (226 lb)   LMP 09/25/2022   SpO2 98%   Breastfeeding No   BMI 35.40 kg/m    Body mass index is 35.4 kg/m .  Physical Exam   {Exam List (Optional):187220}    {Diagnostic Test Results (Optional):175837}    {AMBULATORY ATTESTATION (Optional):051778}

## 2022-09-30 NOTE — NURSING NOTE
"Chief Complaint   Patient presents with     RECHECK     After seeing psychiatrist       She has been seeing a psychiatrist and diagnosed with PMDD and she wants to talk about that and she is having back cramps.  Rocio Mitchell LPN..................9/30/2022   8:25 AM    Initial /88   Pulse 109   Temp 98.3  F (36.8  C) (Temporal)   Resp 16   Wt 102.5 kg (226 lb)   LMP 09/25/2022   SpO2 98%   Breastfeeding No   BMI 35.40 kg/m   Estimated body mass index is 35.4 kg/m  as calculated from the following:    Height as of 6/1/22: 1.702 m (5' 7\").    Weight as of this encounter: 102.5 kg (226 lb).  Medication Reconciliation: complete    FOOD SECURITY SCREENING QUESTIONS  Hunger Vital Signs:  Within the past 12 months we worried whether our food would run out before we got money to buy more. Never  Within the past 12 months the food we bought just didn't last and we didn't have money to get more. Never        Advance care directive on file? no  Advance care directive provided to patient? declined     Rocio Mitchell LPN  "

## 2022-10-10 ENCOUNTER — OFFICE VISIT (OUTPATIENT)
Dept: OTOLARYNGOLOGY | Facility: OTHER | Age: 37
End: 2022-10-10
Attending: OTOLARYNGOLOGY
Payer: COMMERCIAL

## 2022-10-10 VITALS
DIASTOLIC BLOOD PRESSURE: 70 MMHG | BODY MASS INDEX: 33.74 KG/M2 | SYSTOLIC BLOOD PRESSURE: 140 MMHG | HEIGHT: 67 IN | TEMPERATURE: 98.1 F | WEIGHT: 215 LBS | OXYGEN SATURATION: 99 % | HEART RATE: 63 BPM

## 2022-10-10 DIAGNOSIS — Z98.890 HISTORY OF ENDOSCOPIC SINUS SURGERY: Primary | ICD-10-CM

## 2022-10-10 DIAGNOSIS — J01.00 ACUTE NON-RECURRENT MAXILLARY SINUSITIS: ICD-10-CM

## 2022-10-10 DIAGNOSIS — J32.0 CHRONIC MAXILLARY SINUSITIS: ICD-10-CM

## 2022-10-10 PROCEDURE — 99214 OFFICE O/P EST MOD 30 MIN: CPT | Mod: 25 | Performed by: OTOLARYNGOLOGY

## 2022-10-10 PROCEDURE — 31231 NASAL ENDOSCOPY DX: CPT | Performed by: OTOLARYNGOLOGY

## 2022-10-10 PROCEDURE — 31231 NASAL ENDOSCOPY DX: CPT | Mod: 52 | Performed by: OTOLARYNGOLOGY

## 2022-10-10 PROCEDURE — G0463 HOSPITAL OUTPT CLINIC VISIT: HCPCS | Mod: 25

## 2022-10-10 PROCEDURE — 31237 NSL/SINS NDSC SURG BX POLYPC: CPT | Performed by: OTOLARYNGOLOGY

## 2022-10-10 RX ORDER — BUDESONIDE 0.5 MG/2ML
0.5 INHALANT ORAL 2 TIMES DAILY PRN
Qty: 60 ML | Refills: 11 | Status: SHIPPED | OUTPATIENT
Start: 2022-10-10 | End: 2023-01-23

## 2022-10-10 NOTE — LETTER
"    10/10/2022         RE: Radha Christensen  [address]  [facility] [address]        Dear Colleague,    Thank you for referring your patient, Radha Christensen, to the [facility]. Please see a copy of my visit note below.    Otolaryngology Progress Note            Presents for evaluation of her sinuses.  Hx of perennial allergic rhinitis , taking claritin    S/p FESS 8/6/20:  Pre-op Diagnosis: Chronic pansinusitis, deviated nasal septum, bilateral inferior turbinate hypertrophy, bilateral nasal valve collapse  Post-op Diagnosis:  same  Procedures:    1.  Bilateral total ethmoidectomy  2.  Bilateral maxillary antrostomy with tissue removal  3.  Septoplasty with cartilage reinsertion  4.  Bilateral nasal valve repair  5.  Bilateral submucous reduction inferior turbinates    No heavy bleeding or pain  States nasal breathing is improved  Using budesonide irrigations       Post op SNOT 10  Pre op SNOT 72    SNOT today: 61      Normal post op exam on 10/8/20    Since her last visit, she had a episode of epistaxis and has been diagnosed with a right maxillary sinusitis on 9/24/2022 and started on Augmentin by Heather Rivera NP  no recent CT sinus    She has had no problems with her sinuses and no chronic congestion until September 24.  She awoke in the middle the night with heavy right epistaxis.  On the night of the 23rd she had severe right maxillary dentalgia right maxillary and frontal pressure.  She has been using budesonide irrigations.  She is improving on Augmentin    She  has a longstanding history of bilateral otalgia with flights.  She had severe otalgia when she recently had to fly for the unexpected sudden death of her brother.      Physical Exam  BP (!) 140/70 (BP Location: Left arm, Patient Position: Chair, Cuff Size: Adult Regular)   Pulse 63   Temp 98.1  F (36.7  C) (Tympanic)   Ht 1.702 m (5' 7\")   Wt 97.5 kg (215 lb)   LMP 09/25/2022   SpO2 99%   BMI 33.67 kg/m  "     General - The patient is well nourished and well developed, and appears to have good nutritional status.  Alert and oriented to person and place, interactive.  Head and Face - Normocephalic and atraumatic, with no gross asymmetry noted of the contour of the facial features.  The facial nerve is intact, with strong symmetric movements.  Eyes - Extraocular movements intact.   Nose - Nasal mucosa is pink and moist with no abnormal mucus.  The septum was grossly midline, turbinates are without excess edema.  No polyps, masses, or purulence noted on examination.  Ears-TM with mild pars tensa retraction, no effusions  Oral/OP-moist, no mass, uvula and tongue midline  Neck-no concerning nodes, trachea midline    To evaluate the nose and sinuses in the post operative state, I performed rigid nasal endoscopy. The nose was anesthetized with home afrin or topical lidocaine and neosynephrine in the office.    I began with the LEFT side using a 0 degree rigid nasal endoscope, and then similarly examined the RIGHT side    Findings:  Septum midline  Nasal valve implants in good position bilaterally  Inferior turbinates:  Lateralized  Septum midline  I used an 8 maxwell to remove viscous but non purulent secretions from the right inferior meatus  IT grade 3+ bilaterally  Middle turbinate and middle meatus:  No purulence, no polyposis, no synechiae  Antrostomy clear  Ethmoid cavity clear  Mucosa is healthy throughout without polyps nor polypoid degeneration  NP clear with edematous ET mucosa bilaerally, no mass  SER and FR clear  No bleeding or clot thorughout    Impression/Plan      ICD-10-CM    1. History of endoscopic sinus surgery  Z98.890 CT Sinus w/o Contrast      2. Chronic maxillary sinusitis  J32.0 budesonide (PULMICORT) 0.5 MG/2ML neb solution      3. Acute non-recurrent maxillary sinusitis  J01.00 CT Sinus w/o Contrast            CT sinus    Continue budesonide irrigations as indicated  Prn use john med saline  Return  accordingly    Nasal moisture reinforced    Karen Sanchez D.O.  Otolaryngology/Head and Neck Surgery  Allergy            Again, thank you for allowing me to participate in the care of your patient.        Sincerely,        Karen Sanchez MD

## 2022-10-10 NOTE — PATIENT INSTRUCTIONS
Thank you for allowing Dr. Sanchez and our ENT team to participate in your care.  If your medications are too expensive, please give the nurse a call.  We can possibly change this medication.  If you have a scheduling or an appointment question please contact our Health Unit Coordinator at their direct line [phone].   ALL nursing questions or concerns can be directed to your ENT nurse at: [phone] - Nesha    Use Budesonide Rinses Twice Daily    Complete Sinus CT Scan      Budesonide instructions  Make the Jones Med Saline Solution using 2 packages of salt and previously boiled or distilled water.  This will make 240 ml of saline solution.  Mix the entire vial of budesonide into the solution.   Irrigate your nose 2 times a day with the warm budesonide solution using 1 bottle between nostrils in the morning, and one bottle at night.

## 2022-10-10 NOTE — NURSING NOTE
"Chief Complaint   Patient presents with     RECHECK     Sinus Check; FESS, Septoplasty, Turbinate Reduction, Nasal Valve Repair on 7/1/20       Initial BP (!) 140/70 (BP Location: Left arm, Patient Position: Chair, Cuff Size: Adult Regular)   Pulse 63   Temp 98.1  F (36.7  C) (Tympanic)   Ht 1.702 m (5' 7\")   Wt 97.5 kg (215 lb)   LMP 09/25/2022   SpO2 99%   BMI 33.67 kg/m   Estimated body mass index is 33.67 kg/m  as calculated from the following:    Height as of this encounter: 1.702 m (5' 7\").    Weight as of this encounter: 97.5 kg (215 lb).  Medication Reconciliation: complete  ANNE PARNELL LPN    "

## 2022-10-10 NOTE — PROGRESS NOTES
"Otolaryngology Progress Note            Presents for evaluation of her sinuses.  Hx of perennial allergic rhinitis , taking claritin    S/p FESS 8/6/20:  Pre-op Diagnosis: Chronic pansinusitis, deviated nasal septum, bilateral inferior turbinate hypertrophy, bilateral nasal valve collapse  Post-op Diagnosis:  same  Procedures:    1.  Bilateral total ethmoidectomy  2.  Bilateral maxillary antrostomy with tissue removal  3.  Septoplasty with cartilage reinsertion  4.  Bilateral nasal valve repair  5.  Bilateral submucous reduction inferior turbinates    No heavy bleeding or pain  States nasal breathing is improved  Using budesonide irrigations       Post op SNOT 10  Pre op SNOT 72    SNOT today: 61      Normal post op exam on 10/8/20    Since her last visit, she had a episode of epistaxis and has been diagnosed with a right maxillary sinusitis on 9/24/2022 and started on Augmentin by Heather Rivera NP  no recent CT sinus    She has had no problems with her sinuses and no chronic congestion until September 24.  She awoke in the middle the night with heavy right epistaxis.  On the night of the 23rd she had severe right maxillary dentalgia right maxillary and frontal pressure.  She has been using budesonide irrigations.  She is improving on Augmentin    She  has a longstanding history of bilateral otalgia with flights.  She had severe otalgia when she recently had to fly for the unexpected sudden death of her brother.      Physical Exam  BP (!) 140/70 (BP Location: Left arm, Patient Position: Chair, Cuff Size: Adult Regular)   Pulse 63   Temp 98.1  F (36.7  C) (Tympanic)   Ht 1.702 m (5' 7\")   Wt 97.5 kg (215 lb)   LMP 09/25/2022   SpO2 99%   BMI 33.67 kg/m      General - The patient is well nourished and well developed, and appears to have good nutritional status.  Alert and oriented to person and place, interactive.  Head and Face - Normocephalic and atraumatic, with no gross asymmetry noted of the contour of " the facial features.  The facial nerve is intact, with strong symmetric movements.  Eyes - Extraocular movements intact.   Nose - Nasal mucosa is pink and moist with no abnormal mucus.  The septum was grossly midline, turbinates are without excess edema.  No polyps, masses, or purulence noted on examination.  Ears-TM with mild pars tensa retraction, no effusions  Oral/OP-moist, no mass, uvula and tongue midline  Neck-no concerning nodes, trachea midline    To evaluate the nose and sinuses in the post operative state, I performed rigid nasal endoscopy. The nose was anesthetized with home afrin or topical lidocaine and neosynephrine in the office.    I began with the LEFT side using a 0 degree rigid nasal endoscope, and then similarly examined the RIGHT side    Findings:  Septum midline  Nasal valve implants in good position bilaterally  Inferior turbinates:  Lateralized  Septum midline  I used an 8 maxwell to remove viscous but non purulent secretions from the right inferior meatus  IT grade 3+ bilaterally  Middle turbinate and middle meatus:  No purulence, no polyposis, no synechiae  Antrostomy clear  Ethmoid cavity clear  Mucosa is healthy throughout without polyps nor polypoid degeneration  NP clear with edematous ET mucosa bilaerally, no mass  SER and FR clear  No bleeding or clot thorughout    Impression/Plan      ICD-10-CM    1. History of endoscopic sinus surgery  Z98.890 CT Sinus w/o Contrast      2. Chronic maxillary sinusitis  J32.0 budesonide (PULMICORT) 0.5 MG/2ML neb solution      3. Acute non-recurrent maxillary sinusitis  J01.00 CT Sinus w/o Contrast            CT sinus:  Addendum: Sinuses are clear,  small amount of mucoperiosteal thickening in the maxillary sinuses versus retention cysts bilaterally.  Antrostomies are patent septum is midline    Continue budesonide irrigations as indicated  Prn use john med saline  Return accordingly    Nasal moisture reinforced    Karen Sanchez,  LINA.O.  Otolaryngology/Head and Neck Surgery  Allergy

## 2022-10-21 ENCOUNTER — HOSPITAL ENCOUNTER (OUTPATIENT)
Dept: CT IMAGING | Facility: OTHER | Age: 37
Discharge: HOME | End: 2022-10-21
Attending: OTOLARYNGOLOGY
Payer: COMMERCIAL

## 2022-10-21 ENCOUNTER — MYC MEDICAL ADVICE (OUTPATIENT)
Dept: FAMILY MEDICINE | Facility: OTHER | Age: 37
End: 2022-10-21

## 2022-10-21 ENCOUNTER — HOSPITAL ENCOUNTER (OUTPATIENT)
Dept: ULTRASOUND IMAGING | Facility: OTHER | Age: 37
Discharge: HOME | End: 2022-10-21
Attending: PHYSICIAN ASSISTANT
Payer: COMMERCIAL

## 2022-10-21 DIAGNOSIS — N92.6 IRREGULAR MENSTRUAL CYCLE: ICD-10-CM

## 2022-10-21 DIAGNOSIS — J01.00 ACUTE NON-RECURRENT MAXILLARY SINUSITIS: ICD-10-CM

## 2022-10-21 DIAGNOSIS — Z98.890 HISTORY OF ENDOSCOPIC SINUS SURGERY: ICD-10-CM

## 2022-10-21 PROCEDURE — 70486 CT MAXILLOFACIAL W/O DYE: CPT

## 2022-10-21 PROCEDURE — 76830 TRANSVAGINAL US NON-OB: CPT

## 2022-11-10 ENCOUNTER — MYC MEDICAL ADVICE (OUTPATIENT)
Dept: FAMILY MEDICINE | Facility: OTHER | Age: 37
End: 2022-11-10

## 2022-11-10 DIAGNOSIS — L70.0 CYSTIC ACNE: ICD-10-CM

## 2022-11-10 RX ORDER — DROSPIRENONE AND ETHINYL ESTRADIOL 0.02-3(28)
1 KIT ORAL DAILY
Qty: 84 TABLET | Refills: 3 | Status: SHIPPED | OUTPATIENT
Start: 2022-11-10 | End: 2023-09-28

## 2022-12-29 ENCOUNTER — OFFICE VISIT (OUTPATIENT)
Dept: FAMILY MEDICINE | Facility: OTHER | Age: 37
End: 2022-12-29
Attending: PHYSICIAN ASSISTANT
Payer: COMMERCIAL

## 2022-12-29 VITALS
WEIGHT: 222.3 LBS | TEMPERATURE: 97.7 F | OXYGEN SATURATION: 99 % | HEART RATE: 64 BPM | BODY MASS INDEX: 34.82 KG/M2 | SYSTOLIC BLOOD PRESSURE: 126 MMHG | DIASTOLIC BLOOD PRESSURE: 84 MMHG

## 2022-12-29 DIAGNOSIS — J32.1 CHRONIC FRONTAL SINUSITIS: Primary | ICD-10-CM

## 2022-12-29 PROCEDURE — 99213 OFFICE O/P EST LOW 20 MIN: CPT | Performed by: PHYSICIAN ASSISTANT

## 2022-12-29 PROCEDURE — G0463 HOSPITAL OUTPT CLINIC VISIT: HCPCS

## 2022-12-29 RX ORDER — PREDNISONE 20 MG/1
40 TABLET ORAL DAILY
Qty: 10 TABLET | Refills: 0 | Status: SHIPPED | OUTPATIENT
Start: 2022-12-29 | End: 2023-01-03

## 2022-12-29 RX ORDER — HYDROXYZINE HYDROCHLORIDE 10 MG/1
TABLET, FILM COATED ORAL
COMMUNITY
Start: 2022-12-15

## 2022-12-29 ASSESSMENT — PAIN SCALES - GENERAL: PAINLEVEL: SEVERE PAIN (7)

## 2022-12-29 NOTE — NURSING NOTE
Pt presents to  for sinus infection. Pt states she has chronic sinus infections. Worsening symptoms started on 12/25/22 - dizziness, blurred vision, lots of pressure in head.    Krysten Steele on 12/29/2022 at 12:35 PM

## 2022-12-29 NOTE — PATIENT INSTRUCTIONS
You were prescribed an antibiotic, please take into consideration the following information:  - Take entire course of antibiotic even if you start to feel better.  - Antibiotics can cause stomach upset including nausea and diarrhea. Read your bottle or ask the pharmacist if antibiotic can be taken with food to help prevent nausea. If you have symptoms of diarrhea you can take an over-the-counter probiotic and/or increase foods with probiotics such as yogurt, [place], sauerkraut.  -Use caution in sunlight as can lead to increased risk of sunburn while on ABX (antibiotics).     Please refer to your AVS for follow up and pain/symptoms management recommendations (I.e.: medications, helpful conservative treatment modalities, appropriate follow up if need to a specialist or family practice, etc.). Please return to urgent care if your symptoms change or worsen.     Discharge instructions:  -If you were prescribed a medication(s), please take this as prescribed/directed  -Monitor your symptoms, if changing/worsening, return to UC/ER or PCP for follow up    Sinus Infection:   1. Dry out congestion with flonase (1spray in both nostrils 2x daily for 3-5 days)    2. Antihistamine such as Claritin or Zyrtec, etc. Generic brands are OK as well.     3. Use a saline spray/Neti Pot/sinus flush (Jones Med Sinus Rinse) 2-3 times daily to irrigate sinuses/mucosal tissue. This dilutes and moves secretions.     4. Tylenol or ibuprofen for pain and fevers - alternate every 4 hours as needed. I.e.: Ibuprofen at 8am, Tylenol 12pm, Ibuprofen 4pm    -Daily maximum of Tylenol is 4000mg (recommend staying under 3000mg)   -Daily maximum of Ibuprofen is 3200 mg    5. Plenty of fluids and rest as needed.     6. Chew, yawn and speak to help eustachian tubes drain.     - Consider the following over-the-counter products if you are older than 1 year and not pregnant: honey/chestal for cough relief and sambucus/elderberry for viral upper-respiratory  symptoms.

## 2022-12-29 NOTE — PROGRESS NOTES
ASSESSMENT/PLAN:    I have reviewed the nursing notes.  I have reviewed the findings, diagnosis, plan and need for follow up with the patient.    1. Chronic frontal sinusitis  - predniSONE (DELTASONE) 20 MG tablet; Take 2 tablets (40 mg) by mouth daily for 5 days  Dispense: 10 tablet; Refill: 0  - amoxicillin-clavulanate (AUGMENTIN) 875-125 MG tablet; Take 1 tablet by mouth 2 times daily for 10 days  Dispense: 20 tablet; Refill: 0  - Vital signs stable. PE consistent with sinusitis. Discussed with patient that we recommend: to dry out congestion with flonase (1spray in both nostrils 2x daily for 3-5 days) and pseudoephedrine (1-2 tabs every 4-6 hrs for 3-5 days) unless contraindicated, use a saline spray/Neti Pot/sinus flush (Jones Med Sinus Rinse) 2-3 times daily to irrigate sinuses/mucosal tissue. This dilutes and moves secretions. Alternate Tylenol or ibuprofen for pain and fevers - alternate every 4 hours as needed. Recommend plenty of fluids and rest as needed. Discussed that an antibiotic was prescribed today for bacterial sinusitis to take the entire course of antibiotic, discussed side effect profile of prescribed medications. Patient is in agreement and understanding of the above treatment plan. All questions and concerns were addressed and answered to patient's satisfaction. AVS reviewed with patient.     Take Prednisone in AM with food    Discussed warning signs/symptoms indicative of need to f/u    Follow up if symptoms persist or worsen or concerns    I explained my diagnostic considerations and recommendations to the patient, who voiced understanding and agreement with the treatment plan. All questions were answered. We discussed potential side effects of any prescribed or recommended therapies, as well as expectations for response to treatments.    Shiela Almendarez PA-C  12/29/2022  12:43 PM    HPI:    Radha Christensen is a 37 year old female  who presents to [facility] today for concerns of sinus  infection x 22. Chronic history, has seen ENT..     Symptoms:   Location: diffuse  Nasal congestion: Yes  Purulent nasal discharge: Yes  Headache: Yes  Facial pain: Yes   Cough: No  Tooth pain/symptoms: Yes  Myalgias: No  Presence of fever: No   Halitosis: Yes   Anosmia: Yes    Metallic taste in the mouth: Yes     Treatments tried: OTC meds and saline rinses with minimal improvement.     History of similar symptoms: Yes  Prior workup: Yes    PCP: DON Sharp    Past Medical History:   Diagnosis Date     Concussion 2011    Overview:  no loss of consciousness from softball injury     Personal history of other medical treatment (CODE)      2, Para 2     Past Surgical History:   Procedure Laterality Date     ENDOSCOPIC SINUS SURGERY Bilateral 2020    Procedure: Bilateral Endoscopic Sinus Surgery with polypectomy;  Surgeon: Karen Sanchez MD;  Location: [facility] OR     ESOPHAGOSCOPY, GASTROSCOPY, DUODENOSCOPY (EGD), COMBINED N/A 2019    Procedure: ESOPHAGOGASTRODUODENOSCOPY, WITH BIOPSY;  Surgeon: Emigdio Goldberg MD;  Location: [facility] OR     RECONSTRUCT NOSE AND SEPTUM (FUNCTIONAL) Bilateral 2020    Procedure: Nasal Valve Repair(LATERA);  Surgeon: Karen Sanchez MD;  Location: [facility] OR     SEPTOPLASTY, TURBINOPLASTY, COMBINED N/A 2020    Procedure: Septoplasty Turbinate Reduction;  Surgeon: Karen Sanchez MD;  Location: [facility] OR     wisdom teeth        Social History     Tobacco Use     Smoking status: Former     Years: 10.00     Types: Cigarettes     Quit date: 2013     Years since quittin.9     Smokeless tobacco: Never   Substance Use Topics     Alcohol use: Not Currently     Comment: 1 time per month.     Current Outpatient Medications   Medication Sig Dispense Refill     albuterol (PROAIR HFA/PROVENTIL HFA/VENTOLIN HFA) 108 (90 Base) MCG/ACT inhaler Inhale 2 puffs into the lungs every 4 hours as needed for shortness of breath / dyspnea 18 g 3     budesonide  (PULMICORT) 0.5 MG/2ML neb solution Spray 2 mLs (0.5 mg) in nostril 2 times daily as needed (sinus) 60 mL 11     drospirenone-ethinyl estradiol (DILSHAD) 3-0.02 MG tablet Take 1 tablet by mouth daily 84 tablet 3     escitalopram (LEXAPRO) 20 MG tablet Take 1 tablet (20 mg) by mouth daily 90 tablet 3     famotidine (PEPCID) 20 MG tablet TAKE 1 TABLET(20 MG) BY MOUTH TWICE DAILY 60 tablet 11     FLUoxetine (PROZAC) 10 MG capsule        hydrochlorothiazide (HYDRODIURIL) 25 MG tablet Take 0.5 tablets (12.5 mg) by mouth daily 45 tablet 3     hydrOXYzine (ATARAX) 10 MG tablet TAKE 1/2 TO 1 TABLET BY MOUTH UP TO THREE TIMES DAILY AS NEEDED FOR ANXIETY       lisinopril (ZESTRIL) 40 MG tablet Take 1 tablet (40 mg) by mouth daily 90 tablet 3     loratadine (CLARITIN) 10 MG tablet Take 10 mg by mouth daily       LORazepam (ATIVAN) 0.5 MG tablet Take 1 tablet (0.5 mg) by mouth every 6 hours as needed for anxiety 15 tablet 0     vitamin D3 (CHOLECALCIFEROL) 50 mcg (2000 units) tablet Take 1 tablet (50 mcg) by mouth daily       Allergies   Allergen Reactions     Cefaclor Unknown     Past medical history, past surgical history, current medications and allergies reviewed and accurate to the best of my knowledge.      ROS:  Refer to HPI    /84 (BP Location: Left arm, Patient Position: Sitting, Cuff Size: Adult Large)   Pulse 64   Temp 97.7  F (36.5  C) (Temporal)   Wt 100.8 kg (222 lb 4.8 oz)   LMP 12/06/2022 (Exact Date)   SpO2 99%   BMI 34.82 kg/m      EXAM:  General Appearance: Well appearing 37 year old female, appropriate appearance for age. No acute distress   Ears: Left TM intact, translucent with bony landmarks appreciated, no erythema, no effusion, no bulging, no purulence.  Right TM intact, translucent with bony landmarks appreciated, no erythema, no effusion, no bulging, no purulence.  Left auditory canal clear.  Right auditory canal clear.  Normal external ears, non tender.  Eyes: conjunctivae normal without  erythema or irritation, corneas clear, no drainage or crusting, no eyelid swelling, pupils equal   Oropharynx: moist mucous membranes, posterior pharynx without erythema, tonsils symmetric, no erythema, no exudates or petechiae, no post nasal drip seen, no trismus, voice clear.    Sinuses:  Diffuse sinus tenderness to palpation, edema noted to sinuses externally.   Nose:  Bilateral nares: no erythema, no edema, + purulent drainage and congestion  Neck: supple without adenopathy  Respiratory: normal chest wall and respirations.  Normal effort.  Clear to auscultation bilaterally, no wheezing, crackles or rhonchi.  No increased work of breathing.  No cough appreciated.  Cardiac: RRR with no murmurs    Musculoskeletal:  Equal movement of bilateral upper extremities.  Equal movement of bilateral lower extremities.  Normal gait.    Dermatological: no rashes noted of exposed skin  Neuro: Alert and oriented to person, place, and time.  Cranial nerves II-XII grossly intact with no focal or lateralizing deficits.  Muscle tone normal.  Gait normal. No tremor.   Psychological: normal affect, alert, oriented, and pleasant.     Labs:  None     Xray:  None

## 2022-12-29 NOTE — LETTER
[facility]  [address]  [place] [address]  Phone: [phone]  Fax: [phone]    December 29, 2022        Rahda Christensen  [address]  [facility] [address]          To whom it may concern:    RE: Radha Christensen    Patient was seen and treated today at our clinic and missed work.    Please contact me for questions or concerns.      Sincerely,        Shiela Almendarez PA-C

## 2023-01-12 ENCOUNTER — MYC MEDICAL ADVICE (OUTPATIENT)
Dept: FAMILY MEDICINE | Facility: OTHER | Age: 38
End: 2023-01-12

## 2023-01-19 ENCOUNTER — MYC MEDICAL ADVICE (OUTPATIENT)
Dept: FAMILY MEDICINE | Facility: OTHER | Age: 38
End: 2023-01-19
Payer: COMMERCIAL

## 2023-01-20 ENCOUNTER — OFFICE VISIT (OUTPATIENT)
Dept: FAMILY MEDICINE | Facility: OTHER | Age: 38
End: 2023-01-20
Attending: PHYSICIAN ASSISTANT
Payer: COMMERCIAL

## 2023-01-20 VITALS
HEART RATE: 70 BPM | RESPIRATION RATE: 12 BRPM | HEIGHT: 67 IN | SYSTOLIC BLOOD PRESSURE: 124 MMHG | OXYGEN SATURATION: 99 % | BODY MASS INDEX: 35.22 KG/M2 | DIASTOLIC BLOOD PRESSURE: 62 MMHG | WEIGHT: 224.4 LBS | TEMPERATURE: 97.6 F

## 2023-01-20 DIAGNOSIS — J32.0 CHRONIC MAXILLARY SINUSITIS: ICD-10-CM

## 2023-01-20 DIAGNOSIS — J01.00 ACUTE NON-RECURRENT MAXILLARY SINUSITIS: Primary | ICD-10-CM

## 2023-01-20 PROCEDURE — G0463 HOSPITAL OUTPT CLINIC VISIT: HCPCS

## 2023-01-20 PROCEDURE — 99212 OFFICE O/P EST SF 10 MIN: CPT | Performed by: PHYSICIAN ASSISTANT

## 2023-01-20 ASSESSMENT — PAIN SCALES - GENERAL: PAINLEVEL: MODERATE PAIN (4)

## 2023-01-20 NOTE — PROGRESS NOTES
Assessment & Plan     1. Acute non-recurrent maxillary sinusitis  Lingering symptoms from recent infection, already completed antibiotic and oral steroid course. Exam stable today. Recommend focusing on OTC management with nasal or oral decongestant, saline irrigations, etc. Follow up if minimal improvement over the upcoming weeks.       Return if symptoms worsen or fail to improve.    Justine Sharp PA-C  [facility]    Subjective   Radha is a 37 year old presenting for the following health issues:  Sinus Problem      History of Present Illness       Reason for visit:  Sinus infection  Symptom onset:  More than a month  Symptoms include:  Sinus pressure, dizziness, off balance, sinus headache, unable to concentrate  Symptom intensity:  Moderate  Symptom progression:  Staying the same  Had these symptoms before:  Yes  Has tried/received treatment for these symptoms:  Yes  Previous treatment was successful:  No  What makes it worse:  When im around certain smells  What makes it better:  When im in the shower    She eats 2-3 servings of fruits and vegetables daily.She consumes 1 sweetened beverage(s) daily.She exercises with enough effort to increase her heart rate 20 to 29 minutes per day.  She exercises with enough effort to increase her heart rate 3 or less days per week.   She is taking medications regularly.     Here for follow up on sinus infection. Seen in the clinic on 12/29/2022 where she was diagnosed with sinus infection and treated with Augmentin and oral prednisone. Did notice some improvement in symptoms, still lingering frontal headache, bilateral maxillary sinus pain and pressure, nose discomfort, ear discomfort. Still using saline sinus irrigations daily. Has tried nasal decongestants/steroids previously but causes nose bleeds. No fever/chills, cough or cold symptoms, difficulties breathing.     PAST MEDICAL HISTORY:   Past Medical History:   Diagnosis Date     Concussion  2011    Overview:  no loss of consciousness from softball injury     Personal history of other medical treatment (CODE)      2, Para 2       PAST SURGICAL HISTORY:   Past Surgical History:   Procedure Laterality Date     ENDOSCOPIC SINUS SURGERY Bilateral 2020    Procedure: Bilateral Endoscopic Sinus Surgery with polypectomy;  Surgeon: Karen Sanchez MD;  Location: [facility] OR     ESOPHAGOSCOPY, GASTROSCOPY, DUODENOSCOPY (EGD), COMBINED N/A 2019    Procedure: ESOPHAGOGASTRODUODENOSCOPY, WITH BIOPSY;  Surgeon: Emigdio Goldberg MD;  Location: [facility] OR     RECONSTRUCT NOSE AND SEPTUM (FUNCTIONAL) Bilateral 2020    Procedure: Nasal Valve Repair(LATERA);  Surgeon: Karen Sanchez MD;  Location: [facility] OR     SEPTOPLASTY, TURBINOPLASTY, COMBINED N/A 2020    Procedure: Septoplasty Turbinate Reduction;  Surgeon: Karen Sanchez MD;  Location: [facility] OR     wisdom teeth          FAMILY HISTORY:   Family History   Problem Relation Age of Onset     Heart Disease Maternal Grandfather 50        Heart Disease     Diabetes Maternal Grandfather         Diabetes     Chronic Obstructive Pulmonary Disease Maternal Grandfather         COPD     Diabetes Mother         Diabetes     Thyroid Disease Mother         Thyroid Disease     Heart Disease Mother      Heart Disease Maternal Uncle      Thyroid Disease Brother         Thyroid Disease     Diabetes Brother 38        Diabetes     Diabetes Maternal Uncle         Diabetes     Heart Disease Maternal Uncle      Heart Disease Maternal Grandmother      Breast Cancer No family hx of         Cancer-breast     Ovarian Cancer No family hx of         Cancer-ovarian     Prostate Cancer No family hx of         Cancer-prostate     Other - See Comments No family hx of         Stroke     Colon Cancer No family hx of         Cancer-colon     Blood Disease No family hx of         Blood Disease       SOCIAL HISTORY:   Social History     Tobacco Use     Smoking status:  "Former     Years: 10.00     Types: Cigarettes     Quit date: 1/1/2013     Years since quitting: 10.0     Smokeless tobacco: Never   Substance Use Topics     Alcohol use: Not Currently     Comment: 1 time per month.        Allergies   Allergen Reactions     Cefaclor Unknown       Current Outpatient Medications   Medication     albuterol (PROAIR HFA/PROVENTIL HFA/VENTOLIN HFA) 108 (90 Base) MCG/ACT inhaler     budesonide (PULMICORT) 0.5 MG/2ML neb solution     drospirenone-ethinyl estradiol (DILSHAD) 3-0.02 MG tablet     escitalopram (LEXAPRO) 20 MG tablet     famotidine (PEPCID) 20 MG tablet     FLUoxetine (PROZAC) 10 MG capsule     hydrochlorothiazide (HYDRODIURIL) 25 MG tablet     hydrOXYzine (ATARAX) 10 MG tablet     lisinopril (ZESTRIL) 40 MG tablet     loratadine (CLARITIN) 10 MG tablet     LORazepam (ATIVAN) 0.5 MG tablet     vitamin D3 (CHOLECALCIFEROL) 50 mcg (2000 units) tablet     No current facility-administered medications for this visit.         Review of Systems   Per HPI        Objective    /62   Pulse 70   Temp 97.6  F (36.4  C)   Resp 12   Ht 1.702 m (5' 7\")   Wt 101.8 kg (224 lb 6.4 oz)   LMP 12/06/2022 (Exact Date)   SpO2 99%   BMI 35.15 kg/m    Body mass index is 35.15 kg/m .  Physical Exam   General: Pleasant, in no apparent distress.  Eyes: Sclera are white and conjunctiva are clear bilaterally. Lacrimal apparatus free of erythema, edema, and discharge bilaterally.  Ears: External ears without erythema or edema. Tympanic membranes are pearly white and without erythema, scarring or perforations bilaterally. External auditory canals are free of foreign bodies, erythema, ulcers, and masses.  Nose: External nose is symmetrical and free of lesions and deformities.   Oropharynx: Oral mucosa is pink and without ulcers, nodules, and white patches. Tongue is symmetrical, pink, and without masses or lesions. Pharynx is pink, symmetrical, and without lesions. Uvula is midline. Tonsils are pink, " symmetrical, and without edema, ulcers, or exudates, and 1+ bilaterally.  Neck: No cervical lymphadenopathy on inspection and palpation.  Cardiovascular: Regular rate and rhythm with S1 equal to S2. No murmurs, friction rubs, or gallops.   Respiratory: Lungs are resonant and clear to auscultation bilaterally. No wheezes, crackles, or rhonchi.  Psych: Appropriate mood and affect.

## 2023-01-20 NOTE — NURSING NOTE
Patient presents to clinic with sinus pain and pressure along with ear discomfort.  Ann Schultz LPN ....................  1/20/2023   2:55 PM

## 2023-01-23 RX ORDER — BUDESONIDE 0.5 MG/2ML
0.5 INHALANT ORAL 2 TIMES DAILY PRN
Qty: 60 ML | Refills: 11 | Status: SHIPPED | OUTPATIENT
Start: 2023-01-23 | End: 2023-09-28

## 2023-01-27 ENCOUNTER — MYC MEDICAL ADVICE (OUTPATIENT)
Dept: FAMILY MEDICINE | Facility: OTHER | Age: 38
End: 2023-01-27
Payer: COMMERCIAL

## 2023-01-27 DIAGNOSIS — J32.1 CHRONIC FRONTAL SINUSITIS: Primary | ICD-10-CM

## 2023-01-27 RX ORDER — AZITHROMYCIN 250 MG/1
TABLET, FILM COATED ORAL
Qty: 6 TABLET | Refills: 0 | Status: SHIPPED | OUTPATIENT
Start: 2023-01-27 | End: 2023-02-01

## 2023-02-12 NOTE — PROGRESS NOTES
Otolaryngology Progress Note          Radha Christensen is a 37 year old female with perennial allergic rhinitis and  distant fess 2020 presents for follow-up of chronic rhinorrhea    Recently examined after dx of acute maxillary sinusitis by Heather Perera NP  Placed on augmentin    Endoscopy noncerning for purulence on my 10/10 exam but viscous secretions were noted in the inferior meatus the sinuses were clear    She feels like she always has a sinus infection  She has bilateral frontal fullness, left worse than right aural pressure, ocular pruritis    History of migraine.  Formally on Topamax which was effective but she was taken off of this in the past.  Her sinus symptoms are described as bilateral frontal pressure with pain 7 out of 10 associated phono and photophobia and occasional nausea.  No noticeable aura    Her allergy symptoms do not seem to be controlled she has chronic rhinitis    Using budesonide irrigations  claritin    Works for [employer] in [place]  [place] environment    SNOT todays date 59 with a severe problem with need to blow nose and aural fullness moderate problem with nasal blockage rhinorrhea postnasal discharge    Modified Quantitative Allergy Skin Testing results:  Dilution #6:  dust  Dilution #5: ragweed, pigweed, russian thistle, grass, pine, cottonwood, most molds, cat and dot  Dilution #2:  epicoccum      Repeat sinus CT clear   CT sinus dated 10/21/2022 shows aplastic frontal sinuses post ethmoidectomy of the ethmoid sinuses are clear the maxillary antrostomies are patent with small retention cyst at the floor of the right larger than left maxillary sinus the sphenoid sinuses are clear the nasopharynx is clear the septum is midline turbinates with mild edema    S/p FESS 8/6/20:  Pre-op Diagnosis: Chronic pansinusitis, deviated nasal septum, bilateral inferior turbinate hypertrophy, bilateral nasal valve collapse  Post-op Diagnosis:  same  Procedures:    1.  Bilateral total  "ethmoidectomy  2.  Bilateral maxillary antrostomy with tissue removal  3.  Septoplasty with cartilage reinsertion  4.  Bilateral nasal valve repair  5.  Bilateral submucous reduction inferior turbinates       Normal post op exam on 10/8/20    SNOT 10/10 visit: 59    Physical Exam  /70 (BP Location: Left arm, Cuff Size: Adult Large)   Pulse 88   Temp 98.5  F (36.9  C) (Tympanic)   Resp 14   Ht 1.702 m (5' 7\")   Wt 98.9 kg (218 lb)   SpO2 98%   BMI 34.14 kg/m    General - The patient is well nourished and well developed, and appears to have good nutritional status.  Alert and oriented to person and place, interactive.  Head and Face - Normocephalic and atraumatic, with no gross asymmetry noted of the contour of the facial features.  The facial nerve is intact, with strong symmetric movements.  Neck-no palpable lymphadenopathy or thyroid mass.  Trachea is midline.  Eyes - Extraocular movements intact.   Ears- External auditory canals are patent, tympanic membranes are intact without effusion or worrisome retractions   Nose - Nasal mucosa is pink and moist with no abnormal mucus.  The septum was grossly midline and non-obstructive, turbinates of normal size and position.  No polyps, masses, or purulence noted on examination.  Nasal valve collapse resolved   Mouth - Examination of the oral cavity shows pink, healthy, moist mucosa.  No lesions or ulceration noted.  The dentition are in good repair.  The tongue is mobile and midline.  Throat - The walls of the oropharynx were smooth, pink, moist, symmetric, and had no lesions or ulcerations.  The tonsillar pillars and soft palate were symmetric.  The uvula was midline on elevation.      To evaluate the nose and sinuses, I performed rigid nasal endoscopy.  I applied topical nasal lidocaine and neosynephrine.    I began with the LEFT side using a 0 degree rigid nasal endoscope, and then similarly examined the RIGHT side    Findings:  Inferior turbinates:  1+, " lateralized  Middle turbinate and middle meatus:  No purulence, no polyposis  Superior meatus was evaluated  Frontal recess clear  Maxillary antrostomies clear bilaterally ethmoid sinuses clear  Mucosa is healthy throughout,  no polyps nor polypoid degeneration  Sphenoethmoidal recess without purulence   Nasopharynx clear, no adenoid tissue, np ithout mass eustachian tubes patent  The patient tolerated the procedure well    Impression/Plan  Radha Christensen is a 37 year old female    ICD-10-CM    1. Migraine without aura and without status migrainosus, not intractable  G43.009 topiramate (TOPAMAX) 50 MG tablet      2. Perennial allergic rhinitis  J30.89 EPINEPHrine (ANY BX GENERIC EQUIV) 0.3 MG/0.3ML injection 2-pack     levocetirizine (XYZAL) 5 MG tablet     olopatadine (PATANOL) 0.1 % ophthalmic solution      3. History of endoscopic sinus surgery  Z98.890         Endoscopy again negative.  She damian not have any evidence of sinusitis on nasal endoscopy nor on CT sinus    I reviewed her most recent CT sinus which is completely negative.  She does not have frontal sinuses that any frontal pain is not due to frontal sinusitis  Headache symptoms are most likely due to migraine.  I have restarted Topamax      Follow-up for repeat allergy skin testing  Start immunotherapy.  Risks discussed including anaphylaxis, epi pen prescribed.  Continue nasal saline, nasal steroids and antihistamine use    Review tolerance of topamax at her follow-up after testing  Topamax medication effects are  negative for  flank pain,  blood in the urine,  slowing of thinking, or  paresthesias.   Stop your antihistamines before testing as directed    Continue budesonide irrigations twice a day  Start patanol eye drops, use lubricating eye drops    Take claritin in the morning, xyzal at night  Start topamax    Follow-up after allergy testing and start subcutaneous immunotherapy   Epi pen called in      Karen Sanchez,  LINA.O.  Otolaryngology/Head and Neck Surgery  Allergy

## 2023-02-13 ENCOUNTER — OFFICE VISIT (OUTPATIENT)
Dept: OTOLARYNGOLOGY | Facility: OTHER | Age: 38
End: 2023-02-13
Attending: OTOLARYNGOLOGY
Payer: COMMERCIAL

## 2023-02-13 VITALS
TEMPERATURE: 98.5 F | DIASTOLIC BLOOD PRESSURE: 70 MMHG | WEIGHT: 218 LBS | RESPIRATION RATE: 14 BRPM | BODY MASS INDEX: 34.21 KG/M2 | SYSTOLIC BLOOD PRESSURE: 122 MMHG | HEIGHT: 67 IN | OXYGEN SATURATION: 98 % | HEART RATE: 88 BPM

## 2023-02-13 DIAGNOSIS — G43.009 MIGRAINE WITHOUT AURA AND WITHOUT STATUS MIGRAINOSUS, NOT INTRACTABLE: Primary | ICD-10-CM

## 2023-02-13 DIAGNOSIS — J30.89 PERENNIAL ALLERGIC RHINITIS: ICD-10-CM

## 2023-02-13 DIAGNOSIS — Z98.890 HISTORY OF ENDOSCOPIC SINUS SURGERY: ICD-10-CM

## 2023-02-13 PROCEDURE — G0463 HOSPITAL OUTPT CLINIC VISIT: HCPCS | Mod: 25

## 2023-02-13 PROCEDURE — 31231 NASAL ENDOSCOPY DX: CPT | Performed by: OTOLARYNGOLOGY

## 2023-02-13 PROCEDURE — 99214 OFFICE O/P EST MOD 30 MIN: CPT | Mod: 25 | Performed by: OTOLARYNGOLOGY

## 2023-02-13 RX ORDER — EPINEPHRINE 0.3 MG/.3ML
0.3 INJECTION SUBCUTANEOUS
Qty: 2 EACH | Refills: 11 | Status: SHIPPED | OUTPATIENT
Start: 2023-02-13 | End: 2024-07-09

## 2023-02-13 RX ORDER — LEVOCETIRIZINE DIHYDROCHLORIDE 5 MG/1
5 TABLET, FILM COATED ORAL EVERY EVENING
Qty: 90 TABLET | Refills: 11 | Status: SHIPPED | OUTPATIENT
Start: 2023-02-13 | End: 2023-09-28

## 2023-02-13 RX ORDER — OLOPATADINE HYDROCHLORIDE 1 MG/ML
1 SOLUTION/ DROPS OPHTHALMIC 2 TIMES DAILY
Qty: 6 ML | Refills: 4 | Status: SHIPPED | OUTPATIENT
Start: 2023-02-13 | End: 2023-05-14

## 2023-02-13 RX ORDER — TOPIRAMATE 50 MG/1
TABLET, FILM COATED ORAL
Qty: 294 TABLET | Refills: 0 | Status: SHIPPED | OUTPATIENT
Start: 2023-02-13 | End: 2023-04-12

## 2023-02-13 ASSESSMENT — PAIN SCALES - GENERAL: PAINLEVEL: NO PAIN (0)

## 2023-02-13 NOTE — PATIENT INSTRUCTIONS
Follow-up for allergy skin testing  Stop your antihistamines before testing as directed    Continue budesonide irrigations twice a day  Start patanol eye drops, use lubricating eye drops    Take claritin in the morning, xyzal at night  Start topamax    Follow-up after allergy testing and start subcutaneous immunotherapy   Epi pen called in

## 2023-02-13 NOTE — LETTER
2/13/2023         RE: Radha Christensen  [address]  [facility] [address]        Dear Colleague,    Thank you for referring your patient, Radha Christensen, to the [facility]. Please see a copy of my visit note below.    Otolaryngology Progress Note          Radha Christensen is a 37 year old female with perennial allergic rhinitis and  distant fess 2020 presents for follow-up of chronic rhinorrhea    Recently examined after dx of acute maxillary sinusitis by Heather Perera NP  Placed on augmentin    Endoscopy noncerning for purulence on my 10/10 exam but viscous secretions were noted in the inferior meatus the sinuses were clear    She feels like she always has a sinus infection  She has bilateral frontal fullness, left worse than right aural pressure, ocular pruritis    History of migraine.  Formally on Topamax which was effective but she was taken off of this in the past.  Her sinus symptoms are described as bilateral frontal pressure with pain 7 out of 10 associated phono and photophobia and occasional nausea.  No noticeable aura    Her allergy symptoms do not seem to be controlled she has chronic rhinitis    Using budesonide irrigations  claritin    Works for [employer] in [place]  Very [place] environment    SNOT todays date 59 with a severe problem with need to blow nose and aural fullness moderate problem with nasal blockage rhinorrhea postnasal discharge    Modified Quantitative Allergy Skin Testing results:  Dilution #6:  dust  Dilution #5: ragweed, pigweed, russian thistle, grass, pine, cottonwood, most molds, cat and dot  Dilution #2:  epicoccum      Repeat sinus CT clear   CT sinus dated 10/21/2022 shows aplastic frontal sinuses post ethmoidectomy of the ethmoid sinuses are clear the maxillary antrostomies are patent with small retention cyst at the floor of the right larger than left maxillary sinus the sphenoid sinuses are clear the nasopharynx is clear the septum  "is midline turbinates with mild edema    S/p FESS 8/6/20:  Pre-op Diagnosis: Chronic pansinusitis, deviated nasal septum, bilateral inferior turbinate hypertrophy, bilateral nasal valve collapse  Post-op Diagnosis:  same  Procedures:    1.  Bilateral total ethmoidectomy  2.  Bilateral maxillary antrostomy with tissue removal  3.  Septoplasty with cartilage reinsertion  4.  Bilateral nasal valve repair  5.  Bilateral submucous reduction inferior turbinates       Normal post op exam on 10/8/20    SNOT 10/10 visit: 59    Physical Exam  /70 (BP Location: Left arm, Cuff Size: Adult Large)   Pulse 88   Temp 98.5  F (36.9  C) (Tympanic)   Resp 14   Ht 1.702 m (5' 7\")   Wt 98.9 kg (218 lb)   SpO2 98%   BMI 34.14 kg/m    General - The patient is well nourished and well developed, and appears to have good nutritional status.  Alert and oriented to person and place, interactive.  Head and Face - Normocephalic and atraumatic, with no gross asymmetry noted of the contour of the facial features.  The facial nerve is intact, with strong symmetric movements.  Neck-no palpable lymphadenopathy or thyroid mass.  Trachea is midline.  Eyes - Extraocular movements intact.   Ears- External auditory canals are patent, tympanic membranes are intact without effusion or worrisome retractions   Nose - Nasal mucosa is pink and moist with no abnormal mucus.  The septum was grossly midline and non-obstructive, turbinates of normal size and position.  No polyps, masses, or purulence noted on examination.  Nasal valve collapse resolved   Mouth - Examination of the oral cavity shows pink, healthy, moist mucosa.  No lesions or ulceration noted.  The dentition are in good repair.  The tongue is mobile and midline.  Throat - The walls of the oropharynx were smooth, pink, moist, symmetric, and had no lesions or ulcerations.  The tonsillar pillars and soft palate were symmetric.  The uvula was midline on elevation.      To evaluate the nose " and sinuses, I performed rigid nasal endoscopy.  I applied topical nasal lidocaine and neosynephrine.    I began with the LEFT side using a 0 degree rigid nasal endoscope, and then similarly examined the RIGHT side    Findings:  Inferior turbinates:  1+, lateralized  Middle turbinate and middle meatus:  No purulence, no polyposis  Superior meatus was evaluated  Frontal recess clear  Maxillary antrostomies clear bilaterally ethmoid sinuses clear  Mucosa is healthy throughout,  no polyps nor polypoid degeneration  Sphenoethmoidal recess without purulence   Nasopharynx clear, no adenoid tissue, np ithout mass eustachian tubes patent  The patient tolerated the procedure well    Impression/Plan  Radha Christensen is a 37 year old female    ICD-10-CM    1. Migraine without aura and without status migrainosus, not intractable  G43.009 topiramate (TOPAMAX) 50 MG tablet      2. Perennial allergic rhinitis  J30.89 EPINEPHrine (ANY BX GENERIC EQUIV) 0.3 MG/0.3ML injection 2-pack     levocetirizine (XYZAL) 5 MG tablet     olopatadine (PATANOL) 0.1 % ophthalmic solution      3. History of endoscopic sinus surgery  Z98.890         Endoscopy again negative.  She damian not have any evidence of sinusitis on nasal endoscopy nor on CT sinus    I reviewed her most recent CT sinus which is completely negative.  She does not have frontal sinuses that any frontal pain is not due to frontal sinusitis  Headache symptoms are most likely due to migraine.  I have restarted Topamax      Follow-up for repeat allergy skin testing  Start immunotherapy.  Risks discussed including anaphylaxis, epi pen prescribed.  Continue nasal saline, nasal steroids and antihistamine use    Review tolerance of topamax at her follow-up after testing  Topamax medication effects are  negative for  flank pain,  blood in the urine,  slowing of thinking, or  paresthesias.   Stop your antihistamines before testing as directed    Continue budesonide irrigations twice a  day  Start patanol eye drops, use lubricating eye drops    Take claritin in the morning, xyzal at night  Start topamax    Follow-up after allergy testing and start subcutaneous immunotherapy   Epi pen called in      Karen Sanchez D.O.  Otolaryngology/Head and Neck Surgery  Allergy              Again, thank you for allowing me to participate in the care of your patient.        Sincerely,        Karen Sanchez MD

## 2023-02-17 ENCOUNTER — TELEPHONE (OUTPATIENT)
Dept: FAMILY MEDICINE | Facility: OTHER | Age: 38
End: 2023-02-17
Payer: COMMERCIAL

## 2023-02-17 NOTE — TELEPHONE ENCOUNTER
"Fax received from [facility], with message regarding:    famotidine (PEPCID) 20 MG tablet 60 tablet 11 2/16/2022  No   Sig: TAKE 1 TABLET(20 MG) BY MOUTH TWICE DAILY     \"Drug not covered by patient plan. There preferred alternatives are:      Cimetidine tab mg    Nizatidine cap mg    Please call/fad the pharmacy to change medication along with strength, directions, quantity and refills.\"    Krysten Mratinez RN .............. 2/17/2023  12:09 PM  "

## 2023-02-20 NOTE — TELEPHONE ENCOUNTER
Please notify patient her Pepcid is no longer covered under her insurance. She can either choose to purchase this over the counter or I can change to a different generic medication in the same class.    Justine Sharp PA-C on 2/20/2023 at 7:37 AM

## 2023-02-21 NOTE — TELEPHONE ENCOUNTER
Notified patient of providers note, she states that she will buy OTC.  Ann Schultz LPN ....................  2/21/2023   9:08 AM

## 2023-03-01 DIAGNOSIS — F41.1 GAD (GENERALIZED ANXIETY DISORDER): ICD-10-CM

## 2023-03-01 NOTE — TELEPHONE ENCOUNTER
[facility] sent Rx request for the following:      Requested Prescriptions   Pending Prescriptions Disp Refills     escitalopram (LEXAPRO) 20 MG tablet [Pharmacy Med Name: ESCITALOPRAM 20MG TABLETS] 90 tablet 3     Sig: TAKE 1 TABLET(20 MG) BY MOUTH DAILY   Last Prescription Date:   2/16/22  Last Fill Qty/Refills:         90, R-3    Last Office Visit:              1/20/23   Future Office visit:           None    Krysten Martinez RN .............. 3/1/2023  4:19 PM

## 2023-03-02 RX ORDER — ESCITALOPRAM OXALATE 20 MG/1
TABLET ORAL
Qty: 90 TABLET | Refills: 2 | Status: SHIPPED | OUTPATIENT
Start: 2023-03-02 | End: 2023-12-21

## 2023-03-17 ENCOUNTER — TELEPHONE (OUTPATIENT)
Dept: ALLERGY | Facility: OTHER | Age: 38
End: 2023-03-17

## 2023-03-17 DIAGNOSIS — J30.89 PERENNIAL ALLERGIC RHINITIS: Primary | ICD-10-CM

## 2023-03-17 NOTE — TELEPHONE ENCOUNTER
Order for Loratadine 10mg chewable tab to be administered after allergy testing.  Order pended for review and signature.    Sebastián Gonzalez RN on 3/17/2023 at 11:21 AM

## 2023-03-21 DIAGNOSIS — I10 ESSENTIAL HYPERTENSION: ICD-10-CM

## 2023-03-21 NOTE — PROGRESS NOTES
"Otolaryngology Progress Note          Radha Christensen is a 38 year old female  Follow-up of chronic congestion and rhinorrhea and working diagnosis of intractable migraine without aura and perennial allergic rhinitis     Findings today confirm perennial allergic rhinitis     2/13/23 findings: Endoscopy again negative.  She damian not have any evidence of sinusitis on nasal endoscopy nor on CT sinus    Former MQT showed   Dilution #6:  dust  Dilution #5: ragweed, pigweed, russian thistle, grass, pine, cottonwood, most molds, cat and dot  Dilution #2:  epicoccum    Status post distant phase dated 8/6/2020.  Most recently CT sinus dated 10/21/2022 was negative aside from a small retention cyst in the floor of the right larger than left maxillary sinus.  Mild turbinate hypertrophy    Endoscopy negative on 213 visit and grade 1 turbinates noted    She was started on Topamax for migraine without aura due to complaints of chronic sensation of a sinus infection described as bilateral frontal fullness left worse than right, aural pressure and ocular pruritus.  History of migraine    History of migraine.  Formally on Topamax which was effective but she was taken off of this in the past.  Her sinus symptoms are described as bilateral frontal pressure with pain 7 out of 10 associated phono and photophobia and occasional nausea.  No noticeable aura    Since starting Topamax, her headaches have completely resolved  She did not  her Patanol      Modified Quantitative Allergy Skin Testing results todays date:  Dilution #6: Ragweed, dust mite  Dilution #5: Russian thistle Remi grass maple oak dipti Alternaria Hormodendrum penicillium Epicoccum Fusarium Helminthosporium, cat and dog  Dilution #2: Black walnut Bullitt Fleming      Physical Exam  /79 (BP Location: Right arm, Patient Position: Sitting, Cuff Size: Adult Regular)   Pulse 61   Temp 97.7  F (36.5  C) (Tympanic)   Ht 1.702 m (5' 7\")   Wt 97.5 kg (215 lb)   SpO2 " 99%   BMI 33.67 kg/m    General - The patient is well nourished and well developed, and appears to have good nutritional status.  Alert and oriented to person and place, interactive.  Head and Face - Normocephalic and atraumatic, with no gross asymmetry noted of the contour of the facial features.  The facial nerve is intact, with strong symmetric movements.  Eyes - Extraocular movements intact.   Nose - Nasal mucosa is pink and moist with no abnormal mucus.      Impression/Plan  Radha Christensen is a 38 year old female    ICD-10-CM    1. Perennial allergic rhinitis  J30.89       2. Migraine without aura and without status migrainosus, not intractable  G43.009             Start immunotherapy.  Risks discussed including anaphylaxis, epi pen prescribed.  Continue nasal saline, nasal steroids and antihistamine use    SCIT will be completed at [facility]    Recommend waiting 30 minutes post injection due to sensitivities, d/w Radha today       Continue topamax      DEVIN HaskinsO.  Otolaryngology/Head and Neck Surgery  Allergy

## 2023-03-23 ENCOUNTER — OFFICE VISIT (OUTPATIENT)
Dept: OTOLARYNGOLOGY | Facility: OTHER | Age: 38
End: 2023-03-23
Attending: OTOLARYNGOLOGY
Payer: COMMERCIAL

## 2023-03-23 ENCOUNTER — OFFICE VISIT (OUTPATIENT)
Dept: ALLERGY | Facility: OTHER | Age: 38
End: 2023-03-23
Attending: OTOLARYNGOLOGY
Payer: COMMERCIAL

## 2023-03-23 ENCOUNTER — TELEPHONE (OUTPATIENT)
Dept: ALLERGY | Facility: OTHER | Age: 38
End: 2023-03-23

## 2023-03-23 VITALS
TEMPERATURE: 97.7 F | SYSTOLIC BLOOD PRESSURE: 115 MMHG | BODY MASS INDEX: 33.74 KG/M2 | OXYGEN SATURATION: 99 % | HEIGHT: 67 IN | HEART RATE: 61 BPM | WEIGHT: 215 LBS | DIASTOLIC BLOOD PRESSURE: 79 MMHG

## 2023-03-23 DIAGNOSIS — J30.89 PERENNIAL ALLERGIC RHINITIS: Primary | ICD-10-CM

## 2023-03-23 DIAGNOSIS — G43.009 MIGRAINE WITHOUT AURA AND WITHOUT STATUS MIGRAINOSUS, NOT INTRACTABLE: ICD-10-CM

## 2023-03-23 PROCEDURE — 250N000013 HC RX MED GY IP 250 OP 250 PS 637: Performed by: PHYSICIAN ASSISTANT

## 2023-03-23 PROCEDURE — 99213 OFFICE O/P EST LOW 20 MIN: CPT | Mod: 25 | Performed by: OTOLARYNGOLOGY

## 2023-03-23 PROCEDURE — G0463 HOSPITAL OUTPT CLINIC VISIT: HCPCS | Mod: 25

## 2023-03-23 PROCEDURE — 95004 PERQ TESTS W/ALRGNC XTRCS: CPT

## 2023-03-23 PROCEDURE — 95024 IQ TESTS W/ALLERGENIC XTRCS: CPT

## 2023-03-23 RX ORDER — HYDROCHLOROTHIAZIDE 25 MG/1
TABLET ORAL
Qty: 45 TABLET | Refills: 3 | Status: SHIPPED | OUTPATIENT
Start: 2023-03-23 | End: 2024-08-14

## 2023-03-23 RX ADMIN — LORATADINE 10 MG: 5 TABLET, CHEWABLE ORAL at 09:53

## 2023-03-23 ASSESSMENT — ASTHMA QUESTIONNAIRES
ACT_TOTALSCORE: 22
QUESTION_3 LAST FOUR WEEKS HOW OFTEN DID YOUR ASTHMA SYMPTOMS (WHEEZING, COUGHING, SHORTNESS OF BREATH, CHEST TIGHTNESS OR PAIN) WAKE YOU UP AT NIGHT OR EARLIER THAN USUAL IN THE MORNING: NOT AT ALL
QUESTION_5 LAST FOUR WEEKS HOW WOULD YOU RATE YOUR ASTHMA CONTROL: WELL CONTROLLED
QUESTION_4 LAST FOUR WEEKS HOW OFTEN HAVE YOU USED YOUR RESCUE INHALER OR NEBULIZER MEDICATION (SUCH AS ALBUTEROL): ONCE A WEEK OR LESS
ACT_TOTALSCORE: 22
QUESTION_2 LAST FOUR WEEKS HOW OFTEN HAVE YOU HAD SHORTNESS OF BREATH: ONCE OR TWICE A WEEK
QUESTION_1 LAST FOUR WEEKS HOW MUCH OF THE TIME DID YOUR ASTHMA KEEP YOU FROM GETTING AS MUCH DONE AT WORK, SCHOOL OR AT HOME: NONE OF THE TIME

## 2023-03-23 ASSESSMENT — PAIN SCALES - GENERAL: PAINLEVEL: NO PAIN (0)

## 2023-03-23 NOTE — NURSING NOTE
This patient presents today for allergy skin testing.      Symptoms have included chronic rhinorrhea, bilateral frontal sinus fullness that is worse on the left than right, right aural pressure, ocular itching and decreased hearing from the aural fullness.  She has a h/o migraines too.  Symptoms are year round but worse in the winter.  She had sinus surgery in 2020 with .No h/o COM or surgeries.  Currently no skin issues, she had hives when she was younger but that has resolved.  She has asthma and scored 22 on ACT.  No strep/tonsillitis or surgeries.  She is taking Claritin in the AM and Xyzal in the PM.  She said the Claritin does seem to help, and she just started the Xyzal, but she thinks it's helping.  She does Budesonide rinses and said those have been effective.  She took an online food sensitivity test and her highest scores were wheat and cow's milk.    This patient lives in a town home, no basement.  The town home was built in the 2000's. This No mold, water or moisture issues in the home.  There is carpet in the home, and also in the bedroom.  Home has electric heat and central air conditioning.        This patient doesn't have any pets, but she does dog sit, and the dog sleeps with her when it's there.    This patient has had  MQT allergy testing in the past in 2019:    #6 Dust  #5 Ragweed, Pigweed, Russian Thistle, Grass, Pine, Gage, most molds and Cat and Dog  #2 Epicoccum, Maple, Elm, Eran    She didn't start immunotherapy.    This patient's medications have been reviewed prior to testing and all appropriate medications have been stopped.    Consent is signed by patient and signature is verified.     MQT/ID test is performed per protocol.  The patient tolerated testing well.  Benadryl gel used for post test itch per protocol.  Chewable Claritin 10 mg given for post test itch per protocol. All findings are recorded on the paper flow sheet. Results are reviewed with this patient.   They are given written information regarding allergy.       The patient will follow-up with  for treatment plan.      Sebastián Gonzalez RN on 3/23/2023 at 9:53 AM

## 2023-03-23 NOTE — TELEPHONE ENCOUNTER
Last Prescription Date: 2/17/22  Last Qty/Refills: 45 / 3  Last Office Visit: 1/20/23  Future Office Visit: none     Corinne R Thayer, RN on 3/23/2023 at 9:30 AM    Requested Prescriptions   Pending Prescriptions Disp Refills     hydrochlorothiazide (HYDRODIURIL) 25 MG tablet [Pharmacy Med Name: HYDROCHLOROTHIAZIDE 25MG TABLETS] 45 tablet 3     Sig: TAKE 1/2 TABLET(12.5 MG) BY MOUTH DAILY       Diuretics (Including Combos) Protocol Failed - 3/21/2023  8:01 AM        Failed - Normal serum potassium on file in past 12 months     Recent Labs   Lab Test 08/10/22  0400   POTASSIUM 3.4*                    Passed - Blood pressure under 140/90 in past 12 months     BP Readings from Last 3 Encounters:   02/13/23 122/70   01/20/23 124/62   12/29/22 126/84        Passed - Normal serum creatinine on file in past 12 months     Recent Labs   Lab Test 08/10/22  0400   CR 0.90              Passed - Normal serum sodium on file in past 12 months     Recent Labs   Lab Test 08/10/22  0400

## 2023-03-23 NOTE — TELEPHONE ENCOUNTER
Patient needs an order for allergy injections.  Order pended.  Please review and sign, thank you!    Brianna Lopes RN on 3/23/2023 at 3:47 PM

## 2023-03-23 NOTE — LETTER
3/23/2023         RE: Radha Christensen  [address]  [facility] [address]        Dear Colleague,    Thank you for referring your patient, Radha Christensen, to the [facility]. Please see a copy of my visit note below.    Otolaryngology Progress Note          Radha Christensen is a 38 year old female  Follow-up of chronic congestion and rhinorrhea and working diagnosis of intractable migraine without aura and perennial allergic rhinitis     Findings today confirm perennial allergic rhinitis     2/13/23 findings: Endoscopy again negative.  She damian not have any evidence of sinusitis on nasal endoscopy nor on CT sinus    Former MQT showed   Dilution #6:  dust  Dilution #5: ragweed, pigweed, russian thistle, grass, pine, cottonwood, most molds, cat and dot  Dilution #2:  epicoccum    Status post distant phase dated 8/6/2020.  Most recently CT sinus dated 10/21/2022 was negative aside from a small retention cyst in the floor of the right larger than left maxillary sinus.  Mild turbinate hypertrophy    Endoscopy negative on 213 visit and grade 1 turbinates noted    She was started on Topamax for migraine without aura due to complaints of chronic sensation of a sinus infection described as bilateral frontal fullness left worse than right, aural pressure and ocular pruritus.  History of migraine    History of migraine.  Formally on Topamax which was effective but she was taken off of this in the past.  Her sinus symptoms are described as bilateral frontal pressure with pain 7 out of 10 associated phono and photophobia and occasional nausea.  No noticeable aura    Since starting Topamax, her headaches have completely resolved  She did not  her Patanol      Modified Quantitative Allergy Skin Testing results todays date:  Dilution #6: Ragweed, dust mite  Dilution #5: Russian thistle Remi grass maple oak dipti Alternaria Hormodendrum penicillium Epicoccum Fusarium Helminthosporium, cat  "and dog  Dilution #2: Black walnut [place]      Physical Exam  /79 (BP Location: Right arm, Patient Position: Sitting, Cuff Size: Adult Regular)   Pulse 61   Temp 97.7  F (36.5  C) (Tympanic)   Ht 1.702 m (5' 7\")   Wt 97.5 kg (215 lb)   SpO2 99%   BMI 33.67 kg/m    General - The patient is well nourished and well developed, and appears to have good nutritional status.  Alert and oriented to person and place, interactive.  Head and Face - Normocephalic and atraumatic, with no gross asymmetry noted of the contour of the facial features.  The facial nerve is intact, with strong symmetric movements.  Eyes - Extraocular movements intact.   Nose - Nasal mucosa is pink and moist with no abnormal mucus.      Impression/Plan  Radha Christensen is a 38 year old female    ICD-10-CM    1. Perennial allergic rhinitis  J30.89       2. Migraine without aura and without status migrainosus, not intractable  G43.009             Start immunotherapy.  Risks discussed including anaphylaxis, epi pen prescribed.  Continue nasal saline, nasal steroids and antihistamine use    SCIT will be completed at [facility]    Recommend waiting 30 minutes post injection due to sensitivities, d/w Radha today       Continue topamax      Karen Sanchez, D.O.  Otolaryngology/Head and Neck Surgery  Allergy          Again, thank you for allowing me to participate in the care of your patient.        Sincerely,        Karen Sanchez MD    "

## 2023-03-23 NOTE — PATIENT INSTRUCTIONS
Thank you for allowing Dr. Sanchez and our ENT team to participate in your care.  If your medications are too expensive, please give the nurse a call.  We can possibly change this medication.  If you have a scheduling or an appointment question please contact our Health Unit Coordinator at their direct line [phone].   ALL nursing questions or concerns can be directed to your ENT nurse at: [phone] - [place]

## 2023-04-05 ENCOUNTER — ALLIED HEALTH/NURSE VISIT (OUTPATIENT)
Dept: ALLERGY | Facility: OTHER | Age: 38
End: 2023-04-05
Attending: OTOLARYNGOLOGY
Payer: COMMERCIAL

## 2023-04-05 DIAGNOSIS — G43.009 MIGRAINE WITHOUT AURA AND WITHOUT STATUS MIGRAINOSUS, NOT INTRACTABLE: ICD-10-CM

## 2023-04-05 DIAGNOSIS — J30.89 PERENNIAL ALLERGIC RHINITIS: Primary | ICD-10-CM

## 2023-04-05 PROCEDURE — 95165 ANTIGEN THERAPY SERVICES: CPT

## 2023-04-05 PROCEDURE — 95165 ANTIGEN THERAPY SERVICES: CPT | Performed by: OTOLARYNGOLOGY

## 2023-04-07 NOTE — TELEPHONE ENCOUNTER
topiramate (TOPAMAX) 50 MG tablet ()  Last Written Prescription Date:  2023  Last Fill Quantity: 294,   # refills: 0  Last Office Visit: 3/23/2023  Future Office visit:    Next 5 appointments (look out 90 days)    May 08, 2023  8:30 AM  Office Visit with [facility] ALLERGY TESTING  [facility] [place] ([facility] [place] ) [address]  [place] [address]  [phone]

## 2023-04-10 DIAGNOSIS — L70.0 CYSTIC ACNE: ICD-10-CM

## 2023-04-10 RX ORDER — DROSPIRENONE AND ETHINYL ESTRADIOL 0.02-3(28)
1 KIT ORAL DAILY
Qty: 84 TABLET | Refills: 3 | OUTPATIENT
Start: 2023-04-10

## 2023-04-10 NOTE — TELEPHONE ENCOUNTER
"Filled 11/10/2022 #84 x 3 to [facility]. Contacted [facility] and informed of refills at [facility].  \"OK. If she requests them we will get them sent over\". Unable to complete prescription refill per RNMedication Refill Policy.................... Bonita Sommers RN ....................  4/10/2023   3:54 PM     Disp Refills Start End FLORENCIA   drospirenone-ethinyl estradiol (DILSHAD) 3-0.02 MG tablet 84 tablet 3 11/10/2022  No   Sig - Route: Take 1 tablet by mouth daily - Oral   Sent to pharmacy as: Drospirenone-Ethinyl Estradiol 3-0.02 MG Oral Tablet (DILSHAD)   Class: E-Prescribe     [facility] [id number] [facility] - [place], [place] - [address]       "

## 2023-04-12 DIAGNOSIS — G43.009 MIGRAINE WITHOUT AURA AND WITHOUT STATUS MIGRAINOSUS, NOT INTRACTABLE: Primary | ICD-10-CM

## 2023-04-12 RX ORDER — TOPIRAMATE 200 MG/1
200 TABLET, FILM COATED ORAL 2 TIMES DAILY
Qty: 60 TABLET | Refills: 11 | Status: SHIPPED | OUTPATIENT
Start: 2023-04-12 | End: 2023-09-28

## 2023-04-12 RX ORDER — TOPIRAMATE 200 MG/1
200 TABLET, FILM COATED ORAL 2 TIMES DAILY
Qty: 60 TABLET | Refills: 11 | Status: SHIPPED | OUTPATIENT
Start: 2023-04-12 | End: 2024-07-02

## 2023-05-02 DIAGNOSIS — I10 ESSENTIAL HYPERTENSION: ICD-10-CM

## 2023-05-04 RX ORDER — LISINOPRIL 40 MG/1
TABLET ORAL
Qty: 90 TABLET | Refills: 2 | Status: SHIPPED | OUTPATIENT
Start: 2023-05-04 | End: 2024-03-21

## 2023-05-04 NOTE — TELEPHONE ENCOUNTER
"  Last Prescription Date: 2/16/22  Last Qty/Refills: 90 / 3  Last Office Visit: 1/20/23  Future Office Visit: none     Corinne R Thayer, RN on 5/4/2023 at 9:12 AM    Requested Prescriptions   Pending Prescriptions Disp Refills     lisinopril (ZESTRIL) 40 MG tablet [Pharmacy Med Name: LISINOPRIL 40MG TABLETS] 90 tablet 3     Sig: TAKE 1 TABLET(40 MG) BY MOUTH DAILY       ACE Inhibitors (Including Combos) Protocol Failed - 5/2/2023  1:21 PM        Failed - Normal serum potassium on file in past 12 months     Recent Labs   Lab Test 08/10/22  0400   POTASSIUM 3.4*             Passed - Blood pressure under 140/90 in past 12 months     BP Readings from Last 3 Encounters:   03/23/23 115/79   02/13/23 122/70   01/20/23 124/62                 Passed - Recent (12 mo) or future (30 days) visit within the authorizing provider's specialty     Patient has had an office visit with the authorizing provider or a provider within the authorizing providers department within the previous 12 mos or has a future within next 30 days. See \"Patient Info\" tab in inbasket, or \"Choose Columns\" in Meds & Orders section of the refill encounter.              Passed - Medication is active on med list        Passed - Patient is age 18 or older        Passed - No active pregnancy on record        Passed - Normal serum creatinine on file in past 12 months     Recent Labs   Lab Test 08/10/22  0400   CR 0.90       Ok to refill medication if creatinine is low          Passed - No positive pregnancy test within past 12 months             "

## 2023-05-08 ENCOUNTER — OFFICE VISIT (OUTPATIENT)
Dept: ALLERGY | Facility: OTHER | Age: 38
End: 2023-05-08
Attending: OTOLARYNGOLOGY
Payer: COMMERCIAL

## 2023-05-08 DIAGNOSIS — J30.89 PERENNIAL ALLERGIC RHINITIS: Primary | ICD-10-CM

## 2023-05-08 PROCEDURE — 95117 IMMUNOTHERAPY INJECTIONS: CPT

## 2023-05-08 NOTE — PROGRESS NOTES
Pt identified using name and date of birth.    Patient presents to allergy clinic for education and training on subcutaneous immunotherapy.  Patient educated on epi pen and an indications for use.  Patient did a return demonstration. Patient verbalizes understanding.  New patient information sheet given and discussed anaphylaxis, local reactions and answered all questions to patients satisfaction.  Patient is aware that he/she will need a an allergy follow up in 6 months.    SVT=7mm pass, SVT =9mmm. Allergy injection/s given and charted on paper allergy flow sheet.  Patient experienced no reaction.  Epi pen is present today.     Brianna Lopes RN on 5/8/2023 at 9:31 AM

## 2023-05-17 ENCOUNTER — ALLIED HEALTH/NURSE VISIT (OUTPATIENT)
Dept: FAMILY MEDICINE | Facility: OTHER | Age: 38
End: 2023-05-17
Attending: PHYSICIAN ASSISTANT
Payer: COMMERCIAL

## 2023-05-17 DIAGNOSIS — J30.9 ALLERGIC RHINITIS: Primary | ICD-10-CM

## 2023-05-17 PROCEDURE — 95115 IMMUNOTHERAPY ONE INJECTION: CPT

## 2023-05-17 PROCEDURE — 95117 IMMUNOTHERAPY INJECTIONS: CPT

## 2023-05-17 NOTE — PROGRESS NOTES
Allergen Immunotherapy: Adult  Verified patient's name and . Patient stated reason for visit today is to receive allergy shot(s). Patient denied any concerns with previous allergy injections. Allergy injections (x1) prepared and administered subcutaneous. Ice applied post-injection per patient preference. Administration of allergy injection documented in Immunotherapy flow sheet in EHR and paper chart (see for further information regarding injection). Patient to wait in facility for 30 minutes post-injection and notify RN immediately of any reaction. Allergy injection site(s) assessed: no signs of local reaction noted, no other complaints noted by patient. Patient left clinic ambulatory.     SUJIT CAMPA RN on 2023 at 1:06 PM

## 2023-05-29 ASSESSMENT — PATIENT HEALTH QUESTIONNAIRE - PHQ9
10. IF YOU CHECKED OFF ANY PROBLEMS, HOW DIFFICULT HAVE THESE PROBLEMS MADE IT FOR YOU TO DO YOUR WORK, TAKE CARE OF THINGS AT HOME, OR GET ALONG WITH OTHER PEOPLE: VERY DIFFICULT
SUM OF ALL RESPONSES TO PHQ QUESTIONS 1-9: 10
SUM OF ALL RESPONSES TO PHQ QUESTIONS 1-9: 10

## 2023-05-30 ENCOUNTER — ALLIED HEALTH/NURSE VISIT (OUTPATIENT)
Dept: FAMILY MEDICINE | Facility: OTHER | Age: 38
End: 2023-05-30
Attending: PHYSICIAN ASSISTANT
Payer: COMMERCIAL

## 2023-05-30 DIAGNOSIS — J30.9 ALLERGIC RHINITIS: Primary | ICD-10-CM

## 2023-05-30 PROCEDURE — 95117 IMMUNOTHERAPY INJECTIONS: CPT

## 2023-05-30 NOTE — PROGRESS NOTES
Allergen Immunotherapy: Adult  Verified patient's name and . Patient stated reason for visit today is to receive allergy shot(s). Patient denied any concerns with previous allergy injections. Allergy injections (x2) prepared and administered subcutaneous. Ice applied post-injection per patient preference. Administration of allergy injection documented in Immunotherapy flow sheet in EHR and paper chart (see for further information regarding injection). Patient to wait in facility for 30 minutes post-injection and notify RN immediately of any reaction. Allergy injection site(s) assessed: no signs of local reaction noted, no other complaints noted by patient. Patient left clinic ambulatory.     SUJIT CAMPA RN on 2023 at 1:15 PM

## 2023-06-06 ASSESSMENT — PATIENT HEALTH QUESTIONNAIRE - PHQ9
10. IF YOU CHECKED OFF ANY PROBLEMS, HOW DIFFICULT HAVE THESE PROBLEMS MADE IT FOR YOU TO DO YOUR WORK, TAKE CARE OF THINGS AT HOME, OR GET ALONG WITH OTHER PEOPLE: SOMEWHAT DIFFICULT
SUM OF ALL RESPONSES TO PHQ QUESTIONS 1-9: 7
SUM OF ALL RESPONSES TO PHQ QUESTIONS 1-9: 7

## 2023-06-07 ENCOUNTER — ALLIED HEALTH/NURSE VISIT (OUTPATIENT)
Dept: FAMILY MEDICINE | Facility: OTHER | Age: 38
End: 2023-06-07
Attending: OTOLARYNGOLOGY
Payer: COMMERCIAL

## 2023-06-07 DIAGNOSIS — J30.9 ALLERGIC RHINITIS: Primary | ICD-10-CM

## 2023-06-07 PROCEDURE — 95117 IMMUNOTHERAPY INJECTIONS: CPT

## 2023-06-07 NOTE — PROGRESS NOTES
Allergen Immunotherapy: Adult  Verified patient's name and . Patient stated reason for visit today is to receive allergy shot(s). Patient denied any concerns with previous allergy injections. Allergy injections (x2) prepared and administered subcutaneous. Ice applied post-injection per patient preference. Administration of allergy injection documented in Immunotherapy flow sheet in EHR and paper chart (see for further information regarding injection). Patient to wait in facility for 30 minutes post-injection and notify RN immediately of any reaction. Allergy injection site(s) assessed: no signs of local reaction noted, no other complaints noted by patient. Patient left clinic ambulatory.     SUJIT CAMPA RN ....................  2023   12:42 PM

## 2023-06-13 ASSESSMENT — PATIENT HEALTH QUESTIONNAIRE - PHQ9
SUM OF ALL RESPONSES TO PHQ QUESTIONS 1-9: 5
10. IF YOU CHECKED OFF ANY PROBLEMS, HOW DIFFICULT HAVE THESE PROBLEMS MADE IT FOR YOU TO DO YOUR WORK, TAKE CARE OF THINGS AT HOME, OR GET ALONG WITH OTHER PEOPLE: SOMEWHAT DIFFICULT
SUM OF ALL RESPONSES TO PHQ QUESTIONS 1-9: 5

## 2023-06-14 ENCOUNTER — ALLIED HEALTH/NURSE VISIT (OUTPATIENT)
Dept: FAMILY MEDICINE | Facility: OTHER | Age: 38
End: 2023-06-14
Attending: PHYSICIAN ASSISTANT
Payer: COMMERCIAL

## 2023-06-14 DIAGNOSIS — J30.9 ALLERGIC RHINITIS: Primary | ICD-10-CM

## 2023-06-14 PROCEDURE — 95117 IMMUNOTHERAPY INJECTIONS: CPT

## 2023-06-14 NOTE — PROGRESS NOTES
Allergen Immunotherapy: Adult  Verified patient's name and . Patient stated reason for visit today is to receive allergy shot(s). Patient denied any concerns with previous allergy injections. Allergy injections (x2) prepared and administered subcutaneous. Administration of allergy injection documented in Immunotherapy flow sheet in EHR and paper chart (see for further information regarding injection). Patient to wait in facility for 30 minutes post-injection and notify RN immediately of any reaction. Allergy injection site(s) assessed: patient reported slight itching to right injection site, no other complaints noted by patient. Patient left clinic ambulatory.     SUJIT CAMPA RN ....................  2023   4:35 PM

## 2023-06-20 ASSESSMENT — PATIENT HEALTH QUESTIONNAIRE - PHQ9
SUM OF ALL RESPONSES TO PHQ QUESTIONS 1-9: 4
SUM OF ALL RESPONSES TO PHQ QUESTIONS 1-9: 4
10. IF YOU CHECKED OFF ANY PROBLEMS, HOW DIFFICULT HAVE THESE PROBLEMS MADE IT FOR YOU TO DO YOUR WORK, TAKE CARE OF THINGS AT HOME, OR GET ALONG WITH OTHER PEOPLE: SOMEWHAT DIFFICULT

## 2023-06-21 ENCOUNTER — ALLIED HEALTH/NURSE VISIT (OUTPATIENT)
Dept: FAMILY MEDICINE | Facility: OTHER | Age: 38
End: 2023-06-21
Attending: PHYSICIAN ASSISTANT
Payer: COMMERCIAL

## 2023-06-21 ENCOUNTER — TELEPHONE (OUTPATIENT)
Dept: OTOLARYNGOLOGY | Facility: OTHER | Age: 38
End: 2023-06-21

## 2023-06-21 DIAGNOSIS — J30.9 ALLERGIC RHINITIS: Primary | ICD-10-CM

## 2023-06-21 PROCEDURE — 95117 IMMUNOTHERAPY INJECTIONS: CPT

## 2023-06-21 NOTE — PROGRESS NOTES
"Allergen Immunotherapy: Adult  Verified patient's name and . Patient stated reason for visit today is to receive allergy shot(s). Patient denied any concerns with previous allergy injections. Allergy injections (x2) prepared and administered subcutaneous. Ice applied post-injection per patient preference. Administration of allergy injection documented in Immunotherapy flow sheet in EHR and paper chart (see for further information regarding injection). Patient to wait in facility for 30 minutes post-injection and notify RN immediately of any reaction. Allergy injection site(s) assessed: 0 mm wheal observed to injection sites, however both areas are pink- red in color and size of a nickel, no other complaints noted by patient. Patient states, \"I will call Allergy Nurse to let them know\". Patient left clinic ambulatory.     Bonita Sommers RN ....................  2023   10:31 AM        "

## 2023-06-21 NOTE — TELEPHONE ENCOUNTER
Patient calls because she just left the GI clinic after her allergy shot.  She states that she did stay for the 30 minutes after.  She said that she is having redness the size of a quarter.  It is not raised and does not itch.  She si wondering what to do?  Please advise.

## 2023-06-21 NOTE — TELEPHONE ENCOUNTER
Spoke to patient. Apply Ice, Benadryl  to injection site. Continue with daily antihistamines.     Repeat current dose next week/ shot.   Apply topical benadryl and ice following injection.     TR

## 2023-06-22 ENCOUNTER — TELEPHONE (OUTPATIENT)
Dept: OTOLARYNGOLOGY | Facility: OTHER | Age: 38
End: 2023-06-22

## 2023-06-28 ENCOUNTER — ALLIED HEALTH/NURSE VISIT (OUTPATIENT)
Dept: FAMILY MEDICINE | Facility: OTHER | Age: 38
End: 2023-06-28
Attending: PHYSICIAN ASSISTANT
Payer: COMMERCIAL

## 2023-06-28 ENCOUNTER — TELEPHONE (OUTPATIENT)
Dept: OTOLARYNGOLOGY | Facility: OTHER | Age: 38
End: 2023-06-28

## 2023-06-28 DIAGNOSIS — J30.9 ALLERGIC RHINITIS: Primary | ICD-10-CM

## 2023-06-28 PROCEDURE — 95117 IMMUNOTHERAPY INJECTIONS: CPT

## 2023-06-28 NOTE — TELEPHONE ENCOUNTER
Call received from Eulalio VILLAR regarding obtaining an order for Benadryl after giving the patient an allergy injection.  Will send message to Luz Maria Naik to place an order for Benadryl to injection site after allergy injection.

## 2023-06-28 NOTE — PROGRESS NOTES
Allergen Immunotherapy: Adult  Verified patient's name and . Patient stated reason for visit today is to receive allergy shot(s). Patient denied any concerns with previous allergy injections. See telephone note from Luz Maria Naik PA-C dated 23. Repeat dose of 0.3mL prepared and administered subcutaneous as ordered. Ice applied post-injection per order. Administration of allergy injection documented in Immunotherapy flow sheet in EHR and paper chart (see for further information regarding injection). Patient to wait in facility for 30 minutes post-injection and notify RN immediately of any reaction. Allergy injection site(s) assessed: no signs of local reaction noted, no other complaints noted by patient. Patient left clinic ambulatory.

## 2023-06-29 DIAGNOSIS — J30.89 PERENNIAL ALLERGIC RHINITIS: Primary | ICD-10-CM

## 2023-07-05 ENCOUNTER — ALLIED HEALTH/NURSE VISIT (OUTPATIENT)
Dept: FAMILY MEDICINE | Facility: OTHER | Age: 38
End: 2023-07-05
Attending: PHYSICIAN ASSISTANT
Payer: COMMERCIAL

## 2023-07-05 DIAGNOSIS — J30.9 ALLERGIC RHINITIS: Primary | ICD-10-CM

## 2023-07-05 PROCEDURE — 95117 IMMUNOTHERAPY INJECTIONS: CPT

## 2023-07-05 ASSESSMENT — PATIENT HEALTH QUESTIONNAIRE - PHQ9
10. IF YOU CHECKED OFF ANY PROBLEMS, HOW DIFFICULT HAVE THESE PROBLEMS MADE IT FOR YOU TO DO YOUR WORK, TAKE CARE OF THINGS AT HOME, OR GET ALONG WITH OTHER PEOPLE: SOMEWHAT DIFFICULT
SUM OF ALL RESPONSES TO PHQ QUESTIONS 1-9: 5
SUM OF ALL RESPONSES TO PHQ QUESTIONS 1-9: 5

## 2023-07-05 NOTE — PROGRESS NOTES
Allergen Immunotherapy: Adult  Verified patient's name and . Patient stated reason for visit today is to receive allergy shot(s). Patient denied any concerns with previous allergy injections. Patient states she did not have any redness or itching after previous injections like she did after 23 injections. Allergy injections (x2) prepared and administered subcutaneous. Ice applied post-injection per patient preference. Administration of allergy injection documented in Immunotherapy flow sheet in EHR and paper chart (see for further information regarding injection). Patient to wait in facility for 30 minutes post-injection and notify RN immediately of any reaction. Allergy injection site(s) assessed: no signs of local reaction noted, no other complaints noted by patient. Patient left clinic ambulatory.     SUJIT CAMPA RN ....................  2023   9:57 AM

## 2023-07-07 ENCOUNTER — ALLIED HEALTH/NURSE VISIT (OUTPATIENT)
Dept: ALLERGY | Facility: OTHER | Age: 38
End: 2023-07-07
Attending: OTOLARYNGOLOGY
Payer: COMMERCIAL

## 2023-07-07 DIAGNOSIS — J30.89 PERENNIAL ALLERGIC RHINITIS: Primary | ICD-10-CM

## 2023-07-07 PROCEDURE — 95165 ANTIGEN THERAPY SERVICES: CPT | Performed by: OTOLARYNGOLOGY

## 2023-07-07 PROCEDURE — 95165 ANTIGEN THERAPY SERVICES: CPT

## 2023-07-07 NOTE — PROGRESS NOTES
Allergy serum is mixed today at schedule Gold 2 of 4,  into  2  (5 ml)  multi dose vial/vials.    Allergens included were:    Ragweed  0.2 ml of dilution # 3  Pigweed  0.2 ml of dilution # 0  Mugwort 0.2 ml of dilution # 0  Kochia  0.2 ml of dilution # 0  Russian Thistle 0.2 ml of dilution # 1  Remi Grass 0.2 ml of dilution # 1  Birch mix 0.2 ml of dilution # 0  Maple Mix 0.2 of dilution # 1  Elm Mix 0.2 ml of dilution # 0  Oak Mix 0.2 ml of dilution # 1  Eran Mix 0.2 ml of dilution # 1  Pine Mix 0.2 ml of dilution # 1  Eastern [place] 0.2 ml of dilution # 1  Black [place] 0.2 ml of dilution # 1  Aspen 0.2 ml of dilution # 0  Red [place] 0.2 ml of dilution # 0    Alternaria 0.2 ml of dilution # 1  Aspergillus 0.2 ml of dilution # 0  Hormodendrum 0.2 ml of dilution # 1  Helminthosporium 0.2 ml of dilution # 1  Penicillium 0.2 ml of dilution # 1  Epicoccum 0.2 ml of dilution # 1  Fusarium 0.2 ml of dilution # 1  Mucor 0.2 ml of dilution # 0  Grain Smut 0.2 ml of dilution # 0  Grass Smut 0.2 ml of dilution # 0  Cat 0.2 ml of dilution # 1  Dog 0.2 ml of dilution # 2  Feather Mix 0.2 ml of dilution # 0  Dust Mite Mix 0.2 ml of dilution # 3  Horse 0.2 ml of dilution # 0    Brianna Lopes RN on 7/7/2023 at 4:53 PM

## 2023-07-09 NOTE — PROGRESS NOTES
Allergy serum is mixed today at schedule Silver 1 of 4,  into  2  (5 ml)  multi dose vial/vials.    Allergens included were:    Ragweed  0.2 ml of dilution # 4  Pigweed  0.2 ml of dilution # 0  Mugwort 0.2 ml of dilution # 0  Kochia  0.2 ml of dilution # 0  Russian Thistle 0.2 ml of dilution # 2  Remi Grass 0.2 ml of dilution # 2  Birch mix 0.2 ml of dilution # 0  Maple Mix 0.2 of dilution # 2  Elm Mix 0.2 ml of dilution # 0  Oak Mix 0.2 ml of dilution # 2  Eran Mix 0.2 ml of dilution # 2  Pine Mix 0.2 ml of dilution # 2  Eastern [place] 0.2 ml of dilution # 2  Black [place] 0.2 ml of dilution # 2  Aspen 0.2 ml of dilution # 0  Red [place] 0.2 ml of dilution # 0    Alternaria 0.2 ml of dilution # 2  Aspergillus 0.2 ml of dilution # 0  Hormodendrum 0.2 ml of dilution # 2  Helminthosporium 0.2 ml of dilution # 2  Penicillium 0.2 ml of dilution # 2  Epicoccum 0.2 ml of dilution # 2  Fusarium 0.2 ml of dilution # 2  Mucor 0.2 ml of dilution # 0  Grain Smut 0.2 ml of dilution # 0  Grass Smut 0.2 ml of dilution # 0  Cat 0.2 ml of dilution # 2  Dog 0.2 ml of dilution # 3  Feather Mix 0.2 ml of dilution # 0  Dust Mite Mix 0.2 ml of dilution # 4  Horse 0.2 ml of dilution # 0    Brianna Lopes RN on 4/5/2023 at 10:06 AM           Yes

## 2023-07-12 ENCOUNTER — HOSPITAL ENCOUNTER (OUTPATIENT)
Dept: MAMMOGRAPHY | Facility: OTHER | Age: 38
Discharge: HOME | End: 2023-07-12
Attending: PHYSICIAN ASSISTANT
Payer: COMMERCIAL

## 2023-07-12 ENCOUNTER — ALLIED HEALTH/NURSE VISIT (OUTPATIENT)
Dept: FAMILY MEDICINE | Facility: OTHER | Age: 38
End: 2023-07-12
Attending: PHYSICIAN ASSISTANT
Payer: COMMERCIAL

## 2023-07-12 DIAGNOSIS — Z12.31 VISIT FOR SCREENING MAMMOGRAM: ICD-10-CM

## 2023-07-12 DIAGNOSIS — J30.9 ALLERGIC RHINITIS: Primary | ICD-10-CM

## 2023-07-12 PROCEDURE — 95117 IMMUNOTHERAPY INJECTIONS: CPT

## 2023-07-12 PROCEDURE — 77067 SCR MAMMO BI INCL CAD: CPT

## 2023-07-12 NOTE — PROGRESS NOTES
Allergen Immunotherapy: Adult  Verified patient's name and . Patient stated reason for visit today is to receive allergy shot(s). Patient denied any concerns with previous allergy injections. Allergy injections (x2) prepared and administered subcutaneous. Ice applied post-injection per patient preference. Administration of allergy injection documented in Immunotherapy flow sheet in EHR and paper chart (see for further information regarding injection). Patient to wait in facility for 30 minutes post-injection and notify RN immediately of any reaction. Allergy injection site(s) assessed: no signs of local reaction noted, no other complaints noted by patient. Patient left clinic ambulatory.     SUJIT CAMPA RN ....................  2023   9:54 AM

## 2023-07-13 ENCOUNTER — MYC MEDICAL ADVICE (OUTPATIENT)
Dept: FAMILY MEDICINE | Facility: OTHER | Age: 38
End: 2023-07-13
Payer: COMMERCIAL

## 2023-07-13 DIAGNOSIS — N92.6 IRREGULAR MENSTRUAL CYCLE: Primary | ICD-10-CM

## 2023-07-13 DIAGNOSIS — F32.81 PMDD (PREMENSTRUAL DYSPHORIC DISORDER): ICD-10-CM

## 2023-07-19 ENCOUNTER — ALLIED HEALTH/NURSE VISIT (OUTPATIENT)
Dept: FAMILY MEDICINE | Facility: OTHER | Age: 38
End: 2023-07-19
Attending: PHYSICIAN ASSISTANT
Payer: COMMERCIAL

## 2023-07-19 DIAGNOSIS — J30.9 ALLERGIC RHINITIS: Primary | ICD-10-CM

## 2023-07-19 PROCEDURE — 95117 IMMUNOTHERAPY INJECTIONS: CPT

## 2023-07-19 NOTE — PROGRESS NOTES
Allergen Immunotherapy: Adult  Verified patient's name and . Patient stated reason for visit today is to receive allergy shot(s). Patient denied any concerns with previous allergy injections. Allergy injections (x2) prepared and administered subcutaneous. Ice applied post-injection per patient preference. Administration of allergy injection documented in Immunotherapy flow sheet in EHR and paper chart (see for further information regarding injection). Patient to wait in facility for 30 minutes post-injection and notify RN immediately of any reaction. Allergy injection site(s) assessed: no signs of local reaction noted, no other complaints noted by patient. Patient left clinic ambulatory.     SUJIT CAMPA RN ....................  2023   10:49 AM

## 2023-07-26 ENCOUNTER — ALLIED HEALTH/NURSE VISIT (OUTPATIENT)
Dept: FAMILY MEDICINE | Facility: OTHER | Age: 38
End: 2023-07-26
Attending: PHYSICIAN ASSISTANT
Payer: COMMERCIAL

## 2023-07-26 ENCOUNTER — HOSPITAL ENCOUNTER (OUTPATIENT)
Dept: MAMMOGRAPHY | Facility: OTHER | Age: 38
Discharge: HOME | End: 2023-07-26
Attending: PHYSICIAN ASSISTANT
Payer: COMMERCIAL

## 2023-07-26 ENCOUNTER — HOSPITAL ENCOUNTER (OUTPATIENT)
Dept: ULTRASOUND IMAGING | Facility: OTHER | Age: 38
Discharge: HOME | End: 2023-07-26
Attending: PHYSICIAN ASSISTANT
Payer: COMMERCIAL

## 2023-07-26 DIAGNOSIS — R92.8 ABNORMAL FINDING ON BREAST IMAGING: ICD-10-CM

## 2023-07-26 DIAGNOSIS — J30.9 ALLERGIC RHINITIS: Primary | ICD-10-CM

## 2023-07-26 PROCEDURE — 77061 BREAST TOMOSYNTHESIS UNI: CPT | Mod: LT

## 2023-07-26 PROCEDURE — 95117 IMMUNOTHERAPY INJECTIONS: CPT

## 2023-07-26 PROCEDURE — 76642 ULTRASOUND BREAST LIMITED: CPT | Mod: LT

## 2023-07-26 NOTE — PROGRESS NOTES
Allergen Immunotherapy: Adult  Verified patient's name and . Patient stated reason for visit today is to receive allergy shot(s). Patient denied any concerns with previous allergy injections. Allergy injections (x2) prepared and administered subcutaneous. Ice applied post-injection per patient preference. Administration of allergy injection documented in Immunotherapy flow sheet in EHR and paper chart (see for further information regarding injection). Patient to wait in facility for 30 minutes post-injection and notify RN immediately of any reaction. Allergy injection site(s) assessed: no signs of local reaction noted no other complaints noted by patient. Patient left clinic ambulatory.     SUJIT CAMPA RN ....................  2023   11:06 AM

## 2023-07-30 ENCOUNTER — HEALTH MAINTENANCE LETTER (OUTPATIENT)
Age: 38
End: 2023-07-30

## 2023-08-02 ENCOUNTER — ALLIED HEALTH/NURSE VISIT (OUTPATIENT)
Dept: ALLERGY | Facility: OTHER | Age: 38
End: 2023-08-02
Attending: PHYSICIAN ASSISTANT
Payer: COMMERCIAL

## 2023-08-02 DIAGNOSIS — J30.89 PERENNIAL ALLERGIC RHINITIS: Primary | ICD-10-CM

## 2023-08-02 PROCEDURE — 95117 IMMUNOTHERAPY INJECTIONS: CPT

## 2023-08-02 NOTE — PROGRESS NOTES
Allergy injection/s given and charted on paper allergy flow sheet.  Patient experienced no reaction.   Before alllergy injection a SVT was completed in the left and right upper arm.  Result 9 mm wheal in right arm and 9 ml wheal in left are.  She passed the SVT and allergy shot is administered.  Clinic Administered Medication Documentation      Injectable Medication Documentation    Is there an active order (written within the past 365 days, with administrations remaining, not ) in the chart? Yes.     Patient was given  0.05 ml in the right upper arm and 0.05 ml in the left upper  . Prior to medication administration, verified patient's identity using patient s name and date of birth. Please see MAR and medication order for additional information. Patient instructed to remain in clinic for 15 minutes and report any adverse reaction to staff immediately but patient declined.  Patient did stay 30 minutes after injection and showed no reaction    Vial/Syringe: Single dose vial. Was entire vial of medication used? Yes  Was this medication supplied by the patient? No  Is this a medication the patient will need to receive again? Yes. Verified that the patient has refills remaining in their prescription.

## 2023-08-10 ENCOUNTER — TRANSFERRED RECORDS (OUTPATIENT)
Dept: HEALTH INFORMATION MANAGEMENT | Facility: OTHER | Age: 38
End: 2023-08-10
Payer: COMMERCIAL

## 2023-08-14 ASSESSMENT — PATIENT HEALTH QUESTIONNAIRE - PHQ9
10. IF YOU CHECKED OFF ANY PROBLEMS, HOW DIFFICULT HAVE THESE PROBLEMS MADE IT FOR YOU TO DO YOUR WORK, TAKE CARE OF THINGS AT HOME, OR GET ALONG WITH OTHER PEOPLE: VERY DIFFICULT
SUM OF ALL RESPONSES TO PHQ QUESTIONS 1-9: 11
SUM OF ALL RESPONSES TO PHQ QUESTIONS 1-9: 11

## 2023-08-15 ENCOUNTER — OFFICE VISIT (OUTPATIENT)
Dept: OBGYN | Facility: OTHER | Age: 38
End: 2023-08-15
Attending: PHYSICIAN ASSISTANT
Payer: COMMERCIAL

## 2023-08-15 VITALS
BODY MASS INDEX: 33.05 KG/M2 | DIASTOLIC BLOOD PRESSURE: 70 MMHG | SYSTOLIC BLOOD PRESSURE: 118 MMHG | WEIGHT: 211 LBS | HEART RATE: 75 BPM

## 2023-08-15 DIAGNOSIS — N92.6 IRREGULAR MENSTRUAL CYCLE: Primary | ICD-10-CM

## 2023-08-15 DIAGNOSIS — F32.81 PMDD (PREMENSTRUAL DYSPHORIC DISORDER): ICD-10-CM

## 2023-08-15 PROCEDURE — G0463 HOSPITAL OUTPT CLINIC VISIT: HCPCS

## 2023-08-15 PROCEDURE — 99205 OFFICE O/P NEW HI 60 MIN: CPT | Performed by: STUDENT IN AN ORGANIZED HEALTH CARE EDUCATION/TRAINING PROGRAM

## 2023-08-15 RX ORDER — DROSPIRENONE AND ETHINYL ESTRADIOL 0.02-3(28)
1 KIT ORAL DAILY
Qty: 84 TABLET | Refills: 4 | Status: SHIPPED | OUTPATIENT
Start: 2023-08-15 | End: 2024-08-14

## 2023-08-15 ASSESSMENT — PATIENT HEALTH QUESTIONNAIRE - PHQ9
10. IF YOU CHECKED OFF ANY PROBLEMS, HOW DIFFICULT HAVE THESE PROBLEMS MADE IT FOR YOU TO DO YOUR WORK, TAKE CARE OF THINGS AT HOME, OR GET ALONG WITH OTHER PEOPLE: SOMEWHAT DIFFICULT
SUM OF ALL RESPONSES TO PHQ QUESTIONS 1-9: 6
SUM OF ALL RESPONSES TO PHQ QUESTIONS 1-9: 6

## 2023-08-15 ASSESSMENT — PAIN SCALES - GENERAL: PAINLEVEL: MILD PAIN (3)

## 2023-08-15 NOTE — PROGRESS NOTES
"Gynecology Office Visit    Chief Complaint: PMDD    HPI:    Radha Christensen is a 38 year old No obstetric history on file., here for PMDD symptoms. She has been working with a psychiatrist for the last 2 years. She has been bothered by these symptoms for many years. She noted that two weeks each month her life seems really good and the other two weeks they are really bad- they will affect her relationships, ability to work. She will go to bed right after dinner on the bad weeks due to back pain and mood symptoms.    She would feel agitated and get irritable at work, almost feels like she is going to \"pick fights\" for no reason. Other weeks she doesn't get bothered by any of the same triggers etc.      Daily lexapro 20 mg with added 10 mg Prozac during her cycles.    She has an upcoming meeting with her psychiatrist on .    She currently takes Maria Guadalupe for hormonal acne. She takes them in a way in which she does get her period each month. In the past she has tried DepoProvera, Mirena, Paragard, Ortho-tri-cyclen and Maria Guadalupe. She did not like the copper IUD due to heavy menses. She did not like Mirena either.    OBHx  OB History   No obstetric history on file.      x2, 8lb 0.5 oz.      GYN history:   No history of STIs  Last pap smear: 2020, NIL, HPV negative, No history of abnormal pap smears  Menses occur on regular intervals with her birth control pills    Past medical history:  Past Medical History:   Diagnosis Date    Concussion 2011    Overview:  no loss of consciousness from softball injury    Personal history of other medical treatment (CODE)      2, Para 2       Specifically denies VTE, DM, HTN or bleeding disorders    Past Surgical History:  Past Surgical History:   Procedure Laterality Date    ENDOSCOPIC SINUS SURGERY Bilateral 2020    Procedure: Bilateral Endoscopic Sinus Surgery with polypectomy;  Surgeon: Karen Sanchez MD;  Location: [facility]    ESOPHAGOSCOPY, " GASTROSCOPY, DUODENOSCOPY (EGD), COMBINED N/A 8/27/2019    Procedure: ESOPHAGOGASTRODUODENOSCOPY, WITH BIOPSY;  Surgeon: Emigdio Goldberg MD;  Location: [facility]    RECONSTRUCT NOSE AND SEPTUM (FUNCTIONAL) Bilateral 7/1/2020    Procedure: Nasal Valve Repair(LATERA);  Surgeon: Karen Sanchez MD;  Location: [facility]    SEPTOPLASTY, TURBINOPLASTY, COMBINED N/A 7/1/2020    Procedure: Septoplasty Turbinate Reduction;  Surgeon: Karen Sanchez MD;  Location: [facility]    wisdom teeth            Medications:  Current Outpatient Medications   Medication    albuterol (PROAIR HFA/PROVENTIL HFA/VENTOLIN HFA) 108 (90 Base) MCG/ACT inhaler    budesonide (PULMICORT) 0.5 MG/2ML neb solution    drospirenone-ethinyl estradiol (DILSHAD) 3-0.02 MG tablet    EPINEPHrine (ANY BX GENERIC EQUIV) 0.3 MG/0.3ML injection 2-pack    escitalopram (LEXAPRO) 20 MG tablet    famotidine (PEPCID) 20 MG tablet    FLUoxetine (PROZAC) 10 MG capsule    hydrochlorothiazide (HYDRODIURIL) 25 MG tablet    hydrOXYzine (ATARAX) 10 MG tablet    levocetirizine (XYZAL) 5 MG tablet    lisinopril (ZESTRIL) 40 MG tablet    loratadine (CLARITIN) 10 MG tablet    LORazepam (ATIVAN) 0.5 MG tablet    ORDER FOR ALLERGEN IMMUNOTHERAPY    topiramate (TOPAMAX) 200 MG tablet    topiramate (TOPAMAX) 200 MG tablet    vitamin D3 (CHOLECALCIFEROL) 50 mcg (2000 units) tablet     Current Facility-Administered Medications   Medication    diphenhydrAMINE HCl (BENADRYL) 2 % topical gel 1 Application    loratadine (CLARITIN) chewable tablet 10 mg         Allergies:       Allergies   Allergen Reactions    Cefaclor Unknown         Social History:  Social History     Tobacco Use    Smoking status: Former     Years: 10.00     Types: Cigarettes     Quit date: 1/1/2013     Years since quitting: 10.6    Smokeless tobacco: Never   Vaping Use    Vaping Use: Never used   Substance Use Topics    Alcohol use: Not Currently     Comment: 1 time per month.    Drug use: No     Family  History:  Family History   Problem Relation Age of Onset    Heart Disease Maternal Grandfather 50        Heart Disease    Diabetes Maternal Grandfather         Diabetes    Chronic Obstructive Pulmonary Disease Maternal Grandfather         COPD    Diabetes Mother         Diabetes    Thyroid Disease Mother         Thyroid Disease    Heart Disease Mother     Heart Disease Maternal Uncle     Thyroid Disease Brother         Thyroid Disease    Diabetes Brother 38        Diabetes    Diabetes Maternal Uncle         Diabetes    Heart Disease Maternal Uncle     Heart Disease Maternal Grandmother     Breast Cancer No family hx of         Cancer-breast    Ovarian Cancer No family hx of         Cancer-ovarian    Prostate Cancer No family hx of         Cancer-prostate    Other - See Comments No family hx of         Stroke    Colon Cancer No family hx of         Cancer-colon    Blood Disease No family hx of         Blood Disease         ROS:   Respiratory: No shortness of breath, dyspnea on exertion, cough, or hemoptysis  Cardiovascular: negative for palpitations, chest pain, lower extremity edema and syncope or near-syncope  Gastrointestinal: negative for, nausea, vomiting and hematemesis  Genitourinary: negative for, dysuria, frequency and urgency  Musculoskeletal: negative for, back pain and muscular weakness  Psychiatric: negative for, anxiety, depression and hallucinations  Hematologic/Lymphatic/Immunologic: negative for, anemia, chills and fever      Physical Exam  /70   Pulse 75   Wt 95.7 kg (211 lb)   LMP 2023 (Exact Date)   BMI 33.05 kg/m    Gen: Well-appearing, no acute distressed, well-groomed, alert  HEENT: Normocephalic, atraumatic  Cardiovascular: Regular rate, No peripheral edema, normal peripheral circulation  Pulm: Symmetric chest rise, non-labored respirations      Assessment/Plan  Radha Christensen is a 38 year old . female here for discussion and treatment options of PMDD.    #PMDD  --Plan to  use continuous Maria Guadalupe.  She has tolerated this well in the past.  Will use 3 months in a row and have a period after the third month.  --Discussed if this does not seem to help with her symptoms as well as she would like, anticipate that next steps could include acupuncture, or decreasing the amount of estrogen and utilizing low Loestrin in a continuous fashion.  --Continue with current Lexapro and Prozac use.  Continue with regular counseling visits.  -- Continue with plan for August 23 mental health team visit and solidifying plan.    We discussed that our goal will be to try to create a level and continuous hormone profile for her throughout the entire cycle.  At this time even though she is using birth control pills she does still have a withdrawal week during the placebo pill week.  The goal will be to have a consistent level of hormones throughout each of the days of the month.  We discussed optimal ways of doing this could include use of continuous birth control pills, Depo-Provera, Nexplanon.  We did discuss that Nexplanon has been associated with some mood changes and may not be the first option recommended for her.  In the past she has used Depo-Provera and notes she did like it however it has been many years ago.  Currently has had good luck utilizing Maria Guadalupe in terms of a side effect intolerance profile.    Total amount of time spent during today's encounter including chart prep, face to face, documentation was 64 minutes.   Norma Bustamante MD on 8/15/2023 at 1:07 PM     Answers submitted by the patient for this visit:  Patient Health Questionnaire (Submitted on 8/14/2023)  If you checked off any problems, how difficult have these problems made it for you to do your work, take care of things at home, or get along with other people?: Very difficult  PHQ9 TOTAL SCORE: 11

## 2023-08-15 NOTE — NURSING NOTE
"Chief Complaint   Patient presents with    Consult     Irregular Menstrual bleeding   Patient is here to discuss PMDD. Patient states that she starts to feel  like week 2 and week 3 is the worse and states that she feels very duval, back pain, cramps and irritated. Patient is currently on OCP. Patient is seeing phyc and was dx' d with PMDD. Patient is done with having children.     Patient also states that cycles are regular. Patient states that she is not having heavy bleeding but thinks that could be do to being on OTC.     Initial /70   Pulse 75   Wt 95.7 kg (211 lb)   BMI 33.05 kg/m   Estimated body mass index is 33.05 kg/m  as calculated from the following:    Height as of 3/23/23: 1.702 m (5' 7\").    Weight as of this encounter: 95.7 kg (211 lb).  Medication Reconciliation: complete    FOOD SECURITY SCREENING QUESTIONS  Hunger Vital Signs:  Within the past 12 months we worried whether our food would run out before we got money to buy more. Never  Within the past 12 months the food we bought just didn't last and we didn't have money to get more. Never  Norma Hicks LPN 8/15/2023 12:51 PM         Norma Hicks LPN    "

## 2023-08-16 ENCOUNTER — ALLIED HEALTH/NURSE VISIT (OUTPATIENT)
Dept: FAMILY MEDICINE | Facility: OTHER | Age: 38
End: 2023-08-16
Attending: PHYSICIAN ASSISTANT
Payer: COMMERCIAL

## 2023-08-16 DIAGNOSIS — J30.9 ALLERGIC RHINITIS: Primary | ICD-10-CM

## 2023-08-16 PROCEDURE — 95117 IMMUNOTHERAPY INJECTIONS: CPT

## 2023-08-16 NOTE — PROGRESS NOTES
Allergen Immunotherapy: Adult  Verified patient's name and . Patient stated reason for visit today is to receive allergy shot(s). Patient denied any concerns with previous allergy injections. Allergy injections (x2) prepared and administered subcutaneous. Ice applied post-injection per patient preference. Administration of allergy injection documented in Immunotherapy flow sheet in EHR and paper chart (see for further information regarding injection). Patient signed out AMA.    SUJIT CAMPA RN ....................  2023   11:26 AM

## 2023-08-29 ENCOUNTER — ALLIED HEALTH/NURSE VISIT (OUTPATIENT)
Dept: FAMILY MEDICINE | Facility: OTHER | Age: 38
End: 2023-08-29
Attending: PHYSICIAN ASSISTANT
Payer: COMMERCIAL

## 2023-08-29 DIAGNOSIS — J30.9 ALLERGIC RHINITIS: Primary | ICD-10-CM

## 2023-08-29 PROCEDURE — 95117 IMMUNOTHERAPY INJECTIONS: CPT

## 2023-08-29 NOTE — PROGRESS NOTES
Allergen Immunotherapy: Adult  Verified patient's name and . Patient stated reason for visit today is to receive allergy shot(s). Patient denied any concerns with previous allergy injections. Allergy injections (x2) prepared and administered subcutaneous. Ice applied post-injection per patient preference. Administration of allergy injection documented in Immunotherapy flow sheet in EHR and paper chart (see for further information regarding injection). Patient left clinic ambulatory AMA.    SUJIT CAMPA RN ....................  2023   3:36 PM

## 2023-09-06 ENCOUNTER — ALLIED HEALTH/NURSE VISIT (OUTPATIENT)
Dept: FAMILY MEDICINE | Facility: OTHER | Age: 38
End: 2023-09-06
Attending: PHYSICIAN ASSISTANT
Payer: COMMERCIAL

## 2023-09-06 DIAGNOSIS — J30.9 ALLERGIC RHINITIS: Primary | ICD-10-CM

## 2023-09-06 PROCEDURE — 95117 IMMUNOTHERAPY INJECTIONS: CPT

## 2023-09-06 ASSESSMENT — PATIENT HEALTH QUESTIONNAIRE - PHQ9
SUM OF ALL RESPONSES TO PHQ QUESTIONS 1-9: 4
10. IF YOU CHECKED OFF ANY PROBLEMS, HOW DIFFICULT HAVE THESE PROBLEMS MADE IT FOR YOU TO DO YOUR WORK, TAKE CARE OF THINGS AT HOME, OR GET ALONG WITH OTHER PEOPLE: SOMEWHAT DIFFICULT
SUM OF ALL RESPONSES TO PHQ QUESTIONS 1-9: 4

## 2023-09-12 ASSESSMENT — PATIENT HEALTH QUESTIONNAIRE - PHQ9
SUM OF ALL RESPONSES TO PHQ QUESTIONS 1-9: 8
SUM OF ALL RESPONSES TO PHQ QUESTIONS 1-9: 8
10. IF YOU CHECKED OFF ANY PROBLEMS, HOW DIFFICULT HAVE THESE PROBLEMS MADE IT FOR YOU TO DO YOUR WORK, TAKE CARE OF THINGS AT HOME, OR GET ALONG WITH OTHER PEOPLE: SOMEWHAT DIFFICULT

## 2023-09-13 ENCOUNTER — ALLIED HEALTH/NURSE VISIT (OUTPATIENT)
Dept: FAMILY MEDICINE | Facility: OTHER | Age: 38
End: 2023-09-13
Attending: PHYSICIAN ASSISTANT
Payer: COMMERCIAL

## 2023-09-13 DIAGNOSIS — J30.9 ALLERGIC RHINITIS: Primary | ICD-10-CM

## 2023-09-13 PROCEDURE — 95117 IMMUNOTHERAPY INJECTIONS: CPT

## 2023-09-13 NOTE — PROGRESS NOTES
Allergen Immunotherapy: Adult  Verified patient's name and . Patient stated reason for visit today is to receive allergy shot(s). Patient denied any concerns with previous allergy injections. Allergy injections (x2) prepared and administered subcutaneous. Ice applied post-injection per patient preference. Administration of allergy injection documented in Immunotherapy flow sheet in EHR and paper chart (see for further information regarding injection). Patient declined to wait in facility for 30 minutes post-injection. Patient left clinic ambulatory.     SUJIT CAMPA RN ....................  2023   10:14 AM

## 2023-09-19 ASSESSMENT — PATIENT HEALTH QUESTIONNAIRE - PHQ9
SUM OF ALL RESPONSES TO PHQ QUESTIONS 1-9: 8
SUM OF ALL RESPONSES TO PHQ QUESTIONS 1-9: 8
10. IF YOU CHECKED OFF ANY PROBLEMS, HOW DIFFICULT HAVE THESE PROBLEMS MADE IT FOR YOU TO DO YOUR WORK, TAKE CARE OF THINGS AT HOME, OR GET ALONG WITH OTHER PEOPLE: VERY DIFFICULT

## 2023-09-19 ASSESSMENT — ASTHMA QUESTIONNAIRES: ACT_TOTALSCORE: 24

## 2023-09-20 ENCOUNTER — ALLIED HEALTH/NURSE VISIT (OUTPATIENT)
Dept: FAMILY MEDICINE | Facility: OTHER | Age: 38
End: 2023-09-20
Attending: PHYSICIAN ASSISTANT
Payer: COMMERCIAL

## 2023-09-20 DIAGNOSIS — J30.9 ALLERGIC RHINITIS: Primary | ICD-10-CM

## 2023-09-20 PROCEDURE — 95117 IMMUNOTHERAPY INJECTIONS: CPT

## 2023-09-20 NOTE — PROGRESS NOTES
Allergen Immunotherapy: Adult  Verified patient's name and . Patient stated reason for visit today is to receive allergy shot(s). Patient denied any concerns with previous allergy injections. Allergy injections (x2) prepared and administered subcutaneous. Ice applied post-injection per patient preference. Administration of allergy injection documented in Immunotherapy flow sheet in EHR and paper chart (see for further information regarding injection). Patient declined to wait in facility for 30 minutes post-injection. Patient left clinic ambulatory, AMA.    SUJIT CAMPA RN ....................  2023   1:01 PM

## 2023-09-25 ASSESSMENT — ASTHMA QUESTIONNAIRES: ACT_TOTALSCORE: 24

## 2023-09-26 ASSESSMENT — PATIENT HEALTH QUESTIONNAIRE - PHQ9
SUM OF ALL RESPONSES TO PHQ QUESTIONS 1-9: 8
10. IF YOU CHECKED OFF ANY PROBLEMS, HOW DIFFICULT HAVE THESE PROBLEMS MADE IT FOR YOU TO DO YOUR WORK, TAKE CARE OF THINGS AT HOME, OR GET ALONG WITH OTHER PEOPLE: VERY DIFFICULT
SUM OF ALL RESPONSES TO PHQ QUESTIONS 1-9: 8

## 2023-09-27 ENCOUNTER — ALLIED HEALTH/NURSE VISIT (OUTPATIENT)
Dept: FAMILY MEDICINE | Facility: OTHER | Age: 38
End: 2023-09-27
Attending: PHYSICIAN ASSISTANT
Payer: COMMERCIAL

## 2023-09-27 DIAGNOSIS — J30.9 ALLERGIC RHINITIS: Primary | ICD-10-CM

## 2023-09-27 PROCEDURE — 95117 IMMUNOTHERAPY INJECTIONS: CPT

## 2023-09-27 NOTE — PATIENT INSTRUCTIONS
6 month follow up with Luz Maria or Sara for allergy follow up    Continue your weekly allergy follow ups.    Thank you for allowing Dr. Sanchez and our ENT team to participate in your care.  If your medications are too expensive, please give the nurse a call.  We can possibly change this medication.  If you have a scheduling or an appointment question please contact our Health Unit Coordinator at their direct line [phone].   ALL nursing questions or concerns can be directed to your ENT nurse, Indra, at: [phone]         Consultation Re Empty sella syndrome  Referring physician Dr Yesenia Gonzalez DO    Chief Complaint   Patient presents with   • Office Visit     Empty Sella Syndrome   • Consultation       Very pleasant 38 yo female pt here for evaluation of empty sella syndrome  This is a new consult for this concern    He has seen Dr Gaytan in past for Diabetes    CT head 2019 done for concerns for numbness of both arms, and noted empty sella  Concern was for stroke.      Hemoglobin A1C (no units)   Date Value   06/20/2019 9.0         Heart murmur                                                  Essential (primary) hypertension                              Migraine                                                      Diabetes mellitus (CMS/HCC)                                   Gastritis                                                       OTHER CARDIOLOGY                                2007            Comment: surgery for heart murmur at [facility] -                via catheterization    APPENDECTOMY                                                  EGD                                             10/21/2020    Social History     Socioeconomic History   • Marital status: /Civil Union     Spouse name: Not on file   • Number of children: Not on file   • Years of education: Not on file   • Highest education level: Not on file   Occupational History   • Not on file   Social Needs   • Financial resource strain: Not on file   • Food insecurity     Worry: Not on file     Inability: Not on file   • Transportation needs     Medical: Not on file     Non-medical: Not on file   Tobacco Use   • Smoking status: Never Smoker   • Smokeless tobacco: Never Used   Substance and Sexual Activity   • Alcohol use: Yes     Alcohol/week: 0.0 standard drinks     Frequency: Monthly or less     Drinks per session: 1 or 2     Binge frequency: Never     Comment: OCC   • Drug use: No   • Sexual activity: Not on file   Lifestyle   • Physical activity      Days per week: Not on file     Minutes per session: Not on file   • Stress: Not on file   Relationships   • Social connections     Talks on phone: Not on file     Gets together: Not on file     Attends Alevism service: Not on file     Active member of club or organization: Not on file     Attends meetings of clubs or organizations: Not on file     Relationship status: Not on file   • Intimate partner violence     Fear of current or ex partner: Not on file     Emotionally abused: Not on file     Physically abused: Not on file     Forced sexual activity: Not on file   Other Topics Concern   • Not on file   Social History Narrative   • Not on file       History reviewed. No pertinent family history.    ALLERGIES:  No Known Allergies    Constitutional Problem(s):  Negative  Respiratory problem(s):  Negative  Cardiovascular problem(s):  Negative  Hematologic problem(s):  Negative  Immunologic Problem(s):  Negative  Gastrointestinal problem(s):  Negative  Genitourinary problem(s):  Negative  Musculoskeletal problem(s):  Negative  Neurological problem(s):  Negative  Endocrine problem(s):  Negative  Skin problem(s):  Negative    Visit Vitals  /80 (BP Location: RUE - Right upper extremity, Patient Position: Sitting, Cuff Size: Regular)   Pulse 71   Ht 5' (1.524 m)   Wt 74.8 kg   SpO2 97%   BMI 32.22 kg/m²     PHYSICAL EXAM:  General: alert, in no acute distress  Skin: warm with normal turgor  Head: atraumatic and normocephalic  Neck: supple with no significant adenopathy, no thyromegaly  Lungs: clear to auscultation, no wheezing or rhonchi noted  Heart: regular rhythm, no murmur present  Abdomen: soft, no guarding or masses, no organomegaly or CVA tenderness    Empty sella syndrome (CMS/HCC)  (primary encounter diagnosis)      At this point obtain MRI PITUITARY dedicated  HPA axis Obtain labs    I will infuture help manage diabetes    Order A1C     Depending on that will decide when to see him next    Cc note   Yesenia Gonzalez DO

## 2023-09-27 NOTE — PROGRESS NOTES
Otolaryngology Progress Note          Radha Christensen is a 38 year old female  Follow-up of perennial allergic rhinitis.    She has a history of intractable migraine without aura and was formally on Topamax    Radha feels great  No nasal or sinus concerns  On gold vial weekly shots at [place]  No history of anaphylaxis no large local reactions      Taking Claritin in the morning Xyzal at night.  Using NeilMed saline irrigations and rare as needed budesonide irrigations      Modified Quantitative Allergy Skin Testing 3/23/23:  Dilution #6: Ragweed, dust mite  Dilution #5: Russian thistle Remi grass maple oak dipti Alternaria Hormodendrum penicillium Epicoccum Fusarium Helminthosporium, cat and dog  Dilution #2: Black walnut [place]      I last examined her on 3/23/2023 and recommended starting immunotherapy for recalcitrant symptoms despite medical management.    Status post distant endoscopic sinus surgery and nasal valve repair on 7/1/2020         Otolaryngology Operative Note 7/1/20     Pre-op Diagnosis: Chronic pansinusitis, deviated nasal septum, bilateral inferior turbinate hypertrophy, bilateral nasal valve collapse  Post-op Diagnosis:  same  Procedures:    1.  Bilateral total ethmoidectomy  2.  Bilateral maxillary antrostomy with tissue removal  3.  Septoplasty with cartilage reinsertion  4.  Bilateral nasal valve repair  5.  Bilateral submucous reduction inferior turbinates          Physical Exam  /74   Pulse 81   Temp 98  F (36.7  C) (Tympanic)   Resp 18   Wt 92.9 kg (204 lb 12.8 oz)   LMP 07/18/2023 (Exact Date)   SpO2 99%   BMI 32.08 kg/m    General - The patient is well nourished and well developed, and appears to have good nutritional status.  Alert and oriented to person and place, interactive.  Head and Face - Normocephalic and atraumatic, with no gross asymmetry noted of the contour of the facial features.  The facial nerve is intact, with strong symmetric movements.  Neck-no  palpable lymphadenopathy or thyroid mass.  Trachea is midline.  Eyes - Extraocular movements intact.   Ears- External auditory canals are patent, tympanic membranes are intact without effusion or worrisome retractions   Nose - Nasal mucosa is pink and moist with no abnormal mucus.  The septum was grossly midline and non-obstructive, turbinates of normal size and position.    No nasal valve collapse  No polyps, masses, or purulence noted on examination.  No nasal lesions or ulcerations  Mouth - Examination of the oral cavity shows pink, healthy, moist mucosa.  No lesions or ulceration noted.  The dentition are in good repair.  The tongue is mobile and midline.  Throat - The walls of the oropharynx were smooth, pink, moist, symmetric, and had no lesions or ulcerations.  The tonsillar pillars and soft palate were symmetric.  The uvula was midline on elevation.      Impression/Plan  Radha Christensen is a 38 year old female    ICD-10-CM    1. h/o FESS (functional endoscopic sinus surgery)  Z98.890       2. Perennial allergic rhinitis  J30.89 loratadine (CLARITIN) 10 MG tablet     levocetirizine (XYZAL) 5 MG tablet          Continue weekly shots, escalting  Continue bid AH and john med saline, prn budesonide irrigations      Follow-up in 6 months for allergy recheck with Luz Maria or Sara Sanchez D.O.  Otolaryngology/Head and Neck Surgery  Allergy

## 2023-09-27 NOTE — PROGRESS NOTES
Allergen Immunotherapy: Adult  Verified patient's name and . Patient stated reason for visit today is to receive allergy shot(s). Patient denied any concerns with previous allergy injections. Allergy injections (x2) prepared and administered subcutaneous. Ice applied post-injection per patient preference. Administration of allergy injection documented in Immunotherapy flow sheet in EHR and paper chart (see for further information regarding injection). Patient declined to wait in facility for 30 minutes post-injection. Patient left clinic ambulatory, AMA.    SUJIT CAMPA RN ....................  2023   10:11 AM

## 2023-09-28 ENCOUNTER — OFFICE VISIT (OUTPATIENT)
Dept: OTOLARYNGOLOGY | Facility: OTHER | Age: 38
End: 2023-09-28
Attending: OTOLARYNGOLOGY
Payer: COMMERCIAL

## 2023-09-28 VITALS
RESPIRATION RATE: 18 BRPM | BODY MASS INDEX: 32.08 KG/M2 | SYSTOLIC BLOOD PRESSURE: 118 MMHG | WEIGHT: 204.8 LBS | OXYGEN SATURATION: 99 % | TEMPERATURE: 98 F | HEART RATE: 81 BPM | DIASTOLIC BLOOD PRESSURE: 74 MMHG

## 2023-09-28 DIAGNOSIS — J30.89 PERENNIAL ALLERGIC RHINITIS: ICD-10-CM

## 2023-09-28 DIAGNOSIS — Z98.890 S/P FESS (FUNCTIONAL ENDOSCOPIC SINUS SURGERY): Primary | ICD-10-CM

## 2023-09-28 PROCEDURE — 99213 OFFICE O/P EST LOW 20 MIN: CPT | Performed by: OTOLARYNGOLOGY

## 2023-09-28 PROCEDURE — G0463 HOSPITAL OUTPT CLINIC VISIT: HCPCS

## 2023-09-28 RX ORDER — BUDESONIDE 0.5 MG/2ML
0.5 INHALANT ORAL 2 TIMES DAILY PRN
Qty: 60 ML | Refills: 11 | Status: SHIPPED | OUTPATIENT
Start: 2023-09-28 | End: 2024-08-14

## 2023-09-28 RX ORDER — LORATADINE 10 MG/1
10 TABLET ORAL DAILY
Qty: 90 TABLET | Refills: 11 | Status: SHIPPED | OUTPATIENT
Start: 2023-09-28 | End: 2024-08-14

## 2023-09-28 RX ORDER — LEVOCETIRIZINE DIHYDROCHLORIDE 5 MG/1
5 TABLET, FILM COATED ORAL EVERY EVENING
Qty: 90 TABLET | Refills: 11 | Status: SHIPPED | OUTPATIENT
Start: 2023-09-28 | End: 2024-08-14

## 2023-09-28 ASSESSMENT — PAIN SCALES - GENERAL: PAINLEVEL: NO PAIN (0)

## 2023-09-28 NOTE — LETTER
9/28/2023         RE: Radha Christensen  [address]  [place] [address]        Dear Colleague,    Thank you for referring your patient, Radha Christensen, to the [facility]. Please see a copy of my visit note below.    Otolaryngology Progress Note          Radha Christensen is a 38 year old female  Follow-up of perennial allergic rhinitis.    She has a history of intractable migraine without aura and was formally on Topamax    Radha feels great  No nasal or sinus concerns  On gold vial weekly shots at [place]  No history of anaphylaxis no large local reactions      Taking Claritin in the morning Xyzal at night.  Using NeilMed saline irrigations and rare as needed budesonide irrigations      Modified Quantitative Allergy Skin Testing 3/23/23:  Dilution #6: Ragweed, dust mite  Dilution #5: Russian thistle Remi grass maple oak dipti Alternaria Hormodendrum penicillium Epicoccum Fusarium Helminthosporium, cat and dog  Dilution #2: Black walnut Sar[place]      I last examined her on 3/23/2023 and recommended starting immunotherapy for recalcitrant symptoms despite medical management.    Status post distant endoscopic sinus surgery and nasal valve repair on 7/1/2020         Otolaryngology Operative Note 7/1/20     Pre-op Diagnosis: Chronic pansinusitis, deviated nasal septum, bilateral inferior turbinate hypertrophy, bilateral nasal valve collapse  Post-op Diagnosis:  same  Procedures:    1.  Bilateral total ethmoidectomy  2.  Bilateral maxillary antrostomy with tissue removal  3.  Septoplasty with cartilage reinsertion  4.  Bilateral nasal valve repair  5.  Bilateral submucous reduction inferior turbinates          Physical Exam  /74   Pulse 81   Temp 98  F (36.7  C) (Tympanic)   Resp 18   Wt 92.9 kg (204 lb 12.8 oz)   LMP 07/18/2023 (Exact Date)   SpO2 99%   BMI 32.08 kg/m    General - The patient is well nourished and well developed, and appears to have good  nutritional status.  Alert and oriented to person and place, interactive.  Head and Face - Normocephalic and atraumatic, with no gross asymmetry noted of the contour of the facial features.  The facial nerve is intact, with strong symmetric movements.  Neck-no palpable lymphadenopathy or thyroid mass.  Trachea is midline.  Eyes - Extraocular movements intact.   Ears- External auditory canals are patent, tympanic membranes are intact without effusion or worrisome retractions   Nose - Nasal mucosa is pink and moist with no abnormal mucus.  The septum was grossly midline and non-obstructive, turbinates of normal size and position.    No nasal valve collapse  No polyps, masses, or purulence noted on examination.  No nasal lesions or ulcerations  Mouth - Examination of the oral cavity shows pink, healthy, moist mucosa.  No lesions or ulceration noted.  The dentition are in good repair.  The tongue is mobile and midline.  Throat - The walls of the oropharynx were smooth, pink, moist, symmetric, and had no lesions or ulcerations.  The tonsillar pillars and soft palate were symmetric.  The uvula was midline on elevation.      Impression/Plan  Radha Christensen is a 38 year old female    ICD-10-CM    1. h/o FESS (functional endoscopic sinus surgery)  Z98.890       2. Perennial allergic rhinitis  J30.89 loratadine (CLARITIN) 10 MG tablet     levocetirizine (XYZAL) 5 MG tablet          Continue weekly shots, escalting  Continue bid AH and john med saline, prn budesonide irrigations      Follow-up in 6 months for allergy recheck with Luz Maria or DEVIN ClineO.  Otolaryngology/Head and Neck Surgery  Allergy          Again, thank you for allowing me to participate in the care of your patient.        Sincerely,        Karen Sanchez MD

## 2023-10-06 ENCOUNTER — ALLIED HEALTH/NURSE VISIT (OUTPATIENT)
Dept: FAMILY MEDICINE | Facility: OTHER | Age: 38
End: 2023-10-06
Attending: PHYSICIAN ASSISTANT
Payer: COMMERCIAL

## 2023-10-06 DIAGNOSIS — J30.9 ALLERGIC RHINITIS: Primary | ICD-10-CM

## 2023-10-06 PROCEDURE — 95117 IMMUNOTHERAPY INJECTIONS: CPT

## 2023-10-12 ASSESSMENT — PATIENT HEALTH QUESTIONNAIRE - PHQ9
SUM OF ALL RESPONSES TO PHQ QUESTIONS 1-9: 5
SUM OF ALL RESPONSES TO PHQ QUESTIONS 1-9: 5
10. IF YOU CHECKED OFF ANY PROBLEMS, HOW DIFFICULT HAVE THESE PROBLEMS MADE IT FOR YOU TO DO YOUR WORK, TAKE CARE OF THINGS AT HOME, OR GET ALONG WITH OTHER PEOPLE: SOMEWHAT DIFFICULT

## 2023-10-13 ENCOUNTER — ALLIED HEALTH/NURSE VISIT (OUTPATIENT)
Dept: FAMILY MEDICINE | Facility: OTHER | Age: 38
End: 2023-10-13
Attending: PHYSICIAN ASSISTANT
Payer: COMMERCIAL

## 2023-10-13 DIAGNOSIS — J30.9 ALLERGIC RHINITIS: Primary | ICD-10-CM

## 2023-10-13 PROCEDURE — 95117 IMMUNOTHERAPY INJECTIONS: CPT

## 2023-10-13 NOTE — PROGRESS NOTES
Allergen Immunotherapy: Adult  Verified patient's name and . Patient stated reason for visit today is to receive allergy shot(s). Patient denied any concerns with previous allergy injections. Allergy injections (x2) prepared and administered subcutaneous. Ice applied post-injection per patient preference. Administration of allergy injection documented in Immunotherapy flow sheet in EHR and paper chart (see for further information regarding injection). Patient declined to wait in facility for 30 minutes post-injection. . Patient left clinic ambulatory, AMA.    SUJIT CAMPA RN ....................  10/13/2023   10:32 AM

## 2023-10-19 ENCOUNTER — ALLIED HEALTH/NURSE VISIT (OUTPATIENT)
Dept: ALLERGY | Facility: OTHER | Age: 38
End: 2023-10-19
Attending: OTOLARYNGOLOGY
Payer: COMMERCIAL

## 2023-10-19 DIAGNOSIS — J30.89 PERENNIAL ALLERGIC RHINITIS: Primary | ICD-10-CM

## 2023-10-19 PROCEDURE — 95165 ANTIGEN THERAPY SERVICES: CPT | Performed by: OTOLARYNGOLOGY

## 2023-10-19 PROCEDURE — 95165 ANTIGEN THERAPY SERVICES: CPT

## 2023-10-19 NOTE — PROGRESS NOTES
Allergy serum was mixed on 10/18/23 at 1600 by Luz Maria Naik PA-C at schedule blue 3 of 4,  into  2  (5 ml)  multi dose vial/vials.    Allergens included were:    Ragweed  0.2 ml of dilution # 2  Pigweed  0.2 ml of dilution # 0  Mugwort 0.2 ml of dilution # 0  Kochia  0.2 ml of dilution # 0  Russian Thistle 0.2 ml of dilution # 1  Remi Grass 0.2 ml of dilution # 1  Birch mix 0.2 ml of dilution # 0  Maple Mix 0.2 of dilution # 1  Elm Mix 0.2 ml of dilution # 0  Oak Mix 0.2 ml of dilution # 1  Eran Mix 0.2 ml of dilution # 1  Pine Mix 0.2 ml of dilution # 1  Eastern [place] 0.2 ml of dilution # 1  Black [place] 0.2 ml of dilution # 1  Aspen 0.2 ml of dilution # 0  Red [place] 0.2 ml of dilution # 0    Alternaria 0.2 ml of dilution # 1  Aspergillus 0.2 ml of dilution # 0  Hormodendrum 0.2 ml of dilution # 1  Helminthosporium 0.2 ml of dilution # 1  Penicillium 0.2 ml of dilution # 1  Epicoccum 0.2 ml of dilution # 1  Fusarium 0.2 ml of dilution # 1  Mucor 0.2 ml of dilution # 0  Grain Smut 0.2 ml of dilution # 0  Grass Smut 0.2 ml of dilution # 0  Cat 0.2 ml of dilution # 1  Dog 0.2 ml of dilution # 1  Feather Mix 0.2 ml of dilution # 0  Dust Mite Mix 0.2 ml of dilution # 2  Horse 0.2 ml of dilution # 0

## 2023-10-20 ENCOUNTER — ALLIED HEALTH/NURSE VISIT (OUTPATIENT)
Dept: FAMILY MEDICINE | Facility: OTHER | Age: 38
End: 2023-10-20
Attending: PHYSICIAN ASSISTANT
Payer: COMMERCIAL

## 2023-10-20 DIAGNOSIS — J30.9 ALLERGIC RHINITIS: Primary | ICD-10-CM

## 2023-10-20 PROCEDURE — 95117 IMMUNOTHERAPY INJECTIONS: CPT

## 2023-10-20 ASSESSMENT — PATIENT HEALTH QUESTIONNAIRE - PHQ9
10. IF YOU CHECKED OFF ANY PROBLEMS, HOW DIFFICULT HAVE THESE PROBLEMS MADE IT FOR YOU TO DO YOUR WORK, TAKE CARE OF THINGS AT HOME, OR GET ALONG WITH OTHER PEOPLE: SOMEWHAT DIFFICULT
SUM OF ALL RESPONSES TO PHQ QUESTIONS 1-9: 8
SUM OF ALL RESPONSES TO PHQ QUESTIONS 1-9: 8

## 2023-10-20 NOTE — PROGRESS NOTES
Allergen Immunotherapy: Adult  Verified patient's name and . Patient stated reason for visit today is to receive allergy shot(s). Patient denied any concerns with previous allergy injections. Allergy injections (x2) prepared and administered subcutaneous. Ice applied post-injection per patient preference. Administration of allergy injection documented in Immunotherapy flow sheet in EHR and paper chart (see for further information regarding injection). Patient left clinic ambulatory, AMA.     Amy Simms RN ....................  10/20/2023   3:41 PM

## 2023-10-26 ASSESSMENT — PATIENT HEALTH QUESTIONNAIRE - PHQ9
SUM OF ALL RESPONSES TO PHQ QUESTIONS 1-9: 7
10. IF YOU CHECKED OFF ANY PROBLEMS, HOW DIFFICULT HAVE THESE PROBLEMS MADE IT FOR YOU TO DO YOUR WORK, TAKE CARE OF THINGS AT HOME, OR GET ALONG WITH OTHER PEOPLE: SOMEWHAT DIFFICULT
SUM OF ALL RESPONSES TO PHQ QUESTIONS 1-9: 7

## 2023-10-27 ENCOUNTER — ALLIED HEALTH/NURSE VISIT (OUTPATIENT)
Dept: FAMILY MEDICINE | Facility: OTHER | Age: 38
End: 2023-10-27
Attending: PHYSICIAN ASSISTANT
Payer: COMMERCIAL

## 2023-10-27 DIAGNOSIS — J30.9 ALLERGIC RHINITIS: Primary | ICD-10-CM

## 2023-10-27 PROCEDURE — 95117 IMMUNOTHERAPY INJECTIONS: CPT

## 2023-10-27 NOTE — PROGRESS NOTES
Allergen Immunotherapy: Adult  Verified patient's name and . Patient stated reason for visit today is to receive allergy shot(s). Patient denied any concerns with previous allergy injections. Allergy injections (x2) prepared and administered subcutaneous. Ice applied post-injection per patient preference. Administration of allergy injection documented in Immunotherapy flow sheet in EHR and paper chart (see for further information regarding injection). Patient declined to wait in facility for 30 minutes post-injection. Patient left clinic ambulatory, AMA.     SUJIT CAMPA RN ....................  10/27/2023   3:16 PM

## 2023-11-01 ENCOUNTER — OFFICE VISIT (OUTPATIENT)
Dept: FAMILY MEDICINE | Facility: OTHER | Age: 38
End: 2023-11-01
Payer: COMMERCIAL

## 2023-11-01 VITALS
BODY MASS INDEX: 32.33 KG/M2 | SYSTOLIC BLOOD PRESSURE: 132 MMHG | RESPIRATION RATE: 16 BRPM | DIASTOLIC BLOOD PRESSURE: 88 MMHG | WEIGHT: 206 LBS | HEIGHT: 67 IN | TEMPERATURE: 99.5 F | HEART RATE: 64 BPM | OXYGEN SATURATION: 99 %

## 2023-11-01 DIAGNOSIS — J01.90 ACUTE SINUSITIS WITH SYMPTOMS > 10 DAYS: Primary | ICD-10-CM

## 2023-11-01 PROCEDURE — G0463 HOSPITAL OUTPT CLINIC VISIT: HCPCS

## 2023-11-01 PROCEDURE — 99213 OFFICE O/P EST LOW 20 MIN: CPT

## 2023-11-01 ASSESSMENT — PAIN SCALES - GENERAL: PAINLEVEL: SEVERE PAIN (6)

## 2023-11-01 NOTE — PATIENT INSTRUCTIONS
Please refer to your AVS for follow up and pain/symptoms management recommendations (I.e.: medications, helpful conservative treatment modalities, appropriate follow up if need to a specialist or family practice, etc.). Please return to urgent care if your symptoms change or worsen.     Discharge instructions:  -If you were prescribed a medication(s), please take this as prescribed/directed  -Monitor your symptoms, if changing/worsening, return to UC/ER or PCP for follow up    Sinus Infection:   1. Dry out congestion with flonase (1spray in both nostrils 2x daily for 3-5 days) and pseudoephedrine (1-2 tabs every 4-6 hrs for 3-5 days) unless contraindicated     2. Antihistamine such as Claritin or Zyrtec, etc. Generic brands are OK as well.     3. Use a saline spray/Neti Pot/sinus flush (Jones Med Sinus Rinse) 2-3 times daily to irrigate sinuses/mucosal tissue. This dilutes and moves secretions.     4. Tylenol or ibuprofen for pain and fevers - alternate every 4 hours as needed. I.e.: Ibuprofen at 8am, Tylenol 12pm, Ibuprofen 4pm    -Daily maximum of Tylenol is 4000mg (recommend staying under 3000mg)   -Daily maximum of Ibuprofen is 3200 mg    5. Plenty of fluids and rest as needed.     6. Chew, yawn and speak to help eustachian tubes drain.     * If you are a smoker, try to quit *     - Consider the following over-the-counter products if you are older than 1 year and not pregnant: honey/chestal for cough relief and sambucus/elderberry for viral upper-respiratory symptoms.

## 2023-11-01 NOTE — NURSING NOTE
"Chief Complaint   Patient presents with    Sinus Problem     X 1.5 weeks     Patient not tx today.  Feels weather change has impacted sinus - felt dizzy last night when moving and bending over.  Hx of sinus infections  Being tx with allergy injections for severe allergies.    Initial /88 (BP Location: Left arm, Patient Position: Sitting, Cuff Size: Adult Regular)   Pulse 64   Temp 99.5  F (37.5  C) (Tympanic)   Resp 16   Ht 1.702 m (5' 7\")   Wt 93.4 kg (206 lb)   LMP 10/10/2023 (Approximate)   SpO2 99%   BMI 32.26 kg/m   Estimated body mass index is 32.26 kg/m  as calculated from the following:    Height as of this encounter: 1.702 m (5' 7\").    Weight as of this encounter: 93.4 kg (206 lb).     Advance Care Directive on file? no    FOOD SECURITY SCREENING QUESTIONS:    The next two questions are to help us understand your food security.  If you are feeling you need any assistance in this area, we have resources available to support you today.    Hunger Vital Signs:  Within the past 12 months we worried whether our food would run out before we got money to buy more. Never  Within the past 12 months the food we bought just didn't last and we didn't have money to get more. Never  Akilah Steele LPN,LPN on 11/1/2023 at 12:28 PM      Akilah Steele LPN     "

## 2023-11-01 NOTE — PROGRESS NOTES
ASSESSMENT/PLAN:    I have reviewed the nursing notes.  I have reviewed the findings, diagnosis, plan and need for follow up with the patient.    1. Acute sinusitis with symptoms > 10 days  - amoxicillin-clavulanate (AUGMENTIN) 875-125 MG tablet; Take 1 tablet by mouth 2 times daily for 7 days  Dispense: 14 tablet; Refill: 0    Patient presents with sinus symptoms.  Patient's vitals are stable except for slightly elevated temperature and patient appears nontoxic.  Discussed with patient that her symptoms and physical exam are consistent with acute maxillary sinusitis.  Will treat with Augmentin.  Advised patient to make sure to take this medication with food to reduce the risk of GI upset. Discussed symptomatic treatment - Encouraged fluids, elevation, humidifier, sinus rinse/netti pot, lozenges, tea, topical vapor rub, popsicles, rest, etc. May use over-the-counter Tylenol or ibuprofen PRN.    Discussed warning signs/symptoms indicative of need to f/u    Follow up if symptoms persist or worsen or concerns    I explained my diagnostic considerations and recommendations to the patient, who voiced understanding and agreement with the treatment plan. All questions were answered. We discussed potential side effects of any prescribed or recommended therapies, as well as expectations for response to treatments.    SHANTAL Fields CNP  11/1/2023  12:34 PM    HPI:    Radha Christensen is a 38 year old female  who presents to [facility] today for concerns of sinus symptoms    sinus infection x 1.5 weeks.     Symptoms:   Location: bilateral maxillary  Nasal congestion: Yes: +  Purulent nasal discharge: Yes: +  Headache: Yes: +  Facial pain: Yes: +   Cough: No  Tooth pain/symptoms: Yes: +  Myalgias: Yes: +  Presence of fever: Yes: +   Halitosis: No   Anosmia: Yes: +    Metallic taste in the mouth: No     Treatments tried: ibuprofen and nasal rinses with minimal improvement.     History of similar symptoms: Yes  Prior  workup: No    PCP: RUI Perez=-C    Past Medical History:   Diagnosis Date    Concussion 2011    Overview:  no loss of consciousness from softball injury    Personal history of other medical treatment (CODE)      2, Para 2     Past Surgical History:   Procedure Laterality Date    ENDOSCOPIC SINUS SURGERY Bilateral 2020    Procedure: Bilateral Endoscopic Sinus Surgery with polypectomy;  Surgeon: Karen Sanchez MD;  Location: [facility] OR    ESOPHAGOSCOPY, GASTROSCOPY, DUODENOSCOPY (EGD), COMBINED N/A 2019    Procedure: ESOPHAGOGASTRODUODENOSCOPY, WITH BIOPSY;  Surgeon: Emigdio Goldberg MD;  Location: [facility] OR    RECONSTRUCT NOSE AND SEPTUM (FUNCTIONAL) Bilateral 2020    Procedure: Nasal Valve Repair(LATERA);  Surgeon: Karen Sanchez MD;  Location: [facility] OR    SEPTOPLASTY, TURBINOPLASTY, COMBINED N/A 2020    Procedure: Septoplasty Turbinate Reduction;  Surgeon: Karen Sanchez MD;  Location: [facility] OR    wisdom teeth        Social History     Tobacco Use    Smoking status: Former     Years: 10     Types: Cigarettes     Quit date: 2013     Years since quitting: 10.8    Smokeless tobacco: Never   Substance Use Topics    Alcohol use: Not Currently     Comment: 1 time per month.     Current Outpatient Medications   Medication Sig Dispense Refill    albuterol (PROAIR HFA/PROVENTIL HFA/VENTOLIN HFA) 108 (90 Base) MCG/ACT inhaler Inhale 2 puffs into the lungs every 4 hours as needed for shortness of breath / dyspnea 18 g 3    budesonide (PULMICORT) 0.5 MG/2ML neb solution Spray 2 mLs (0.5 mg) in nostril 2 times daily as needed (sinus) 60 mL 11    drospirenone-ethinyl estradiol (DILSHAD) 3-0.02 MG tablet Take 1 tablet by mouth daily 84 tablet 4    EPINEPHrine (ANY BX GENERIC EQUIV) 0.3 MG/0.3ML injection 2-pack Inject 0.3 mLs (0.3 mg) into the muscle once as needed 2 each 11    escitalopram (LEXAPRO) 20 MG tablet TAKE 1 TABLET(20 MG) BY MOUTH DAILY 90 tablet 2    famotidine  "(PEPCID) 20 MG tablet TAKE 1 TABLET(20 MG) BY MOUTH TWICE DAILY 60 tablet 11    FLUoxetine (PROZAC) 10 MG capsule Take 10 mg by mouth daily During cycle      FLUoxetine (PROZAC) 20 MG capsule Take 20 mg by mouth At Bedtime      hydrochlorothiazide (HYDRODIURIL) 25 MG tablet TAKE 1/2 TABLET(12.5 MG) BY MOUTH DAILY 45 tablet 3    hydrOXYzine (ATARAX) 10 MG tablet TAKE 1/2 TO 1 TABLET BY MOUTH UP TO THREE TIMES DAILY AS NEEDED FOR ANXIETY      levocetirizine (XYZAL) 5 MG tablet Take 1 tablet (5 mg) by mouth every evening 90 tablet 11    lisinopril (ZESTRIL) 40 MG tablet TAKE 1 TABLET(40 MG) BY MOUTH DAILY 90 tablet 2    loratadine (CLARITIN) 10 MG tablet Take 1 tablet (10 mg) by mouth daily 90 tablet 11    ORDER FOR ALLERGEN IMMUNOTHERAPY Start allergy injections at [facility]. Mix all dilution 6 and 5. Separate Ragweed and Dust. Mix Pine, Eastern [place], and Black [place]. Follow standard buildup protocol. 10 mL PRN    topiramate (TOPAMAX) 200 MG tablet Take 1 tablet (200 mg) by mouth 2 times daily 60 tablet 11    vitamin D3 (CHOLECALCIFEROL) 50 mcg (2000 units) tablet Take 1 tablet (50 mcg) by mouth daily       Allergies   Allergen Reactions    Cefaclor Unknown     Past medical history, past surgical history, current medications and allergies reviewed and accurate to the best of my knowledge.      ROS:  Refer to HPI    /88 (BP Location: Left arm, Patient Position: Sitting, Cuff Size: Adult Regular)   Pulse 64   Temp 99.5  F (37.5  C) (Tympanic)   Resp 16   Ht 1.702 m (5' 7\")   Wt 93.4 kg (206 lb)   LMP 10/10/2023 (Approximate)   SpO2 99%   BMI 32.26 kg/m      EXAM:  General Appearance: Well appearing 38 year old female, appropriate appearance for age. No acute distress   Ears: Left TM intact, translucent with bony landmarks appreciated, no erythema, no effusion, no bulging, no purulence.  Right TM intact, translucent with bony landmarks appreciated, no erythema, no effusion, no bulging, no " purulence.  Left auditory canal clear.  Right auditory canal clear.  Normal external ears, non tender.  Eyes: conjunctivae normal without erythema or irritation, corneas clear, no drainage or crusting, no eyelid swelling, pupils equal   Oropharynx: moist mucous membranes, posterior pharynx without erythema, tonsils symmetric and 1+, no erythema, no exudates or petechiae, no post nasal drip seen, no trismus, voice clear.    Sinuses:  Sinus tenderness upon palpation of the maxillary sinuses  Nose:  Bilateral nares: mild erythema and edema, purulent drainage and congestion   Neck: supple without adenopathy  Respiratory: normal chest wall and respirations.  Normal effort.  Clear to auscultation bilaterally, no wheezing, crackles or rhonchi.  No increased work of breathing.  No cough appreciated.  Cardiac: RRR with no murmurs  Musculoskeletal:  Equal movement of bilateral upper extremities.  Equal movement of bilateral lower extremities.  Normal gait.    Dermatological: no rashes noted of exposed skin  Neuro: Alert and oriented to person, place, and time.    Psychological: normal affect, alert, oriented, and pleasant.        2. The status of comorbities. (See ED/admit documents)

## 2023-11-08 ENCOUNTER — ALLIED HEALTH/NURSE VISIT (OUTPATIENT)
Dept: ALLERGY | Facility: OTHER | Age: 38
End: 2023-11-08
Attending: NURSE PRACTITIONER
Payer: COMMERCIAL

## 2023-11-08 DIAGNOSIS — J30.9 ALLERGIC RHINITIS: Primary | ICD-10-CM

## 2023-11-08 PROCEDURE — 95117 IMMUNOTHERAPY INJECTIONS: CPT

## 2023-11-08 NOTE — PROGRESS NOTES
Prior to injection patient identity verified using name and date of birth.     SVT done on left arm, measuring 8 MM. Passed Blue vial 1.      SVT done on right arm, measuring 8 MM. Passed Blue vial 2.     Documented on paper flow sheet.     Allergy injection given and charted on paper allergy flow sheet. Patient signed out AMA at 1040.    Allergy injection/s given and charted on paper allergy flow sheet.  Patient experienced unknown reaction, patient signed out AMA form at 1040.    Due to injection administration, patient instructed to remain in clinic for 30 minutes  afterwards, and to report any adverse reaction to me immediately.    Patient was instructed to seek medical attention/go to the emergency room if having any reaction symptoms or needing to use their Epipen, patient aware and acknowledged. Patient signed out AMA form at 1040. Patient alert/orientated and left ambulating independently. Patient asked if knew anaphylaxis symptoms, states she is aware and acknowledged.

## 2023-11-10 ENCOUNTER — MEDICAL CORRESPONDENCE (OUTPATIENT)
Dept: HEALTH INFORMATION MANAGEMENT | Facility: OTHER | Age: 38
End: 2023-11-10
Payer: COMMERCIAL

## 2023-11-17 ENCOUNTER — ALLIED HEALTH/NURSE VISIT (OUTPATIENT)
Dept: FAMILY MEDICINE | Facility: OTHER | Age: 38
End: 2023-11-17
Attending: PHYSICIAN ASSISTANT
Payer: COMMERCIAL

## 2023-11-17 DIAGNOSIS — J30.9 ALLERGIC RHINITIS: Primary | ICD-10-CM

## 2023-11-17 PROCEDURE — 95117 IMMUNOTHERAPY INJECTIONS: CPT

## 2023-11-17 NOTE — PROGRESS NOTES
Allergen Immunotherapy: Adult  Verified patient's name and . Patient stated reason for visit today is to receive allergy shot(s). Patient denied any concerns with previous allergy injections. Allergy injections (x2) prepared and administered subcutaneous. Ice applied post-injection per patient preference. Administration of allergy injection documented in Immunotherapy flow sheet in EHR and paper chart (see for further information regarding injection). Patient declined to wait in facility for 30 minutes post-injection. Patient left clinic ambulatory, AMA.    SUJIT CAMPA RN ....................  2023   3:29 PM

## 2023-11-24 ENCOUNTER — ALLIED HEALTH/NURSE VISIT (OUTPATIENT)
Dept: FAMILY MEDICINE | Facility: OTHER | Age: 38
End: 2023-11-24
Attending: PHYSICIAN ASSISTANT
Payer: COMMERCIAL

## 2023-11-24 DIAGNOSIS — J30.9 ALLERGIC RHINITIS: Primary | ICD-10-CM

## 2023-11-24 PROCEDURE — 95117 IMMUNOTHERAPY INJECTIONS: CPT

## 2023-11-24 NOTE — PROGRESS NOTES
Allergen Immunotherapy: Adult  Verified patient's name and . Patient stated reason for visit today is to receive allergy shot(s). Patient denied any concerns with previous allergy injections. Allergy injections (x2) prepared and administered subcutaneous. Ice applied post-injection per patient preference. Administration of allergy injection documented in Immunotherapy flow sheet in EHR and paper chart (see for further information regarding injection). Patient declined to wait in facility for 30 minutes post-injection. Patient left clinic ambulatory, AMA.    SUJIT CAMPA RN ....................  2023   3:33 PM

## 2023-12-01 ENCOUNTER — ALLIED HEALTH/NURSE VISIT (OUTPATIENT)
Dept: FAMILY MEDICINE | Facility: OTHER | Age: 38
End: 2023-12-01
Attending: PHYSICIAN ASSISTANT
Payer: COMMERCIAL

## 2023-12-01 DIAGNOSIS — J30.9 ALLERGIC RHINITIS: Primary | ICD-10-CM

## 2023-12-01 PROCEDURE — 95117 IMMUNOTHERAPY INJECTIONS: CPT

## 2023-12-01 NOTE — PROGRESS NOTES
Allergen Immunotherapy: Adult  Verified patient's name and . Patient stated reason for visit today is to receive allergy shots. Patient denied any concerns with previous allergy injections. Allergy injections (x2) prepared and administered subcutaneous. Ice applied post-injection per patient preference. Administration of allergy injection documented in Immunotherapy flow sheet in EHR and paper chart (see for further information regarding injection). Patient declined to wait in facility for 30 minutes post-injection. Patient left clinic ambulatory, AMA.    SUJIT CAMPA RN ....................  2023   4:00 PM

## 2023-12-08 ENCOUNTER — ALLIED HEALTH/NURSE VISIT (OUTPATIENT)
Dept: FAMILY MEDICINE | Facility: OTHER | Age: 38
End: 2023-12-08
Attending: PHYSICIAN ASSISTANT
Payer: COMMERCIAL

## 2023-12-08 DIAGNOSIS — J30.9 ALLERGIC RHINITIS: Primary | ICD-10-CM

## 2023-12-08 PROCEDURE — 95117 IMMUNOTHERAPY INJECTIONS: CPT

## 2023-12-11 NOTE — PROGRESS NOTES
Radha was seen for allergy skin testing. Patient was seen by this nurse in conjunction with ENT provider. All encounter details are documented in ENT Provider's appointment from this same date. Please see referenced encounter for this visits documentation.     Sebastián Gonzalez RN on 3/23/2023 at 9:52 AM     Trisha is a 62 year old who is being evaluated via a billable telephone visit.      What phone number would you like to be contacted at? [phone]  How would you like to obtain your AVS? Hiren    Distant Location (provider location):  On-site    Assessment & Plan     Infection due to 2019 novel coronavirus  - nirmatrelvir and ritonavir (PAXLOVID) 300 mg/100 mg therapy pack; Take 3 tablets by mouth 2 times daily for 5 days (Take 2 Nirmatrelvir tablets and 1 Ritonavir tablet twice daily for 5 days)    States that she started having COVID symptoms 2 days ago and had a positive test yesterday at home.  Patient is interested in the antiviral Paxlovid as she has taken this in the past and it has worked well for her.  Reviewed patient's kidney function which is normal.  Patient is currently on warfarin so advised her to closely monitor her INR while on the Paxlovid.  Sent in a prescription for Paxlovid.  Advised patient that she should quarantine for 5 days from symptom onset and then be fever free for 24 hours. Discussed symptomatic treatment - Encouraged fluids, salt water gargles, honey (only if greater than 1 year in age due to risk of botulism), elevation, humidifier, sinus rinse/netti pot, lozenges, tea, topical vapor rub, popsicles, rest, etc. May use over-the-counter Tylenol PRN.    Discussed warning signs/symptoms indicative of need to f/u    Follow up if symptoms persist or worsen or concerns    I explained my diagnostic considerations and recommendations to the patient, who voiced understanding and agreement with the treatment plan. All questions were answered. We discussed potential side effects of any prescribed or recommended therapies, as well as expectations for response to treatments.    BELLA Ojeda [facility] [place]    Subjective   Trisha is a 62 year old, presenting for the following health issues:  Covid Concern    HPI       COVID-19 Symptom Review  How many days ago  "did these symptoms start? Started on 12-9-23    Are any of the following symptoms significant for you?  New or worsening difficulty breathing? Yes  Please describe what kind of difficulty you are having breathing:Moderate dyspnea (short of breath with ADLs, shortness of breath that limits the ability to walk up one flight of stairs without needing to rest)  Worsening cough? Yes, it's a dry cough.   Fever or chills? Yes  Headache: YES  Sore throat: YES  Chest pain: YES  Diarrhea: No  Body aches? YES    What treatments has patient tried? Acetaminophen and Ibuprofen, Nyquil   Does patient live in a nursing home, group home, or shelter? No  Does patient have a way to get food/medications during quarantined? Yes, I have a friend or family member who can help me.    Review of Systems   See HPI      Objective    Vitals - Patient Reported  Weight (Patient Reported): 165.6 kg (365 lb)  Height (Patient Reported): 172.7 cm (5' 8\")  BMI (Based on Pt Reported Ht/Wt): 55.5  Temperature (Patient Reported): 99.7  F (37.6  C) (temporal)  Pain Score: Severe Pain (7)  Pain Loc:  (joints)      Physical Exam   PSYCH: Alert and oriented times 3; coherent speech, normal   rate and volume, able to articulate logical thoughts, able   to abstract reason, no tangential thoughts, no hallucinations   or delusions  Her affect is normal  RESP: No cough, no audible wheezing, able to talk in full sentences  Remainder of exam unable to be completed due to telephone visits      Phone call duration: 5 minutes      "

## 2023-12-15 ENCOUNTER — ALLIED HEALTH/NURSE VISIT (OUTPATIENT)
Dept: FAMILY MEDICINE | Facility: OTHER | Age: 38
End: 2023-12-15
Attending: PHYSICIAN ASSISTANT
Payer: COMMERCIAL

## 2023-12-15 DIAGNOSIS — J30.9 ALLERGIC RHINITIS: Primary | ICD-10-CM

## 2023-12-15 PROCEDURE — 95117 IMMUNOTHERAPY INJECTIONS: CPT

## 2023-12-15 ASSESSMENT — PATIENT HEALTH QUESTIONNAIRE - PHQ9
SUM OF ALL RESPONSES TO PHQ QUESTIONS 1-9: 6
10. IF YOU CHECKED OFF ANY PROBLEMS, HOW DIFFICULT HAVE THESE PROBLEMS MADE IT FOR YOU TO DO YOUR WORK, TAKE CARE OF THINGS AT HOME, OR GET ALONG WITH OTHER PEOPLE: SOMEWHAT DIFFICULT
SUM OF ALL RESPONSES TO PHQ QUESTIONS 1-9: 6

## 2023-12-15 NOTE — PROGRESS NOTES
Allergen Immunotherapy: Adult  Verified patient's name and . Patient stated reason for visit today is to receive allergy shot(s). Patient denied any concerns with previous allergy injections. Allergy injections (x2) prepared and administered subcutaneous. Ice applied post-injection per patient preference. Administration of allergy injection documented in Immunotherapy flow sheet in EHR and paper chart (see for further information regarding injection). Patient to wait in facility for 30 minutes post-injection and notify RN immediately of any reaction. Patient left clinic AMA.     Valeria Foreman RN ....................  12/15/2023   3:36 PM

## 2023-12-20 DIAGNOSIS — F41.1 GAD (GENERALIZED ANXIETY DISORDER): ICD-10-CM

## 2023-12-21 RX ORDER — ESCITALOPRAM OXALATE 20 MG/1
TABLET ORAL
Qty: 90 TABLET | Refills: 2 | Status: SHIPPED | OUTPATIENT
Start: 2023-12-21 | End: 2024-08-14

## 2023-12-22 ENCOUNTER — ALLIED HEALTH/NURSE VISIT (OUTPATIENT)
Dept: FAMILY MEDICINE | Facility: OTHER | Age: 38
End: 2023-12-22
Attending: PHYSICIAN ASSISTANT
Payer: COMMERCIAL

## 2023-12-22 DIAGNOSIS — J30.9 ALLERGIC RHINITIS: Primary | ICD-10-CM

## 2023-12-22 PROCEDURE — 95117 IMMUNOTHERAPY INJECTIONS: CPT

## 2023-12-22 NOTE — PROGRESS NOTES
Allergen Immunotherapy: Adult  Verified patient's name and . Patient stated reason for visit today is to receive allergy shot(s). Patient denied any concerns with previous allergy injections. Allergy injections (x2) prepared and administered subcutaneous. Ice applied post-injection per patient preference. Administration of allergy injection documented in Immunotherapy flow sheet in EHR and paper chart (see for further information regarding injection). Patient declined to wait in facility for 30 minutes post-injection. Patient left clinic ambulatory, AMA.     SUJIT CAMPA RN ....................  2023   3:47 PM

## 2023-12-29 ENCOUNTER — ALLIED HEALTH/NURSE VISIT (OUTPATIENT)
Dept: FAMILY MEDICINE | Facility: OTHER | Age: 38
End: 2023-12-29
Attending: PHYSICIAN ASSISTANT
Payer: COMMERCIAL

## 2023-12-29 DIAGNOSIS — J30.9 ALLERGIC RHINITIS: Primary | ICD-10-CM

## 2023-12-29 PROCEDURE — 95117 IMMUNOTHERAPY INJECTIONS: CPT

## 2023-12-29 ASSESSMENT — PATIENT HEALTH QUESTIONNAIRE - PHQ9
SUM OF ALL RESPONSES TO PHQ QUESTIONS 1-9: 6
SUM OF ALL RESPONSES TO PHQ QUESTIONS 1-9: 6
10. IF YOU CHECKED OFF ANY PROBLEMS, HOW DIFFICULT HAVE THESE PROBLEMS MADE IT FOR YOU TO DO YOUR WORK, TAKE CARE OF THINGS AT HOME, OR GET ALONG WITH OTHER PEOPLE: SOMEWHAT DIFFICULT

## 2024-01-05 ENCOUNTER — ALLIED HEALTH/NURSE VISIT (OUTPATIENT)
Dept: FAMILY MEDICINE | Facility: OTHER | Age: 39
End: 2024-01-05
Payer: COMMERCIAL

## 2024-01-05 DIAGNOSIS — J30.9 ALLERGIC RHINITIS: Primary | ICD-10-CM

## 2024-01-05 PROCEDURE — 95117 IMMUNOTHERAPY INJECTIONS: CPT

## 2024-01-05 NOTE — PROGRESS NOTES
Allergen Immunotherapy: Adult  Verified patient's name and . Patient stated reason for visit today is to receive allergy shot(s). Patient denied any concerns with previous allergy injections. Allergy injections (x2) prepared and administered subcutaneous. Ice applied post-injection per patient preference. Administration of allergy injection documented in Immunotherapy flow sheet in EHR and paper chart (see for further information regarding injection). Patient declined to wait in facility for 30 minutes post-injection. Patient left clinic ambulatory, AMA.     SUJIT CAMPA RN ....................  2024   3:54 PM

## 2024-01-08 ENCOUNTER — ALLIED HEALTH/NURSE VISIT (OUTPATIENT)
Dept: ALLERGY | Facility: OTHER | Age: 39
End: 2024-01-08
Attending: OTOLARYNGOLOGY
Payer: COMMERCIAL

## 2024-01-08 DIAGNOSIS — J30.89 PERENNIAL ALLERGIC RHINITIS: Primary | ICD-10-CM

## 2024-01-08 PROCEDURE — 95165 ANTIGEN THERAPY SERVICES: CPT | Performed by: OTOLARYNGOLOGY

## 2024-01-08 PROCEDURE — 95165 ANTIGEN THERAPY SERVICES: CPT

## 2024-01-08 NOTE — PROGRESS NOTES
Allergy serum is mixed today at schedule Green 4 of 4,  into  2  (5 ml)  multi dose vial/vials.    Allergens included were:    Ragweed  0.2 ml of dilution # 1  Pigweed  0.2 ml of dilution # 0  Mugwort 0.2 ml of dilution # 0  Kochia  0.2 ml of dilution # 0  Russian Thistle 0.2 ml of dilution # 1  Remi Grass 0.2 ml of dilution # 1  Birch mix 0.2 ml of dilution # 0  Maple Mix 0.2 of dilution # 1  Elm Mix 0.2 ml of dilution # 0  Oak Mix 0.2 ml of dilution # 1  Eran Mix 0.2 ml of dilution # 1  Pine Mix 0.2 ml of dilution # 1  Eastern [place] 0.2 ml of dilution # 1  Black [place] 0.2 ml of dilution # 1  Aspen 0.2 ml of dilution # 0  Red [place] 0.2 ml of dilution # 0    Alternaria 0.2 ml of dilution # 1  Aspergillus 0.2 ml of dilution # 0  Hormodendrum 0.2 ml of dilution # 1  Helminthosporium 0.2 ml of dilution # 1  Penicillium 0.2 ml of dilution # 1  Epicoccum 0.2 ml of dilution # 1  Fusarium 0.2 ml of dilution # 1  Mucor 0.2 ml of dilution # 0  Grain Smut 0.2 ml of dilution # 0  Grass Smut 0.2 ml of dilution # 0  Cat 0.2 ml of dilution # 1  Dog 0.2 ml of dilution # 1  Feather Mix 0.2 ml of dilution # 0  Dust Mite Mix 0.2 ml of dilution # 1  Horse 0.2 ml of dilution # 0

## 2024-01-12 ENCOUNTER — ALLIED HEALTH/NURSE VISIT (OUTPATIENT)
Dept: FAMILY MEDICINE | Facility: OTHER | Age: 39
End: 2024-01-12
Payer: COMMERCIAL

## 2024-01-12 DIAGNOSIS — J30.9 ALLERGIC RHINITIS: Primary | ICD-10-CM

## 2024-01-12 PROCEDURE — 95117 IMMUNOTHERAPY INJECTIONS: CPT

## 2024-01-12 NOTE — PROGRESS NOTES
Allergen Immunotherapy: Adult  Verified patient's name and . Patient stated reason for visit today is to receive allergy shot(s). Patient denied any concerns with previous allergy injections. Allergy injections (x2) prepared and administered subcutaneous. Ice applied post-injection per patient preference. Administration of allergy injection documented in Immunotherapy flow sheet in EHR and paper chart (see for further information regarding injection). Patient declined to wait in facility for 30 minutes post-injection. Patient left clinic ambulatory, AMA.     Rashmi Fabian RN ....................  2024   2:54 PM

## 2024-01-15 ENCOUNTER — TELEPHONE (OUTPATIENT)
Dept: ALLERGY | Facility: OTHER | Age: 39
End: 2024-01-15

## 2024-01-15 NOTE — TELEPHONE ENCOUNTER
Patient called back regarding call placed to her today for update. Ask the patient if she has an upcoming appointment at [facility] ([facility]), patient states she does on Friday. Informed due to her last injection and the length of time between injections we can only complete her SVT (safety vial test) and she can have her injection at [facility] on Friday as planned as long as she passes her SVT. Patient aware and acknowledged. No further questions or concerns at call completion.

## 2024-01-16 ENCOUNTER — TELEPHONE (OUTPATIENT)
Dept: FAMILY MEDICINE | Facility: OTHER | Age: 39
End: 2024-01-16
Payer: COMMERCIAL

## 2024-01-16 NOTE — TELEPHONE ENCOUNTER
[facility] called. They will be giving pt her safety dose tomorrow on the 17th. She will then follow-up on her next dose on 1/24/24 as scheduled. They will send out a new serum vial on Friday 1/19/24 at 8:00 am.     Rashmi Fabian RN on 1/16/2024 at 2:41 PM

## 2024-01-17 ENCOUNTER — ALLIED HEALTH/NURSE VISIT (OUTPATIENT)
Dept: ALLERGY | Facility: OTHER | Age: 39
End: 2024-01-17
Attending: PHYSICIAN ASSISTANT
Payer: COMMERCIAL

## 2024-01-17 DIAGNOSIS — J30.89 PERENNIAL ALLERGIC RHINITIS: Primary | ICD-10-CM

## 2024-01-17 NOTE — PROGRESS NOTES
Prior to injection patient identity verified using name and date of birth.     SVT done on left arm (vial 2), measuring 13mm MM. failed.     SVT done on right arm (vial 1), measuring 15mm MM. failed.     Documented on paper flow sheet.     Patient not given allergy injection due to being to early as last injection was on 1/12/24    Provider Sara Donis NP saw patient and provided instructions to double up on claritin and take nightly Xyzal as prescribed for two days prior, day of, and day after next SVT.

## 2024-01-24 ENCOUNTER — ALLIED HEALTH/NURSE VISIT (OUTPATIENT)
Dept: ALLERGY | Facility: OTHER | Age: 39
End: 2024-01-24
Attending: NURSE PRACTITIONER
Payer: COMMERCIAL

## 2024-01-24 DIAGNOSIS — J30.9 ALLERGIC RHINITIS: Primary | ICD-10-CM

## 2024-01-24 PROCEDURE — 95117 IMMUNOTHERAPY INJECTIONS: CPT

## 2024-01-24 NOTE — PROGRESS NOTES
Prior to injection patient identity verified using name and date of birth.     SVT done on both arm, measuring 7 on the left and 9 on the right MM. Passed.     Documented on paper flow sheet.     Allergy injection given and charted on paper allergy flow sheet. Patient signed out AMA.      Prior to injection verified patient identity using patient name and date of birth.    Questions asked: Yes    Patient was given allergy injection(s) of 0.05 from Green vial given in Both arm(s).    Injection(s) charted on paper allergy flow sheet.    Epipen present at appointment.    Patient left against medical advice (AMA), signed form and did not stay for the observation period.    Patient was instructed to seek medical attention/go to the emergency room if having any reaction symptoms or needing to use their Epipen, patient aware and acknowledged. Patient signed out AMA form at 1123 and ambulated out of the clinic.

## 2024-01-25 ENCOUNTER — MEDICAL CORRESPONDENCE (OUTPATIENT)
Dept: HEALTH INFORMATION MANAGEMENT | Facility: OTHER | Age: 39
End: 2024-01-25
Payer: COMMERCIAL

## 2024-01-30 ASSESSMENT — PATIENT HEALTH QUESTIONNAIRE - PHQ9
SUM OF ALL RESPONSES TO PHQ QUESTIONS 1-9: 7
SUM OF ALL RESPONSES TO PHQ QUESTIONS 1-9: 7
10. IF YOU CHECKED OFF ANY PROBLEMS, HOW DIFFICULT HAVE THESE PROBLEMS MADE IT FOR YOU TO DO YOUR WORK, TAKE CARE OF THINGS AT HOME, OR GET ALONG WITH OTHER PEOPLE: SOMEWHAT DIFFICULT

## 2024-01-31 ENCOUNTER — ALLIED HEALTH/NURSE VISIT (OUTPATIENT)
Dept: FAMILY MEDICINE | Facility: OTHER | Age: 39
End: 2024-01-31
Attending: PHYSICIAN ASSISTANT
Payer: COMMERCIAL

## 2024-01-31 DIAGNOSIS — J30.9 ALLERGIC RHINITIS: Primary | ICD-10-CM

## 2024-01-31 PROCEDURE — 95115 IMMUNOTHERAPY ONE INJECTION: CPT

## 2024-01-31 NOTE — PROGRESS NOTES
Allergen Immunotherapy: Adult  Verified patient's name and . Patient stated reason for visit today is to receive allergy shot(s). Patient denied any concerns with previous allergy injections. Allergy injections (x2) prepared and administered subcutaneous. Ice applied post-injection per patient preference. Administration of allergy injection documented in Immunotherapy flow sheet in EHR and paper chart (see for further information regarding injection). Patient declined to wait in facility for 30 minutes post-injection. Patient left clinic ambulatory, AMA.    SUJIT CAMPA RN on 2024 at 10:04 AM

## 2024-02-07 ENCOUNTER — ALLIED HEALTH/NURSE VISIT (OUTPATIENT)
Dept: FAMILY MEDICINE | Facility: OTHER | Age: 39
End: 2024-02-07
Attending: PHYSICIAN ASSISTANT
Payer: COMMERCIAL

## 2024-02-07 DIAGNOSIS — J30.9 ALLERGIC RHINITIS: Primary | ICD-10-CM

## 2024-02-07 PROCEDURE — 95117 IMMUNOTHERAPY INJECTIONS: CPT

## 2024-02-07 NOTE — PROGRESS NOTES
Allergen Immunotherapy: Adult  Verified patient's name and . Patient stated reason for visit today is to receive allergy shot(s). Patient denied any concerns with previous allergy injections. Allergy injections (x2) prepared and administered subcutaneous. Ice applied post-injection per patient preference. Administration of allergy injection documented in Immunotherapy flow sheet in EHR and paper chart (see for further information regarding injection). Patient declined to wait in facility for 30 minutes post-injection. Patient left clinic ambulatory, AMA.    SUJIT CAMPA RN on 2024 at 1:39 PM

## 2024-02-19 ENCOUNTER — ALLIED HEALTH/NURSE VISIT (OUTPATIENT)
Dept: FAMILY MEDICINE | Facility: OTHER | Age: 39
End: 2024-02-19
Attending: PHYSICIAN ASSISTANT
Payer: COMMERCIAL

## 2024-02-19 DIAGNOSIS — J30.9 ALLERGIC RHINITIS: Primary | ICD-10-CM

## 2024-02-19 PROCEDURE — 95117 IMMUNOTHERAPY INJECTIONS: CPT

## 2024-02-19 NOTE — PROGRESS NOTES
Allergen Immunotherapy: Adult  Verified patient's name and . Patient stated reason for visit today is to receive allergy shot(s). Patient denied any concerns with previous allergy injections. Allergy injections (x2) prepared and administered subcutaneous. Ice applied post-injection per patient preference. Administration of allergy injection documented in Immunotherapy flow sheet in EHR and paper chart (see for further information regarding injection). Patient declined to wait in facility for 30 minutes post-injection. Patient left clinic ambulatory, AMA.    SUJIT CAMPA RN on 2024 at 10:35 AM

## 2024-02-28 ENCOUNTER — ALLIED HEALTH/NURSE VISIT (OUTPATIENT)
Dept: FAMILY MEDICINE | Facility: OTHER | Age: 39
End: 2024-02-28
Attending: PHYSICIAN ASSISTANT
Payer: COMMERCIAL

## 2024-02-28 DIAGNOSIS — J30.9 ALLERGIC RHINITIS: Primary | ICD-10-CM

## 2024-02-28 PROCEDURE — 95117 IMMUNOTHERAPY INJECTIONS: CPT

## 2024-02-28 NOTE — PROGRESS NOTES
Allergen Immunotherapy: Adult  Verified patient's name and . Patient stated reason for visit today is to receive allergy shot(s). Patient denied any concerns with previous allergy injections. Allergy injections (x2) prepared and administered subcutaneous. Ice applied post-injection per patient preference. Administration of allergy injection documented in Immunotherapy flow sheet in EHR and paper chart (see for further information regarding injection). Patient declined to wait in facility for 30 minutes post-injection. Patient left clinic ambulatory, AMA.    SUJIT CAMPA RN on 2024 at 9:27 AM

## 2024-03-06 ENCOUNTER — ALLIED HEALTH/NURSE VISIT (OUTPATIENT)
Dept: FAMILY MEDICINE | Facility: OTHER | Age: 39
End: 2024-03-06
Attending: PHYSICIAN ASSISTANT
Payer: COMMERCIAL

## 2024-03-06 DIAGNOSIS — J30.9 ALLERGIC RHINITIS: Primary | ICD-10-CM

## 2024-03-06 PROCEDURE — 95117 IMMUNOTHERAPY INJECTIONS: CPT

## 2024-03-06 NOTE — PROGRESS NOTES
Allergen Immunotherapy: Adult  Verified patient's name and . Patient stated reason for visit today is to receive allergy shot(s). Patient denied any concerns with previous allergy injections. Allergy injections (x2) prepared and administered subcutaneous. Ice applied post-injection per patient preference. Administration of allergy injection documented in Immunotherapy flow sheet in EHR and paper chart (see for further information regarding injection). Patient declined to wait in facility for 30 minutes post-injection. Patient left clinic ambulatory, AMA.    SUJIT CAMPA RN on 3/6/2024 at 10:35 AM

## 2024-03-13 ENCOUNTER — ALLIED HEALTH/NURSE VISIT (OUTPATIENT)
Dept: FAMILY MEDICINE | Facility: OTHER | Age: 39
End: 2024-03-13
Attending: PHYSICIAN ASSISTANT
Payer: COMMERCIAL

## 2024-03-13 DIAGNOSIS — J30.9 ALLERGIC RHINITIS: Primary | ICD-10-CM

## 2024-03-13 PROCEDURE — 95117 IMMUNOTHERAPY INJECTIONS: CPT

## 2024-03-13 NOTE — PROGRESS NOTES
Allergen Immunotherapy: Adult  Verified patient's name and . Patient stated reason for visit today is to receive allergy shot(s). Patient denied any concerns with previous allergy injections. Allergy injections (x2) prepared and administered subcutaneous. Ice applied post-injection per patient preference. Administration of allergy injection documented in Immunotherapy flow sheet in EHR and paper chart (see for further information regarding injection). Patient declined to wait in facility for 30 minutes post-injection. Patient left clinic ambulatory, AMA.    SUJIT CAMPA RN on 3/13/2024 at 10:20 AM

## 2024-03-17 DIAGNOSIS — I10 ESSENTIAL HYPERTENSION: ICD-10-CM

## 2024-03-20 ENCOUNTER — ALLIED HEALTH/NURSE VISIT (OUTPATIENT)
Dept: FAMILY MEDICINE | Facility: OTHER | Age: 39
End: 2024-03-20
Attending: PHYSICIAN ASSISTANT
Payer: COMMERCIAL

## 2024-03-20 DIAGNOSIS — J30.9 ALLERGIC RHINITIS: Primary | ICD-10-CM

## 2024-03-20 PROCEDURE — 95117 IMMUNOTHERAPY INJECTIONS: CPT

## 2024-03-20 ASSESSMENT — PATIENT HEALTH QUESTIONNAIRE - PHQ9
SUM OF ALL RESPONSES TO PHQ QUESTIONS 1-9: 9
SUM OF ALL RESPONSES TO PHQ QUESTIONS 1-9: 9
10. IF YOU CHECKED OFF ANY PROBLEMS, HOW DIFFICULT HAVE THESE PROBLEMS MADE IT FOR YOU TO DO YOUR WORK, TAKE CARE OF THINGS AT HOME, OR GET ALONG WITH OTHER PEOPLE: SOMEWHAT DIFFICULT

## 2024-03-20 NOTE — PROGRESS NOTES
Allergen Immunotherapy: Adult  Verified patient's name and . Patient stated reason for visit today is to receive allergy shot(s). Patient denied any concerns with previous allergy injections. Allergy injections (x2) prepared and administered subcutaneous. Ice applied post-injection per patient preference. Administration of allergy injection documented in Immunotherapy flow sheet in EHR and paper chart (see for further information regarding injection). Patient declined to wait in facility for 30 minutes post-injection. Patient left clinic ambulatory, AMA.    SUJIT CAMPA RN on 3/20/2024 at 10:10 AM

## 2024-03-20 NOTE — TELEPHONE ENCOUNTER
Requested Prescriptions   Pending Prescriptions Disp Refills    lisinopril (ZESTRIL) 40 MG tablet [Pharmacy Med Name: LISINOPRIL 40MG TABLETS] 90 tablet 2     Sig: TAKE 1 TABLET(40 MG) BY MOUTH DAILY       ACE Inhibitors (Including Combos) Protocol Failed - 3/17/2024  7:21 PM        Failed - Has GFR on file in past 12 months and most recent value is normal        Failed - Normal serum creatinine on file in past 12 months     Recent Labs   Lab Test 08/10/22  0400   CR 0.90             Failed - Normal serum potassium on file in past 12 months     Recent Labs   Lab Test 08/10/22  0400   POTASSIUM 3.4*           Passed - Blood pressure under 140/90 in past 12 months     BP Readings from Last 3 Encounters:   11/01/23 132/88   09/28/23 118/74   08/15/23 118/70           Passed - Medication is active on med list        Passed - Medication indicated for associated diagnosis     Medication is associated with one or more of the following diagnoses:     Chronic Kidney Disease (CKD)   Coronary Artery Disease (CAD)   Diabetes   Heart Failure (HF)   Hypertension (HTN)   Nephropathy            Passed - Recent (12 mo) or future (90 days) visit within the authorizing provider's specialty     The patient must have completed an in-person or virtual visit within the past 12 months or has a future visit scheduled within the next 90 days with the authorizing provider s specialty.  Urgent care and e-visits do not quality as an office visit for this protocol.          Passed - Patient is age 18 or older        Passed - No active pregnancy on record        Passed - No positive pregnancy test within past 12 months         LISINOPRIL 40MG TABLETS   Last Written Prescription Date:  5/4/23  Last Fill Quantity: 90,   # refills: 2  Last Office Visit: 1/20/23  Future Office visit:    Next 5 appointments (look out 90 days)      Mar 27, 2024 11:30 AM  Office Visit with Luz Maria Naik PA-C  [facility] ) [address]  [place] [address]  [phone]           Routing refill request to provider for review/approval because:  Drug not on the FM, P or [facility] refill protocol or controlled substance.  Patient is past due for follow up.     Unable to complete prescription refill per RNMedication Refill Policy.................... Diane Lei RN ....................  3/20/2024   4:50 PM

## 2024-03-21 ENCOUNTER — ALLIED HEALTH/NURSE VISIT (OUTPATIENT)
Dept: ALLERGY | Facility: OTHER | Age: 39
End: 2024-03-21
Attending: OTOLARYNGOLOGY
Payer: COMMERCIAL

## 2024-03-21 DIAGNOSIS — J30.89 PERENNIAL ALLERGIC RHINITIS: Primary | ICD-10-CM

## 2024-03-21 PROCEDURE — 95165 ANTIGEN THERAPY SERVICES: CPT | Performed by: OTOLARYNGOLOGY

## 2024-03-21 PROCEDURE — 95165 ANTIGEN THERAPY SERVICES: CPT

## 2024-03-21 RX ORDER — LISINOPRIL 40 MG/1
TABLET ORAL
Qty: 90 TABLET | Refills: 2 | Status: SHIPPED | OUTPATIENT
Start: 2024-03-21

## 2024-03-21 NOTE — PROGRESS NOTES
Allergy serum is mixed today at schedule of red maintenance,  into  2  (5 ml)  multi dose vial/vials.    Allergens included were:    Ragweed  0.2 ml of dilution # 1  Pigweed  0.2 ml of dilution # 0  Mugwort 0.2 ml of dilution # 0  Kochia  0.2 ml of dilution # 0  Russian Thistle 0.2 ml of dilution # 1  Remi Grass 0.2 ml of dilution # 1  Birch mix 0.2 ml of dilution # 0  Maple Mix 0.2 of dilution # 1  Elm Mix 0.2 ml of dilution # 0  Oak Mix 0.2 ml of dilution # 1  Eran Mix 0.2 ml of dilution # 1  Pine Mix 0.2 ml of dilution # 1  Eastern Harris 0.2 ml of dilution # 1  Black [place] 0.2 ml of dilution # 1  Aspen 0.2 ml of dilution # 0  Red Gooding 0.2 ml of dilution # 0    Alternaria 0.2 ml of dilution # 1  Aspergillus 0.2 ml of dilution # 0  Hormodendrum 0.2 ml of dilution # 1  Helminthosporium 0.2 ml of dilution # 1  Penicillium 0.2 ml of dilution # 1  Epicoccum 0.2 ml of dilution # 1  Fusarium 0.2 ml of dilution # 1  Mucor 0.2 ml of dilution # 0  Grain Smut 0.2 ml of dilution # 0  Grass Smut 0.2 ml of dilution # 0  Cat 0.2 ml of dilution # 1  Dog 0.2 ml of dilution # 1  Feather Mix 0.2 ml of dilution # 0  Dust Mite Mix 0.2 ml of dilution # 1  Horse 0.2 ml of dilution # 0    Kiara Alcazar is a 60 year old female presenting bodily pain.    Health Maintenance Due   Topic Date Due   • Diabetes Eye Exam  Never done   • Diabetes Foot Exam  Never done   • Hepatitis B Vaccine (1 of 3 - Risk 3-dose series) Never done   • Cervical Cancer Screen 30-64 -  Never done   • Colorectal Cancer Screen-  Never done   • Lung Cancer Screening  Never done   • Hepatitis C Screening  Never done   • Shingles Vaccine (2 of 3) 04/09/2013   • DM/CKD Microalbumin  10/17/2016   • Breast Cancer Screening  03/07/2020   • Pneumococcal Vaccine 0-64 (2 - PCV) 10/02/2020   • Medicare Advantage- Medicare Wellness Visit  Never done   • Diabetes A1C  06/24/2022       Patient is due for topics as listed above but is not proceeding with Immunization(s) Hep B at this time.     Patient would like communication of their results via:     Cell Phone:   Telephone Information:   Mobile [phone]     Okay to leave a message containing results? Yes

## 2024-03-22 ENCOUNTER — TELEPHONE (OUTPATIENT)
Dept: ALLERGY | Facility: OTHER | Age: 39
End: 2024-03-22

## 2024-03-22 DIAGNOSIS — J30.89 PERENNIAL ALLERGIC RHINITIS: Primary | ICD-10-CM

## 2024-03-25 ASSESSMENT — ASTHMA QUESTIONNAIRES
ACT_TOTALSCORE: 21
QUESTION_5 LAST FOUR WEEKS HOW WOULD YOU RATE YOUR ASTHMA CONTROL: WELL CONTROLLED
ACT_TOTALSCORE: 21
QUESTION_1 LAST FOUR WEEKS HOW MUCH OF THE TIME DID YOUR ASTHMA KEEP YOU FROM GETTING AS MUCH DONE AT WORK, SCHOOL OR AT HOME: NONE OF THE TIME
QUESTION_2 LAST FOUR WEEKS HOW OFTEN HAVE YOU HAD SHORTNESS OF BREATH: ONCE OR TWICE A WEEK
QUESTION_3 LAST FOUR WEEKS HOW OFTEN DID YOUR ASTHMA SYMPTOMS (WHEEZING, COUGHING, SHORTNESS OF BREATH, CHEST TIGHTNESS OR PAIN) WAKE YOU UP AT NIGHT OR EARLIER THAN USUAL IN THE MORNING: ONCE OR TWICE
QUESTION_4 LAST FOUR WEEKS HOW OFTEN HAVE YOU USED YOUR RESCUE INHALER OR NEBULIZER MEDICATION (SUCH AS ALBUTEROL): ONCE A WEEK OR LESS

## 2024-03-27 ENCOUNTER — ALLIED HEALTH/NURSE VISIT (OUTPATIENT)
Dept: FAMILY MEDICINE | Facility: OTHER | Age: 39
End: 2024-03-27
Attending: PHYSICIAN ASSISTANT
Payer: COMMERCIAL

## 2024-03-27 DIAGNOSIS — J30.9 ALLERGIC RHINITIS: Primary | ICD-10-CM

## 2024-03-27 PROCEDURE — 95117 IMMUNOTHERAPY INJECTIONS: CPT

## 2024-03-27 NOTE — PROGRESS NOTES
Allergen Immunotherapy: Adult  Verified patient's name and . Patient stated reason for visit today is to receive allergy shot(s). Patient denied any concerns with previous allergy injections. Allergy injections (x2) prepared and administered subcutaneous. Ice applied post-injection per patient preference. Administration of allergy injection documented in Immunotherapy flow sheet in EHR and paper chart (see for further information regarding injection). Patient declined to wait in facility for 30 minutes post-injection. Patient left clinic ambulatory, AMA.    SUJIT CAMPA RN on 3/27/2024 at 10:52 AM

## 2024-04-02 ASSESSMENT — ASTHMA QUESTIONNAIRES
QUESTION_5 LAST FOUR WEEKS HOW WOULD YOU RATE YOUR ASTHMA CONTROL: WELL CONTROLLED
ACT_TOTALSCORE: 21
QUESTION_1 LAST FOUR WEEKS HOW MUCH OF THE TIME DID YOUR ASTHMA KEEP YOU FROM GETTING AS MUCH DONE AT WORK, SCHOOL OR AT HOME: NONE OF THE TIME
QUESTION_4 LAST FOUR WEEKS HOW OFTEN HAVE YOU USED YOUR RESCUE INHALER OR NEBULIZER MEDICATION (SUCH AS ALBUTEROL): ONCE A WEEK OR LESS
QUESTION_3 LAST FOUR WEEKS HOW OFTEN DID YOUR ASTHMA SYMPTOMS (WHEEZING, COUGHING, SHORTNESS OF BREATH, CHEST TIGHTNESS OR PAIN) WAKE YOU UP AT NIGHT OR EARLIER THAN USUAL IN THE MORNING: ONCE OR TWICE
ACT_TOTALSCORE: 21
QUESTION_2 LAST FOUR WEEKS HOW OFTEN HAVE YOU HAD SHORTNESS OF BREATH: ONCE OR TWICE A WEEK

## 2024-04-03 ENCOUNTER — ALLIED HEALTH/NURSE VISIT (OUTPATIENT)
Dept: FAMILY MEDICINE | Facility: OTHER | Age: 39
End: 2024-04-03
Attending: PHYSICIAN ASSISTANT
Payer: COMMERCIAL

## 2024-04-03 DIAGNOSIS — J30.9 ALLERGIC RHINITIS: Primary | ICD-10-CM

## 2024-04-03 PROCEDURE — 95117 IMMUNOTHERAPY INJECTIONS: CPT

## 2024-04-03 NOTE — PROGRESS NOTES
Allergen Immunotherapy: Adult  Verified patient's name and . Patient stated reason for visit today is to receive allergy shot(s). Patient denied any concerns with previous allergy injections. Allergy injections (x2) prepared and administered subcutaneous. Ice applied post-injection per patient preference. Administration of allergy injection documented in Immunotherapy flow sheet in EHR and paper chart (see for further information regarding injection). Patient to wait in facility for 30 minutes post-injection and notify RN immediately of any reaction.     Valeria Foreman RN ....................  4/3/2024   10:01 AM

## 2024-04-10 ENCOUNTER — TELEPHONE (OUTPATIENT)
Dept: ALLERGY | Facility: OTHER | Age: 39
End: 2024-04-10

## 2024-04-10 NOTE — TELEPHONE ENCOUNTER
Call to Pt. Pt verified by name and . Pt informed of the need to change appointment. Pt agreed and new appointment date made. Pt denied further questions or concerns at call completion.

## 2024-04-10 NOTE — TELEPHONE ENCOUNTER
Call to Pt. Left VM. Need to reschedule Pt appointment due to no provider in clinic and Pt needing SVT today.

## 2024-04-12 ENCOUNTER — TELEPHONE (OUTPATIENT)
Dept: ALLERGY | Facility: OTHER | Age: 39
End: 2024-04-12

## 2024-04-12 DIAGNOSIS — J30.89 PERENNIAL ALLERGIC RHINITIS: Primary | ICD-10-CM

## 2024-04-15 ENCOUNTER — ALLIED HEALTH/NURSE VISIT (OUTPATIENT)
Dept: ALLERGY | Facility: OTHER | Age: 39
End: 2024-04-15
Attending: PHYSICIAN ASSISTANT
Payer: COMMERCIAL

## 2024-04-15 DIAGNOSIS — J30.9 ALLERGIC RHINITIS: ICD-10-CM

## 2024-04-15 DIAGNOSIS — J30.89 PERENNIAL ALLERGIC RHINITIS: Primary | ICD-10-CM

## 2024-04-15 PROCEDURE — 95117 IMMUNOTHERAPY INJECTIONS: CPT

## 2024-04-15 NOTE — PROGRESS NOTES
Prior to injection patient identity verified using name and date of birth.     SVT done on left arm, measuring 8 MM. Passed     SVT done on right arm, measuring 9 MM. Passed      Documented on paper flow sheet.     Allergy injection given and charted on paper allergy flow sheet. Patient signed out AMA    Prior to injection verified patient identity using patient name and date of birth.    Questions asked: Yes    Patient was given allergy injection(s) of 0.5 mL from Red vial given in Both arm(s).    Injection(s) charted on paper allergy flow sheet.    Epipen present at appointment.    Patient left against medical advice (AMA), signed form and did not stay for the observation period.  Patient was instructed to seek medical attention/go to the emergency room if having any reaction symptoms or needing to use their Epipen, patient aware and acknowledged. Patient signed out AMA form at 0815 and ambulated out of the clinic.

## 2024-04-19 ENCOUNTER — MEDICAL CORRESPONDENCE (OUTPATIENT)
Dept: HEALTH INFORMATION MANAGEMENT | Facility: OTHER | Age: 39
End: 2024-04-19
Payer: COMMERCIAL

## 2024-04-24 ENCOUNTER — ALLIED HEALTH/NURSE VISIT (OUTPATIENT)
Dept: FAMILY MEDICINE | Facility: OTHER | Age: 39
End: 2024-04-24
Attending: PHYSICIAN ASSISTANT
Payer: COMMERCIAL

## 2024-04-24 DIAGNOSIS — J30.9 ALLERGIC RHINITIS: Primary | ICD-10-CM

## 2024-04-24 PROCEDURE — 95117 IMMUNOTHERAPY INJECTIONS: CPT

## 2024-04-24 NOTE — PROGRESS NOTES
Allergen Immunotherapy: Adult  Verified patient's name and . Patient stated reason for visit today is to receive allergy shot(s). Patient denied any concerns with previous allergy injections. Allergy injections (x2) prepared and administered subcutaneous. Ice applied post-injection per patient preference. Administration of allergy injection documented in Immunotherapy flow sheet in EHR and paper chart (see for further information regarding injection). Patient declined to wait in facility for 30 minutes post-injection and notify RN immediately of any reaction. Patient left clinic ambulatory, AMA.    SUJIT CAMPA RN on 2024 at 10:44 AM

## 2024-05-01 ENCOUNTER — ALLIED HEALTH/NURSE VISIT (OUTPATIENT)
Dept: FAMILY MEDICINE | Facility: OTHER | Age: 39
End: 2024-05-01
Attending: PHYSICIAN ASSISTANT
Payer: COMMERCIAL

## 2024-05-01 DIAGNOSIS — J30.9 ALLERGIC RHINITIS: Primary | ICD-10-CM

## 2024-05-01 PROCEDURE — 95117 IMMUNOTHERAPY INJECTIONS: CPT

## 2024-05-01 NOTE — PROGRESS NOTES
Allergen Immunotherapy: Adult  Verified patient's name and . Patient stated reason for visit today is to receive allergy shot(s). Patient denied any concerns with previous allergy injections. Allergy injections (x2) prepared and administered subcutaneous. Ice applied post-injection per patient preference. Administration of allergy injection documented in Immunotherapy flow sheet in EHR and paper chart (see for further information regarding injection). Patient declined to wait 30 min after injection.  Patient left clinic ambulatory.     Chata Henderson RN ....................  2024   10:11 AM

## 2024-05-08 ENCOUNTER — ALLIED HEALTH/NURSE VISIT (OUTPATIENT)
Dept: FAMILY MEDICINE | Facility: OTHER | Age: 39
End: 2024-05-08
Attending: PHYSICIAN ASSISTANT
Payer: COMMERCIAL

## 2024-05-08 DIAGNOSIS — J30.9 ALLERGIC RHINITIS: Primary | ICD-10-CM

## 2024-05-08 PROCEDURE — 95117 IMMUNOTHERAPY INJECTIONS: CPT

## 2024-05-08 NOTE — PROGRESS NOTES
Allergen Immunotherapy: Adult  Verified patient's name and . Patient stated reason for visit today is to receive allergy shot(s). Patient denied any concerns with previous allergy injections. Allergy injections (x2) prepared and administered subcutaneous. Ice applied post-injection per patient preference. Administration of allergy injection documented in Immunotherapy flow sheet in EHR and paper chart (see for further information regarding injection). Patient to wait in facility for 30 minutes post-injection and notify RN immediately of any reaction.  No other complaints noted by patient. Patient left clinic ambulatory.     Chata Henderson RN ....................  2024   9:37 AM  Chata Henderson RN on 2024 at 9:39 AM

## 2024-05-15 ENCOUNTER — ALLIED HEALTH/NURSE VISIT (OUTPATIENT)
Dept: FAMILY MEDICINE | Facility: OTHER | Age: 39
End: 2024-05-15
Attending: PHYSICIAN ASSISTANT
Payer: COMMERCIAL

## 2024-05-15 DIAGNOSIS — J30.9 ALLERGIC RHINITIS: Primary | ICD-10-CM

## 2024-05-15 PROCEDURE — 95117 IMMUNOTHERAPY INJECTIONS: CPT

## 2024-05-15 NOTE — PROGRESS NOTES
Allergen Immunotherapy: Adult  Verified patient's name and . Patient stated reason for visit today is to receive allergy shot(s). Patient denied any concerns with previous allergy injections. Allergy injections (x2) prepared and administered subcutaneous. Ice applied post-injection per patient preference. Administration of allergy injection documented in Immunotherapy flow sheet in EHR and paper chart (see for further information regarding injection). Patient declined to wait in facility for 30 minutes post-injection and notify RN immediately of any reaction. Patient left clinic ambulatory, AMA.    SUJIT CAMPA RN on 5/15/2024 at 10:27 AM

## 2024-05-21 ASSESSMENT — ASTHMA QUESTIONNAIRES
QUESTION_4 LAST FOUR WEEKS HOW OFTEN HAVE YOU USED YOUR RESCUE INHALER OR NEBULIZER MEDICATION (SUCH AS ALBUTEROL): ONCE A WEEK OR LESS
ACT_TOTALSCORE: 21
ACT_TOTALSCORE: 21
QUESTION_2 LAST FOUR WEEKS HOW OFTEN HAVE YOU HAD SHORTNESS OF BREATH: ONCE OR TWICE A WEEK
QUESTION_5 LAST FOUR WEEKS HOW WOULD YOU RATE YOUR ASTHMA CONTROL: WELL CONTROLLED
QUESTION_3 LAST FOUR WEEKS HOW OFTEN DID YOUR ASTHMA SYMPTOMS (WHEEZING, COUGHING, SHORTNESS OF BREATH, CHEST TIGHTNESS OR PAIN) WAKE YOU UP AT NIGHT OR EARLIER THAN USUAL IN THE MORNING: ONCE OR TWICE
QUESTION_1 LAST FOUR WEEKS HOW MUCH OF THE TIME DID YOUR ASTHMA KEEP YOU FROM GETTING AS MUCH DONE AT WORK, SCHOOL OR AT HOME: NONE OF THE TIME

## 2024-05-21 NOTE — PROGRESS NOTES
Chief Complaint   Patient presents with    Follow Up     allergy follow up           Radha Christensen is a 39 year old female  Follow-up of perennial allergic rhinitis.  She was last seen 2023 by Dr. Sanchez.  She was overall doing well and recommended to continue with subcutaneous immunotherapy and to follow-up in 6 months for repeat exam.  Radha has been doing well with her allergy injections.   She has local reaction and apply ice/ benadryl    She has a history of intractable migraine without aura and was formally on Topamax     Radha feels great  No nasal or sinus concerns    No history of anaphylaxis      Taking Claritin in the morning Xyzal at night.    Using NeilMed saline irrigations and rare as needed budesonide irrigations        Modified Quantitative Allergy Skin Testing 3/23/23:  Dilution #6: Ragweed, dust mite  Dilution #5: Russian thistle Remi grass maple oak dipti Alternaria Hormodendrum penicillium Epicoccum Fusarium Helminthosporium, cat and dog  Dilution #2: Black walnut [place] Knox        Dr. Sanchez last examined her on 3/23/2023 and recommended starting immunotherapy for recalcitrant symptoms despite medical management.     Status post distant endoscopic sinus surgery and nasal valve repair on 2020           Otolaryngology Operative Note 20     Pre-op Diagnosis: Chronic pansinusitis, deviated nasal septum, bilateral inferior turbinate hypertrophy, bilateral nasal valve collapse  Post-op Diagnosis:  same  Procedures:    1.  Bilateral total ethmoidectomy  2.  Bilateral maxillary antrostomy with tissue removal  3.  Septoplasty with cartilage reinsertion  4.  Bilateral nasal valve repair  5.  Bilateral submucous reduction inferior turbinates          Past Medical History:   Diagnosis Date    Concussion 2011    Overview:  no loss of consciousness from softball injury    Personal history of other medical treatment (CODE)      2, Para 2        Allergies   Allergen  Reactions    Cefaclor Unknown     Current Outpatient Medications   Medication Sig Dispense Refill    albuterol (PROAIR HFA/PROVENTIL HFA/VENTOLIN HFA) 108 (90 Base) MCG/ACT inhaler Inhale 2 puffs into the lungs every 4 hours as needed for shortness of breath / dyspnea 18 g 3    budesonide (PULMICORT) 0.5 MG/2ML neb solution Spray 2 mLs (0.5 mg) in nostril 2 times daily as needed (sinus) 60 mL 11    drospirenone-ethinyl estradiol (DILSHAD) 3-0.02 MG tablet Take 1 tablet by mouth daily 84 tablet 4    EPINEPHrine (ANY BX GENERIC EQUIV) 0.3 MG/0.3ML injection 2-pack Inject 0.3 mLs (0.3 mg) into the muscle once as needed 2 each 11    escitalopram (LEXAPRO) 20 MG tablet TAKE 1 TABLET(20 MG) BY MOUTH DAILY 90 tablet 2    famotidine (PEPCID) 20 MG tablet TAKE 1 TABLET(20 MG) BY MOUTH TWICE DAILY 60 tablet 11    FLUoxetine (PROZAC) 10 MG capsule Take 10 mg by mouth daily During cycle      FLUoxetine (PROZAC) 20 MG capsule Take 20 mg by mouth At Bedtime      hydrochlorothiazide (HYDRODIURIL) 25 MG tablet TAKE 1/2 TABLET(12.5 MG) BY MOUTH DAILY 45 tablet 3    hydrOXYzine (ATARAX) 10 MG tablet TAKE 1/2 TO 1 TABLET BY MOUTH UP TO THREE TIMES DAILY AS NEEDED FOR ANXIETY      levocetirizine (XYZAL) 5 MG tablet Take 1 tablet (5 mg) by mouth every evening 90 tablet 11    lisinopril (ZESTRIL) 40 MG tablet TAKE 1 TABLET(40 MG) BY MOUTH DAILY 90 tablet 2    loratadine (CLARITIN) 10 MG tablet Take 1 tablet (10 mg) by mouth daily 90 tablet 11    ORDER FOR ALLERGEN IMMUNOTHERAPY Continue allergy injections (SCIT) at maintenance dose of 0.5 ml red vial weekly for one year.      topiramate (TOPAMAX) 200 MG tablet Take 1 tablet (200 mg) by mouth 2 times daily 60 tablet 11    vitamin D3 (CHOLECALCIFEROL) 50 mcg (2000 units) tablet Take 1 tablet (50 mcg) by mouth daily       Current Facility-Administered Medications   Medication Dose Route Frequency Provider Last Rate Last Admin    diphenhydrAMINE HCl (BENADRYL) 2 % topical gel 1 Application  1  "Application Topical WEEKLY Luz Maria Naik PA-C        loratadine (CLARITIN) chewable tablet 10 mg  10 mg Oral Daily Luz Maria Naik PA-C   10 mg at 03/23/23 0953     ROS- SEE HPI  /78 (BP Location: Right arm, Patient Position: Sitting, Cuff Size: Adult Regular)   Pulse 52   Temp 97.4  F (36.3  C) (Tympanic)   Resp 18   Ht 1.702 m (5' 7\")   Wt 101.2 kg (223 lb)   SpO2 98%   BMI 34.93 kg/m      General - The patient is well nourished and well developed, and appears to have good nutritional status.  Alert and oriented to person and place, interactive.  Head and Face - Normocephalic and atraumatic, with no gross asymmetry noted of the contour of the facial features.  The facial nerve is intact, with strong symmetric movements.  Neck-no palpable lymphadenopathy or thyroid mass.  Trachea is midline.  Eyes - Extraocular movements intact.   Ears- External auditory canals are patent, tympanic membranes are intact without effusion or worrisome retractions   Nose - Nasal mucosa is pink and moist with no abnormal mucus.  The septum was grossly midline and non-obstructive, turbinates of normal size and position.    No nasal valve collapse  No polyps, masses, or purulence noted on examination.  No nasal lesions or ulcerations  Mouth - Examination of the oral cavity shows pink, healthy, moist mucosa.  No lesions or ulceration noted.  The dentition are in good repair.  The tongue is mobile and midline.  Throat - The walls of the oropharynx were smooth, pink, moist, symmetric, and had no lesions or ulcerations.  The tonsillar pillars and soft palate were symmetric.  The uvula was midline on elevation.       Impression/Plan    ICD-10-CM    1. Perennial allergic rhinitis  J30.89       2. H/O endoscopic sinus surgery  Z98.890             Doing well with allergy injections  Continue with weekly injections until April 2025.   Follow up in 1 year  Continue with Claritin, Xyzal.  Use rinses as reviewed.   Budesonide rinses for " worsening congestion    Return sooner to ENT if any concerns.         Luz Maria Naik PA-C  [facility]    Answers submitted by the patient for this visit:  General Questionnaire (Submitted on 3/27/2024)  Chief Complaint: Chronic problems general questions HPI Form  What is the reason for your visit today? : Sinus/allergies follow up  On average, how many sweetened beverages do you drink each day (Examples: soda, juice, sweet tea, etc.  Do NOT count diet or artificially sweetened beverages)?: 0  How many days a week do you exercise enough to make your heart beat faster?: 3 or less  How many days per week do you miss taking your medication?: 0

## 2024-05-22 ENCOUNTER — ALLIED HEALTH/NURSE VISIT (OUTPATIENT)
Dept: FAMILY MEDICINE | Facility: OTHER | Age: 39
End: 2024-05-22
Attending: PHYSICIAN ASSISTANT
Payer: COMMERCIAL

## 2024-05-22 VITALS
WEIGHT: 223 LBS | BODY MASS INDEX: 35 KG/M2 | RESPIRATION RATE: 18 BRPM | HEIGHT: 67 IN | DIASTOLIC BLOOD PRESSURE: 78 MMHG | SYSTOLIC BLOOD PRESSURE: 114 MMHG | OXYGEN SATURATION: 98 % | TEMPERATURE: 97.4 F | HEART RATE: 52 BPM

## 2024-05-22 DIAGNOSIS — J30.89 PERENNIAL ALLERGIC RHINITIS: Primary | ICD-10-CM

## 2024-05-22 DIAGNOSIS — Z98.890 H/O ENDOSCOPIC SINUS SURGERY: ICD-10-CM

## 2024-05-22 DIAGNOSIS — J30.9 ALLERGIC RHINITIS: Primary | ICD-10-CM

## 2024-05-22 PROCEDURE — G0463 HOSPITAL OUTPT CLINIC VISIT: HCPCS

## 2024-05-22 PROCEDURE — 95117 IMMUNOTHERAPY INJECTIONS: CPT

## 2024-05-22 PROCEDURE — 99213 OFFICE O/P EST LOW 20 MIN: CPT | Performed by: PHYSICIAN ASSISTANT

## 2024-05-22 ASSESSMENT — PAIN SCALES - GENERAL: PAINLEVEL: NO PAIN (0)

## 2024-05-22 NOTE — PATIENT INSTRUCTIONS
Continue with allergy injections  Maintain weekly injections until April 2025.   Continue with Claritin, Xyzal  Use Jones med rinses at night, and Budesonide rinses as needed.   Follow up in 1 year or sooner if any concerns       Thank you for allowing [facility] and our ENT team to participate in your care.  If your medications are too expensive, please give the nurse a call.  We can possibly change this medication.  If you have a scheduling or an appointment question please contact our Health Unit Coordinator at their direct line [phone].   ALL nursing questions or concerns can be directed to your ENT nurse, Yessenia at: [phone].

## 2024-05-22 NOTE — PROGRESS NOTES
Allergen Immunotherapy: Adult  Verified patient's name and . Patient stated reason for visit today is to receive allergy shot(s). Patient denied any concerns with previous allergy injections. Allergy injections (x2) prepared and administered subcutaneous. Ice applied post-injection per patient preference. Administration of allergy injection documented in Immunotherapy flow sheet in EHR and paper chart (see for further information regarding injection). Patient declined to wait in facility for 30 minutes post-injection and notify RN immediately of any reaction. Patient left clinic ambulatory, AMA.    SUJIT CAMPA RN on 2024 at 10:38 AM

## 2024-05-29 ENCOUNTER — ALLIED HEALTH/NURSE VISIT (OUTPATIENT)
Dept: FAMILY MEDICINE | Facility: OTHER | Age: 39
End: 2024-05-29
Attending: PHYSICIAN ASSISTANT
Payer: COMMERCIAL

## 2024-05-29 DIAGNOSIS — J30.9 ALLERGIC RHINITIS: Primary | ICD-10-CM

## 2024-05-29 PROCEDURE — 95117 IMMUNOTHERAPY INJECTIONS: CPT

## 2024-05-29 NOTE — PROGRESS NOTES
Allergen Immunotherapy: Adult  Verified patient's name and . Patient stated reason for visit today is to receive allergy shot(s). Patient denied any concerns with previous allergy injections. Allergy injections (x2) prepared and administered subcutaneous. Ice applied post-injection per patient preference. Administration of allergy injection documented in Immunotherapy flow sheet in EHR and paper chart (see for further information regarding injection). Patient to wait in facility for 30 minutes post-injection and notify RN immediately of any reaction. Allergy injection site(s) assessed:   no other complaints noted by patient. Patient left clinic ambulatory.     Chata Henderson RN ....................  2024   10:27 AM  Chata Henderson RN on 2024 at 10:28 AM

## 2024-06-06 ENCOUNTER — ALLIED HEALTH/NURSE VISIT (OUTPATIENT)
Dept: FAMILY MEDICINE | Facility: OTHER | Age: 39
End: 2024-06-06
Payer: COMMERCIAL

## 2024-06-06 DIAGNOSIS — J30.9 ALLERGIC RHINITIS: Primary | ICD-10-CM

## 2024-06-06 PROCEDURE — 95117 IMMUNOTHERAPY INJECTIONS: CPT

## 2024-06-06 NOTE — PROGRESS NOTES
Allergen Immunotherapy: Adult  Verified patient's name and . Patient stated reason for visit today is to receive allergy shot(s). Patient denied any concerns with previous allergy injections. Allergy injections (x2) prepared and administered subcutaneous. Ice applied post-injection per patient preference. Administration of allergy injection documented in Immunotherapy flow sheet in EHR and paper chart (see for further information regarding injection). Patient to wait in facility for 30 minutes post-injection and notify RN immediately of any reaction. APatient left clinic ambulatory.     Valeria Foreman RN ....................  2024   3:17 PM

## 2024-06-07 ENCOUNTER — TELEPHONE (OUTPATIENT)
Dept: ALLERGY | Facility: OTHER | Age: 39
End: 2024-06-07

## 2024-06-07 DIAGNOSIS — T78.40XD ALLERGY, SUBSEQUENT ENCOUNTER: Primary | ICD-10-CM

## 2024-06-10 RX ORDER — EPINEPHRINE 0.3 MG/.3ML
0.3 INJECTION SUBCUTANEOUS PRN
Qty: 2 EACH | Status: SHIPPED
Start: 2024-06-10 | End: 2025-06-10

## 2024-06-11 ENCOUNTER — ALLIED HEALTH/NURSE VISIT (OUTPATIENT)
Dept: ALLERGY | Facility: OTHER | Age: 39
End: 2024-06-11
Attending: OTOLARYNGOLOGY
Payer: COMMERCIAL

## 2024-06-11 DIAGNOSIS — J30.89 PERENNIAL ALLERGIC RHINITIS: Primary | ICD-10-CM

## 2024-06-11 PROCEDURE — 95165 ANTIGEN THERAPY SERVICES: CPT | Performed by: OTOLARYNGOLOGY

## 2024-06-11 PROCEDURE — 95165 ANTIGEN THERAPY SERVICES: CPT

## 2024-06-11 NOTE — PROGRESS NOTES
Allergy serum is mixed today at schedule red maintenance,  into  2  (5 ml)  multi dose vial/vials.    Allergens included were:    Ragweed  0.2 ml of dilution # 1  Pigweed  0.2 ml of dilution # 0  Mugwort 0.2 ml of dilution # 0  Kochia  0.2 ml of dilution # 0  Russian Thistle 0.2 ml of dilution # 1  Remi Grass 0.2 ml of dilution # 1  Birch mix 0.2 ml of dilution # 0  Maple Mix 0.2 of dilution # 1  Elm Mix 0.2 ml of dilution # 0  Oak Mix 0.2 ml of dilution # 1  Eran Mix 0.2 ml of dilution # 1  Pine Mix 0.2 ml of dilution # 1  Eastern [place] 0.2 ml of dilution # 1  Black [place] 0.2 ml of dilution # 1  Aspen 0.2 ml of dilution # 0  Red [place] 0.2 ml of dilution # 0    Alternaria 0.2 ml of dilution # 1  Aspergillus 0.2 ml of dilution # 0  Hormodendrum 0.2 ml of dilution # 1  Helminthosporium 0.2 ml of dilution # 1  Penicillium 0.2 ml of dilution # 1  Epicoccum 0.2 ml of dilution # 1  Fusarium 0.2 ml of dilution # 1  Mucor 0.2 ml of dilution # 0  Grain Smut 0.2 ml of dilution # 0  Grass Smut 0.2 ml of dilution # 0  Cat 0.2 ml of dilution # 1  Dog 0.2 ml of dilution # 1  Feather Mix 0.2 ml of dilution # 0  Dust Mite Mix 0.2 ml of dilution # 1  Horse 0.2 ml of dilution # 0

## 2024-06-19 ENCOUNTER — OFFICE VISIT (OUTPATIENT)
Dept: ALLERGY | Facility: OTHER | Age: 39
End: 2024-06-19
Attending: OTOLARYNGOLOGY
Payer: COMMERCIAL

## 2024-06-19 DIAGNOSIS — J30.89 PERENNIAL ALLERGIC RHINITIS: Primary | ICD-10-CM

## 2024-06-19 PROCEDURE — 95117 IMMUNOTHERAPY INJECTIONS: CPT

## 2024-06-19 NOTE — PROGRESS NOTES
Prior to injection patient identity verified using name and date of birth.     SVT done on both arms, measuring 9 MM on the left and   MM on the right. Passed failed.     Documented on paper flow sheet.     Allergy injection given and charted on paper allergy flow sheet. Patient signed out AMA.     Prior to injection verified patient identity using patient name and date of birth.    Questions asked: Yes    Patient was given allergy injection(s) of 0.5 mL from Red vial given in Both arm(s).    Injection(s) charted on paper allergy flow sheet.    Epipen present at appointment.    Patient left against medical advice (AMA), signed form and did not stay for the observation period.  Patient was instructed to seek medical attention/go to the emergency room if having any reaction symptoms or needing to use their Epipen, patient aware and acknowledged. Patient signed out AMA form at 1320 and ambulated out of the clinic.

## 2024-06-28 ENCOUNTER — ALLIED HEALTH/NURSE VISIT (OUTPATIENT)
Dept: FAMILY MEDICINE | Facility: OTHER | Age: 39
End: 2024-06-28
Attending: PHYSICIAN ASSISTANT
Payer: COMMERCIAL

## 2024-06-28 DIAGNOSIS — J30.9 ALLERGIC RHINITIS: Primary | ICD-10-CM

## 2024-06-28 DIAGNOSIS — G43.009 MIGRAINE WITHOUT AURA AND WITHOUT STATUS MIGRAINOSUS, NOT INTRACTABLE: ICD-10-CM

## 2024-06-28 PROCEDURE — 95117 IMMUNOTHERAPY INJECTIONS: CPT

## 2024-06-28 NOTE — PROGRESS NOTES
Allergen Immunotherapy: Adult  Verified patient's name and . Patient stated reason for visit today is to receive allergy shot(s). Patient denied any concerns with previous allergy injections. Allergy injections (x 2) prepared and administered subcutaneous. Ice applied post-injection per patient preference. Administration of allergy injection documented in Immunotherapy flow sheet in EHR and paper chart (see for further information regarding injection).  Allergy injection site(s) assessed:  no other complaints noted by patient. Patient left clinic ambulatory.     Chata Henderson RN ....................  2024   8:13 AM

## 2024-07-01 NOTE — TELEPHONE ENCOUNTER
Topamax      Last Written Prescription Date:  3.14.24  Last Fill Quantity: #60,   # refills: 0  Last Office Visit: 5.22.24  Future Office visit:    Next 5 appointments (look out 90 days)      Jul 10, 2024 8:20 AM  (Arrive by 8:05 AM)  Adult Preventative Visit with Justine Sharp PA-C  [facility] ([facility] ) [address]  [place] [address]  [phone]     Jul 10, 2024 10:30 AM  (Arrive by 10:15 AM)  Nurse Visit with GH INJECTION NURSE  [facility] ([facility] ) [address]  [place] [address]  [phone]     Jul 17, 2024 9:15 AM  (Arrive by 9:00 AM)  Nurse Visit with GH INJECTION NURSE  [facility] ([facility] ) [address]  [place] [address]  [phone]     Jul 24, 2024 10:30 AM  (Arrive by 10:15 AM)  Nurse Visit with GH INJECTION NURSE  [facility] ([facility] ) [address]  [place] [address]  [phone]     Jul 31, 2024 10:45 AM  (Arrive by 10:30 AM)  Nurse Visit with GH INJECTION NURSE  [facility] ([facility] ) [address]  [place] [address]  [phone]             Routing refill request to provider for review/approval because:  Drug not on the [facility], P or [facility] refill protocol or controlled substance

## 2024-07-02 RX ORDER — TOPIRAMATE 200 MG/1
200 TABLET, FILM COATED ORAL 2 TIMES DAILY
Qty: 60 TABLET | Refills: 0 | Status: SHIPPED | OUTPATIENT
Start: 2024-07-02 | End: 2024-08-15

## 2024-07-09 DIAGNOSIS — J30.89 PERENNIAL ALLERGIC RHINITIS: ICD-10-CM

## 2024-07-09 RX ORDER — EPINEPHRINE 0.3 MG/.3ML
0.3 INJECTION SUBCUTANEOUS
Qty: 2 EACH | Refills: 11 | Status: SHIPPED | OUTPATIENT
Start: 2024-07-09

## 2024-07-10 ENCOUNTER — ALLIED HEALTH/NURSE VISIT (OUTPATIENT)
Dept: FAMILY MEDICINE | Facility: OTHER | Age: 39
End: 2024-07-10
Attending: PHYSICIAN ASSISTANT
Payer: COMMERCIAL

## 2024-07-10 DIAGNOSIS — J30.9 ALLERGIC RHINITIS: Primary | ICD-10-CM

## 2024-07-10 PROCEDURE — 95117 IMMUNOTHERAPY INJECTIONS: CPT

## 2024-07-10 NOTE — PROGRESS NOTES

## 2024-07-17 ENCOUNTER — ALLIED HEALTH/NURSE VISIT (OUTPATIENT)
Dept: FAMILY MEDICINE | Facility: OTHER | Age: 39
End: 2024-07-17
Attending: PHYSICIAN ASSISTANT
Payer: COMMERCIAL

## 2024-07-17 DIAGNOSIS — J30.9 ALLERGIC RHINITIS: Primary | ICD-10-CM

## 2024-07-17 PROCEDURE — 95117 IMMUNOTHERAPY INJECTIONS: CPT

## 2024-07-17 NOTE — PROGRESS NOTES
Allergen Immunotherapy: Adult  Verified patient's name and . Patient stated reason for visit today is to receive allergy shot(s). Patient denied any concerns with previous allergy injections. Allergy injections (x2) prepared and administered subcutaneous. Ice applied post-injection per patient preference. Administration of allergy injection documented in Immunotherapy flow sheet in EHR and paper chart (see for further information regarding injection). Patient to wait in facility for 30 minutes post-injection and notify RN immediately of any reaction.      Richi Chan RN ....................  2024   9:24 AM

## 2024-07-24 ENCOUNTER — ALLIED HEALTH/NURSE VISIT (OUTPATIENT)
Dept: FAMILY MEDICINE | Facility: OTHER | Age: 39
End: 2024-07-24
Attending: PHYSICIAN ASSISTANT
Payer: COMMERCIAL

## 2024-07-24 DIAGNOSIS — J30.9 ALLERGIC RHINITIS: Primary | ICD-10-CM

## 2024-07-24 PROCEDURE — 95117 IMMUNOTHERAPY INJECTIONS: CPT

## 2024-07-24 NOTE — PROGRESS NOTES
Allergen Immunotherapy: Adult  Verified patient's name and . Patient stated reason for visit today is to receive allergy shot(s). Patient denied any concerns with previous allergy injections. Allergy injections (x2) prepared and administered subcutaneous. Ice applied post-injection per patient preference. Administration of allergy injection documented in Immunotherapy flow sheet in EHR and paper chart (see for further information regarding injection). Patient to wait in facility for 30 minutes post-injection and notify RN immediately of any reaction. . Patient left clinic ambulatory.     Valeria Foreman RN ....................  2024   10:22 AM

## 2024-08-07 ENCOUNTER — ALLIED HEALTH/NURSE VISIT (OUTPATIENT)
Dept: ALLERGY | Facility: OTHER | Age: 39
End: 2024-08-07
Attending: OTOLARYNGOLOGY
Payer: COMMERCIAL

## 2024-08-07 ENCOUNTER — ALLIED HEALTH/NURSE VISIT (OUTPATIENT)
Dept: FAMILY MEDICINE | Facility: OTHER | Age: 39
End: 2024-08-07
Payer: COMMERCIAL

## 2024-08-07 ENCOUNTER — MEDICAL CORRESPONDENCE (OUTPATIENT)
Dept: HEALTH INFORMATION MANAGEMENT | Facility: OTHER | Age: 39
End: 2024-08-07

## 2024-08-07 DIAGNOSIS — J30.9 ALLERGIC RHINITIS: Primary | ICD-10-CM

## 2024-08-07 DIAGNOSIS — J30.89 PERENNIAL ALLERGIC RHINITIS: Primary | ICD-10-CM

## 2024-08-07 PROCEDURE — 95117 IMMUNOTHERAPY INJECTIONS: CPT

## 2024-08-07 PROCEDURE — 2894A PR VOIDCORRECT: CPT | Performed by: OTOLARYNGOLOGY

## 2024-08-07 PROCEDURE — 95165 ANTIGEN THERAPY SERVICES: CPT

## 2024-08-07 NOTE — PROGRESS NOTES
Allergy serum is mixed today at schedule red maintenance,  into  2  (5 ml)  multi dose vial/vials.    Allergens included were:    Ragweed  0.2 ml of dilution # 1  Pigweed  0.2 ml of dilution # 0  Mugwort 0.2 ml of dilution # 0  Kochia  0.2 ml of dilution # 0  Russian Thistle 0.2 ml of dilution # 1  Remi Grass 0.2 ml of dilution # 1  Birch mix 0.2 ml of dilution # 0  Maple Mix 0.2 of dilution # 1  Elm Mix 0.2 ml of dilution # 0  Oak Mix 0.2 ml of dilution # 1  Eran Mix 0.2 ml of dilution # 1  Pine Mix 0.2 ml of dilution # 1  Eastern [place] 0.2 ml of dilution # 1  Black [place] 0.2 ml of dilution # 1  Aspen 0.2 ml of dilution # 0  Red [place] 0.2 ml of dilution # 0    Alternaria 0.2 ml of dilution # 1  Aspergillus 0.2 ml of dilution # 0  Hormodendrum 0.2 ml of dilution # 1  Helminthosporium 0.2 ml of dilution # 1  Penicillium 0.2 ml of dilution # 1  Epicoccum 0.2 ml of dilution # 1  Fusarium 0.2 ml of dilution # 1  Mucor 0.2 ml of dilution # 0  Grain Smut 0.2 ml of dilution # 0  Grass Smut 0.2 ml of dilution # 0  Cat 0.2 ml of dilution # 1  Dog 0.2 ml of dilution # 1  Feather Mix 0.2 ml of dilution # 0  Dust Mite Mix 0.2 ml of dilution # 1  Horse 0.2 ml of dilution # 0

## 2024-08-07 NOTE — PROGRESS NOTES
Allergen Immunotherapy: Adult  Verified patient's name and . Patient stated reason for visit today is to receive allergy shot(s). Patient denied any concerns with previous allergy injections. Allergy injections (x2) prepared and administered subcutaneous. Ice applied post-injection per patient preference. Administration of allergy injection documented in Immunotherapy flow sheet in EHR and paper chart (see for further information regarding injection). Patient to wait in facility for 30 minutes post-injection and notify RN immediately of any reaction. Allergy injection site(s) assessed:     Richi Chan RN ....................  2024   10:01 AM

## 2024-08-14 ENCOUNTER — ALLIED HEALTH/NURSE VISIT (OUTPATIENT)
Dept: FAMILY MEDICINE | Facility: OTHER | Age: 39
End: 2024-08-14
Payer: COMMERCIAL

## 2024-08-14 ENCOUNTER — OFFICE VISIT (OUTPATIENT)
Dept: FAMILY MEDICINE | Facility: OTHER | Age: 39
End: 2024-08-14
Attending: PHYSICIAN ASSISTANT
Payer: COMMERCIAL

## 2024-08-14 VITALS
HEART RATE: 74 BPM | WEIGHT: 224 LBS | SYSTOLIC BLOOD PRESSURE: 128 MMHG | RESPIRATION RATE: 14 BRPM | TEMPERATURE: 96.6 F | DIASTOLIC BLOOD PRESSURE: 76 MMHG | OXYGEN SATURATION: 99 % | HEIGHT: 67 IN | BODY MASS INDEX: 35.16 KG/M2

## 2024-08-14 DIAGNOSIS — N92.6 ABNORMAL MENSTRUAL CYCLE: ICD-10-CM

## 2024-08-14 DIAGNOSIS — Z00.00 ROUTINE HISTORY AND PHYSICAL EXAMINATION OF ADULT: Primary | ICD-10-CM

## 2024-08-14 DIAGNOSIS — J30.9 ALLERGIC RHINITIS: Primary | ICD-10-CM

## 2024-08-14 DIAGNOSIS — J30.89 PERENNIAL ALLERGIC RHINITIS: ICD-10-CM

## 2024-08-14 DIAGNOSIS — J45.20 MILD INTERMITTENT ASTHMA WITHOUT COMPLICATION: ICD-10-CM

## 2024-08-14 DIAGNOSIS — Z12.11 SPECIAL SCREENING FOR MALIGNANT NEOPLASMS, COLON: ICD-10-CM

## 2024-08-14 DIAGNOSIS — R73.01 IFG (IMPAIRED FASTING GLUCOSE): ICD-10-CM

## 2024-08-14 DIAGNOSIS — N39.3 URINARY, INCONTINENCE, STRESS FEMALE: ICD-10-CM

## 2024-08-14 DIAGNOSIS — I10 ESSENTIAL HYPERTENSION: ICD-10-CM

## 2024-08-14 DIAGNOSIS — G43.009 MIGRAINE WITHOUT AURA AND WITHOUT STATUS MIGRAINOSUS, NOT INTRACTABLE: ICD-10-CM

## 2024-08-14 DIAGNOSIS — F32.81 PMDD (PREMENSTRUAL DYSPHORIC DISORDER): ICD-10-CM

## 2024-08-14 DIAGNOSIS — Z12.31 VISIT FOR SCREENING MAMMOGRAM: ICD-10-CM

## 2024-08-14 DIAGNOSIS — K21.9 GASTROESOPHAGEAL REFLUX DISEASE WITHOUT ESOPHAGITIS: ICD-10-CM

## 2024-08-14 DIAGNOSIS — R21 RASH: ICD-10-CM

## 2024-08-14 DIAGNOSIS — F41.1 GAD (GENERALIZED ANXIETY DISORDER): ICD-10-CM

## 2024-08-14 DIAGNOSIS — Z83.719 FAMILY HISTORY OF COLONIC POLYPS: ICD-10-CM

## 2024-08-14 PROBLEM — Z30.9 CONTRACEPTIVE MANAGEMENT: Status: RESOLVED | Noted: 2018-02-09 | Resolved: 2024-08-14

## 2024-08-14 LAB
ALBUMIN SERPL BCG-MCNC: 4.3 G/DL (ref 3.5–5.2)
ALP SERPL-CCNC: 87 U/L (ref 40–150)
ALT SERPL W P-5'-P-CCNC: 11 U/L (ref 0–50)
ANION GAP SERPL CALCULATED.3IONS-SCNC: 8 MMOL/L (ref 7–15)
AST SERPL W P-5'-P-CCNC: 17 U/L (ref 0–45)
BASOPHILS # BLD AUTO: 0 10E3/UL (ref 0–0.2)
BASOPHILS NFR BLD AUTO: 0 %
BILIRUB SERPL-MCNC: 0.4 MG/DL
BUN SERPL-MCNC: 9 MG/DL (ref 6–20)
CALCIUM SERPL-MCNC: 9 MG/DL (ref 8.8–10.4)
CHLORIDE SERPL-SCNC: 110 MMOL/L (ref 98–107)
CHOLEST SERPL-MCNC: 147 MG/DL
CREAT SERPL-MCNC: 0.85 MG/DL (ref 0.51–0.95)
EGFRCR SERPLBLD CKD-EPI 2021: 88 ML/MIN/1.73M2
EOSINOPHIL # BLD AUTO: 0.3 10E3/UL (ref 0–0.7)
EOSINOPHIL NFR BLD AUTO: 3 %
ERYTHROCYTE [DISTWIDTH] IN BLOOD BY AUTOMATED COUNT: 13.2 % (ref 10–15)
FASTING STATUS PATIENT QL REPORTED: YES
FASTING STATUS PATIENT QL REPORTED: YES
GLUCOSE SERPL-MCNC: 111 MG/DL (ref 70–99)
HCO3 SERPL-SCNC: 20 MMOL/L (ref 22–29)
HCT VFR BLD AUTO: 41.7 % (ref 35–47)
HDLC SERPL-MCNC: 46 MG/DL
HGB BLD-MCNC: 13.4 G/DL (ref 11.7–15.7)
IMM GRANULOCYTES # BLD: 0 10E3/UL
IMM GRANULOCYTES NFR BLD: 0 %
LDLC SERPL CALC-MCNC: 64 MG/DL
LYMPHOCYTES # BLD AUTO: 1.8 10E3/UL (ref 0.8–5.3)
LYMPHOCYTES NFR BLD AUTO: 24 %
MCH RBC QN AUTO: 28.6 PG (ref 26.5–33)
MCHC RBC AUTO-ENTMCNC: 32.1 G/DL (ref 31.5–36.5)
MCV RBC AUTO: 89 FL (ref 78–100)
MONOCYTES # BLD AUTO: 0.4 10E3/UL (ref 0–1.3)
MONOCYTES NFR BLD AUTO: 5 %
NEUTROPHILS # BLD AUTO: 5.1 10E3/UL (ref 1.6–8.3)
NEUTROPHILS NFR BLD AUTO: 67 %
NONHDLC SERPL-MCNC: 101 MG/DL
NRBC # BLD AUTO: 0 10E3/UL
NRBC BLD AUTO-RTO: 0 /100
PLATELET # BLD AUTO: 291 10E3/UL (ref 150–450)
POTASSIUM SERPL-SCNC: 4.5 MMOL/L (ref 3.4–5.3)
PROT SERPL-MCNC: 7.2 G/DL (ref 6.4–8.3)
RBC # BLD AUTO: 4.68 10E6/UL (ref 3.8–5.2)
SODIUM SERPL-SCNC: 138 MMOL/L (ref 135–145)
TRIGL SERPL-MCNC: 183 MG/DL
WBC # BLD AUTO: 7.6 10E3/UL (ref 4–11)

## 2024-08-14 PROCEDURE — 36415 COLL VENOUS BLD VENIPUNCTURE: CPT | Mod: ZL | Performed by: PHYSICIAN ASSISTANT

## 2024-08-14 PROCEDURE — 99396 PREV VISIT EST AGE 40-64: CPT | Performed by: PHYSICIAN ASSISTANT

## 2024-08-14 PROCEDURE — 95117 IMMUNOTHERAPY INJECTIONS: CPT

## 2024-08-14 PROCEDURE — 80053 COMPREHEN METABOLIC PANEL: CPT | Mod: ZL | Performed by: PHYSICIAN ASSISTANT

## 2024-08-14 PROCEDURE — G0463 HOSPITAL OUTPT CLINIC VISIT: HCPCS | Mod: 25

## 2024-08-14 PROCEDURE — 80061 LIPID PANEL: CPT | Mod: ZL | Performed by: PHYSICIAN ASSISTANT

## 2024-08-14 PROCEDURE — G0463 HOSPITAL OUTPT CLINIC VISIT: HCPCS

## 2024-08-14 PROCEDURE — 85025 COMPLETE CBC W/AUTO DIFF WBC: CPT | Mod: ZL | Performed by: PHYSICIAN ASSISTANT

## 2024-08-14 PROCEDURE — 99214 OFFICE O/P EST MOD 30 MIN: CPT | Mod: 25 | Performed by: PHYSICIAN ASSISTANT

## 2024-08-14 RX ORDER — LEVOCETIRIZINE DIHYDROCHLORIDE 5 MG/1
5 TABLET, FILM COATED ORAL EVERY EVENING
Qty: 90 TABLET | Refills: 3 | Status: SHIPPED | OUTPATIENT
Start: 2024-08-14

## 2024-08-14 RX ORDER — ALBUTEROL SULFATE 90 UG/1
2 AEROSOL, METERED RESPIRATORY (INHALATION) EVERY 4 HOURS PRN
Qty: 18 G | Refills: 3 | Status: SHIPPED | OUTPATIENT
Start: 2024-08-14

## 2024-08-14 RX ORDER — BUDESONIDE 0.5 MG/2ML
0.5 INHALANT ORAL 2 TIMES DAILY PRN
Qty: 60 ML | Refills: 11 | Status: SHIPPED | OUTPATIENT
Start: 2024-08-14

## 2024-08-14 RX ORDER — DROSPIRENONE AND ETHINYL ESTRADIOL 0.02-3(28)
1 KIT ORAL DAILY
Qty: 84 TABLET | Refills: 4 | Status: SHIPPED | OUTPATIENT
Start: 2024-08-14

## 2024-08-14 RX ORDER — ESCITALOPRAM OXALATE 20 MG/1
20 TABLET ORAL DAILY
Qty: 90 TABLET | Refills: 3 | Status: SHIPPED | OUTPATIENT
Start: 2024-08-14

## 2024-08-14 RX ORDER — FAMOTIDINE 20 MG/1
TABLET, FILM COATED ORAL
Qty: 60 TABLET | Refills: 11 | Status: SHIPPED | OUTPATIENT
Start: 2024-08-14 | End: 2024-08-15

## 2024-08-14 RX ORDER — LORATADINE 10 MG/1
10 TABLET ORAL DAILY
Qty: 90 TABLET | Refills: 3 | Status: SHIPPED | OUTPATIENT
Start: 2024-08-14

## 2024-08-14 RX ORDER — TRIAMCINOLONE ACETONIDE 1 MG/G
CREAM TOPICAL 2 TIMES DAILY
Qty: 45 G | Refills: 0 | Status: SHIPPED | OUTPATIENT
Start: 2024-08-14

## 2024-08-14 RX ORDER — HYDROCHLOROTHIAZIDE 25 MG/1
12.5 TABLET ORAL DAILY
Qty: 45 TABLET | Refills: 3 | Status: SHIPPED | OUTPATIENT
Start: 2024-08-14

## 2024-08-14 SDOH — HEALTH STABILITY: PHYSICAL HEALTH: ON AVERAGE, HOW MANY MINUTES DO YOU ENGAGE IN EXERCISE AT THIS LEVEL?: 20 MIN

## 2024-08-14 SDOH — HEALTH STABILITY: PHYSICAL HEALTH: ON AVERAGE, HOW MANY DAYS PER WEEK DO YOU ENGAGE IN MODERATE TO STRENUOUS EXERCISE (LIKE A BRISK WALK)?: 3 DAYS

## 2024-08-14 ASSESSMENT — SOCIAL DETERMINANTS OF HEALTH (SDOH): HOW OFTEN DO YOU GET TOGETHER WITH FRIENDS OR RELATIVES?: TWICE A WEEK

## 2024-08-14 ASSESSMENT — PAIN SCALES - GENERAL: PAINLEVEL: NO PAIN (0)

## 2024-08-14 NOTE — PROGRESS NOTES
Preventive Care Visit  [facility]  Justine Sharp PA-C, Family Medicine  Aug 14, 2024      Assessment & Plan     1. Routine history and physical examination of adult  Preventative exams updated below.  Labs pending as below.  Up-to-date on immunizations.  Chronic, stable.  Refill as below.  - CBC and Differential; Future  - Comprehensive Metabolic Panel; Future  - Lipid Panel; Future  - CBC and Differential  - Comprehensive Metabolic Panel  - Lipid Panel    2. Mild intermittent asthma without complication  Chronic, stable. Refilled as below.  - albuterol (PROAIR HFA/PROVENTIL HFA/VENTOLIN HFA) 108 (90 Base) MCG/ACT inhaler; Inhale 2 puffs into the lungs every 4 hours as needed for shortness of breath  Dispense: 18 g; Refill: 3  - budesonide (PULMICORT) 0.5 MG/2ML neb solution; Spray 2 mLs (0.5 mg) in nostril 2 times daily as needed (sinus)  Dispense: 60 mL; Refill: 11    3. IFG (impaired fasting glucose)  Labs pending.     4. Essential hypertension  Chronic, stable. Refilled as below.  - hydrochlorothiazide (HYDRODIURIL) 25 MG tablet; Take 0.5 tablets (12.5 mg) by mouth daily  Dispense: 45 tablet; Refill: 3    5. Perennial allergic rhinitis  Chronic, stable. Refilled as below.  - loratadine (CLARITIN) 10 MG tablet; Take 1 tablet (10 mg) by mouth daily  Dispense: 90 tablet; Refill: 3  - levocetirizine (XYZAL) 5 MG tablet; Take 1 tablet (5 mg) by mouth every evening  Dispense: 90 tablet; Refill: 3    6. PMDD (premenstrual dysphoric disorder)  Chronic, stable. Refilled as below.  - drospirenone-ethinyl estradiol (DILSHAD) 3-0.02 MG tablet; Take 1 tablet by mouth daily  Dispense: 84 tablet; Refill: 4    7. MADELINE (generalized anxiety disorder)  Chronic, stable. Refilled as below.  - escitalopram (LEXAPRO) 20 MG tablet; Take 1 tablet (20 mg) by mouth daily  Dispense: 90 tablet; Refill: 3    8. Gastroesophageal reflux disease without esophagitis  Chronic, stable. Refilled as below.  - famotidine  "(PEPCID) 20 MG tablet; TAKE 1 TABLET(20 MG) BY MOUTH TWICE DAILY  Dispense: 60 tablet; Refill: 11    9. Rash  Exam consistent with dermatitis. Prescription for steroid cream as below.   - triamcinolone (KENALOG) 0.1 % external cream; Apply topically 2 times daily  Dispense: 45 g; Refill: 0    10. Visit for screening mammogram  - MA Screen Bilateral w/Orestes; Future    11. Urinary, incontinence, stress female  Describes symptoms consistent with urinary stress incontinence.  Patient would like to meet with OB/GYN to discuss definitive management.   - Ob/Gyn  Referral; Future    12. Abnormal menstrual cycle  Menstrual cycle has been more regular with increased duration of bleeding as well.  Patient would like to discuss this further with OB/GYN as she has hesitant to change her Maria Guadalupe with her PMDD and acne being well-controlled.  Referral placed.  - Ob/Gyn  Referral; Future    13. Family history of colonic polyps  14. Special screening for malignant neoplasms, colon  Significant family history with both mother and father with history of multiple colonic polyps, paternal grandfather with history of colon cancer.  Patient requesting consideration for earlier screening.  - Colonoscopy Screening  Referral; Future        BMI  Estimated body mass index is 35.08 kg/m  as calculated from the following:    Height as of this encounter: 1.702 m (5' 7\").    Weight as of this encounter: 101.6 kg (224 lb).   Weight management plan: Discussed healthy diet and exercise guidelines    Counseling  Appropriate preventive services were addressed with this patient via screening, questionnaire, or discussion as appropriate for fall prevention, nutrition, physical activity, Tobacco-use cessation, weight loss and cognition.  Checklist reviewing preventive services available has been given to the patient.  Reviewed patient's diet, addressing concerns and/or questions.   She is at risk for lack of exercise and has been " provided with information to increase physical activity for the benefit of her well-being.   The patient was instructed to see the dentist every 6 months.   The patient's PHQ-9 score is consistent with mild depression. She was provided with information regarding depression.       No follow-ups on file.    Desiree Garcia is a 39 year old, presenting for the following:  Physical         Health Care Directive  Patient does not have a Health Care Directive or Living Will: Discussed advance care planning with patient; however, patient declined at this time.    HPI  Answers submitted by the patient for this visit:  Patient Health Questionnaire (Submitted on 8/14/2024)  If you checked off any problems, how difficult have these problems made it for you to do your work, take care of things at home, or get along with other people?: Somewhat difficult  PHQ9 TOTAL SCORE: 8    Colon screening:  Family history of colon polyps significant.  Mother recently had updated colonoscopy outlining 11 polyps with several that were advanced.  Her provider had recommended she contact all of her first-degree relatives to have earlier screenings.  Patient's father also has a history of colonic polyps.  Patient's paternal grandfather passed away from colon cancer.  She has not previously had a colon cancer screening but would like to have earlier screening due to family history.    Urinary symptoms:  Struggling with urinary stress incontinence.  Leakage with laughing, coughing, sneezing, sudden movements.  Will often have to change her clothing due to bladder leakage.  Would like to consider possible sling.    Menstrual cycle:  Patient continues on Maria Guadalupe for management of her menstrual cycle as well as PMDD and cystic acne.  She is taking Maria Guadalupe continuously for 3 months and then allowing a period.  This had been working well for her for management of her menstrual bleeding as well as PMDD and acne however more recently she has been noticing  irregularities in her menstrual cycle.  Bleeding has been more sporadic and has been lasting much longer.  She previously had been bleeding every 3 months and was lasting approximately 5 days.  Now she is bleeding nearly every month and is lasting for 1 to 3+ weeks.  She does still feel like her PMDD and acne is well-controlled but she would like to discuss this further with OB/GYN to see if there could be changes to her hormonal management to help better control her menstrual cycle without losing control of her PMDD and acne.    Axillary rash:  Noticed a rash on her left axillary region recently.  Has not used any new body soaps, laundry soaps, clothing, deodorants, avani, shaving gel, etc.  Red splotchy rash.  Bothersome and burning.  No bleeding or drainage.    Contraception: OCP  Risk for STI?: No  Last pap: 11/2020, NIL, HPV-  Any hx of abnormal paps:  Yes, High risk HPV+ previously  FH of early CA?: Mother and father with colon polyps but no cancer  Cholesterol/DM concerns/screening: Due  Tobacco?: No  Calcium intake: Dietary  DEXA: Not indicated based on patient age and health status.   Last mammo: 07/2023  Colonoscopy: Not indicated based on patient age and health status.   Immunizations: Up to date        8/14/2024   General Health   How would you rate your overall physical health? Good   Feel stress (tense, anxious, or unable to sleep) To some extent      (!) STRESS CONCERN      8/14/2024   Nutrition   Three or more servings of calcium each day? Yes   Diet: Gluten-free/reduced   How many servings of fruit and vegetables per day? (!) 2-3   How many sweetened beverages each day? 0-1            8/14/2024   Exercise   Days per week of moderate/strenous exercise 3 days   Average minutes spent exercising at this level 20 min            8/14/2024   Social Factors   Frequency of gathering with friends or relatives Twice a week   Worry food won't last until get money to buy more No   Food not last or not have  enough money for food? No   Do you have housing? (Housing is defined as stable permanent housing and does not include staying ouside in a car, in a tent, in an abandoned building, in an overnight shelter, or couch-surfing.) Yes   Are you worried about losing your housing? No   Lack of transportation? No   Unable to get utilities (heat,electricity)? No            2024   Dental   Dentist two times every year? (!) NO            2024   TB Screening   Were you born outside of the [place]? No          Today's PHQ-9 Score:       2024     6:52 AM   PHQ-9 SCORE   PHQ-9 Total Score MyChart 8 (Mild depression)   PHQ-9 Total Score 8         2024   Substance Use   Alcohol more than 3/day or more than 7/wk No   Do you use any other substances recreationally? No        Social History     Tobacco Use    Smoking status: Former     Current packs/day: 0.00     Types: Cigarettes     Start date: 2003     Quit date: 2013     Years since quittin.6    Smokeless tobacco: Never   Vaping Use    Vaping status: Never Used   Substance Use Topics    Alcohol use: Not Currently     Comment: 1 time per month.    Drug use: No           2023   LAST FHS-7 RESULTS   1st degree relative breast or ovarian cancer No   Any relative bilateral breast cancer No   Any male have breast cancer No   Any ONE woman have BOTH breast AND ovarian cancer No   Any woman with breast cancer before 50yrs No   2 or more relatives with breast AND/OR ovarian cancer No   2 or more relatives with breast AND/OR bowel cancer No     Mammogram Screening - Mammogram every 1-2 years updated in Health Maintenance based on mutual decision making        2024   STI Screening   New sexual partner(s) since last STI/HIV test? No        History of abnormal Pap smear: YES - reflected in Problem List and Health Maintenance accordingly        Latest Ref Rng & Units 2020     2:26 PM   PAP / HPV   PAP (Historical)  NIL    HPV 16 DNA NEG^Negative Negative   "  HPV 18 DNA NEG^Negative Negative    Other HR HPV NEG^Negative Negative      ASCVD Risk   The 10-year ASCVD risk score (Belkis WILEY, et al., 2019) is: 0.6%    Values used to calculate the score:      Age: 39 years      Sex: Female      Is Non- : No      Diabetic: No      Tobacco smoker: No      Systolic Blood Pressure: 128 mmHg      Is BP treated: Yes      HDL Cholesterol: 46 mg/dL      Total Cholesterol: 147 mg/dL        2024   Contraception/Family Planning   Questions about contraception or family planning No        Reviewed and updated as needed this visit by Provider                    Past Medical History:   Diagnosis Date    Concussion 2011    Overview:  no loss of consciousness from softball injury    Personal history of other medical treatment (CODE)      2, Para 2     Past Surgical History:   Procedure Laterality Date    ENDOSCOPIC SINUS SURGERY Bilateral 2020    Procedure: Bilateral Endoscopic Sinus Surgery with polypectomy;  Surgeon: Karen Sanchez MD;  Location: [facility] OR    ESOPHAGOSCOPY, GASTROSCOPY, DUODENOSCOPY (EGD), COMBINED N/A 2019    Procedure: ESOPHAGOGASTRODUODENOSCOPY, WITH BIOPSY;  Surgeon: Emigdio Goldberg MD;  Location:  OR    RECONSTRUCT NOSE AND SEPTUM (FUNCTIONAL) Bilateral 2020    Procedure: Nasal Valve Repair(LATERA);  Surgeon: Karen Sanchez MD;  Location: [facility] OR    SEPTOPLASTY, TURBINOPLASTY, COMBINED N/A 2020    Procedure: Septoplasty Turbinate Reduction;  Surgeon: Karen Sanchez MD;  Location: [facility] OR    wisdom teeth        OB History   No obstetric history on file.            Objective    Exam  /76   Pulse 74   Temp (!) 96.6  F (35.9  C)   Resp 14   Ht 1.702 m (5' 7\")   Wt 101.6 kg (224 lb)   LMP 2024 (Exact Date)   SpO2 99%   BMI 35.08 kg/m     Estimated body mass index is 35.08 kg/m  as calculated from the following:    Height as of this encounter: 1.702 m (5' 7\").    " Weight as of this encounter: 101.6 kg (224 lb).    Physical Exam  GENERAL: alert and no distress  EYES: Eyes grossly normal to inspection, PERRL and conjunctivae and sclerae normal  HENT: ear canals and TM's normal, nose and mouth without ulcers or lesions  RESP: lungs clear to auscultation - no rales, rhonchi or wheezes  CV: regular rate and rhythm, normal S1 S2, no S3 or S4, no murmur, click or rub, no peripheral edema  MS: no gross musculoskeletal defects noted, no edema  SKIN: no suspicious lesions or rashes  PSYCH: mentation appears normal, affect normal/bright        Signed Electronically by: Justine Sharp PA-C

## 2024-08-14 NOTE — PROGRESS NOTES

## 2024-08-14 NOTE — NURSING NOTE
Patient presents to clinic for annual physical and to discuss perimenopause, family hx of colon cancer.  Ann Schultz LPN ....................  8/14/2024   7:24 AM  EXT [id number]

## 2024-08-14 NOTE — TELEPHONE ENCOUNTER
Topamax      Last Written Prescription Date:  7/2/24  Last Fill Quantity: 60,   # refills: 0  Last Office Visit: 9/28/23  Future Office visit:       Routing refill request to provider for review/approval because:

## 2024-08-15 RX ORDER — TOPIRAMATE 200 MG/1
200 TABLET, FILM COATED ORAL 2 TIMES DAILY
Qty: 60 TABLET | Refills: 0 | Status: SHIPPED | OUTPATIENT
Start: 2024-08-15 | End: 2024-08-28

## 2024-08-15 RX ORDER — FAMOTIDINE 20 MG/1
TABLET, FILM COATED ORAL
Qty: 180 TABLET | Refills: 3 | Status: SHIPPED | OUTPATIENT
Start: 2024-08-15

## 2024-08-15 NOTE — TELEPHONE ENCOUNTER
[facility] requesting:  [facility] sent Rx request for the following:      Requested Prescriptions   Pending Prescriptions Disp Refills    famotidine (PEPCID) 20 MG tablet [Pharmacy Med Name: FAMOTIDINE 20MG TABLETS] 180 tablet      Sig: TAKE 1 TABLET(20 MG) BY MOUTH TWICE DAILY       H2 Blockers Protocol Passed - 8/14/2024  8:11 AM        Passed - Patient is age 12 or older        Passed - Medication is active on med list        Passed - Medication indicated for associated diagnosis     Medication is associated with one or more of the following diagnoses:  Erosive esophagitis - Gastric hypersecretion   Gastric ulcer   Gastroesophageal reflux disease   Indigestion   Ulcer of duodenum   Esophagitis   Gastritis   Gastrointestinal hemorrhage   Stress ulcer; Prophylaxis   Helicobacter pylori gastrointestinal tract infection - Ulcer of duodenum  Zollinger-Vallejo syndrome             Passed - Recent (12 mo) or future (90 days) visit within the authorizing provider's specialty     The patient must have completed an in-person or virtual visit within the past 12 months or has a future visit scheduled within the next 90 days with the authorizing provider s specialty.  Urgent care and e-visits do not quality as an office visit for this protocol.               Last Prescription Date:   8/14/24  Last Fill Qty/Refills:         60, R-11    Last Office Visit:              8/14/24   Future Office visit:           none    Patient requesting 90 day supply    Prescription approved per [facility] Refill Protocol.  Valeria Foreman RN on 8/15/2024 at 2:07 PM

## 2024-08-16 ENCOUNTER — TELEPHONE (OUTPATIENT)
Dept: SURGERY | Facility: OTHER | Age: 39
End: 2024-08-16
Payer: COMMERCIAL

## 2024-08-16 NOTE — TELEPHONE ENCOUNTER
[facility] Diagnostic Referral    39 year old patient has a referral for a C-Scope with a diagnosis of Family history of colonic polyps.    Please advise.    Thank you,  Zabrina Estrada on 8/16/2024 at 3:28 PM

## 2024-08-20 DIAGNOSIS — Z12.11 ENCOUNTER FOR SCREENING COLONOSCOPY: Primary | ICD-10-CM

## 2024-08-20 NOTE — TELEPHONE ENCOUNTER
Screening Questions for the Scheduling of Screening Colonoscopies   (If Colonoscopy is diagnostic, Provider should review the chart before scheduling.)  Are you younger than 45 or older than 80?  YES  Do you take aspirin or fish oil?  NO (if yes, tell patient to stop 1 week prior to Colonoscopy)  Do you take warfarin (Coumadin), clopidogrel (Plavix), apixaban (Eliquis), dabigatram (Pradaxa), rivaroxaban (Xarelto) or any blood thinner? NO  Do you take semaglutide (Ozempic or Wegovy), tirzepatide (Mounjaro or Zepbound), liraglutide (Victoza), or dulaglutide (Trulicity)? NO  Do you use oxygen or a CPAP at home?  NO  Do you have kidney disease? NO  Are you on dialysis? NO  Have you had a stroke or heart attack in the last year? NO  Have you had a stent in your heart or any blood vessel in the last year? NO  Have you had a transplant of any organ? NO  Have you had a colonoscopy or upper endoscopy (EGD) before? NO  Date of scheduled Colonoscopy. 10/29/2024  [facility]

## 2024-08-21 ENCOUNTER — ALLIED HEALTH/NURSE VISIT (OUTPATIENT)
Dept: ALLERGY | Facility: OTHER | Age: 39
End: 2024-08-21
Attending: OTOLARYNGOLOGY
Payer: COMMERCIAL

## 2024-08-21 DIAGNOSIS — J30.89 PERENNIAL ALLERGIC RHINITIS: Primary | ICD-10-CM

## 2024-08-21 PROCEDURE — 95117 IMMUNOTHERAPY INJECTIONS: CPT

## 2024-08-21 NOTE — PROGRESS NOTES
Prior to injection patient identity verified using name and date of birth.     SVT done on right arm, measuring 7 MM.  Passed     SVT done on left arm, measuring 7 MM. Passed     Documented on paper flow sheet.     Allergy injection given and charted on paper allergy flow sheet. Patient signed out AMA. Prior to injection verified patient identity using patient name and date of birth.    Questions asked: Yes    Patient was given allergy injection(s) of 0.5 mL from Red vial given in Both arm(s).    Injection(s) charted on paper allergy flow sheet.    Epipen present at appointment.    Patient left against medical advice (AMA), signed form and did not stay for the observation period.  Patient was instructed to seek medical attention/go to the emergency room if having any reaction symptoms or needing to use their Epipen, patient aware and acknowledged. Patient signed out AMA form at 1340 and ambulated out of the clinic.

## 2024-08-22 RX ORDER — BISACODYL 5 MG/1
TABLET, DELAYED RELEASE ORAL
Qty: 2 TABLET | Refills: 0 | Status: SHIPPED | OUTPATIENT
Start: 2024-10-22

## 2024-08-22 RX ORDER — POLYETHYLENE GLYCOL 3350, SODIUM CHLORIDE, SODIUM BICARBONATE, POTASSIUM CHLORIDE 420; 11.2; 5.72; 1.48 G/4L; G/4L; G/4L; G/4L
4000 POWDER, FOR SOLUTION ORAL ONCE
Qty: 4000 ML | Refills: 0 | Status: SHIPPED | OUTPATIENT
Start: 2024-10-22 | End: 2024-10-22

## 2024-08-23 ENCOUNTER — ALLIED HEALTH/NURSE VISIT (OUTPATIENT)
Dept: ALLERGY | Facility: OTHER | Age: 39
End: 2024-08-23
Attending: OTOLARYNGOLOGY
Payer: COMMERCIAL

## 2024-08-23 DIAGNOSIS — J30.89 PERENNIAL ALLERGIC RHINITIS: Primary | ICD-10-CM

## 2024-08-23 PROCEDURE — 95165 ANTIGEN THERAPY SERVICES: CPT

## 2024-08-23 PROCEDURE — 95165 ANTIGEN THERAPY SERVICES: CPT | Performed by: OTOLARYNGOLOGY

## 2024-08-23 NOTE — PROGRESS NOTES
Allergy serum is mixed today at schedule red maintenance,  into  2  (5 ml)  multi dose vial/vials.    Allergens included were:    Ragweed  0.2 ml of dilution # 1  Pigweed  0.2 ml of dilution # 0  Mugwort 0.2 ml of dilution # 0  Kochia  0.2 ml of dilution # 0  Russian Thistle 0.2 ml of dilution # 1  Remi Grass 0.2 ml of dilution # 1  Birch mix 0.2 ml of dilution # 0  Maple Mix 0.2 of dilution # 1  Elm Mix 0.2 ml of dilution # 0  Oak Mix 0.2 ml of dilution # 1  Eran Mix 0.2 ml of dilution # 1  Pine Mix 0.2 ml of dilution # 1  Eastern Britton 0.2 ml of dilution # 1  Black Alva 0.2 ml of dilution # 1  Aspen 0.2 ml of dilution # 0  Red [place]hoe 0.2 ml of dilution # 0    Alternaria 0.2 ml of dilution # 1  Aspergillus 0.2 ml of dilution # 0  Hormodendrum 0.2 ml of dilution # 1  Helminthosporium 0.2 ml of dilution # 1  Penicillium 0.2 ml of dilution # 1  Epicoccum 0.2 ml of dilution # 1  Fusarium 0.2 ml of dilution # 1  Mucor 0.2 ml of dilution # 0  Grain Smut 0.2 ml of dilution # 0  Grass Smut 0.2 ml of dilution # 0  Cat 0.2 ml of dilution # 1  Dog 0.2 ml of dilution # 1  Feather Mix 0.2 ml of dilution # 0  Dust Mite Mix 0.2 ml of dilution # 1  Horse 0.2 ml of dilution # 0

## 2024-08-27 ENCOUNTER — ALLIED HEALTH/NURSE VISIT (OUTPATIENT)
Dept: ALLERGY | Facility: OTHER | Age: 39
End: 2024-08-27
Attending: OTOLARYNGOLOGY
Payer: COMMERCIAL

## 2024-08-27 DIAGNOSIS — J30.89 PERENNIAL ALLERGIC RHINITIS: Primary | ICD-10-CM

## 2024-08-27 DIAGNOSIS — G43.009 MIGRAINE WITHOUT AURA AND WITHOUT STATUS MIGRAINOSUS, NOT INTRACTABLE: ICD-10-CM

## 2024-08-27 PROCEDURE — 95117 IMMUNOTHERAPY INJECTIONS: CPT

## 2024-08-27 NOTE — PROGRESS NOTES
Prior to injection patient identity verified using name and date of birth.     SVT done on left arm, measuring 7 MM. Passed     SVT done on right arm, measuring 7 MM. Passed     Documented on paper flow sheet.     Allergy injection given and charted on paper allergy flow sheet. Patient signed out AMA. Prior to injection verified patient identity using patient name and date of birth.    Questions asked: Yes    Patient was given allergy injection(s) of 0.5 mL from Red vial given in Both arm(s).    Injection(s) charted on paper allergy flow sheet.    Epipen present at appointment.    Patient left against medical advice (AMA), signed form and did not stay for the observation period.  Patient was instructed to seek medical attention/go to the emergency room if having any reaction symptoms or needing to use their Epipen, patient aware and acknowledged. Patient signed out AMA form at 1320 and ambulated out of the clinic.

## 2024-08-27 NOTE — TELEPHONE ENCOUNTER
Topamax      Last Written Prescription Date:  8/15/24  Last Fill Quantity: 60,   # refills: 0  Last Office Visit: 9/28/23  Future Office visit:    Next 5 appointments (look out 90 days)      Oct 09, 2024 10:15 AM  CONSULT with Norma Bustamante MD  [facility] ) [address]  [place] [address]  [phone]             Routing refill request to provider for review/approval because:

## 2024-08-28 RX ORDER — TOPIRAMATE 200 MG/1
200 TABLET, FILM COATED ORAL 2 TIMES DAILY
Qty: 180 TABLET | Refills: 3 | Status: SHIPPED | OUTPATIENT
Start: 2024-08-28

## 2024-08-28 NOTE — TELEPHONE ENCOUNTER
Anti-Seizure Meds Protocol  [id number]/27/2024 08:06 AM   Protocol Details Review Authorizing provider's last note.

## 2024-08-29 ENCOUNTER — MEDICAL CORRESPONDENCE (OUTPATIENT)
Dept: HEALTH INFORMATION MANAGEMENT | Facility: OTHER | Age: 39
End: 2024-08-29
Payer: COMMERCIAL

## 2024-09-11 ENCOUNTER — ALLIED HEALTH/NURSE VISIT (OUTPATIENT)
Dept: FAMILY MEDICINE | Facility: OTHER | Age: 39
End: 2024-09-11
Attending: PHYSICIAN ASSISTANT
Payer: COMMERCIAL

## 2024-09-11 DIAGNOSIS — J30.9 ALLERGIC RHINITIS: Primary | ICD-10-CM

## 2024-09-11 PROCEDURE — 95117 IMMUNOTHERAPY INJECTIONS: CPT

## 2024-09-11 NOTE — PROGRESS NOTES
Allergen Immunotherapy: Adult  Verified patient's name and . Patient stated reason for visit today is to receive allergy shot(s). Patient denied any concerns with previous allergy injections. Allergy injections (x2) prepared and administered subcutaneous. Ice applied post-injection per patient preference. Administration of allergy injection documented in Immunotherapy flow sheet in EHR and paper chart (see for further information regarding injection). Patient to wait in facility for 30 minutes post-injection and notify RN immediately of any reactionEducated patient to stay 30 min for any reaction patient left AMA.    Richi Chan RN ....................  2024   10:09 AM

## 2024-09-18 ENCOUNTER — ALLIED HEALTH/NURSE VISIT (OUTPATIENT)
Dept: FAMILY MEDICINE | Facility: OTHER | Age: 39
End: 2024-09-18
Attending: PHYSICIAN ASSISTANT
Payer: COMMERCIAL

## 2024-09-18 DIAGNOSIS — J30.9 ALLERGIC RHINITIS: Primary | ICD-10-CM

## 2024-09-18 PROCEDURE — 95117 IMMUNOTHERAPY INJECTIONS: CPT

## 2024-09-18 NOTE — PROGRESS NOTES
Allergen Immunotherapy: Adult  Verified patient's name and . Patient stated reason for visit today is to receive allergy shot(s). Patient denied any concerns with previous allergy injections. Allergy injections (x2) prepared and administered subcutaneous. Ice applied post-injection per patient preference. Administration of allergy injection documented in Immunotherapy flow sheet in EHR and paper chart (see for further information regarding injection). Patient to wait in facility for 30 minutes post-injection and notify RN immediately of any reaction.    Educated patient to stay 30 min for any reaction patient left AMA.  Richi Chan RN ....................  2024   11:30 AM

## 2024-09-25 ENCOUNTER — ALLIED HEALTH/NURSE VISIT (OUTPATIENT)
Dept: FAMILY MEDICINE | Facility: OTHER | Age: 39
End: 2024-09-25
Attending: PHYSICIAN ASSISTANT
Payer: COMMERCIAL

## 2024-09-25 DIAGNOSIS — J30.9 ALLERGIC RHINITIS: Primary | ICD-10-CM

## 2024-09-25 PROCEDURE — 95117 IMMUNOTHERAPY INJECTIONS: CPT

## 2024-09-25 NOTE — PROGRESS NOTES
Allergen Immunotherapy: Adult  Verified patient's name and . Patient stated reason for visit today is to receive allergy shot(s). Patient denied any concerns with previous allergy injections. Allergy injections (x2) prepared and administered subcutaneous. Ice applied post-injection per patient preference. Administration of allergy injection documented in Immunotherapy flow sheet in EHR and paper chart (see for further information regarding injection). Patient to wait in facility for 30 minutes post-injection and notify RN immediately of any reaction.   Educated patient to stay 30 min for any reaction patient left AMA.  Richi Chan RN ....................  2024   9:45 AM

## 2024-10-02 ENCOUNTER — ALLIED HEALTH/NURSE VISIT (OUTPATIENT)
Dept: FAMILY MEDICINE | Facility: OTHER | Age: 39
End: 2024-10-02
Attending: PHYSICIAN ASSISTANT
Payer: COMMERCIAL

## 2024-10-02 ENCOUNTER — HOSPITAL ENCOUNTER (OUTPATIENT)
Dept: MAMMOGRAPHY | Facility: OTHER | Age: 39
Discharge: HOME | End: 2024-10-02
Attending: PHYSICIAN ASSISTANT
Payer: COMMERCIAL

## 2024-10-02 DIAGNOSIS — Z12.31 VISIT FOR SCREENING MAMMOGRAM: ICD-10-CM

## 2024-10-02 DIAGNOSIS — J30.9 ALLERGIC RHINITIS: Primary | ICD-10-CM

## 2024-10-02 PROCEDURE — 95117 IMMUNOTHERAPY INJECTIONS: CPT

## 2024-10-02 PROCEDURE — 77063 BREAST TOMOSYNTHESIS BI: CPT

## 2024-10-02 NOTE — PROGRESS NOTES
Allergen Immunotherapy: Adult  Verified patient's name and . Patient stated reason for visit today is to receive allergy shot(s). Patient denied any concerns with previous allergy injections. Allergy injections (x2) prepared and administered subcutaneous. Ice applied post-injection per patient preference. Administration of allergy injection documented in Immunotherapy flow sheet in EHR and paper chart (see for further information regarding injection). Patient to wait in facility for 30 minutes post-injection and notify RN immediately of any reaction.  Educated patient to stay 30 min for any reaction patient left AMA.    Richi Chan RN ....................  10/2/2024   10:06 AM

## 2024-10-09 ENCOUNTER — OFFICE VISIT (OUTPATIENT)
Dept: OBGYN | Facility: OTHER | Age: 39
End: 2024-10-09
Attending: PHYSICIAN ASSISTANT
Payer: COMMERCIAL

## 2024-10-09 VITALS
DIASTOLIC BLOOD PRESSURE: 72 MMHG | BODY MASS INDEX: 34.93 KG/M2 | SYSTOLIC BLOOD PRESSURE: 128 MMHG | WEIGHT: 223 LBS | HEART RATE: 73 BPM

## 2024-10-09 DIAGNOSIS — N92.6 ABNORMAL MENSTRUAL CYCLE: ICD-10-CM

## 2024-10-09 DIAGNOSIS — N39.3 URINARY, INCONTINENCE, STRESS FEMALE: ICD-10-CM

## 2024-10-09 PROCEDURE — G0463 HOSPITAL OUTPT CLINIC VISIT: HCPCS | Mod: 25 | Performed by: STUDENT IN AN ORGANIZED HEALTH CARE EDUCATION/TRAINING PROGRAM

## 2024-10-09 PROCEDURE — 99214 OFFICE O/P EST MOD 30 MIN: CPT | Mod: 25 | Performed by: STUDENT IN AN ORGANIZED HEALTH CARE EDUCATION/TRAINING PROGRAM

## 2024-10-09 PROCEDURE — 88305 TISSUE EXAM BY PATHOLOGIST: CPT

## 2024-10-09 PROCEDURE — 58100 BIOPSY OF UTERUS LINING: CPT | Performed by: STUDENT IN AN ORGANIZED HEALTH CARE EDUCATION/TRAINING PROGRAM

## 2024-10-09 ASSESSMENT — PAIN SCALES - GENERAL: PAINLEVEL: NO PAIN (0)

## 2024-10-09 NOTE — PROGRESS NOTES
Gynecology Office Visit    Chief Complaint: Stress urinary incontinence, menstrual concerns    HPI:    Radha Christensen is a 39 year old  here for discussion of two concerns- stress urinary incontinence and menstrual concerns.    Her stress incontinence has been present for many years. She notes it  got worse over the last few years. She has not tried anything such as pelvic floor PT or Impressa tampons. She notes it happens with coughing, sneezing, swinging a bat. She has to change her underwear each day.    Her PMDD is doing well with her current regimen. She really likes using the DILSHAD but us frustrated with the breakthrough bleeding that occurs about every 2 months. The month of May 2024 she bled for 3 weeks straight. In the past she has tried numerous hormonal options for menses control and mood control related to her PMDD and she is nervous to change anything because it is finally a great fit for her and she feels well on it. She may be more interested in a surgical definitive approach paired with stress management control rather than conservative hormonal adjustments for this reason.    She also notes that she feels as though she is in perimenopause. She has noticed brain fog, but overall feels well.    OBHx   x2: pelvis proven to 8 lb 0.5 oz    GYN history:   No history of STIs  Last pap smear 2020: NIL, HPV negative (due 2025)  Menses very irregular despite being on DILSHAD    Past medical history:  Past Medical History:   Diagnosis Date    Concussion 2011    Overview:  no loss of consciousness from softball injury    Personal history of other medical treatment (CODE)      2, Para 2     Patient Active Problem List   Diagnosis    Other abnormal Papanicolaou smear of cervix and cervical HPV(795.09)    Cystic acne    Alcohol abuse    Dysthymic disorder    Asthma    Cervical high risk HPV (human papillomavirus) test positive    Family history of diabetes mellitus (DM)    Migraine  headache    Tobacco abuse    Compound nevus of back    IFG (impaired fasting glucose)    Melanocytic nevus of scalp    Essential hypertension    Lateral epicondylitis, unspecified laterality    Morbid obesity (H)         Past Surgical History:  Past Surgical History:   Procedure Laterality Date    ENDOSCOPIC SINUS SURGERY Bilateral 7/1/2020    Procedure: Bilateral Endoscopic Sinus Surgery with polypectomy;  Surgeon: Karen Sanchez MD;  Location: [facility] OR    ESOPHAGOSCOPY, GASTROSCOPY, DUODENOSCOPY (EGD), COMBINED N/A 8/27/2019    Procedure: ESOPHAGOGASTRODUODENOSCOPY, WITH BIOPSY;  Surgeon: Emigdio Goldberg MD;  Location: [facility] OR    RECONSTRUCT NOSE AND SEPTUM (FUNCTIONAL) Bilateral 7/1/2020    Procedure: Nasal Valve Repair(LATERA);  Surgeon: Karen Sanchez MD;  Location: [facility] OR    SEPTOPLASTY, TURBINOPLASTY, COMBINED N/A 7/1/2020    Procedure: Septoplasty Turbinate Reduction;  Surgeon: Karen Sanchez MD;  Location: [facility] OR    wisdom teeth            Medications:  Current Outpatient Medications   Medication Sig Dispense Refill    albuterol (PROAIR HFA/PROVENTIL HFA/VENTOLIN HFA) 108 (90 Base) MCG/ACT inhaler Inhale 2 puffs into the lungs every 4 hours as needed for shortness of breath 18 g 3    [START ON 10/22/2024] bisacodyl (DULCOLAX) 5 MG EC tablet Take as directed by colonoscopy prep instructions Do not start before October 22, 2024. 2 tablet 0    budesonide (PULMICORT) 0.5 MG/2ML neb solution Spray 2 mLs (0.5 mg) in nostril 2 times daily as needed (sinus) 60 mL 11    drospirenone-ethinyl estradiol (DILSHAD) 3-0.02 MG tablet Take 1 tablet by mouth daily 84 tablet 4    EPINEPHrine (ANY BX GENERIC EQUIV) 0.3 MG/0.3ML injection 2-pack Inject 0.3 mLs (0.3 mg) into the muscle once as needed 2 each 11    EPINEPHrine (ANY BX GENERIC EQUIV) 0.3 MG/0.3ML injection 2-pack Inject 0.3 mLs (0.3 mg) into the muscle as needed for anaphylaxis May repeat one time in 5-15 minutes if response to initial dose is  inadequate. 2 each     escitalopram (LEXAPRO) 20 MG tablet Take 1 tablet (20 mg) by mouth daily 90 tablet 3    famotidine (PEPCID) 20 MG tablet TAKE 1 TABLET(20 MG) BY MOUTH TWICE DAILY 180 tablet 3    FLUoxetine (PROZAC) 10 MG capsule Take 10 mg by mouth daily During cycle      FLUoxetine (PROZAC) 20 MG capsule Take 20 mg by mouth At Bedtime      hydrochlorothiazide (HYDRODIURIL) 25 MG tablet Take 0.5 tablets (12.5 mg) by mouth daily 45 tablet 3    hydrOXYzine (ATARAX) 10 MG tablet TAKE 1/2 TO 1 TABLET BY MOUTH UP TO THREE TIMES DAILY AS NEEDED FOR ANXIETY      levocetirizine (XYZAL) 5 MG tablet Take 1 tablet (5 mg) by mouth every evening 90 tablet 3    lisinopril (ZESTRIL) 40 MG tablet TAKE 1 TABLET(40 MG) BY MOUTH DAILY 90 tablet 2    loratadine (CLARITIN) 10 MG tablet Take 1 tablet (10 mg) by mouth daily 90 tablet 3    ORDER FOR ALLERGEN IMMUNOTHERAPY Continue allergy injections (SCIT) at maintenance dose of 0.5 ml red vial weekly for one year.      [START ON 10/22/2024] polyethylene glycol-electrolytes (NULYTELY) 420 g solution Take 4,000 mLs by mouth once for 1 dose. Do not start before October 22, 2024. 4000 mL 0    topiramate (TOPAMAX) 200 MG tablet TAKE 1 TABLET(200 MG) BY MOUTH TWICE DAILY 180 tablet 3    triamcinolone (KENALOG) 0.1 % external cream Apply topically 2 times daily 45 g 0    vitamin D3 (CHOLECALCIFEROL) 50 mcg (2000 units) tablet Take 1 tablet (50 mcg) by mouth daily       Current Facility-Administered Medications   Medication Dose Route Frequency Provider Last Rate Last Admin    diphenhydrAMINE HCl (BENADRYL) 2 % topical gel 1 Application  1 Application Topical WEEKLY Luz Maria Naik PA-C        loratadine (CLARITIN) chewable tablet 10 mg  10 mg Oral Daily Luz Maria Naik PA-C   10 mg at 03/23/23 0978         Allergies:       Allergies   Allergen Reactions    Cefaclor Unknown         Social History:  Social History     Tobacco Use    Smoking status: Former     Current packs/day: 0.00     Types:  Cigarettes     Start date: 2003     Quit date: 2013     Years since quittin.7    Smokeless tobacco: Never   Vaping Use    Vaping status: Never Used   Substance Use Topics    Alcohol use: Not Currently     Comment: 1 time per month.    Drug use: No       Daughter is a senior in high school  Son is a senior at college      Family History:  Family History   Problem Relation Age of Onset    Diabetes Mother         Diabetes    Thyroid Disease Mother         Thyroid Disease    Heart Disease Mother     Colon Polyps Mother     Colon Polyps Father     Thyroid Disease Brother         Thyroid Disease    Diabetes Brother 38        Diabetes    Heart Disease Maternal Grandmother     Colon Polyps Maternal Grandmother     Heart Disease Maternal Grandfather 50        Heart Disease    Diabetes Maternal Grandfather         Diabetes    Chronic Obstructive Pulmonary Disease Maternal Grandfather         COPD    Colon Cancer Paternal Grandfather     Heart Disease Maternal Uncle     Diabetes Maternal Uncle         Diabetes    Heart Disease Maternal Uncle     Colon Polyps Maternal Uncle     Breast Cancer No family hx of         Cancer-breast    Ovarian Cancer No family hx of         Cancer-ovarian    Prostate Cancer No family hx of         Cancer-prostate    Other - See Comments No family hx of         Stroke    Blood Disease No family hx of         Blood Disease       ROS:   Respiratory: No shortness of breath, dyspnea on exertion, cough, or hemoptysis  Cardiovascular: negative for palpitations, chest pain, lower extremity edema and syncope or near-syncope  Gastrointestinal: negative for, nausea, vomiting and hematemesis  Genitourinary: negative for, dysuria, frequency and urgency  Musculoskeletal: negative for, back pain and muscular weakness  Psychiatric: negative for, anxiety, depression and hallucinations  Hematologic/Lymphatic/Immunologic: negative for, anemia, chills and fever      Physical Exam  /72   Pulse 73    Wt 101.2 kg (223 lb)   LMP 2024 (Exact Date)   BMI 34.93 kg/m    Gen: Well-appearing, no acute distressed, well-groomed, alert  HEENT: Normocephalic, atraumatic  Cardiovascular: Regular rate, No peripheral edema, normal peripheral circulation  Pulm: Symmetric chest rise, non-labored respirations  Abd: Soft, non-tender, non-distended  Ext: No LE edema, extremities warm and well perfused  Pelvic:  Normal appearing external female genitalia. Normal hair distribution. Vagina is without lesions with moist, pink ruggae. There is no vaginal discharge. Scant menstrual blood at the level of the external os. Cervix parous, no lesions, no cervical motion tenderness. Uterus is approximately 7 cm, mobile, non-tender. No adnexal tenderness or masses    PVR: 20mL    Endometrial Biopsy Note:   We discussed the risks, benefits and alternatives to the procedure and Ms. Christensen was agreeable to proceed with the EMB. Consents were signed.  A bimanual exam was performed to reveal an anteverted uterus.  She was assisted into a lithotomy position and a speculum was gently inserted to provide visualization of the cervix.  The cervix was clearly visualized.  The cervix was cleaned with 3 Betadine swabs. The cervix was slightly dilated as she had just been bleeding and no tenaculum or os finder was needed. The biopsy cannula was then gently inserted through the already dilated cervix and advanced slowly to the uterine fundus.  The plunger was pulled and with suction applied a sample was collected on removal of the cannula in a twisting manner.  The tissue was placed in a specimen cup.  A second pass was performed in a similar manner to complete the tissue collection.    Assessment/Plan  Radha Christensen is a 39 year old   female here for Abnormal uterine bleeding and stress urinary incontinence.     # AUB  -- EMB collected today: scant tissue note  -- Pelvic US ordered to assess for uterine anomalies  -- At this time not interested  in altering her hormonal control (taking Dilshad) as this has finally been a good fit for her PMDD symptoms. She has tried many other forms of hormonal control in the past and not liked them. Most interested in definitive management in the form of hysterectomy. Understands the ovaries would remain in place and she could continue to use her DILSHAD for PMDD symptoms after the hysterectomy.    # Stress urinary incontinence  -- Discussed conservative approach with PT and Impressa tampon use-- but ultimately understands that stress incontinence definitive management is in the form of surgery with a mid-urethral sling. She is most interested in this and would like to work with the team at [facility] for surgical procedure: likely vaginal hysterectomy paired with mid-urethral sling.    She is going to call and let us know when she is ready for the urogynecology referral to be placed.    PVR today is 20 mL  Total amount of time spent during today's encounter including chart prep, face to face, documentation was 40 minutes. 5 minutes in addition were spent with the procedure.    Norma Bustamante MD on 10/9/2024 at 10:18 AM       Answers submitted by the patient for this visit:  Patient Health Questionnaire (Submitted on 10/8/2024)  If you checked off any problems, how difficult have these problems made it for you to do your work, take care of things at home, or get along with other people?: Somewhat difficult  PHQ9 TOTAL SCORE: 6

## 2024-10-09 NOTE — NURSING NOTE
"Chief Complaint   Patient presents with    Consult     Patient presents to clinic for bladder leakage for years for cough, laugh, sneeze. Patient did have vaginal deliveries. Patient feels like she empties bladder fully. Patient is getting up at least once per night. Patient is done having kids.     Patient also states that she is wondering if starting perimenopause. Pt is having facial hair growth, skipping periods, back pain, brain fog.   Initial /72   Pulse 73   Wt 101.2 kg (223 lb)   LMP 07/08/2024 (Exact Date)   BMI 34.93 kg/m   Estimated body mass index is 34.93 kg/m  as calculated from the following:    Height as of 8/14/24: 1.702 m (5' 7\").    Weight as of this encounter: 101.2 kg (223 lb).  Medication Reconciliation: complete        Post void residual 20 ml      Norma Hicks LPN    "

## 2024-10-11 LAB
PATH REPORT.COMMENTS IMP SPEC: NORMAL
PATH REPORT.FINAL DX SPEC: NORMAL
PHOTO IMAGE: NORMAL

## 2024-10-14 ENCOUNTER — HOSPITAL ENCOUNTER (OUTPATIENT)
Dept: GENERAL RADIOLOGY | Facility: OTHER | Age: 39
Discharge: HOME | End: 2024-10-14
Payer: COMMERCIAL

## 2024-10-14 ENCOUNTER — ALLIED HEALTH/NURSE VISIT (OUTPATIENT)
Dept: FAMILY MEDICINE | Facility: OTHER | Age: 39
End: 2024-10-14
Attending: PHYSICIAN ASSISTANT
Payer: COMMERCIAL

## 2024-10-14 VITALS
SYSTOLIC BLOOD PRESSURE: 130 MMHG | HEIGHT: 67 IN | OXYGEN SATURATION: 98 % | HEART RATE: 85 BPM | RESPIRATION RATE: 21 BRPM | WEIGHT: 224.2 LBS | DIASTOLIC BLOOD PRESSURE: 70 MMHG | BODY MASS INDEX: 35.19 KG/M2 | TEMPERATURE: 98.2 F

## 2024-10-14 DIAGNOSIS — J22 LOWER RESPIRATORY INFECTION: Primary | ICD-10-CM

## 2024-10-14 DIAGNOSIS — R05.1 ACUTE COUGH: ICD-10-CM

## 2024-10-14 DIAGNOSIS — J30.9 ALLERGIC RHINITIS: Primary | ICD-10-CM

## 2024-10-14 DIAGNOSIS — R06.02 SHORTNESS OF BREATH: ICD-10-CM

## 2024-10-14 PROCEDURE — 95117 IMMUNOTHERAPY INJECTIONS: CPT

## 2024-10-14 PROCEDURE — 71046 X-RAY EXAM CHEST 2 VIEWS: CPT

## 2024-10-14 PROCEDURE — 99213 OFFICE O/P EST LOW 20 MIN: CPT

## 2024-10-14 PROCEDURE — G0463 HOSPITAL OUTPT CLINIC VISIT: HCPCS | Mod: 25

## 2024-10-14 RX ORDER — AZITHROMYCIN 250 MG/1
TABLET, FILM COATED ORAL
Qty: 6 TABLET | Refills: 0 | Status: SHIPPED | OUTPATIENT
Start: 2024-10-14 | End: 2024-10-19

## 2024-10-14 RX ORDER — PREDNISONE 20 MG/1
40 TABLET ORAL DAILY
Qty: 10 TABLET | Refills: 0 | Status: SHIPPED | OUTPATIENT
Start: 2024-10-14 | End: 2024-10-19

## 2024-10-14 ASSESSMENT — ENCOUNTER SYMPTOMS
COUGH: 1
DIARRHEA: 0
FEVER: 0
EYES NEGATIVE: 1
SINUS PRESSURE: 1
MUSCULOSKELETAL NEGATIVE: 1
FATIGUE: 1
SHORTNESS OF BREATH: 1
ABDOMINAL PAIN: 0
SORE THROAT: 1
NAUSEA: 0
VOMITING: 0
CHILLS: 1

## 2024-10-14 ASSESSMENT — PAIN SCALES - GENERAL: PAINLEVEL: MILD PAIN (3)

## 2024-10-14 NOTE — PROGRESS NOTES
Radha Christensen  1985    ASSESSMENT/PLAN      Presents to rapid clinic by herself . Patient's vitals are stable and appears nontoxic.      1. Lower respiratory infection  - predniSONE (DELTASONE) 20 MG tablet; Take 2 tablets (40 mg) by mouth daily for 5 days.  Dispense: 10 tablet; Refill: 0  - azithromycin (ZITHROMAX) 250 MG tablet; Take 2 tablets (500 mg) by mouth daily for 1 day, THEN 1 tablet (250 mg) daily for 4 days.  Dispense: 6 tablet; Refill: 0  2. Acute cough  - XR Chest 2 Views  3. Shortness of breath  - XR Chest 2 Views    -Symptoms are consistent with lower respiratory tract infection, chest x-ray does not show any signs of infiltrate or abnormality.  Per radiology:  No acute abnormality. No evidence of acute or active cardiopulmonary  disease.  -Prednisone 40 mg once daily for 5 days provided for cough.  No fever, however due to illness lasting 10 days did provide prescription for azithromycin.  Please follow-up within 7 days if symptoms are not resolving. Patient agreeable to plan.  - Symptomatic treatment - Encouraged fluids, salt water gargles, honey, humidifier, saline nasal spray, lozenges, tea, soup, smoothies, popsicles, topical vapor rub, rest, etc   - May use over-the-counter Tylenol or ibuprofen PRN  - Follow up as needed for new or worsening symptoms        *Explanation of diagnosis, treatment options and risk and benefits of medications reviewed with patient. Patient agrees with plan of care.  *All questions were answered.    *Red flags symptoms were discussed and patient was advised when they should return for reevaluation or for prompt emergency evaluation.   *Patient was given verbal and written instructions on plan of care. Instructions were printed or are available on [employer] on electronic AVS.   *We discussed potential side effects of any prescribed or recommended therapies, as well as expectations for response to treatments.  *Patient discharged in stable condition    Charlie  "Santy, JOEL, APRN, FNP-C  [facility]    SUBJECTIVE  CHIEF COMPLAINT/ REASON FOR VISIT  Patient presents with:  Cough  Otalgia  Breathing Problem  Nasal Congestion     HISTORY OF PRESENT ILLNESS  Radha Christensen is a pleasant 39 year old female presents to rapid clinic today with concerns of bronchitis.  Symptoms started 10 days ago with sneezing, coughing, headache, which progressed to worsening cough, congestion, with minor sinus pain.  She has also noted increased wheezing and use of albuterol inhaler.  Reports she used her albuterol inhaler a couple times a day with no help.  Today she feels more shortness of breath and fatigue.  History provided by patient.      I have reviewed the nursing notes.  I have reviewed allergies, medication list, problem list, and past medical history.    REVIEW OF SYSTEMS  Review of Systems   Constitutional:  Positive for chills and fatigue. Negative for fever.   HENT:  Positive for congestion, sinus pressure, sneezing and sore throat.    Eyes: Negative.    Respiratory:  Positive for cough and shortness of breath.    Cardiovascular:  Negative for chest pain.   Gastrointestinal:  Negative for abdominal pain, diarrhea, nausea and vomiting.   Genitourinary: Negative.    Musculoskeletal: Negative.    All other systems reviewed and are negative.       VITAL SIGNS  Vitals:    10/14/24 0939   BP: 130/70   Pulse: 85   Resp: 21   Temp: 98.2  F (36.8  C)   TempSrc: Temporal   SpO2: 98%   Weight: 101.7 kg (224 lb 3.2 oz)   Height: 1.702 m (5' 7\")      Body mass index is 35.11 kg/m .      OBJECTIVE  PHYSICAL EXAM  Physical Exam  Vitals and nursing note reviewed.   Constitutional:       General: She is not in acute distress.     Appearance: Normal appearance. She is normal weight. She is not ill-appearing, toxic-appearing or diaphoretic.   HENT:      Head: Normocephalic and atraumatic.      Right Ear: Tympanic membrane, ear canal and external ear normal. There is no impacted " cerumen.      Left Ear: Tympanic membrane, ear canal and external ear normal. There is no impacted cerumen.      Nose: Nose normal. No congestion.      Mouth/Throat:      Mouth: Mucous membranes are moist.      Pharynx: Oropharynx is clear. Posterior oropharyngeal erythema present.   Cardiovascular:      Rate and Rhythm: Normal rate and regular rhythm.      Pulses: Normal pulses.      Heart sounds: Normal heart sounds. No murmur heard.  Pulmonary:      Effort: Pulmonary effort is normal. No respiratory distress.      Breath sounds: Wheezing present.   Musculoskeletal:      Cervical back: Normal range of motion. No rigidity.   Lymphadenopathy:      Cervical: Cervical adenopathy present.   Neurological:      Mental Status: She is alert and oriented to person, place, and time. Mental status is at baseline.   Psychiatric:         Mood and Affect: Mood normal.         Behavior: Behavior normal.         Thought Content: Thought content normal.         Judgment: Judgment normal.            DIAGNOSTICS  Results for orders placed or performed in visit on 10/14/24   XR Chest 2 Views     Status: None    Narrative    Procedure:XR CHEST 2 VIEWS    Clinical history:Female, 39 years, Acute cough; Shortness of breath    Technique: Two views are submitted.    Comparison: 4/29/2020    Findings: The cardiac silhouette is within normal limits.. The  pulmonary vasculature is within normal limits.    The lungs are clear. Bony structures are unremarkable.      Impression    Impression:   No acute abnormality. No evidence of acute or active cardiopulmonary  disease.    TSERING YANG MD         SYSTEM ID:  [id number]

## 2024-10-14 NOTE — NURSING NOTE
"Chief Complaint   Patient presents with    Cough    Otalgia    Breathing Problem    Nasal Congestion     Patient here for possible sinus infection x1 week. Symptoms included, nasal congestion, cough, full ears, headache, tooth pain,  heaviness in chest with struggle to catch breath. Has been treating with inhaler,     Initial /70   Pulse 85   Temp 98.2  F (36.8  C) (Temporal)   Resp 21   Ht 1.702 m (5' 7\")   Wt 101.7 kg (224 lb 3.2 oz)   LMP 10/04/2024 (Exact Date)   SpO2 98%   BMI 35.11 kg/m   Estimated body mass index is 35.11 kg/m  as calculated from the following:    Height as of this encounter: 1.702 m (5' 7\").    Weight as of this encounter: 101.7 kg (224 lb 3.2 oz).  Medication Review: complete    The next two questions are to help us understand your food security.  If you are feeling you need any assistance in this area, we have resources available to support you today.          10/14/2024   SDOH- Food Insecurity   Within the past 12 months, did you worry that your food would run out before you got money to buy more? N   Within the past 12 months, did the food you bought just not last and you didn t have money to get more? N            Health Care Directive:  Patient does not have a Health Care Directive or Living Will: Discussed advance care planning with patient; however, patient declined at this time.    Edith Rodriguez LPN      "

## 2024-10-14 NOTE — PROGRESS NOTES
Allergen Immunotherapy: Adult  Verified patient's name and . Patient stated reason for visit today is to receive allergy shot(s). Patient denied any concerns with previous allergy injections. Allergy injections (x2) prepared and administered subcutaneous. Ice applied post-injection per patient preference. Administration of allergy injection documented in Immunotherapy flow sheet in EHR and paper chart (see for further information regarding injection). Patient to wait in facility for 30 minutes post-injection and notify RN immediately of any reaction.   Educated patient to stay 30 min for any reaction patient left AMA.    Richi Chan RN ....................  10/14/2024   8:21 AM

## 2024-10-23 ENCOUNTER — ALLIED HEALTH/NURSE VISIT (OUTPATIENT)
Dept: FAMILY MEDICINE | Facility: OTHER | Age: 39
End: 2024-10-23
Attending: PHYSICIAN ASSISTANT
Payer: COMMERCIAL

## 2024-10-23 DIAGNOSIS — J30.9 ALLERGIC RHINITIS: Primary | ICD-10-CM

## 2024-10-23 PROCEDURE — 95117 IMMUNOTHERAPY INJECTIONS: CPT

## 2024-10-23 NOTE — RESULT ENCOUNTER NOTE
[facility]  IC01/01  History and Physical Note   Patient: Yoni Perze  Today's Date: 10/23/2024    YOB: 1951  Admission Date: 10/23/2024    MRN: [id number]  Inpatient LOS: 0    Attending: Janice Barfield MD  Hospital Day: Hospital Day: 1       HISTORY   Past Medical History  Surgical History  Family History Social History       Chief Complaint: Chest pain    History of Present Illness:73-year-old man with a history of chronic pain syndrome, PAF on Eliquis presented to the emergency room from CT department for chest pain.  Patient had CT chest in preparation for Watchman when he developed left-sided chest pain while in radiology department.  He described the pain as burning pain to the left side of the chest worse when lying flat or taking deep breath.  Patient states he has been back on Eliquis for at least a week last dose was yesterday 4 PM.  Workup included EKG was NSR no acute ST or T changes, chest x-ray no infiltrates, troponin negative x 2; CT head neg  Cardiology Dr. Cruz was consulted      Per records On September 22 patient was admitted for CP, lightheadedness with unsteady gait. Patient remained in sinus rhythm.  TTE showed EF of 60% mild AR moderate MR and mild pulmonary hypertension;  cardiology felt the patient twas a good candidate for Watchman    For history of chronic pain/dizziness  in Sept patient had MRI of the brain with and without contrast which came back normal;  September 24 patient had MRA of the head which was unremarkable.    In August  CT of the C-spine which showed mild DDD.  Pt  was supposed to get epidural steroid injection by dr Duenas  but because Toradol injections the procedure was canceled.  Discharge patient followed up with Dr. Aman Delgado who referred patient to Dr. Simmons for evaluation for C1 injection    Histories: I have personally reviewed and updated the following EPIC sections: Past Medical History, Past Surgical History, Social  Fungal negative after 4 weeks. History, Family History, Current medications,Allergies  Medications and Allergies   Prior to Admission Medications   Allergies      Review of Systems: complete ROS performed and negative except as documented in HPI    Objective   PHYSICAL EXAMINATION     Vital 24 Hour Range Most Recent Value   Temperature Temp  Min: 96.6 °F (35.9 °C)  Max: 97.9 °F (36.6 °C) 96.8 °F (36 °C)   Pulse Pulse  Min: 57  Max: 62 (!) 57   Respiratory Resp  Min: 12  Max: 18 16   Blood Pressure BP  Min: 142/90  Max: 176/95 (!) 159/89   Pulse Oximetry SpO2  Min: 96 %  Max: 98 % 96 %   Arterial BP No data recorded     O2 No data recorded     Recorded Intake and Output:  Intake/Output Summary (Last 24 hours) at 10/23/2024 0914  Last data filed at 10/23/2024 0700  Gross per 24 hour   Intake --   Output 800 ml   Net -800 ml      Recorded Last Stool Occurrence:     Vital Most Recent Value First Value   Weight 96.6 kg (212 lb 15.4 oz) Weight: 100.4 kg (221 lb 5.5 oz)   Height 6' 3\" (190.5 cm) Height: 6' 3\" (190.5 cm)   BMI 26.62 N/A     General:  No acute distress.  Skin:  Warm and dry without rash.  HEENT: Normocephalic, atraumatic, PERRL, intact extraocular movement.  Neck:  Trachea is midline. No adenopathy.    Cardiovascular:  Regular rate and rhythm, normal S1, S2, no added sounds or murmurs.  Respiratory: Clear to auscultation bilaterally.  No wheezes, rales or rhonchi.  Gastrointestinal:  Soft, nontender and nondistended, no hepatomegaly or splenomegaly. bowel sounds present.  Neuro:   Alert and Oriented to time, place, person. CN II-XII intact. no focal weakness or sensory deficits.  Psychiatric:   Cooperative.  Appropriate mood & affect.  Normal judgment.  Extremities: No pitting edema of the lower extremities bilaterally, distal pulses intact.      ADVANCED CARE PLANNING     Full Resuscitation    TEST RESULTS  Labs Since Admission  Micro Summary  Imaging     Labs: labs have been reviewed and pertinent findings discussed in the  Assessment and Plan. Laboratory values:   Recent Labs   Lab 10/23/24  0441 10/23/24  0048 10/18/24  1108   WBC 9.5 9.3 10.7   HGB 13.9 14.7 14.2   HCT 43.6 45.4 44.5    201 205         Recent Labs   Lab 10/23/24  0441 10/23/24  0048 10/18/24  1108   SODIUM 140 138 139   POTASSIUM 4.0 4.1 4.4   CHLORIDE 106 106 106   CO2 26 25 25   CALCIUM 9.1 9.2 8.9   GLUCOSE 104* 102* 96   BUN 15 17 18   CREATININE 0.90 0.90 1.00   MG 1.9 1.7 1.8        Recent Labs   Lab 10/23/24  0441 10/23/24  0048 10/18/24  1108   ALBUMIN 3.3* 3.4 3.5   AST 21 22 21   GPT 23 27 27   BILIRUBIN 0.6 0.4 0.9     No results found  Recent Labs   Lab 10/23/24  0441 10/23/24  0048 10/18/24  1314   HTROPI 13 13 12         Radiology: Imaging studies have been reviewed and pertinent findings discussed in the Assessment and Plan.  Results for orders placed or performed during the hospital encounter of 10/23/24 (from the past 48 hour(s))   XR CHEST PA OR AP 1 VIEW    Impression    FINDINGS AND IMPRESSION:    Heart size normal. Coarse reticular opacities left lung base likely  scarring. No infiltrate, pleural effusion or pneumothorax. Minor electives  right lung base. Elevation of the right hemidiaphragm with superimposition  of bowel redemonstrated. Bilateral shoulder prosthesis. No pneumothorax.      Electronically Signed by: MARIZOL BAUMAN M.D.   Signed on: 10/23/2024 6:32 AM   Workstation ID: [id number]   CT HEAD WO CONTRAST    Impression    No acute finding.    Electronically Signed by: KELLY ALEXANDER M.D.   Signed on: 10/23/2024 8:23 AM   Workstation ID: [id number]          ASSESSMENT AND PLAN     1.  Atypical chest pain question radiculopathy in patient with chronic back pain  Chest x-ray negative  Troponin normal  CRP and sed rate normal  CTA chest negative for proximal PE  Check D-dimer, although it is unlikely that while on Eliquis patient may have small peripheral PE causing pleurisy  Await cardiology input    2 paroxysmal atrial  fibrillation in NSR  CTA chest negative for appendage thrombus  Continue Eliquis  Follow-up with Dr. Blanchard    3 chronic back pain/cervical radiculopathy  Patient has appointment with pain medicine Dr. Simmons on Monday    Heparin for DVT prophylaxis    Disposition pending hospital course         Bernabe Sands MD

## 2024-10-23 NOTE — PROGRESS NOTES
Allergen Immunotherapy: Adult  Verified patient's name and . Patient stated reason for visit today is to receive allergy shot(s). Patient denied any concerns with previous allergy injections. Allergy injections (x2) prepared and administered subcutaneous. Ice applied post-injection per patient preference. Administration of allergy injection documented in Immunotherapy flow sheet in EHR and paper chart (see for further information regarding injection). Patient to wait in facility for 30 minutes post-injection and notify RN immediately of any reaction.  Educated patient to stay 30 min for any reaction patient left AMA.    Richi Chan RN ....................  10/23/2024   8:38 AM

## 2024-10-29 ENCOUNTER — HOSPITAL ENCOUNTER (OUTPATIENT)
Facility: OTHER | Age: 39
Discharge: HOME | End: 2024-10-29
Attending: SURGERY | Admitting: SURGERY
Payer: COMMERCIAL

## 2024-10-29 ENCOUNTER — ANESTHESIA (OUTPATIENT)
Dept: SURGERY | Facility: OTHER | Age: 39
End: 2024-10-29
Payer: COMMERCIAL

## 2024-10-29 ENCOUNTER — ANESTHESIA EVENT (OUTPATIENT)
Dept: SURGERY | Facility: OTHER | Age: 39
End: 2024-10-29
Payer: COMMERCIAL

## 2024-10-29 VITALS
HEIGHT: 67 IN | BODY MASS INDEX: 35.16 KG/M2 | TEMPERATURE: 98 F | OXYGEN SATURATION: 100 % | DIASTOLIC BLOOD PRESSURE: 75 MMHG | HEART RATE: 61 BPM | RESPIRATION RATE: 18 BRPM | WEIGHT: 224 LBS | SYSTOLIC BLOOD PRESSURE: 123 MMHG

## 2024-10-29 PROCEDURE — 45380 COLONOSCOPY AND BIOPSY: CPT | Performed by: SURGERY

## 2024-10-29 PROCEDURE — 258N000003 HC RX IP 258 OP 636: Performed by: SURGERY

## 2024-10-29 PROCEDURE — 45385 COLONOSCOPY W/LESION REMOVAL: CPT | Performed by: NURSE ANESTHETIST, CERTIFIED REGISTERED

## 2024-10-29 PROCEDURE — 45385 COLONOSCOPY W/LESION REMOVAL: CPT | Mod: PT | Performed by: SURGERY

## 2024-10-29 PROCEDURE — 250N000011 HC RX IP 250 OP 636: Performed by: NURSE ANESTHETIST, CERTIFIED REGISTERED

## 2024-10-29 PROCEDURE — 999N000010 HC STATISTIC ANES STAT CODE-CRNA PER MINUTE: Performed by: SURGERY

## 2024-10-29 PROCEDURE — 88305 TISSUE EXAM BY PATHOLOGIST: CPT

## 2024-10-29 PROCEDURE — 250N000009 HC RX 250: Performed by: NURSE ANESTHETIST, CERTIFIED REGISTERED

## 2024-10-29 RX ORDER — FLUMAZENIL 0.1 MG/ML
0.2 INJECTION, SOLUTION INTRAVENOUS
Status: DISCONTINUED | OUTPATIENT
Start: 2024-10-29 | End: 2024-10-29 | Stop reason: HOSPADM

## 2024-10-29 RX ORDER — NALOXONE HYDROCHLORIDE 0.4 MG/ML
0.2 INJECTION, SOLUTION INTRAMUSCULAR; INTRAVENOUS; SUBCUTANEOUS
Status: DISCONTINUED | OUTPATIENT
Start: 2024-10-29 | End: 2024-10-29 | Stop reason: HOSPADM

## 2024-10-29 RX ORDER — NALOXONE HYDROCHLORIDE 0.4 MG/ML
0.4 INJECTION, SOLUTION INTRAMUSCULAR; INTRAVENOUS; SUBCUTANEOUS
Status: DISCONTINUED | OUTPATIENT
Start: 2024-10-29 | End: 2024-10-29 | Stop reason: HOSPADM

## 2024-10-29 RX ORDER — LIDOCAINE HYDROCHLORIDE 20 MG/ML
INJECTION, SOLUTION INFILTRATION; PERINEURAL PRN
Status: DISCONTINUED | OUTPATIENT
Start: 2024-10-29 | End: 2024-10-29

## 2024-10-29 RX ORDER — LIDOCAINE 40 MG/G
CREAM TOPICAL
Status: DISCONTINUED | OUTPATIENT
Start: 2024-10-29 | End: 2024-10-29 | Stop reason: HOSPADM

## 2024-10-29 RX ORDER — PROPOFOL 10 MG/ML
INJECTION, EMULSION INTRAVENOUS PRN
Status: DISCONTINUED | OUTPATIENT
Start: 2024-10-29 | End: 2024-10-29

## 2024-10-29 RX ORDER — PROPOFOL 10 MG/ML
INJECTION, EMULSION INTRAVENOUS CONTINUOUS PRN
Status: DISCONTINUED | OUTPATIENT
Start: 2024-10-29 | End: 2024-10-29

## 2024-10-29 RX ORDER — SODIUM CHLORIDE, SODIUM LACTATE, POTASSIUM CHLORIDE, CALCIUM CHLORIDE 600; 310; 30; 20 MG/100ML; MG/100ML; MG/100ML; MG/100ML
INJECTION, SOLUTION INTRAVENOUS CONTINUOUS
Status: DISCONTINUED | OUTPATIENT
Start: 2024-10-29 | End: 2024-10-29 | Stop reason: HOSPADM

## 2024-10-29 RX ADMIN — LIDOCAINE HYDROCHLORIDE 40 MG: 20 INJECTION, SOLUTION INFILTRATION; PERINEURAL at 12:06

## 2024-10-29 RX ADMIN — PROPOFOL 180 MCG/KG/MIN: 10 INJECTION, EMULSION INTRAVENOUS at 12:06

## 2024-10-29 RX ADMIN — SODIUM CHLORIDE, POTASSIUM CHLORIDE, SODIUM LACTATE AND CALCIUM CHLORIDE: 600; 310; 30; 20 INJECTION, SOLUTION INTRAVENOUS at 11:12

## 2024-10-29 RX ADMIN — PROPOFOL 30 MG: 10 INJECTION, EMULSION INTRAVENOUS at 12:13

## 2024-10-29 RX ADMIN — PROPOFOL 100 MG: 10 INJECTION, EMULSION INTRAVENOUS at 12:06

## 2024-10-29 ASSESSMENT — ACTIVITIES OF DAILY LIVING (ADL)
ADLS_ACUITY_SCORE: 0

## 2024-10-29 ASSESSMENT — LIFESTYLE VARIABLES: TOBACCO_USE: 1

## 2024-10-29 NOTE — ANESTHESIA CARE TRANSFER NOTE
Patient: Radha Christensen    Procedure: Procedure(s):  COLONOSCOPY, WITH POLYPECTOMY       Diagnosis: Family history of colonic polyps [Z83.719]  Colon cancer screening [Z12.11]  Diagnosis Additional Information: No value filed.    Anesthesia Type:   MAC     Note:    Oropharynx: oropharynx clear of all foreign objects and spontaneously breathing  Level of Consciousness: awake  Oxygen Supplementation: room air    Independent Airway: airway patency satisfactory and stable    Vital Signs Stable: post-procedure vital signs reviewed and stable  Report to RN Given: handoff report given  Patient transferred to: Phase II    Handoff Report: Identifed the Patient, Identified the Reponsible Provider, Reviewed the pertinent medical history, Discussed the surgical course, Reviewed Intra-OP anesthesia mangement and issues during anesthesia, Set expectations for post-procedure period and Allowed opportunity for questions and acknowledgement of understanding      Vitals:  Vitals Value Taken Time   BP     Temp     Pulse     Resp     SpO2         Electronically Signed By: SHANTAL DUMONT CRNA  October 29, 2024  12:54 PM

## 2024-10-29 NOTE — ANESTHESIA PREPROCEDURE EVALUATION
Anesthesia Pre-Procedure Evaluation    Patient: Radha Christensen   MRN: [id number] : 1985        Procedure : Procedure(s):  Colonoscopy          Past Medical History:   Diagnosis Date    Concussion 2011    Overview:  no loss of consciousness from softball injury    Personal history of other medical treatment (CODE)      2, Para 2      Past Surgical History:   Procedure Laterality Date    ENDOSCOPIC SINUS SURGERY Bilateral 2020    Procedure: Bilateral Endoscopic Sinus Surgery with polypectomy;  Surgeon: Karen Sanchez MD;  Location: [facility] OR    ESOPHAGOSCOPY, GASTROSCOPY, DUODENOSCOPY (EGD), COMBINED N/A 2019    Procedure: ESOPHAGOGASTRODUODENOSCOPY, WITH BIOPSY;  Surgeon: Emigdio Goldberg MD;  Location: [facility] OR    RECONSTRUCT NOSE AND SEPTUM (FUNCTIONAL) Bilateral 2020    Procedure: Nasal Valve Repair(LATERA);  Surgeon: Karen Sanchez MD;  Location: [facility] OR    SEPTOPLASTY, TURBINOPLASTY, COMBINED N/A 2020    Procedure: Septoplasty Turbinate Reduction;  Surgeon: Karen Sanchez MD;  Location: [facility] OR    wisdom teeth         Allergies   Allergen Reactions    Cefaclor Unknown      Social History     Tobacco Use    Smoking status: Former     Current packs/day: 0.00     Types: Cigarettes     Start date: 2003     Quit date: 2013     Years since quittin.8    Smokeless tobacco: Never   Substance Use Topics    Alcohol use: Not Currently     Comment: 1 time per month.      Wt Readings from Last 1 Encounters:   10/29/24 101.6 kg (224 lb)        Anesthesia Evaluation   Pt has had prior anesthetic.         ROS/MED HX  ENT/Pulmonary:     (+)                tobacco use, Past use,     asthma                  Neurologic:  - neg neurologic ROS     Cardiovascular:  - neg cardiovascular ROS     METS/Exercise Tolerance: >4 METS    Hematologic:  - neg hematologic  ROS     Musculoskeletal:  - neg musculoskeletal ROS     GI/Hepatic:     (+) GERD, Asymptomatic on medication,                   Renal/Genitourinary:  - neg Renal ROS     Endo:     (+)               Obesity,       Psychiatric/Substance Use:     (+) psychiatric history anxiety       Infectious Disease: Comment: Sinus infection 2 weeks ago-treated and feels back to normal self      Malignancy:       Other:            Physical Exam    Airway        Mallampati: I   TM distance: > 3 FB   Neck ROM: full   Mouth opening: > 3 cm    Respiratory Devices and Support         Dental       (+) Minor Abnormalities - some fillings, tiny chips      Cardiovascular   cardiovascular exam normal          Pulmonary   pulmonary exam normal                OUTSIDE LABS:  CBC:   Lab Results   Component Value Date    WBC 7.6 08/14/2024    WBC 8.7 08/10/2022    HGB 13.4 08/14/2024    HGB 11.7 08/10/2022    HCT 41.7 08/14/2024    HCT 35.2 08/10/2022     08/14/2024     08/10/2022     BMP:   Lab Results   Component Value Date     08/14/2024     08/10/2022    POTASSIUM 4.5 08/14/2024    POTASSIUM 3.4 (L) 08/10/2022    CHLORIDE 110 (H) 08/14/2024    CHLORIDE 108 (H) 08/10/2022    CO2 20 (L) 08/14/2024    CO2 21 08/10/2022    BUN 9.0 08/14/2024    BUN 13 08/10/2022    CR 0.85 08/14/2024    CR 0.90 08/10/2022     (H) 08/14/2024    GLC 90 08/10/2022     COAGS:   Lab Results   Component Value Date    INR 0.95 08/07/2019     POC:   Lab Results   Component Value Date    HCG Negative 08/10/2022     HEPATIC:   Lab Results   Component Value Date    ALBUMIN 4.3 08/14/2024    PROTTOTAL 7.2 08/14/2024    ALT 11 08/14/2024    AST 17 08/14/2024    ALKPHOS 87 08/14/2024    BILITOTAL 0.4 08/14/2024     OTHER:   Lab Results   Component Value Date    LACT 2.0 08/10/2022    A1C 5.0 06/01/2022    PREETI 9.0 08/14/2024    LIPASE 73 08/07/2019    TSH 1.13 06/01/2022    T4 0.64 05/25/2016       Anesthesia Plan    ASA Status:  2    NPO Status:  NPO Appropriate    Anesthesia Type: MAC.     - Reason for MAC: straight local not clinically adequate           "    Consents    Anesthesia Plan(s) and associated risks, benefits, and realistic alternatives discussed. Questions answered and patient/representative(s) expressed understanding.     - Discussed: Risks, Benefits and Alternatives for BOTH SEDATION and the PROCEDURE were discussed     - Discussed with:  Patient            Postoperative Care            Comments:               SHANTAL DUMONT CRNA    I have reviewed the pertinent notes and labs in the chart from the past 30 days and (re)examined the patient.  Any updates or changes from those notes are reflected in this note.               # Hypertension: Noted on problem list         # Obesity: Estimated body mass index is 35.08 kg/m  as calculated from the following:    Height as of this encounter: 1.702 m (5' 7\").    Weight as of this encounter: 101.6 kg (224 lb).       # Asthma: noted on problem list       "

## 2024-10-29 NOTE — DISCHARGE INSTRUCTIONS
[facility]  Adult Discharge Orders & Instructions      ________________________________________________________________            For 12 hours after surgery  Get plenty of rest.  A responsible adult must stay with you for at least 12 hours after you leave the hospital.   You may feel lightheaded.  IF so, sit for a few minutes before standing.  Have someone help you get up.   You may have a slight fever. Call the doctor if your fever is over 101 F (38.3 C) (taken under the tongue) or lasts longer than 24 hours.  You may have a dry mouth, a sore throat, muscle aches or trouble sleeping.  These should go away after 24 hours.  Do not make important or legal decisions.  6.   Do not drive or use heavy equipment.  If you have weakness or tingling, don't drive or use heavy equipment until this feeling goes away.    To contact a doctor, call   [phone]

## 2024-10-29 NOTE — ANESTHESIA POSTPROCEDURE EVALUATION
Patient: Radha Christensen    Procedure: Procedure(s):  COLONOSCOPY, WITH POLYPECTOMY       Anesthesia Type:  MAC    Note:  Disposition: Outpatient   Postop Pain Control: Uneventful            Sign Out: Well controlled pain   PONV: No   Neuro/Psych: Uneventful            Sign Out: Acceptable/Baseline neuro status   Airway/Respiratory: Uneventful            Sign Out: Acceptable/Baseline resp. status   CV/Hemodynamics: Uneventful            Sign Out: Acceptable CV status; No obvious hypovolemia; No obvious fluid overload   Other NRE: NONE   DID A NON-ROUTINE EVENT OCCUR?            Last vitals:  Vitals Value Taken Time   /72 10/29/24 1300   Temp     Pulse 77 10/29/24 1253   Resp     SpO2 100 % 10/29/24 1300   Vitals shown include unfiled device data.    Electronically Signed By: SHANTAL DUMONT CRNA  October 29, 2024  1:01 PM   16-Feb-2019

## 2024-10-29 NOTE — OR NURSING
Pt has been discharged to home at 1330 via ambulatory accompanied by self.    Written discharge instructions were provided to pt.  Prescriptions were none.  Patient states their pain is none, and denies any nausea or dizziness upon discharge.    Patient verbalize understanding of discharge instructions including no driving until tomorrow and no longer taking narcotic pain medications, no operating mechanical equipment, and no making any important decisions.They understand reason for discharge, and necessary follow-up appointments.  Lina Donahue RN on 10/29/2024 at 2:14 PM

## 2024-10-29 NOTE — OP NOTE
COLONOSCOPY PROCEDURE NOTE    DATE OF SERVICE: 10/29/2024    SURGEON: BOBBY Goldberg MD     PRE-OP DIAGNOSIS:    Screening for colon cancer     POST-OP DIAGNOSIS:    Polyps at transverse colon x 2, sigmoid colon x 1    PROCEDURE:   Colonoscopy with snare polypectomy    ASSISTANT:  Circulator: Vicky Omalley RN; Jerica Roach RN  Scrub Person: Bala Davis    ANESTHESIA:  MAC                            MACCRNA Independent: Larissa Arevalo APRN CRNA    INDICATION FOR THE PROCEDURE: The patient is a 39 year old female with a family history of colon polyps. The patient has no other complaints.  After explaining the risks to include bleeding, perforation, potential inability to reach the cecum the patient wishes to proceed.    PROCEDURE: After adequate sedation, the patient was in the left lateral decubitus position.  Rectal exam was performed.  There was normal tone and no palpable masses.  The colonoscope was introduced into the rectum and advanced to the cecum with Moderate difficulty.  The patient's prep was excellent.  The terminal cecum was reached.  The cecum, ascending, transverse, descending and sigmoid colon were significant for one 4 mm flat polyp in the proximal transverse colon, one 10 mm flat polyp in the distal transverse colon, 1 pedunculated 2 cm polyp in the sigmoid colon.  The scope was retroflexed in the rectum.  The anorectal junction was unremarkable.  The scope was straightened and removed.  The patient tolerated the procedure well.     ESTIMATED BLOOD LOSS: none    COMPLICATIONS:  None    TISSUE REMOVED:  Yes    RECOMMEND:    Follow-up pending pathology        BOBBY Goldberg MD

## 2024-10-29 NOTE — H&P
GENERAL SURGERY CONSULTATION NOTE    Radha Christensen   [address]  [place] [address]  39 year old  female    Primary Care Provider:  Justine Sharp      HPI: Radha Christensen presents to day surgery in need of colonoscopy for screrening for colon cancer.   Radha Christensen denies family history of colon cancer. Patient denies change in bowel habits or blood in stools. Previous colonoscopy was never.     REVIEW OF SYSTEMS:    GENERAL: No fevers or chills. Denies fatigue, recent weight loss.  HEENT: No sinus drainage. No changes with vision or hearing. No difficulty swallowing.   LYMPHATICS:  Noswollen nodes in axilla, neck or groin.  CARDIOVASCULAR: Denies chest pain, palpitations and dyspnea on exertion.  PULMONARY: No shortness of breath or cough. No increase in sputum production.  GI: Denies melena,bright red blood in stools. No hematemesis. No constipation or diarrhea.  : No dysuria or hematuria.  SKIN: No recent rashes or ulcers.   HEMATOLOGY:  No history of easy bruising or bleeding.  ENDOCRINE:  No history of diabetes or thyroid problems.  NEUROLOGY:  No history of seizures or headaches. No motor or sensory changes.        Patient Active Problem List   Diagnosis    Other abnormal Papanicolaou smear of cervix and cervical HPV(795.09)    Cystic acne    Alcohol abuse    Dysthymic disorder    Asthma    Cervical high risk HPV (human papillomavirus) test positive    Family history of diabetes mellitus (DM)    Migraine headache    Tobacco abuse    Compound nevus of back    IFG (impaired fasting glucose)    Melanocytic nevus of scalp    Essential hypertension    Lateral epicondylitis, unspecified laterality    Morbid obesity (H)       Past Medical History:   Diagnosis Date    Concussion 2011    Overview:  no loss of consciousness from softball injury    Personal history of other medical treatment (CODE)      2, Para 2       Past Surgical History:   Procedure Laterality Date    ENDOSCOPIC  SINUS SURGERY Bilateral 7/1/2020    Procedure: Bilateral Endoscopic Sinus Surgery with polypectomy;  Surgeon: Karen Sanchez MD;  Location: [facility]    ESOPHAGOSCOPY, GASTROSCOPY, DUODENOSCOPY (EGD), COMBINED N/A 8/27/2019    Procedure: ESOPHAGOGASTRODUODENOSCOPY, WITH BIOPSY;  Surgeon: Emigdio Goldberg MD;  Location: [facility]    RECONSTRUCT NOSE AND SEPTUM (FUNCTIONAL) Bilateral 7/1/2020    Procedure: Nasal Valve Repair(LATERA);  Surgeon: Karen Sanchez MD;  Location: [facility]    SEPTOPLASTY, TURBINOPLASTY, COMBINED N/A 7/1/2020    Procedure: Septoplasty Turbinate Reduction;  Surgeon: Karen Sanchez MD;  Location: [facility] OR    wisdom teeth          Family History   Problem Relation Age of Onset    Diabetes Mother         Diabetes    Thyroid Disease Mother         Thyroid Disease    Heart Disease Mother     Colon Polyps Mother     Colon Polyps Father     Thyroid Disease Brother         Thyroid Disease    Diabetes Brother 38        Diabetes    Heart Disease Maternal Grandmother     Colon Polyps Maternal Grandmother     Heart Disease Maternal Grandfather 50        Heart Disease    Diabetes Maternal Grandfather         Diabetes    Chronic Obstructive Pulmonary Disease Maternal Grandfather         COPD    Colon Cancer Paternal Grandfather     Heart Disease Maternal Uncle     Diabetes Maternal Uncle         Diabetes    Heart Disease Maternal Uncle     Colon Polyps Maternal Uncle     Breast Cancer No family hx of         Cancer-breast    Ovarian Cancer No family hx of         Cancer-ovarian    Prostate Cancer No family hx of         Cancer-prostate    Other - See Comments No family hx of         Stroke    Blood Disease No family hx of         Blood Disease       Social History     Social History Narrative    two children.     Preload 03/01/2013       Social History     Socioeconomic History    Marital status:      Spouse name: Not on file    Number of children: Not on file    Years of  education: Not on file    Highest education level: Not on file   Occupational History    Not on file   Tobacco Use    Smoking status: Former     Current packs/day: 0.00     Types: Cigarettes     Start date: 2003     Quit date: 2013     Years since quittin.8    Smokeless tobacco: Never   Vaping Use    Vaping status: Never Used   Substance and Sexual Activity    Alcohol use: Not Currently     Comment: 1 time per month.    Drug use: No    Sexual activity: Not Currently     Partners: Male   Other Topics Concern    Parent/sibling w/ CABG, MI or angioplasty before 65F 55M? Not Asked   Social History Narrative    two children.     Preload 2013     Social Drivers of Health     Financial Resource Strain: Low Risk  (2024)    Financial Resource Strain     Within the past 12 months, have you or your family members you live with been unable to get utilities (heat, electricity) when it was really needed?: No   Food Insecurity: Low Risk  (10/14/2024)    Food Insecurity     Within the past 12 months, did you worry that your food would run out before you got money to buy more?: No     Within the past 12 months, did the food you bought just not last and you didn t have money to get more?: No   Transportation Needs: Low Risk  (2024)    Transportation Needs     Within the past 12 months, has lack of transportation kept you from medical appointments, getting your medicines, non-medical meetings or appointments, work, or from getting things that you need?: No   Physical Activity: Insufficiently Active (2024)    Exercise Vital Sign     Days of Exercise per Week: 3 days     Minutes of Exercise per Session: 20 min   Stress: Stress Concern Present (2024)    [employer] - Occupational Stress Questionnaire     Feeling of Stress : To some extent   Social Connections: Unknown (2024)    Social Connection and Isolation Panel [NHANES]     Frequency of Communication  "with Friends and Family: Not on file     Frequency of Social Gatherings with Friends and Family: Twice a week     Attends Nondenominational Services: Not on file     Active Member of Clubs or Organizations: Not on file     Attends Club or Organization Meetings: Not on file     Marital Status: Not on file   Interpersonal Safety: Low Risk  (10/29/2024)    Interpersonal Safety     Do you feel physically and emotionally safe where you currently live?: Yes     Within the past 12 months, have you been hit, slapped, kicked or otherwise physically hurt by someone?: No     Within the past 12 months, have you been humiliated or emotionally abused in other ways by your partner or ex-partner?: No   Housing Stability: Low Risk  (8/14/2024)    Housing Stability     Do you have housing? : Yes     Are you worried about losing your housing?: No       Current Facility-Administered Medications   Medication Dose Route Frequency Provider Last Rate Last Admin    lactated ringers infusion   Intravenous Continuous Emigdio Goldberg MD 30 mL/hr at 10/29/24 1112 New Bag at 10/29/24 1112    lidocaine (LMX4) cream   Topical Q1H PRN Emigdio Goldberg MD        lidocaine 1 % 0.1-1 mL  0.1-1 mL Other Q1H PRN Emigdio Goldberg MD        sodium chloride (PF) 0.9% PF flush 3 mL  3 mL Intracatheter Q8H Emigdio Goldberg MD        sodium chloride (PF) 0.9% PF flush 3 mL  3 mL Intracatheter q1 min prn Emigdio Goldberg MD             ALLERGIES/SENSITIVITIES:   Allergies   Allergen Reactions    Cefaclor Unknown       PHYSICAL EXAM:     /85   Pulse 76   Temp 98  F (36.7  C) (Tympanic)   Resp 18   Ht 1.702 m (5' 7\")   Wt 101.6 kg (224 lb)   SpO2 97%   BMI 35.08 kg/m      General Appearance:   Sitting up in bed, no apparent distress  HEENT: Pupils are equal and reactive, no scleral icterus   Heart & CV:  RRR, no murmur.  LUNGS: No increased work of breathing. Lungs are CTA B/L, no wheezing or crackles.  Abd:  soft, " non-tender, no masses   Ext: no lower extremity edema   Neuro: alert and oriented, normal speech and mentation         CONSULTATION ASSESSMENT AND PLAN:    39 year old female with average risk for colon cancer.      The technical details of colonoscopy were discussed with the patient along with the risks and benefits to include bleeding, perforation and incomplete study. Radha JOE Christensen demonstrated understanding and is willing to proceed.       Emigdio Goldberg MD on 10/29/2024 at 11:45 AM

## 2024-10-30 ENCOUNTER — ALLIED HEALTH/NURSE VISIT (OUTPATIENT)
Dept: FAMILY MEDICINE | Facility: OTHER | Age: 39
End: 2024-10-30
Attending: PHYSICIAN ASSISTANT
Payer: COMMERCIAL

## 2024-10-30 DIAGNOSIS — J30.9 ALLERGIC RHINITIS: Primary | ICD-10-CM

## 2024-10-30 PROCEDURE — 95117 IMMUNOTHERAPY INJECTIONS: CPT

## 2024-10-30 NOTE — PROGRESS NOTES
Allergen Immunotherapy: Adult  Verified patient's name and . Patient stated reason for visit today is to receive allergy shot(s). Patient denied any concerns with previous allergy injections. Allergy injections (x2) prepared and administered subcutaneous. Ice applied post-injection per patient preference. Administration of allergy injection documented in Immunotherapy flow sheet in EHR and paper chart (see for further information regarding injection). Patient to wait in facility for 30 minutes post-injection and notify RN immediately of any reaction.  Educated patient to stay 30 min for any reaction patient left AMA.    Richi Chan RN ....................  10/30/2024   10:58 AM

## 2024-11-01 LAB
PATH REPORT.COMMENTS IMP SPEC: NORMAL
PATH REPORT.FINAL DX SPEC: NORMAL
PATH REPORT.RELEVANT HX SPEC: NORMAL
PHOTO IMAGE: NORMAL

## 2024-11-06 ENCOUNTER — HOSPITAL ENCOUNTER (OUTPATIENT)
Dept: ULTRASOUND IMAGING | Facility: OTHER | Age: 39
Discharge: HOME | End: 2024-11-06
Attending: STUDENT IN AN ORGANIZED HEALTH CARE EDUCATION/TRAINING PROGRAM | Admitting: STUDENT IN AN ORGANIZED HEALTH CARE EDUCATION/TRAINING PROGRAM
Payer: COMMERCIAL

## 2024-11-06 DIAGNOSIS — N92.6 ABNORMAL MENSTRUAL CYCLE: ICD-10-CM

## 2024-11-06 PROCEDURE — 76856 US EXAM PELVIC COMPLETE: CPT

## 2024-11-11 ENCOUNTER — ALLIED HEALTH/NURSE VISIT (OUTPATIENT)
Dept: FAMILY MEDICINE | Facility: OTHER | Age: 39
End: 2024-11-11
Attending: PHYSICIAN ASSISTANT
Payer: COMMERCIAL

## 2024-11-11 DIAGNOSIS — J30.9 ALLERGIC RHINITIS: Primary | ICD-10-CM

## 2024-11-11 PROCEDURE — 95117 IMMUNOTHERAPY INJECTIONS: CPT

## 2024-11-11 NOTE — PROGRESS NOTES
Allergen Immunotherapy: Adult  Verified patient's name and . Patient stated reason for visit today is to receive allergy shot(s). Patient denied any concerns with previous allergy injections. Allergy injections (x2) prepared and administered subcutaneous. Ice applied post-injection per patient preference. Administration of allergy injection documented in Immunotherapy flow sheet in EHR and paper chart (see for further information regarding injection). Patient to wait in facility for 30 minutes post-injection and notify RN immediately of any reaction.  Educated patient to stay 30 min for any reaction patient left AMA.    Richi Chan RN ....................  2024   8:27 AM

## 2024-11-12 ENCOUNTER — MYC MEDICAL ADVICE (OUTPATIENT)
Dept: OBGYN | Facility: OTHER | Age: 39
End: 2024-11-12
Payer: COMMERCIAL

## 2024-11-12 ENCOUNTER — ALLIED HEALTH/NURSE VISIT (OUTPATIENT)
Dept: ALLERGY | Facility: OTHER | Age: 39
End: 2024-11-12
Attending: OTOLARYNGOLOGY
Payer: COMMERCIAL

## 2024-11-12 ENCOUNTER — TELEPHONE (OUTPATIENT)
Dept: OTOLARYNGOLOGY | Facility: OTHER | Age: 39
End: 2024-11-12

## 2024-11-12 DIAGNOSIS — J30.89 PERENNIAL ALLERGIC RHINITIS: Primary | ICD-10-CM

## 2024-11-12 PROCEDURE — 95165 ANTIGEN THERAPY SERVICES: CPT

## 2024-11-12 PROCEDURE — 95165 ANTIGEN THERAPY SERVICES: CPT | Performed by: OTOLARYNGOLOGY

## 2024-11-12 NOTE — PROGRESS NOTES
Allergy serum is mixed today at schedule red maintenance,  into  2  (5 ml)  multi dose vial/vials.    Allergens included were:    Ragweed  0.2 ml of dilution # 1  Pigweed  0.2 ml of dilution # 0  Mugwort 0.2 ml of dilution # 0  Kochia  0.2 ml of dilution # 0  Russian Thistle 0.2 ml of dilution # 1  Remi Grass 0.2 ml of dilution # 1  Birch mix 0.2 ml of dilution # 0  Maple Mix 0.2 of dilution # 1  Elm Mix 0.2 ml of dilution # 0  Oak Mix 0.2 ml of dilution # 1  Eran Mix 0.2 ml of dilution # 1  Pine Mix 0.2 ml of dilution # 1  [place] 0.2 ml of dilution # 1  Black [place] 0.2 ml of dilution # 1  Aspen 0.2 ml of dilution # 0  Red [place] 0.2 ml of dilution # 0    Alternaria 0.2 ml of dilution # 1  Aspergillus 0.2 ml of dilution # 0  Hormodendrum 0.2 ml of dilution # 1  Helminthosporium 0.2 ml of dilution # 1  Penicillium 0.2 ml of dilution # 1  Epicoccum 0.2 ml of dilution # 1  Fusarium 0.2 ml of dilution # 1  Mucor 0.2 ml of dilution # 0  Grain Smut 0.2 ml of dilution # 0  Grass Smut 0.2 ml of dilution # 0  Cat 0.2 ml of dilution # 1  Dog 0.2 ml of dilution # 1  Feather Mix 0.2 ml of dilution # 0  Dust Mite Mix 0.2 ml of dilution # 1  Horse 0.2 ml of dilution # 0

## 2024-11-14 ENCOUNTER — MYC MEDICAL ADVICE (OUTPATIENT)
Dept: OBGYN | Facility: OTHER | Age: 39
End: 2024-11-14
Payer: COMMERCIAL

## 2024-11-14 DIAGNOSIS — N92.6 ABNORMAL MENSTRUAL CYCLE: Primary | ICD-10-CM

## 2024-11-19 ENCOUNTER — ALLIED HEALTH/NURSE VISIT (OUTPATIENT)
Dept: ALLERGY | Facility: OTHER | Age: 39
End: 2024-11-19
Attending: OTOLARYNGOLOGY
Payer: COMMERCIAL

## 2024-11-19 DIAGNOSIS — J30.89 PERENNIAL ALLERGIC RHINITIS: Primary | ICD-10-CM

## 2024-11-19 PROCEDURE — 95117 IMMUNOTHERAPY INJECTIONS: CPT

## 2024-11-19 NOTE — PROGRESS NOTES
Prior to injection patient identity verified using name and date of birth.     SVT done on right  arm, measuring 9 MM. Passed     SVT done on left arm, measuring 9 MM. Passed     Documented on paper flow sheet.     Allergy injection given and charted on paper allergy flow sheet. Patient signed out AMA. Prior to injection verified patient identity using patient name and date of birth.    Questions asked: Yes    Patient was given allergy injection(s) of 0.5 mL from Red vial given in Both arm(s).    Injection(s) charted on paper allergy flow sheet.    Epipen present at appointment.    Patient left against medical advice (AMA), signed form and did not stay for the observation period.  Patient was instructed to seek medical attention/go to the emergency room if having any reaction symptoms or needing to use their Epipen, patient aware and acknowledged. Patient signed out AMA form at 1420 and ambulated out of the clinic.

## 2024-11-25 ENCOUNTER — MEDICAL CORRESPONDENCE (OUTPATIENT)
Dept: HEALTH INFORMATION MANAGEMENT | Facility: OTHER | Age: 39
End: 2024-11-25
Payer: COMMERCIAL

## 2024-12-04 ENCOUNTER — ALLIED HEALTH/NURSE VISIT (OUTPATIENT)
Dept: FAMILY MEDICINE | Facility: OTHER | Age: 39
End: 2024-12-04
Attending: PHYSICIAN ASSISTANT
Payer: COMMERCIAL

## 2024-12-04 DIAGNOSIS — J30.9 ALLERGIC RHINITIS: Primary | ICD-10-CM

## 2024-12-04 PROCEDURE — 95117 IMMUNOTHERAPY INJECTIONS: CPT

## 2024-12-04 NOTE — PROGRESS NOTES
Allergen Immunotherapy: Adult  Verified patient's name and . Patient stated reason for visit today is to receive allergy shot(s). Patient denied any concerns with previous allergy injections. Allergy injections (x2) prepared and administered subcutaneous. Ice applied post-injection per patient preference. Administration of allergy injection documented in Immunotherapy flow sheet in EHR and paper chart (see for further information regarding injection). Patient to wait in facility for 30 minutes post-injection and notify RN immediately of any reaction. Allergy injection site(s) assessed:   Educated patient to stay 30 min for any reaction patient left AMA.    Richi Chan RN ....................  2024   1:35 PM

## 2024-12-11 ENCOUNTER — ALLIED HEALTH/NURSE VISIT (OUTPATIENT)
Dept: FAMILY MEDICINE | Facility: OTHER | Age: 39
End: 2024-12-11
Attending: PHYSICIAN ASSISTANT
Payer: COMMERCIAL

## 2024-12-11 DIAGNOSIS — J30.9 ALLERGIC RHINITIS: Primary | ICD-10-CM

## 2024-12-11 PROCEDURE — 95117 IMMUNOTHERAPY INJECTIONS: CPT

## 2024-12-11 NOTE — PROGRESS NOTES
Allergen Immunotherapy: Adult  Verified patient's name and . Patient stated reason for visit today is to receive allergy shot(s). Patient denied any concerns with previous allergy injections. Allergy injections (x2) prepared and administered subcutaneous. Ice applied post-injection per patient preference. Administration of allergy injection documented in Immunotherapy flow sheet in EHR and paper chart (see for further information regarding injection). Patient to wait in facility for 30 minutes post-injection and notify RN immediately of any reaction.   Educated patient to stay 30 min for any reaction patient left AMA.      Richi Chan RN ....................  2024   12:48 PM

## 2024-12-14 DIAGNOSIS — I10 ESSENTIAL HYPERTENSION: ICD-10-CM

## 2024-12-16 RX ORDER — LISINOPRIL 40 MG/1
TABLET ORAL
Qty: 90 TABLET | Refills: 2 | Status: SHIPPED | OUTPATIENT
Start: 2024-12-16

## 2024-12-18 ENCOUNTER — ALLIED HEALTH/NURSE VISIT (OUTPATIENT)
Dept: FAMILY MEDICINE | Facility: OTHER | Age: 39
End: 2024-12-18
Attending: PHYSICIAN ASSISTANT
Payer: COMMERCIAL

## 2024-12-18 DIAGNOSIS — J30.9 ALLERGIC RHINITIS: Primary | ICD-10-CM

## 2024-12-18 PROCEDURE — 95117 IMMUNOTHERAPY INJECTIONS: CPT

## 2024-12-18 NOTE — PROGRESS NOTES
Allergen Immunotherapy: Adult  Verified patient's name and . Patient stated reason for visit today is to receive allergy shot(s). Patient denied any concerns with previous allergy injections. Allergy injections (x2) prepared and administered subcutaneous. Ice applied post-injection per patient preference. Administration of allergy injection documented in Immunotherapy flow sheet in EHR and paper chart (see for further information regarding injection). Patient to wait in facility for 30 minutes post-injection and notify RN immediately of any reaction. Allergy injection site(s) assessed:  Educated patient to stay 30 min for any reaction patient left AMA.    Richi Chan RN ....................  2024   10:44 AM

## 2024-12-24 ENCOUNTER — ALLIED HEALTH/NURSE VISIT (OUTPATIENT)
Dept: FAMILY MEDICINE | Facility: OTHER | Age: 39
End: 2024-12-24
Attending: PHYSICIAN ASSISTANT
Payer: COMMERCIAL

## 2024-12-24 DIAGNOSIS — J30.89 PERENNIAL ALLERGIC RHINITIS: Primary | ICD-10-CM

## 2024-12-24 PROCEDURE — 95117 IMMUNOTHERAPY INJECTIONS: CPT

## 2024-12-24 NOTE — PROGRESS NOTES
Allergen Immunotherapy: Adult  Verified patient's name and . Patient stated reason for visit today is to receive allergy shot(s). Patient denied any concerns with previous allergy injections. Allergy injections (x2) prepared and administered subcutaneous. Ice applied post-injection per patient preference. Administration of allergy injection documented in Immunotherapy flow sheet in EHR and paper chart (see for further information regarding injection). Patient to wait in facility for 30 minutes post-injection and notify RN immediately of any reaction. Patient was advised to wait due to possible reaction.Patient left clinic ambulatory.     Valeria Foreman RN ....................  2024   8:22 AM

## 2024-12-31 ENCOUNTER — ALLIED HEALTH/NURSE VISIT (OUTPATIENT)
Dept: FAMILY MEDICINE | Facility: OTHER | Age: 39
End: 2024-12-31
Attending: PHYSICIAN ASSISTANT
Payer: COMMERCIAL

## 2024-12-31 DIAGNOSIS — J30.89 PERENNIAL ALLERGIC RHINITIS: Primary | ICD-10-CM

## 2024-12-31 PROCEDURE — 95117 IMMUNOTHERAPY INJECTIONS: CPT

## 2024-12-31 NOTE — PROGRESS NOTES
Allergen Immunotherapy: Adult  Verified patient's name and . Patient stated reason for visit today is to receive allergy shot(s). Patient denied any concerns with previous allergy injections. Allergy injections (x2) prepared and administered subcutaneous. Ice applied post-injection per patient preference. Administration of allergy injection documented in Immunotherapy flow sheet in EHR and paper chart (see for further information regarding injection). Patient to wait in facility for 30 minutes post-injection and notify RN immediately of any reaction. Importance of staying to monitor reaction discussed, patient left clinic without staying Patient left clinic ambulatory.     Valeria Foreman RN ....................  2024   10:55 AM     Follow up with PCP or ENT if no improvement

## 2025-01-08 ENCOUNTER — ALLIED HEALTH/NURSE VISIT (OUTPATIENT)
Dept: FAMILY MEDICINE | Facility: OTHER | Age: 40
End: 2025-01-08
Attending: PHYSICIAN ASSISTANT
Payer: COMMERCIAL

## 2025-01-08 DIAGNOSIS — J30.9 ALLERGIC RHINITIS: Primary | ICD-10-CM

## 2025-01-08 PROCEDURE — 95117 IMMUNOTHERAPY INJECTIONS: CPT

## 2025-01-08 NOTE — PROGRESS NOTES
Allergen Immunotherapy: Adult  Verified patient's name and . Patient stated reason for visit today is to receive allergy shot(s). Patient denied any concerns with previous allergy injections. Allergy injections (x2) prepared and administered subcutaneous. Ice applied post-injection per patient preference. Administration of allergy injection documented in Immunotherapy flow sheet in EHR and paper chart (see for further information regarding injection). Patient to wait in facility for 30 minutes post-injection and notify RN immediately of any reaction. Allergy injection site(s) assessed:  Educated patient to stay 30 min for any reaction patient left AMA.    Richi Chan RN ....................  2025   10:09 AM

## 2025-01-22 ENCOUNTER — ALLIED HEALTH/NURSE VISIT (OUTPATIENT)
Dept: ALLERGY | Facility: OTHER | Age: 40
End: 2025-01-22
Attending: PHYSICIAN ASSISTANT
Payer: COMMERCIAL

## 2025-01-22 DIAGNOSIS — J30.89 PERENNIAL ALLERGIC RHINITIS: Primary | ICD-10-CM

## 2025-01-22 PROCEDURE — 95117 IMMUNOTHERAPY INJECTIONS: CPT

## 2025-01-22 NOTE — PROGRESS NOTES
Prior to injection patient identity verified using name and date of birth.     SVT done on both arms, measuring 9 MM on the right and 7 MM on the left. Passed.    Documented on paper flow sheet.     Allergy injection given and charted on paper allergy flow sheet. Patient signed out AMA.     Prior to injection verified patient identity using patient name and date of birth.    Questions asked: Yes    Patient was given allergy injection(s) of 0.5 mL from Red vial given in Both arm(s).    Injection(s) charted on paper allergy flow sheet.    Epipen present at appointment.    Patient left against medical advice (AMA), signed form and did not stay for the observation period.  Patient was instructed to seek medical attention/go to the emergency room if having any reaction symptoms or needing to use their Epipen, patient aware and acknowledged. Patient signed out AMA form at 1055 and ambulated out of the clinic.

## 2025-01-29 ENCOUNTER — ALLIED HEALTH/NURSE VISIT (OUTPATIENT)
Dept: FAMILY MEDICINE | Facility: OTHER | Age: 40
End: 2025-01-29
Attending: PHYSICIAN ASSISTANT
Payer: COMMERCIAL

## 2025-01-29 DIAGNOSIS — J30.9 ALLERGIC RHINITIS: Primary | ICD-10-CM

## 2025-01-29 PROCEDURE — 95117 IMMUNOTHERAPY INJECTIONS: CPT

## 2025-01-29 NOTE — PROGRESS NOTES
Allergen Immunotherapy: Adult  Verified patient's name and . Patient stated reason for visit today is to receive allergy shot(s). Patient denied any concerns with previous allergy injections. Allergy injections (x  ) prepared and administered subcutaneous. Ice applied post-injection per patient preference. Administration of allergy injection documented in Immunotherapy flow sheet in EHR and paper chart (see for further information regarding injection). Patient to wait in facility for 30 minutes post-injection and notify RN immediately of any reaction.   Educated patient to stay 30 min for any reaction patient left AMA.     Richi Chan RN ....................  2025   9:17 AM

## 2025-01-30 ASSESSMENT — ASTHMA QUESTIONNAIRES
QUESTION_2 LAST FOUR WEEKS HOW OFTEN HAVE YOU HAD SHORTNESS OF BREATH: ONCE OR TWICE A WEEK
QUESTION_4 LAST FOUR WEEKS HOW OFTEN HAVE YOU USED YOUR RESCUE INHALER OR NEBULIZER MEDICATION (SUCH AS ALBUTEROL): ONCE A WEEK OR LESS
ACT_TOTALSCORE: 20
ACT_TOTALSCORE: 20
QUESTION_5 LAST FOUR WEEKS HOW WOULD YOU RATE YOUR ASTHMA CONTROL: WELL CONTROLLED
QUESTION_1 LAST FOUR WEEKS HOW MUCH OF THE TIME DID YOUR ASTHMA KEEP YOU FROM GETTING AS MUCH DONE AT WORK, SCHOOL OR AT HOME: A LITTLE OF THE TIME
QUESTION_3 LAST FOUR WEEKS HOW OFTEN DID YOUR ASTHMA SYMPTOMS (WHEEZING, COUGHING, SHORTNESS OF BREATH, CHEST TIGHTNESS OR PAIN) WAKE YOU UP AT NIGHT OR EARLIER THAN USUAL IN THE MORNING: ONCE OR TWICE

## 2025-02-03 PROBLEM — N39.3 SUI (STRESS URINARY INCONTINENCE, FEMALE): Status: ACTIVE | Noted: 2024-12-30

## 2025-02-03 PROBLEM — N93.9 ABNORMAL UTERINE BLEEDING: Status: ACTIVE | Noted: 2024-12-30

## 2025-02-03 PROBLEM — N83.8 OVARIAN MASS, LEFT: Status: ACTIVE | Noted: 2024-12-30

## 2025-02-03 NOTE — PROGRESS NOTES
Preoperative Evaluation  [facility]  [address]  [place] [address]  Phone: [phone]  Fax: [phone]  Primary Provider: Justine Sharp PA-C  Pre-op Performing Provider: Justine Sharp PA-C  Feb 5, 2025 2/5/2025   Surgical Information   What procedure is being done? Hysterectomy   Facility or Hospital where procedure/surgery will be performed: [facility]   Who is doing the procedure / surgery? James   Date of surgery / procedure: 2/12/2025   Time of surgery / procedure: AM   Where do you plan to recover after surgery? at home with family     Fax number for surgical facility: [phone]    Assessment & Plan     The proposed surgical procedure is considered INTERMEDIATE risk.    Preop general physical exam  Ovarian mass, left  Abnormal uterine bleeding  MONTEZ (stress urinary incontinence, female)  Vitals and exam stable.  Labs stable.  EKG not indicated based on patient age and health status.  - CBC and Differential; Future  - Basic Metabolic Panel; Future  - CBC and Differential  - Basic Metabolic Panel    PMDD (premenstrual dysphoric disorder)  Chronic, stable.  Continues to follow with mental health team.    Mild intermittent asthma without complication  Chronic, stable.    Morbid obesity (H)  Chronic, stable.    Essential hypertension  Chronic, stable.       - No identified additional risk factors other than previously addressed    Antiplatelet or Anticoagulation Medication Instructions   - Patient is on no antiplatelet or anticoagulation medications.    Additional Medication Instructions   - ACE/ARB/ARNI (lisinopril, enalapril, losartan, valsartan, olmesartan, sacubritril/valsartan) : DO NOT TAKE on day of surgery (minimum 11 hours for general anesthesia).    Recommendation  Approval given to proceed with proposed procedure, without further diagnostic evaluation.    Desiree Garcia is a 39 year old, presenting for the following:  No  chief complaint on file.        HPI related to upcoming procedure:   Patient has been evaluated by OB/GYN for evaluation of stress urinary incontinence as well as abnormal bleeding.  Continues on Maria Guadalupe but still experiences breakthrough bleeding.  She does have known PMDD and hormonal changes that affect her skin.  Recent ultrasound completed showing a 10 mm endometrial polyp versus submucosal fibroid as well as a complex with solid component of left ovary along with multiple complex cystic components measuring up to 4.7 cm.  Uterus was normal in size and contour.  Patient followed up with OB/GYN through [facility] who recommended proceeding forward with pelvic washing and removal of left ovary and tube.  Specimen will be sent for pathology for frozen sample.  If benign they will continue with removal of right fallopian tube uterus and cervix.  If pathology reveals ovarian tumor they plan to discuss with ovarian oncologist who will proceed with remainder of surgery.  Due to persistent stress urinary incontinence they will also be placing bladder sling.          2/5/2025   Pre-Op Questionnaire   Have you ever had a heart attack or stroke? No   Have you ever had surgery on your heart or blood vessels, such as a stent placement, a coronary artery bypass, or surgery on an artery in your head, neck, heart, or legs? No   Do you have chest pain with activity? No   Do you have a history of heart failure? No   Do you currently have a cold, bronchitis or symptoms of other infection? No   Do you have a cough, shortness of breath, or wheezing? No   Do you or anyone in your family have previous history of blood clots? No   Do you or does anyone in your family have a serious bleeding problem such as prolonged bleeding following surgeries or cuts? No   Have you ever had problems with anemia or been told to take iron pills? No   Have you had any abnormal blood loss such as black, tarry or bloody stools, or abnormal vaginal bleeding? No    Have you ever had a blood transfusion? No   Are you willing to have a blood transfusion if it is medically needed before, during, or after your surgery? Yes   Have you or any of your relatives ever had problems with anesthesia? No   Do you have sleep apnea, excessive snoring or daytime drowsiness? No   Do you have any artifical heart valves or other implanted medical devices like a pacemaker, defibrillator, or continuous glucose monitor? No   Do you have artificial joints? No   Are you allergic to latex? No     Health Care Directive  Patient does not have a Health Care Directive: Discussed advance care planning with patient; however, patient declined at this time.    Preoperative Review of    reviewed - no record of controlled substances prescribed.      Status of Chronic Conditions:  See problem list for active medical problems.  Problems all longstanding and stable, except as noted/documented.  See ROS for pertinent symptoms related to these conditions.    Patient Active Problem List    Diagnosis Date Noted    Abnormal uterine bleeding 12/30/2024     Priority: Medium    Ovarian mass, left 12/30/2024     Priority: Medium    MONTEZ (stress urinary incontinence, female) 12/30/2024     Priority: Medium    Lateral epicondylitis, unspecified laterality 05/27/2021     Priority: Medium    Morbid obesity (H) 05/27/2021     Priority: Medium    Essential hypertension 12/30/2020     Priority: Medium    Compound nevus of back 11/18/2020     Priority: Medium    IFG (impaired fasting glucose) 11/18/2020     Priority: Medium    Melanocytic nevus of scalp 11/18/2020     Priority: Medium    Cystic acne 02/09/2018     Priority: Medium    Dysthymic disorder 02/09/2018     Priority: Medium    Asthma 02/09/2018     Priority: Medium     Overview:   exercise induced/mild intermittent      Family history of diabetes mellitus (DM) 08/02/2017     Priority: Medium    Cervical high risk HPV (human papillomavirus) test positive 09/21/2016      Priority: Medium    Other acne 2011     Priority: Medium    Migraine headache 2011     Priority: Medium    Other abnormal Papanicolaou smear of cervix and cervical HPV(795.09) 10/31/2011     Priority: Medium    Pap smear abnormality of cervix with LGSIL 10/31/2011     Priority: Medium    Alcohol abuse 10/19/2011     Priority: Medium    Tobacco abuse 10/19/2011     Priority: Medium      Past Medical History:   Diagnosis Date    Concussion 2011    Overview:  no loss of consciousness from softball injury    Personal history of other medical treatment (CODE)      2, Para 2     Past Surgical History:   Procedure Laterality Date    COLONOSCOPY N/A 10/29/2024    2 large adenomas, advanced with tubulovillous pathology   F/U 1 year    ENDOSCOPIC SINUS SURGERY Bilateral 2020    Procedure: Bilateral Endoscopic Sinus Surgery with polypectomy;  Surgeon: Karen Sanchez MD;  Location: [facility] OR    ESOPHAGOSCOPY, GASTROSCOPY, DUODENOSCOPY (EGD), COMBINED N/A 2019    Procedure: ESOPHAGOGASTRODUODENOSCOPY, WITH BIOPSY;  Surgeon: Emigdio Goldberg MD;  Location:  OR    RECONSTRUCT NOSE AND SEPTUM (FUNCTIONAL) Bilateral 2020    Procedure: Nasal Valve Repair(LATERA);  Surgeon: Karen Sanchez MD;  Location: [facility] OR    SEPTOPLASTY, TURBINOPLASTY, COMBINED N/A 2020    Procedure: Septoplasty Turbinate Reduction;  Surgeon: Karen Sanchez MD;  Location: [facility] OR    wisdom teeth        Current Outpatient Medications   Medication Sig Dispense Refill    albuterol (PROAIR HFA/PROVENTIL HFA/VENTOLIN HFA) 108 (90 Base) MCG/ACT inhaler Inhale 2 puffs into the lungs every 4 hours as needed for shortness of breath 18 g 3    budesonide (PULMICORT) 0.5 MG/2ML neb solution Spray 2 mLs (0.5 mg) in nostril 2 times daily as needed (sinus) 60 mL 11    drospirenone-ethinyl estradiol (DILSHAD) 3-0.02 MG tablet Take 1 tablet by mouth daily 84 tablet 4    EPINEPHrine (ANY BX GENERIC EQUIV) 0.3  MG/0.3ML injection 2-pack Inject 0.3 mLs (0.3 mg) into the muscle once as needed 2 each 11    EPINEPHrine (ANY BX GENERIC EQUIV) 0.3 MG/0.3ML injection 2-pack Inject 0.3 mLs (0.3 mg) into the muscle as needed for anaphylaxis May repeat one time in 5-15 minutes if response to initial dose is inadequate. 2 each     escitalopram (LEXAPRO) 20 MG tablet Take 1 tablet (20 mg) by mouth daily 90 tablet 3    famotidine (PEPCID) 20 MG tablet TAKE 1 TABLET(20 MG) BY MOUTH TWICE DAILY 180 tablet 3    FLUoxetine (PROZAC) 10 MG capsule Take 10 mg by mouth daily During cycle      FLUoxetine (PROZAC) 20 MG capsule Take 20 mg by mouth At Bedtime      hydrochlorothiazide (HYDRODIURIL) 25 MG tablet Take 0.5 tablets (12.5 mg) by mouth daily 45 tablet 3    hydrOXYzine (ATARAX) 10 MG tablet TAKE 1/2 TO 1 TABLET BY MOUTH UP TO THREE TIMES DAILY AS NEEDED FOR ANXIETY      levocetirizine (XYZAL) 5 MG tablet Take 1 tablet (5 mg) by mouth every evening 90 tablet 3    lisinopril (ZESTRIL) 40 MG tablet TAKE 1 TABLET(40 MG) BY MOUTH DAILY 90 tablet 2    loratadine (CLARITIN) 10 MG tablet Take 1 tablet (10 mg) by mouth daily 90 tablet 3    ORDER FOR ALLERGEN IMMUNOTHERAPY Continue allergy injections (SCIT) at maintenance dose of 0.5 ml red vial weekly for one year.      topiramate (TOPAMAX) 200 MG tablet TAKE 1 TABLET(200 MG) BY MOUTH TWICE DAILY 180 tablet 3    triamcinolone (KENALOG) 0.1 % external cream Apply topically 2 times daily 45 g 0    vitamin D3 (CHOLECALCIFEROL) 50 mcg (2000 units) tablet Take 1 tablet (50 mcg) by mouth daily         Allergies   Allergen Reactions    Cefaclor Unknown        Social History     Tobacco Use    Smoking status: Former     Current packs/day: 0.00     Types: Cigarettes     Start date: 2003     Quit date: 2013     Years since quittin.0    Smokeless tobacco: Never   Substance Use Topics    Alcohol use: Not Currently     Comment: 1 time per month.     Family History   Problem Relation Age of Onset  "   Diabetes Mother         Diabetes    Thyroid Disease Mother         Thyroid Disease    Heart Disease Mother     Colon Polyps Mother     Colon Polyps Father     Thyroid Disease Brother         Thyroid Disease    Diabetes Brother 38        Diabetes    Heart Disease Maternal Grandmother     Colon Polyps Maternal Grandmother     Heart Disease Maternal Grandfather 50        Heart Disease    Diabetes Maternal Grandfather         Diabetes    Chronic Obstructive Pulmonary Disease Maternal Grandfather         COPD    Colon Cancer Paternal Grandfather     Heart Disease Maternal Uncle     Diabetes Maternal Uncle         Diabetes    Heart Disease Maternal Uncle     Colon Polyps Maternal Uncle     Breast Cancer No family hx of         Cancer-breast    Ovarian Cancer No family hx of         Cancer-ovarian    Prostate Cancer No family hx of         Cancer-prostate    Other - See Comments No family hx of         Stroke    Blood Disease No family hx of         Blood Disease     History   Drug Use No           Objective    There were no vitals taken for this visit.   Estimated body mass index is 35.08 kg/m  as calculated from the following:    Height as of 10/29/24: 1.702 m (5' 7\").    Weight as of 10/29/24: 101.6 kg (224 lb).  Physical Exam  GENERAL: alert and no distress  EYES: Eyes grossly normal to inspection, PERRL and conjunctivae and sclerae normal  HENT: ear canals and TM's normal, nose and mouth without ulcers or lesions  RESP: lungs clear to auscultation - no rales, rhonchi or wheezes  CV: regular rate and rhythm, normal S1 S2, no S3 or S4, no murmur, click or rub, no peripheral edema  MS: no gross musculoskeletal defects noted, no edema  SKIN: no suspicious lesions or rashes  PSYCH: mentation appears normal, affect normal/bright    Recent Labs   Lab Test 08/14/24  0810   HGB 13.4         POTASSIUM 4.5   CR 0.85        Diagnostics  Recent Results (from the past 24 hours)   CBC with platelets and differential "    Collection Time: 02/05/25  9:04 AM   Result Value Ref Range    WBC Count 7.5 4.0 - 11.0 10e3/uL    RBC Count 4.51 3.80 - 5.20 10e6/uL    Hemoglobin 13.0 11.7 - 15.7 g/dL    Hematocrit 39.9 35.0 - 47.0 %    MCV 89 78 - 100 fL    MCH 28.8 26.5 - 33.0 pg    MCHC 32.6 31.5 - 36.5 g/dL    RDW 13.1 10.0 - 15.0 %    Platelet Count 309 150 - 450 10e3/uL    % Neutrophils 70 %    % Lymphocytes 23 %    % Monocytes 4 %    % Eosinophils 3 %    % Basophils 1 %    % Immature Granulocytes 0 %    NRBCs per 100 WBC 0 <1 /100    Absolute Neutrophils 5.3 1.6 - 8.3 10e3/uL    Absolute Lymphocytes 1.7 0.8 - 5.3 10e3/uL    Absolute Monocytes 0.3 0.0 - 1.3 10e3/uL    Absolute Eosinophils 0.2 0.0 - 0.7 10e3/uL    Absolute Basophils 0.0 0.0 - 0.2 10e3/uL    Absolute Immature Granulocytes 0.0 <=0.4 10e3/uL    Absolute NRBCs 0.0 10e3/uL      No EKG required, no history of coronary heart disease, significant arrhythmia, peripheral arterial disease or other structural heart disease.    Revised Cardiac Risk Index (RCRI)  The patient has the following serious cardiovascular risks for perioperative complications:   - No serious cardiac risks = 0 points     RCRI Interpretation: 0 points: Class I (very low risk - 0.4% complication rate)         Signed Electronically by: Justine Sharp PA-C  A copy of this evaluation report is provided to the requesting physician.         Answers submitted by the patient for this visit:  Patient Health Questionnaire (Submitted on 2/5/2025)  If you checked off any problems, how difficult have these problems made it for you to do your work, take care of things at home, or get along with other people?: Somewhat difficult  PHQ9 TOTAL SCORE: 6

## 2025-02-05 ENCOUNTER — ALLIED HEALTH/NURSE VISIT (OUTPATIENT)
Dept: FAMILY MEDICINE | Facility: OTHER | Age: 40
End: 2025-02-05
Attending: PHYSICIAN ASSISTANT
Payer: COMMERCIAL

## 2025-02-05 VITALS
OXYGEN SATURATION: 100 % | HEIGHT: 67 IN | RESPIRATION RATE: 18 BRPM | BODY MASS INDEX: 36.19 KG/M2 | WEIGHT: 230.6 LBS | DIASTOLIC BLOOD PRESSURE: 74 MMHG | HEART RATE: 59 BPM | TEMPERATURE: 98.5 F | SYSTOLIC BLOOD PRESSURE: 122 MMHG

## 2025-02-05 DIAGNOSIS — J30.9 ALLERGIC RHINITIS: Primary | ICD-10-CM

## 2025-02-05 DIAGNOSIS — E66.01 MORBID OBESITY (H): ICD-10-CM

## 2025-02-05 DIAGNOSIS — I10 ESSENTIAL HYPERTENSION: ICD-10-CM

## 2025-02-05 DIAGNOSIS — N83.8 OVARIAN MASS, LEFT: ICD-10-CM

## 2025-02-05 DIAGNOSIS — J45.20 MILD INTERMITTENT ASTHMA WITHOUT COMPLICATION: ICD-10-CM

## 2025-02-05 DIAGNOSIS — F32.81 PMDD (PREMENSTRUAL DYSPHORIC DISORDER): ICD-10-CM

## 2025-02-05 DIAGNOSIS — Z01.818 PREOP GENERAL PHYSICAL EXAM: Primary | ICD-10-CM

## 2025-02-05 DIAGNOSIS — N93.9 ABNORMAL UTERINE BLEEDING: ICD-10-CM

## 2025-02-05 DIAGNOSIS — N39.3 SUI (STRESS URINARY INCONTINENCE, FEMALE): ICD-10-CM

## 2025-02-05 LAB
ANION GAP SERPL CALCULATED.3IONS-SCNC: 12 MMOL/L (ref 7–15)
BASOPHILS # BLD AUTO: 0 10E3/UL (ref 0–0.2)
BASOPHILS NFR BLD AUTO: 1 %
BUN SERPL-MCNC: 17.5 MG/DL (ref 6–20)
CALCIUM SERPL-MCNC: 8.9 MG/DL (ref 8.8–10.4)
CHLORIDE SERPL-SCNC: 109 MMOL/L (ref 98–107)
CREAT SERPL-MCNC: 0.72 MG/DL (ref 0.51–0.95)
EGFRCR SERPLBLD CKD-EPI 2021: >90 ML/MIN/1.73M2
EOSINOPHIL # BLD AUTO: 0.2 10E3/UL (ref 0–0.7)
EOSINOPHIL NFR BLD AUTO: 3 %
ERYTHROCYTE [DISTWIDTH] IN BLOOD BY AUTOMATED COUNT: 13.1 % (ref 10–15)
GLUCOSE SERPL-MCNC: 92 MG/DL (ref 70–99)
HCO3 SERPL-SCNC: 19 MMOL/L (ref 22–29)
HCT VFR BLD AUTO: 39.9 % (ref 35–47)
HGB BLD-MCNC: 13 G/DL (ref 11.7–15.7)
IMM GRANULOCYTES # BLD: 0 10E3/UL
IMM GRANULOCYTES NFR BLD: 0 %
LYMPHOCYTES # BLD AUTO: 1.7 10E3/UL (ref 0.8–5.3)
LYMPHOCYTES NFR BLD AUTO: 23 %
MCH RBC QN AUTO: 28.8 PG (ref 26.5–33)
MCHC RBC AUTO-ENTMCNC: 32.6 G/DL (ref 31.5–36.5)
MCV RBC AUTO: 89 FL (ref 78–100)
MONOCYTES # BLD AUTO: 0.3 10E3/UL (ref 0–1.3)
MONOCYTES NFR BLD AUTO: 4 %
NEUTROPHILS # BLD AUTO: 5.3 10E3/UL (ref 1.6–8.3)
NEUTROPHILS NFR BLD AUTO: 70 %
NRBC # BLD AUTO: 0 10E3/UL
NRBC BLD AUTO-RTO: 0 /100
PLATELET # BLD AUTO: 309 10E3/UL (ref 150–450)
POTASSIUM SERPL-SCNC: 4 MMOL/L (ref 3.4–5.3)
RBC # BLD AUTO: 4.51 10E6/UL (ref 3.8–5.2)
SODIUM SERPL-SCNC: 140 MMOL/L (ref 135–145)
WBC # BLD AUTO: 7.5 10E3/UL (ref 4–11)

## 2025-02-05 PROCEDURE — 85014 HEMATOCRIT: CPT | Mod: ZL | Performed by: PHYSICIAN ASSISTANT

## 2025-02-05 PROCEDURE — 80048 BASIC METABOLIC PNL TOTAL CA: CPT | Mod: ZL | Performed by: PHYSICIAN ASSISTANT

## 2025-02-05 PROCEDURE — 95117 IMMUNOTHERAPY INJECTIONS: CPT

## 2025-02-05 PROCEDURE — 85041 AUTOMATED RBC COUNT: CPT | Mod: ZL | Performed by: PHYSICIAN ASSISTANT

## 2025-02-05 PROCEDURE — 85004 AUTOMATED DIFF WBC COUNT: CPT | Mod: ZL | Performed by: PHYSICIAN ASSISTANT

## 2025-02-05 PROCEDURE — 36415 COLL VENOUS BLD VENIPUNCTURE: CPT | Mod: ZL | Performed by: PHYSICIAN ASSISTANT

## 2025-02-05 PROCEDURE — G0463 HOSPITAL OUTPT CLINIC VISIT: HCPCS

## 2025-02-05 RX ORDER — LAMOTRIGINE 25 MG/1
75 TABLET ORAL AT BEDTIME
COMMUNITY

## 2025-02-05 ASSESSMENT — PAIN SCALES - GENERAL: PAINLEVEL_OUTOF10: NO PAIN (0)

## 2025-02-05 NOTE — PROGRESS NOTES
Allergen Immunotherapy: Adult  Verified patient's name and . Patient stated reason for visit today is to receive allergy shot(s). Patient denied any concerns with previous allergy injections. Allergy injections (x2) prepared and administered subcutaneous. Ice applied post-injection per patient preference. Administration of allergy injection documented in Immunotherapy flow sheet in EHR and paper chart (see for further information regarding injection). Patient to wait in facility for 30 minutes post-injection and notify RN immediately of any reaction.     Educated patient to stay 30 min for any reaction patient left AMA.  Richi Chan RN ....................  2025   1:30 PM

## 2025-02-05 NOTE — NURSING NOTE
"Chief Complaint   Patient presents with    Pre-Op Exam     Pre Op: 02/12/2025, overnight observation for robotic hysterectomy, [facility]       Initial /74   Pulse 59   Temp 98.5  F (36.9  C) (Tympanic)   Resp 18   Ht 1.702 m (5' 7\")   Wt 104.6 kg (230 lb 9.6 oz)   SpO2 100%   BMI 36.12 kg/m   Estimated body mass index is 36.12 kg/m  as calculated from the following:    Height as of this encounter: 1.702 m (5' 7\").    Weight as of this encounter: 104.6 kg (230 lb 9.6 oz).  Medication Review: complete    The next two questions are to help us understand your food security.  If you are feeling you need any assistance in this area, we have resources available to support you today.          10/14/2024   SDOH- Food Insecurity   Within the past 12 months, did you worry that your food would run out before you got money to buy more? N   Within the past 12 months, did the food you bought just not last and you didn t have money to get more? N         Health Care Directive:  Patient does not have a Health Care Directive: Discussed advance care planning with patient; however, patient declined at this time.    Aysha Verdugo LPN      "

## 2025-02-19 ENCOUNTER — ALLIED HEALTH/NURSE VISIT (OUTPATIENT)
Dept: FAMILY MEDICINE | Facility: OTHER | Age: 40
End: 2025-02-19
Attending: PHYSICIAN ASSISTANT
Payer: COMMERCIAL

## 2025-02-19 DIAGNOSIS — J30.9 ALLERGIC RHINITIS: Primary | ICD-10-CM

## 2025-02-19 PROCEDURE — 95117 IMMUNOTHERAPY INJECTIONS: CPT

## 2025-02-19 NOTE — PROGRESS NOTES
Allergen Immunotherapy: Adult  Verified patient's name and . Patient stated reason for visit today is to receive allergy shot(s). Patient denied any concerns with previous allergy injections. Allergy injections (x2) prepared and administered subcutaneous. Ice applied post-injection per patient preference. Administration of allergy injection documented in Immunotherapy flow sheet in EHR and paper chart (see for further information regarding injection). Patient to wait in facility for 30 minutes post-injection and notify RN immediately of any reaction.  Educated patient to stay 30 min for any reaction patient left AMA.    Richi Chan RN ....................  2025   9:18 AM

## 2025-02-26 ENCOUNTER — ALLIED HEALTH/NURSE VISIT (OUTPATIENT)
Dept: FAMILY MEDICINE | Facility: OTHER | Age: 40
End: 2025-02-26
Attending: PHYSICIAN ASSISTANT
Payer: COMMERCIAL

## 2025-02-26 DIAGNOSIS — J30.9 ALLERGIC RHINITIS, UNSPECIFIED SEASONALITY, UNSPECIFIED TRIGGER: Primary | ICD-10-CM

## 2025-02-26 PROCEDURE — 95117 IMMUNOTHERAPY INJECTIONS: CPT

## 2025-02-26 NOTE — PROGRESS NOTES
Allergen Immunotherapy: Adult  Verified patient's name and . Patient stated reason for visit today is to receive allergy shot(s). Patient denied any concerns with previous allergy injections. Allergy injections (x2) prepared and administered subcutaneous. Ice applied post-injection per patient preference. Administration of allergy injection documented in Immunotherapy flow sheet in EHR and paper chart (see for further information regarding injection). Patient left AMA  ambulatory.     Gracia Kellogg RN ....................  2025   2:06 PM

## 2025-03-26 ENCOUNTER — ALLIED HEALTH/NURSE VISIT (OUTPATIENT)
Dept: FAMILY MEDICINE | Facility: OTHER | Age: 40
End: 2025-03-26
Attending: PHYSICIAN ASSISTANT

## 2025-03-26 DIAGNOSIS — J30.9 ALLERGIC RHINITIS: Primary | ICD-10-CM

## 2025-03-26 NOTE — PROGRESS NOTES
Allergen Immunotherapy: Adult  Verified patient's name and . Patient stated reason for visit today is to receive allergy shot(s). Patient denied any concerns with previous allergy injections. Allergy injections (x2) prepared and administered subcutaneous. Ice applied post-injection per patient preference. Administration of allergy injection documented in Immunotherapy flow sheet in EHR and paper chart (see for further information regarding injection). Patient to wait in facility for 30 minutes post-injection and notify RN immediately of any reaction.  Educated patient to stay 30 min for any reaction patient left AMA.    Richi Chan RN ....................  3/26/2025   9:27 AM

## 2025-04-02 ENCOUNTER — ALLIED HEALTH/NURSE VISIT (OUTPATIENT)
Dept: FAMILY MEDICINE | Facility: OTHER | Age: 40
End: 2025-04-02
Attending: PHYSICIAN ASSISTANT

## 2025-04-02 DIAGNOSIS — J30.9 ALLERGIC RHINITIS: Primary | ICD-10-CM

## 2025-04-02 NOTE — PROGRESS NOTES
Allergen Immunotherapy: Adult  Verified patient's name and . Patient stated reason for visit today is to receive allergy shot(s). Patient denied any concerns with previous allergy injections. Allergy injections (x2) prepared and administered subcutaneous. Ice applied post-injection per patient preference. Administration of allergy injection documented in Immunotherapy flow sheet in EHR and paper chart (see for further information regarding injection). Patient to wait in facility for 30 minutes post-injection and notify RN immediately of any reaction.   Educated patient to stay 30 min for any reaction patient left AMA.  Richi Chan RN ....................  2025   4:05 PM

## 2025-04-09 ENCOUNTER — ALLIED HEALTH/NURSE VISIT (OUTPATIENT)
Dept: FAMILY MEDICINE | Facility: OTHER | Age: 40
End: 2025-04-09
Attending: PHYSICIAN ASSISTANT
Payer: MEDICAID

## 2025-04-09 DIAGNOSIS — J30.9 ALLERGIC RHINITIS: Primary | ICD-10-CM

## 2025-04-09 PROCEDURE — 95117 IMMUNOTHERAPY INJECTIONS: CPT

## 2025-04-09 NOTE — PROGRESS NOTES
Allergen Immunotherapy: Adult  Verified patient's name and . Patient stated reason for visit today is to receive allergy shot(s). Patient denied any concerns with previous allergy injections. Allergy injections (x2) prepared and administered subcutaneous. Ice applied post-injection per patient preference. Administration of allergy injection documented in Immunotherapy flow sheet in EHR and paper chart (see for further information regarding injection). Patient to wait in facility for 30 minutes post-injection and notify RN immediately of any reaction.  Educated patient to stay 30 min for any reaction patient left AMA.    Richi Chan RN ....................  2025   9:14 AM

## 2025-04-14 NOTE — NURSING NOTE
Went over instructions with patient for allergy skin testing.  Reviewed patients current medications and patient will avoid all contraindicated medications prior to MQT testing.  Patient verbalizes understanding.  Copy of allergy testing packet given to patient. Brought to scheduling desk to be scheduled.  She is advised to call if she has any questions.    Brianna Lopes RN on 2/13/2023 at 1:58 PM          
dentures

## 2025-04-16 ENCOUNTER — ALLIED HEALTH/NURSE VISIT (OUTPATIENT)
Dept: FAMILY MEDICINE | Facility: OTHER | Age: 40
End: 2025-04-16
Attending: PHYSICIAN ASSISTANT
Payer: MEDICAID

## 2025-04-16 DIAGNOSIS — J30.9 ALLERGIC RHINITIS: Primary | ICD-10-CM

## 2025-04-16 PROCEDURE — 95117 IMMUNOTHERAPY INJECTIONS: CPT

## 2025-04-16 NOTE — PROGRESS NOTES
Allergen Immunotherapy: Adult  Verified patient's name and . Patient stated reason for visit today is to receive allergy shot(s). Patient denied any concerns with previous allergy injections. Allergy injections (x2) prepared and administered subcutaneous. Ice applied post-injection per patient preference. Administration of allergy injection documented in Immunotherapy flow sheet in EHR and paper chart (see for further information regarding injection). Patient to wait in facility for 30 minutes post-injection and notify RN immediately of any reaction.   Educated patient to stay 30 min for any reaction patient left AMA.  Richi Chan RN ....................  2025   9:35 AM

## 2025-04-29 ENCOUNTER — ALLIED HEALTH/NURSE VISIT (OUTPATIENT)
Dept: ALLERGY | Facility: OTHER | Age: 40
End: 2025-04-29
Attending: PHYSICIAN ASSISTANT
Payer: MEDICAID

## 2025-04-29 ENCOUNTER — TELEPHONE (OUTPATIENT)
Dept: ALLERGY | Facility: OTHER | Age: 40
End: 2025-04-29

## 2025-04-29 DIAGNOSIS — J30.89 PERENNIAL ALLERGIC RHINITIS: Primary | ICD-10-CM

## 2025-04-29 PROCEDURE — 95165 ANTIGEN THERAPY SERVICES: CPT

## 2025-04-29 NOTE — PROGRESS NOTES
Allergy serum is mixed today at schedule red maintenance,  into  2  (5 ml)  multi dose vial/vials.    Allergens included were:    Ragweed  0.2 ml of dilution # 1  Pigweed  0.2 ml of dilution # 0  Mugwort 0.2 ml of dilution # 0  Kochia  0.2 ml of dilution # 0  Russian Thistle 0.2 ml of dilution # 1  Remi Grass 0.2 ml of dilution # 1  Birch mix 0.2 ml of dilution # 0  Maple Mix 0.2 of dilution # 1  Elm Mix 0.2 ml of dilution # 0  Oak Mix 0.2 ml of dilution # 1  Eran Mix 0.2 ml of dilution # 1  Pine Mix 0.2 ml of dilution # 1  Eastern [place] 0.2 ml of dilution # 1  Black [place] 0.2 ml of dilution # 1  Aspen 0.2 ml of dilution # 0  Red [place] 0.2 ml of dilution # 0    Alternaria 0.2 ml of dilution # 1  Aspergillus 0.2 ml of dilution # 0  Hormodendrum 0.2 ml of dilution # 1  Helminthosporium 0.2 ml of dilution # 1  Penicillium 0.2 ml of dilution # 1  Epicoccum 0.2 ml of dilution # 1  Fusarium 0.2 ml of dilution # 1  Mucor 0.2 ml of dilution # 0  Grain Smut 0.2 ml of dilution # 0  Grass Smut 0.2 ml of dilution # 0  Cat 0.2 ml of dilution # 1  Dog 0.2 ml of dilution # 1  Feather Mix 0.2 ml of dilution # 0  Dust Mite Mix 0.2 ml of dilution # 1  Horse 0.2 ml of dilution # 0

## 2025-04-30 ENCOUNTER — ALLIED HEALTH/NURSE VISIT (OUTPATIENT)
Dept: ALLERGY | Facility: OTHER | Age: 40
End: 2025-04-30
Attending: PHYSICIAN ASSISTANT
Payer: MEDICAID

## 2025-04-30 DIAGNOSIS — J30.89 PERENNIAL ALLERGIC RHINITIS: Primary | ICD-10-CM

## 2025-04-30 PROCEDURE — 95117 IMMUNOTHERAPY INJECTIONS: CPT

## 2025-04-30 NOTE — PROGRESS NOTES
Prior to injection patient identity verified using name and date of birth.     SVT done on both arms, measuring 9 MM. Passed     Documented on paper flow sheet.     Allergy injection given and charted on paper allergy flow sheet. Patient signed out AMA. Prior to injection verified patient identity using patient name and date of birth.    Questions asked: Yes    Patient was given allergy injection(s) of 0.5 mL from Red vial given in Both arm(s).    Injection(s) charted on paper allergy flow sheet.    Epipen present at appointment.    Patient left against medical advice (AMA), signed form and did not stay for the observation period.  Patient was instructed to seek medical attention/go to the emergency room if having any reaction symptoms or needing to use their Epipen, patient aware and acknowledged. Patient signed out AMA form at 1312 and ambulated out of the clinic.

## 2025-05-01 ENCOUNTER — MEDICAL CORRESPONDENCE (OUTPATIENT)
Dept: HEALTH INFORMATION MANAGEMENT | Facility: OTHER | Age: 40
End: 2025-05-01
Payer: COMMERCIAL

## 2025-05-07 ENCOUNTER — OFFICE VISIT (OUTPATIENT)
Dept: FAMILY MEDICINE | Facility: OTHER | Age: 40
End: 2025-05-07
Attending: STUDENT IN AN ORGANIZED HEALTH CARE EDUCATION/TRAINING PROGRAM
Payer: COMMERCIAL

## 2025-05-07 VITALS
OXYGEN SATURATION: 99 % | TEMPERATURE: 97.9 F | HEIGHT: 67 IN | HEART RATE: 59 BPM | WEIGHT: 238 LBS | SYSTOLIC BLOOD PRESSURE: 126 MMHG | DIASTOLIC BLOOD PRESSURE: 74 MMHG | RESPIRATION RATE: 22 BRPM | BODY MASS INDEX: 37.35 KG/M2

## 2025-05-07 DIAGNOSIS — J34.89 SINUS PRESSURE: ICD-10-CM

## 2025-05-07 DIAGNOSIS — H65.93 MIDDLE EAR EFFUSION, BILATERAL: ICD-10-CM

## 2025-05-07 DIAGNOSIS — J01.00 ACUTE NON-RECURRENT MAXILLARY SINUSITIS: Primary | ICD-10-CM

## 2025-05-07 DIAGNOSIS — R09.81 NASAL CONGESTION: ICD-10-CM

## 2025-05-07 DIAGNOSIS — Z91.09 HISTORY OF ENVIRONMENTAL ALLERGIES: ICD-10-CM

## 2025-05-07 PROCEDURE — G0463 HOSPITAL OUTPT CLINIC VISIT: HCPCS

## 2025-05-07 RX ORDER — FLUTICASONE PROPIONATE 50 MCG
1 SPRAY, SUSPENSION (ML) NASAL DAILY
Qty: 11.1 ML | Refills: 0 | Status: SHIPPED | OUTPATIENT
Start: 2025-05-07

## 2025-05-07 ASSESSMENT — ENCOUNTER SYMPTOMS
DIARRHEA: 0
COUGH: 0
CHILLS: 0
CARDIOVASCULAR NEGATIVE: 1
SINUS PAIN: 0
VOMITING: 0
SINUS PRESSURE: 1
NAUSEA: 0
FEVER: 0
SORE THROAT: 0

## 2025-05-07 ASSESSMENT — PAIN SCALES - GENERAL: PAINLEVEL_OUTOF10: NO PAIN (0)

## 2025-05-07 NOTE — PROGRESS NOTES
Radha Christensen  1985    ASSESSMENT/PLAN      1. Acute non-recurrent maxillary sinusitis (Primary)  - amoxicillin-clavulanate (AUGMENTIN) 875-125 MG tablet; Take 1 tablet by mouth 2 times daily for 7 days.  Dispense: 14 tablet; Refill: 0  - fluticasone (FLONASE) 50 MCG/ACT nasal spray; Spray 1 spray into both nostrils daily.  Dispense: 11.1 mL; Refill: 0  2. Middle ear effusion, bilateral  - Start Flonase + Continue Claritin and xyzal  3. Nasal congestion  4. Sinus pressure  5. History of environmental allergies      - Augmentin twice daily for 7 days provided for acute maxillary sinusitis.  - Flonase nasal spray recommended daily for GREGORY and sinusitis.  - Continue Claritin and Xyzal  - Recommend nasal lavage with distilled water and saline packet at least daily.  May add budesonide neb solution to saline solution.  - May use over-the-counter Tylenol or ibuprofen PRN  - Follow up as needed for new or worsening symptoms          *Explanation of diagnosis, treatment options and risk and benefits of medications reviewed with patient. Patient agrees with plan of care.  *All questions were answered.    *Red flags symptoms were discussed and patient was advised when they should return for reevaluation or for prompt emergency evaluation.   *Patient was given verbal and written instructions on plan of care. Instructions were printed or are available on [employer] on electronic AVS.   *We discussed potential side effects of any prescribed or recommended therapies, as well as expectations for response to treatments.  *Patient discharged in stable condition    Charlie Madera, DNP, APRN, FNP-C  [facility]    SUBJECTIVE  CHIEF COMPLAINT/ REASON FOR VISIT  Patient presents with:  Sinus Problem  Ear Problem: Plugged ears     HISTORY OF PRESENT ILLNESS  Radha Christensen is a pleasant 40 year old female presents to [facility] today with [facility] concerns.  Patient states over the last 2 weeks she has had sinus  "congestion, sinus pressure, ear fullness, and postnasal drip.  Patient states when she blows her nose she is getting a lot of yellow mucus.  She denies any re-sickening or worsening of symptoms, just consistent symptoms.  Patient takes Claritin and Xyzal daily.  She currently is going through allergy injections for environmental allergies.  She has been doing this for the last 2 years.  She states she goes every other week for injections.  Patient denies any fever, cough, body aches, or chills.    History provided by patient      I have reviewed the nursing notes.  I have reviewed allergies, medication list, problem list, and past medical history.    REVIEW OF SYSTEMS  Review of Systems   Constitutional:  Negative for chills and fever.   HENT:  Positive for congestion, ear pain, postnasal drip and sinus pressure. Negative for sinus pain and sore throat.    Respiratory:  Negative for cough.    Cardiovascular: Negative.    Gastrointestinal:  Negative for diarrhea, nausea and vomiting.        VITAL SIGNS  Vitals:    05/07/25 1020   BP: 126/74   Pulse: 59   Resp: 22   Temp: 97.9  F (36.6  C)   TempSrc: Tympanic   SpO2: 99%   Weight: 108 kg (238 lb)   Height: 1.702 m (5' 7\")      Body mass index is 37.28 kg/m .      OBJECTIVE  PHYSICAL EXAM  Physical Exam  Vitals and nursing note reviewed.   Constitutional:       General: She is not in acute distress.     Appearance: Normal appearance. She is normal weight. She is not ill-appearing, toxic-appearing or diaphoretic.   HENT:      Head: Normocephalic and atraumatic.      Right Ear: Ear canal and external ear normal. A middle ear effusion is present. There is no impacted cerumen. Tympanic membrane is not erythematous or bulging.      Left Ear: Ear canal and external ear normal. A middle ear effusion is present. There is no impacted cerumen. Tympanic membrane is not erythematous or bulging.      Nose: Congestion present. No rhinorrhea.      Right Sinus: Maxillary sinus " tenderness present.      Left Sinus: Maxillary sinus tenderness present.      Mouth/Throat:      Mouth: Mucous membranes are moist.      Pharynx: Oropharynx is clear. No oropharyngeal exudate or posterior oropharyngeal erythema.   Eyes:      Extraocular Movements: Extraocular movements intact.      Conjunctiva/sclera: Conjunctivae normal.      Pupils: Pupils are equal, round, and reactive to light.   Cardiovascular:      Rate and Rhythm: Normal rate and regular rhythm.      Pulses: Normal pulses.      Heart sounds: Normal heart sounds.   Pulmonary:      Effort: Pulmonary effort is normal. No respiratory distress.      Breath sounds: Normal breath sounds. No wheezing or rhonchi.   Musculoskeletal:      Cervical back: Normal range of motion. No rigidity or tenderness.   Lymphadenopathy:      Cervical: No cervical adenopathy.   Neurological:      Mental Status: She is alert.            DIAGNOSTICS  No results found for any visits on 05/07/25.

## 2025-05-07 NOTE — NURSING NOTE
"Chief Complaint   Patient presents with    Sinus Problem    Ear Problem     Plugged ears     Patient here for plugged ears, sinus headache and pressure, and cough x2+ weeks. Also has allergies.       Initial /74   Pulse 59   Temp 97.9  F (36.6  C) (Tympanic)   Resp 22   Ht 1.702 m (5' 7\")   Wt 108 kg (238 lb)   SpO2 99%   BMI 37.28 kg/m   Estimated body mass index is 37.28 kg/m  as calculated from the following:    Height as of this encounter: 1.702 m (5' 7\").    Weight as of this encounter: 108 kg (238 lb).  Medication Review: complete    The next two questions are to help us understand your food security.  If you are feeling you need any assistance in this area, we have resources available to support you today.          5/7/2025   SDOH- Food Insecurity   Within the past 12 months, did you worry that your food would run out before you got money to buy more? N   Within the past 12 months, did the food you bought just not last and you didn t have money to get more? N         Health Care Directive:  Patient does not have a Health Care Directive: Discussed advance care planning with patient; however, patient declined at this time.    Edith Rodriguez LPN      "

## 2025-05-09 ENCOUNTER — TELEPHONE (OUTPATIENT)
Dept: FAMILY MEDICINE | Facility: OTHER | Age: 40
End: 2025-05-09
Payer: MEDICAID

## 2025-05-09 NOTE — TELEPHONE ENCOUNTER
Patient called and has questions about Antibiotics and allergy injections.  Please call her.      Rosa Maria Martinez on 5/9/2025 at 12:40 PM

## 2025-05-12 ENCOUNTER — TELEPHONE (OUTPATIENT)
Dept: FAMILY MEDICINE | Facility: OTHER | Age: 40
End: 2025-05-12
Payer: MEDICAID

## 2025-05-12 DIAGNOSIS — T36.95XA ANTIBIOTIC-INDUCED YEAST INFECTION: Primary | ICD-10-CM

## 2025-05-12 DIAGNOSIS — B37.9 ANTIBIOTIC-INDUCED YEAST INFECTION: Primary | ICD-10-CM

## 2025-05-12 RX ORDER — FLUCONAZOLE 150 MG/1
150 TABLET ORAL ONCE
Qty: 1 TABLET | Refills: 0 | Status: SHIPPED | OUTPATIENT
Start: 2025-05-12 | End: 2025-05-12

## 2025-05-12 NOTE — TELEPHONE ENCOUNTER
The patient was put on an antibiotic and feels she now has a yeast infection.  Wondering if she could have a prescription called into [facility] for this.

## 2025-05-12 NOTE — TELEPHONE ENCOUNTER
Talk to allergy cilinc and was told to have patient hold allergy shot for one week. Called and talk to patient and updated her.   Richi Chan RN on 5/12/2025 at 8:43 AM

## 2025-05-12 NOTE — TELEPHONE ENCOUNTER
Verified name and . Patient states that she has a yeast infection after being on Augmentin. States she is done with the prescription tomorrow but would like a prescription sent to [facility] for the yeast infection.     Aysha Verdugo LPN on 2025 at 10:08 AM Ext [phone]

## 2025-05-12 NOTE — TELEPHONE ENCOUNTER
Verified name and . Patient was given information. No further questions or concerns.     Aysha Verdugo LPN on 2025 at 11:07 AM Ext [phone]

## 2025-05-20 ENCOUNTER — ALLIED HEALTH/NURSE VISIT (OUTPATIENT)
Dept: FAMILY MEDICINE | Facility: OTHER | Age: 40
End: 2025-05-20
Attending: PHYSICIAN ASSISTANT
Payer: COMMERCIAL

## 2025-05-20 DIAGNOSIS — J30.9 ALLERGIC RHINITIS, UNSPECIFIED SEASONALITY, UNSPECIFIED TRIGGER: Primary | ICD-10-CM

## 2025-05-20 DIAGNOSIS — J30.9 ALLERGIC RHINITIS: ICD-10-CM

## 2025-05-20 PROCEDURE — 95117 IMMUNOTHERAPY INJECTIONS: CPT

## 2025-05-20 NOTE — PROGRESS NOTES
Allergen Immunotherapy: Adult  Verified patient's name and . Patient stated reason for visit today is to receive allergy shot(s). Patient denied any concerns with previous allergy injections. Allergy injections (x2) prepared and administered subcutaneous. Ice applied post-injection per patient preference. Administration of allergy injection documented in Immunotherapy flow sheet in EHR and paper chart (see for further information regarding injection). Patient to wait in facility for 30 minutes post-injection and notify RN immediately of any reaction. Patient declined to wait.  No other complaints noted by patient. Patient left clinic ambulatory.     Valeria Foreman RN ....................  2025   8:49 AM

## 2025-06-10 ENCOUNTER — ALLIED HEALTH/NURSE VISIT (OUTPATIENT)
Dept: FAMILY MEDICINE | Facility: OTHER | Age: 40
End: 2025-06-10
Payer: COMMERCIAL

## 2025-06-10 DIAGNOSIS — J30.89 PERENNIAL ALLERGIC RHINITIS: Primary | ICD-10-CM

## 2025-06-10 PROCEDURE — 95117 IMMUNOTHERAPY INJECTIONS: CPT

## 2025-06-10 NOTE — PROGRESS NOTES
Allergen Immunotherapy: Adult  Verified patient's name and . Patient stated reason for visit today is to receive allergy shot(s). Patient denied any concerns with previous allergy injections. Allergy injections (x2) prepared and administered subcutaneous. Ice applied post-injection per patient preference. Administration of allergy injection documented in Immunotherapy flow sheet in EHR and paper chart (see for further information regarding injection). Patient to wait in facility for 30 minutes post-injection and notify RN immediately of any reaction. Patient left clinic ambulatory.     Valeria Foreman RN ....................  6/10/2025   9:02 AM

## 2025-06-24 ENCOUNTER — ALLIED HEALTH/NURSE VISIT (OUTPATIENT)
Dept: FAMILY MEDICINE | Facility: OTHER | Age: 40
End: 2025-06-24
Payer: COMMERCIAL

## 2025-06-24 DIAGNOSIS — J30.89 PERENNIAL ALLERGIC RHINITIS: Primary | ICD-10-CM

## 2025-06-24 PROCEDURE — 95117 IMMUNOTHERAPY INJECTIONS: CPT

## 2025-06-24 NOTE — PROGRESS NOTES
Allergen Immunotherapy: Adult  Verified patient's name and . Patient stated reason for visit today is to receive allergy shot(s). Patient denied any concerns with previous allergy injections. Allergy injections (x2) prepared and administered subcutaneous. Ice applied post-injection per patient preference. Administration of allergy injection documented in Immunotherapy flow sheet in EHR and paper chart (see for further information regarding injection). Patient to wait in facility for 30 minutes post-injection and notify RN immediately of any reaction. Educated patient to stay 30 min for any reaction patient left AMA.    Richi Chan RN ....................  2025   8:04 AM

## 2025-07-08 ENCOUNTER — ALLIED HEALTH/NURSE VISIT (OUTPATIENT)
Dept: FAMILY MEDICINE | Facility: OTHER | Age: 40
End: 2025-07-08
Payer: COMMERCIAL

## 2025-07-08 DIAGNOSIS — J30.89 NON-SEASONAL ALLERGIC RHINITIS: Primary | ICD-10-CM

## 2025-07-08 PROCEDURE — 95117 IMMUNOTHERAPY INJECTIONS: CPT

## 2025-07-08 PROCEDURE — G0463 HOSPITAL OUTPT CLINIC VISIT: HCPCS

## 2025-07-08 NOTE — PROGRESS NOTES
Allergen Immunotherapy: Adult  Verified patient's name and . Patient stated reason for visit today is to receive allergy shot(s). Patient denied any concerns with previous allergy injections. Allergy injections (x2) prepared and administered subcutaneous. Ice applied post-injection per patient preference. Administration of allergy injection documented in Immunotherapy flow sheet in EHR and paper chart (see for further information regarding injection). Patient to wait in facility for 30 minutes post-injection and notify RN immediately of any reaction. Patient left clinic AMA. Patient left clinic ambulatory.     Valeria Foreman RN ....................  2025   8:30 AM

## 2025-07-15 ENCOUNTER — PATIENT OUTREACH (OUTPATIENT)
Dept: CARE COORDINATION | Facility: CLINIC | Age: 40
End: 2025-07-15
Payer: COMMERCIAL

## 2025-07-22 ENCOUNTER — ALLIED HEALTH/NURSE VISIT (OUTPATIENT)
Dept: FAMILY MEDICINE | Facility: OTHER | Age: 40
End: 2025-07-22
Payer: COMMERCIAL

## 2025-07-22 DIAGNOSIS — J30.89 NON-SEASONAL ALLERGIC RHINITIS: Primary | ICD-10-CM

## 2025-07-22 PROCEDURE — 95117 IMMUNOTHERAPY INJECTIONS: CPT

## 2025-07-22 NOTE — PROGRESS NOTES
Allergen Immunotherapy: Adult  Verified patient's name and . Patient stated reason for visit today is to receive allergy shot(s). Patient denied any concerns with previous allergy injections. Allergy injections (x2) prepared and administered subcutaneous. Ice applied post-injection per patient preference. Administration of allergy injection documented in Immunotherapy flow sheet in EHR and paper chart (see for further information regarding injection). Patient to wait in facility for 30 minutes post-injection and notify RN immediately of any reaction. Allergy injection site(s) assessed: 0 mm wheal observed to 2 injection sites, no other complaints noted by patient. Patient left clinic ambulatory.     Richi Chan RN ....................  2025   8:22 AM

## 2025-07-24 ENCOUNTER — ALLIED HEALTH/NURSE VISIT (OUTPATIENT)
Dept: ALLERGY | Facility: OTHER | Age: 40
End: 2025-07-24
Attending: PHYSICIAN ASSISTANT

## 2025-07-24 DIAGNOSIS — J30.89 PERENNIAL ALLERGIC RHINITIS: Primary | ICD-10-CM

## 2025-07-24 NOTE — PROGRESS NOTES
Allergy serum is mixed today at [facility] red maintenance,  into  two  (5 ml)  multi dose vial/vials.    Allergens included were:    Ragweed  0.2 ml of dilution # 1  Pigweed  0.2 ml of dilution # 0  Mugwort 0.2 ml of dilution # 0  Kochia  0.2 ml of dilution # 0  Russian Thistle 0.2 ml of dilution # 1  Remi Grass 0.2 ml of dilution # 1  Birch mix 0.2 ml of dilution # 0  Maple Mix 0.2 of dilution # 1  Elm Mix 0.2 ml of dilution # 0  Oak Mix 0.2 ml of dilution # 1  Eran Mix 0.2 ml of dilution # 1  Pine Mix 0.2 ml of dilution # 1  [place] 0.2 ml of dilution # 1  Black [place] 0.2 ml of dilution # 1  Aspen 0.2 ml of dilution # 0  Red Rooks 0.2 ml of dilution # 0    Alternaria 0.2 ml of dilution # 1  Aspergillus 0.2 ml of dilution # 0  Hormodendrum 0.2 ml of dilution # 1  Helminthosporium 0.2 ml of dilution # 1  Penicillium 0.2 ml of dilution # 1  Epicoccum 0.2 ml of dilution # 1  Fusarium 0.2 ml of dilution # 1  Mucor 0.2 ml of dilution # 0  Grain Smut 0.2 ml of dilution # 0  Grass Smut 0.2 ml of dilution # 0  Cat 0.2 ml of dilution # 1  Dog 0.2 ml of dilution # 1  Feather Mix 0.2 ml of dilution # 0  Dust Mite Mix 0.2 ml of dilution # 1  Horse 0.2 ml of dilution # 0

## 2025-08-05 ENCOUNTER — ALLIED HEALTH/NURSE VISIT (OUTPATIENT)
Dept: FAMILY MEDICINE | Facility: OTHER | Age: 40
End: 2025-08-05
Attending: PHYSICIAN ASSISTANT
Payer: COMMERCIAL

## 2025-08-05 DIAGNOSIS — J30.89 NON-SEASONAL ALLERGIC RHINITIS: Primary | ICD-10-CM

## 2025-08-05 PROCEDURE — 95117 IMMUNOTHERAPY INJECTIONS: CPT

## 2025-08-16 SDOH — HEALTH STABILITY: PHYSICAL HEALTH: ON AVERAGE, HOW MANY MINUTES DO YOU ENGAGE IN EXERCISE AT THIS LEVEL?: 30 MIN

## 2025-08-16 SDOH — HEALTH STABILITY: PHYSICAL HEALTH: ON AVERAGE, HOW MANY DAYS PER WEEK DO YOU ENGAGE IN MODERATE TO STRENUOUS EXERCISE (LIKE A BRISK WALK)?: 3 DAYS

## 2025-08-16 ASSESSMENT — SOCIAL DETERMINANTS OF HEALTH (SDOH): HOW OFTEN DO YOU GET TOGETHER WITH FRIENDS OR RELATIVES?: TWICE A WEEK

## 2025-08-16 ASSESSMENT — ASTHMA QUESTIONNAIRES
QUESTION_5 LAST FOUR WEEKS HOW WOULD YOU RATE YOUR ASTHMA CONTROL: WELL CONTROLLED
QUESTION_2 LAST FOUR WEEKS HOW OFTEN HAVE YOU HAD SHORTNESS OF BREATH: NOT AT ALL
ACT_TOTALSCORE: 24
QUESTION_1 LAST FOUR WEEKS HOW MUCH OF THE TIME DID YOUR ASTHMA KEEP YOU FROM GETTING AS MUCH DONE AT WORK, SCHOOL OR AT HOME: NONE OF THE TIME
QUESTION_3 LAST FOUR WEEKS HOW OFTEN DID YOUR ASTHMA SYMPTOMS (WHEEZING, COUGHING, SHORTNESS OF BREATH, CHEST TIGHTNESS OR PAIN) WAKE YOU UP AT NIGHT OR EARLIER THAN USUAL IN THE MORNING: NOT AT ALL
QUESTION_4 LAST FOUR WEEKS HOW OFTEN HAVE YOU USED YOUR RESCUE INHALER OR NEBULIZER MEDICATION (SUCH AS ALBUTEROL): NOT AT ALL

## 2025-08-18 ENCOUNTER — OFFICE VISIT (OUTPATIENT)
Dept: FAMILY MEDICINE | Facility: OTHER | Age: 40
End: 2025-08-18
Attending: PHYSICIAN ASSISTANT
Payer: COMMERCIAL

## 2025-08-18 VITALS
WEIGHT: 238.2 LBS | BODY MASS INDEX: 37.31 KG/M2 | RESPIRATION RATE: 16 BRPM | TEMPERATURE: 97.5 F | HEART RATE: 65 BPM | OXYGEN SATURATION: 99 % | SYSTOLIC BLOOD PRESSURE: 128 MMHG | DIASTOLIC BLOOD PRESSURE: 84 MMHG

## 2025-08-18 DIAGNOSIS — J45.20 MILD INTERMITTENT ASTHMA WITHOUT COMPLICATION: ICD-10-CM

## 2025-08-18 DIAGNOSIS — Z90.710 S/P HYSTERECTOMY: ICD-10-CM

## 2025-08-18 DIAGNOSIS — K21.9 GASTROESOPHAGEAL REFLUX DISEASE WITHOUT ESOPHAGITIS: ICD-10-CM

## 2025-08-18 DIAGNOSIS — E66.812 CLASS 2 OBESITY WITHOUT SERIOUS COMORBIDITY WITH BODY MASS INDEX (BMI) OF 37.0 TO 37.9 IN ADULT, UNSPECIFIED OBESITY TYPE: ICD-10-CM

## 2025-08-18 DIAGNOSIS — Z00.00 ROUTINE HISTORY AND PHYSICAL EXAMINATION OF ADULT: Primary | ICD-10-CM

## 2025-08-18 DIAGNOSIS — L70.9 ACNE, UNSPECIFIED ACNE TYPE: ICD-10-CM

## 2025-08-18 DIAGNOSIS — Z12.11 SPECIAL SCREENING FOR MALIGNANT NEOPLASMS, COLON: ICD-10-CM

## 2025-08-18 DIAGNOSIS — I10 ESSENTIAL HYPERTENSION: ICD-10-CM

## 2025-08-18 DIAGNOSIS — F41.1 GAD (GENERALIZED ANXIETY DISORDER): ICD-10-CM

## 2025-08-18 DIAGNOSIS — Z23 NEED FOR DIPHTHERIA-TETANUS-PERTUSSIS (TDAP) VACCINE: ICD-10-CM

## 2025-08-18 DIAGNOSIS — J30.89 PERENNIAL ALLERGIC RHINITIS: ICD-10-CM

## 2025-08-18 DIAGNOSIS — Z12.31 ENCOUNTER FOR SCREENING MAMMOGRAM FOR BREAST CANCER: ICD-10-CM

## 2025-08-18 LAB
ALBUMIN SERPL BCG-MCNC: 4 G/DL (ref 3.5–5.2)
ALP SERPL-CCNC: 89 U/L (ref 40–150)
ALT SERPL W P-5'-P-CCNC: 16 U/L (ref 0–50)
ANION GAP SERPL CALCULATED.3IONS-SCNC: 11 MMOL/L (ref 7–15)
AST SERPL W P-5'-P-CCNC: 17 U/L (ref 0–45)
BASOPHILS # BLD AUTO: <0.03 10E3/UL (ref 0–0.2)
BASOPHILS NFR BLD AUTO: 0.3 %
BILIRUB SERPL-MCNC: 0.2 MG/DL
BUN SERPL-MCNC: 12.3 MG/DL (ref 6–20)
CALCIUM SERPL-MCNC: 8.7 MG/DL (ref 8.8–10.4)
CHLORIDE SERPL-SCNC: 111 MMOL/L (ref 98–107)
CHOLEST SERPL-MCNC: 147 MG/DL
CREAT SERPL-MCNC: 0.87 MG/DL (ref 0.51–0.95)
EGFRCR SERPLBLD CKD-EPI 2021: 85 ML/MIN/1.73M2
EOSINOPHIL # BLD AUTO: 0.18 10E3/UL (ref 0–0.7)
EOSINOPHIL NFR BLD AUTO: 2.9 %
ERYTHROCYTE [DISTWIDTH] IN BLOOD BY AUTOMATED COUNT: 13.8 % (ref 10–15)
EST. AVERAGE GLUCOSE BLD GHB EST-MCNC: 94 MG/DL
FASTING STATUS PATIENT QL REPORTED: YES
FASTING STATUS PATIENT QL REPORTED: YES
GLUCOSE SERPL-MCNC: 103 MG/DL (ref 70–99)
HBA1C MFR BLD: 4.9 %
HCO3 SERPL-SCNC: 20 MMOL/L (ref 22–29)
HCT VFR BLD AUTO: 38.8 % (ref 35–47)
HDLC SERPL-MCNC: 38 MG/DL
HGB BLD-MCNC: 12.7 G/DL (ref 11.7–15.7)
IMM GRANULOCYTES # BLD: <0.03 10E3/UL
IMM GRANULOCYTES NFR BLD: 0.2 %
INSULIN SERPL-ACNC: 12.2 UU/ML (ref 2.6–24.9)
LDLC SERPL CALC-MCNC: 84 MG/DL
LYMPHOCYTES # BLD AUTO: 1.99 10E3/UL (ref 0.8–5.3)
LYMPHOCYTES NFR BLD AUTO: 32.5 %
MCH RBC QN AUTO: 28.9 PG (ref 26.5–33)
MCHC RBC AUTO-ENTMCNC: 32.7 G/DL (ref 31.5–36.5)
MCV RBC AUTO: 88.4 FL (ref 78–100)
MONOCYTES # BLD AUTO: 0.32 10E3/UL (ref 0–1.3)
MONOCYTES NFR BLD AUTO: 5.2 %
NEUTROPHILS # BLD AUTO: 3.6 10E3/UL (ref 1.6–8.3)
NEUTROPHILS NFR BLD AUTO: 58.9 %
NONHDLC SERPL-MCNC: 109 MG/DL
NRBC # BLD AUTO: <0.03 10E3/UL
NRBC BLD AUTO-RTO: 0 /100
PLATELET # BLD AUTO: 280 10E3/UL (ref 150–450)
POTASSIUM SERPL-SCNC: 4 MMOL/L (ref 3.4–5.3)
PROT SERPL-MCNC: 6.8 G/DL (ref 6.4–8.3)
RBC # BLD AUTO: 4.39 10E6/UL (ref 3.8–5.2)
SODIUM SERPL-SCNC: 142 MMOL/L (ref 135–145)
TRIGL SERPL-MCNC: 125 MG/DL
TSH SERPL DL<=0.005 MIU/L-ACNC: 2.94 UIU/ML (ref 0.3–4.2)
WBC # BLD AUTO: 6.12 10E3/UL (ref 4–11)

## 2025-08-18 PROCEDURE — 83525 ASSAY OF INSULIN: CPT | Mod: ZL | Performed by: PHYSICIAN ASSISTANT

## 2025-08-18 PROCEDURE — 85025 COMPLETE CBC W/AUTO DIFF WBC: CPT | Mod: ZL | Performed by: PHYSICIAN ASSISTANT

## 2025-08-18 PROCEDURE — 84155 ASSAY OF PROTEIN SERUM: CPT | Mod: ZL | Performed by: PHYSICIAN ASSISTANT

## 2025-08-18 PROCEDURE — 80061 LIPID PANEL: CPT | Mod: ZL | Performed by: PHYSICIAN ASSISTANT

## 2025-08-18 PROCEDURE — 83036 HEMOGLOBIN GLYCOSYLATED A1C: CPT | Mod: ZL | Performed by: PHYSICIAN ASSISTANT

## 2025-08-18 PROCEDURE — 90471 IMMUNIZATION ADMIN: CPT

## 2025-08-18 PROCEDURE — 84443 ASSAY THYROID STIM HORMONE: CPT | Mod: ZL | Performed by: PHYSICIAN ASSISTANT

## 2025-08-18 PROCEDURE — G0463 HOSPITAL OUTPT CLINIC VISIT: HCPCS

## 2025-08-18 PROCEDURE — 36415 COLL VENOUS BLD VENIPUNCTURE: CPT | Mod: ZL | Performed by: PHYSICIAN ASSISTANT

## 2025-08-18 RX ORDER — ESCITALOPRAM OXALATE 20 MG/1
20 TABLET ORAL DAILY
Qty: 90 TABLET | Refills: 3 | Status: SHIPPED | OUTPATIENT
Start: 2025-08-18

## 2025-08-18 RX ORDER — HYDROCHLOROTHIAZIDE 25 MG/1
12.5 TABLET ORAL DAILY
Qty: 45 TABLET | Refills: 3 | Status: SHIPPED | OUTPATIENT
Start: 2025-08-18

## 2025-08-18 RX ORDER — FAMOTIDINE 20 MG/1
TABLET, FILM COATED ORAL
Qty: 180 TABLET | Refills: 3 | Status: SHIPPED | OUTPATIENT
Start: 2025-08-18

## 2025-08-18 RX ORDER — ESTRADIOL 0.1 MG/D
1 PATCH TRANSDERMAL WEEKLY
COMMUNITY
Start: 2025-06-18

## 2025-08-18 RX ORDER — LISINOPRIL 40 MG/1
40 TABLET ORAL DAILY
Qty: 90 TABLET | Refills: 2 | Status: SHIPPED | OUTPATIENT
Start: 2025-08-18

## 2025-08-18 RX ORDER — LEVOCETIRIZINE DIHYDROCHLORIDE 5 MG/1
5 TABLET, FILM COATED ORAL EVERY EVENING
Qty: 90 TABLET | Refills: 3 | Status: SHIPPED | OUTPATIENT
Start: 2025-08-18

## 2025-08-18 ASSESSMENT — PAIN SCALES - GENERAL: PAINLEVEL_OUTOF10: NO PAIN (0)

## 2025-08-19 ENCOUNTER — ALLIED HEALTH/NURSE VISIT (OUTPATIENT)
Dept: FAMILY MEDICINE | Facility: OTHER | Age: 40
End: 2025-08-19
Attending: PHYSICIAN ASSISTANT
Payer: COMMERCIAL

## 2025-08-19 DIAGNOSIS — J30.9 ALLERGIC RHINITIS, UNSPECIFIED SEASONALITY, UNSPECIFIED TRIGGER: Primary | ICD-10-CM

## 2025-08-19 PROCEDURE — 95117 IMMUNOTHERAPY INJECTIONS: CPT

## (undated) DEVICE — SOL WATER 1500ML

## (undated) DEVICE — LABEL-STERILE PREPRINTED FOR OR

## (undated) DEVICE — TRACKER-PATIENT ENT NIPT

## (undated) DEVICE — CAUTERY PENCIL-W/SUCTION 8FR HANDSWITCHING

## (undated) DEVICE — INSTRUMENT WIPE-VISIWIPE

## (undated) DEVICE — ADAPTER-SPECIMEN TRAP

## (undated) DEVICE — SOL-NACL 0.9% 1000ML

## (undated) DEVICE — COBLATION WAND-TURBINATE REDUCT. PTR

## (undated) DEVICE — IRRIGATION-NACL 1000ML

## (undated) DEVICE — BLADE-FUSION ROTATABLE QUADCUT 3.4MM 13CM

## (undated) DEVICE — ENDO SNARE POLYPECTOMY OVAL 25MM LOOP SD-240U-25

## (undated) DEVICE — BIN-ENT BIN

## (undated) DEVICE — TUBING SUCTION 10'X3/16" N510

## (undated) DEVICE — TRACKER-INSTRUMENT ENT NAV

## (undated) DEVICE — ENDO KIT COMPLIANCE DYKENDOCMPLY

## (undated) DEVICE — BLANKET-BAIR LOWER EXTREMITY

## (undated) DEVICE — ENDO BRUSH CHANNEL MASTER CLEANING 2-4.2MM BW-412T

## (undated) DEVICE — ESU GROUND PAD ADULT W/CORD E7507

## (undated) DEVICE — ENDO SNARE POLYPECTOMY OVAL 10MM LOOP SD-240U-10

## (undated) DEVICE — CANISTER-SUCTION 2000CC

## (undated) DEVICE — ENDO TRAP POLYP E-TRAP 00711099

## (undated) DEVICE — TUBING-IRRIG. SYSTEM CLEARVISION

## (undated) DEVICE — LIGHT HANDLE COVER

## (undated) DEVICE — GLV-6.5 PROTEXIS PI CLASSIC LF/PF

## (undated) DEVICE — Device

## (undated) DEVICE — ENDO FORCEP ENDOJAW BIOPSY 2.8MMX230CM FB-220U

## (undated) DEVICE — SUCTION MANIFOLD NEPTUNE 2 SYS 4 PORT 0702-020-000

## (undated) DEVICE — TUBING-SUCTION 20FT

## (undated) DEVICE — SUTURE-SILK 0 SH K834H

## (undated) DEVICE — SUTURE-CHROMIC GUT 4-0 RB-1 U203H

## (undated) DEVICE — TUBING-IRRIGATOR STRAIGHTSHOT FUSION

## (undated) DEVICE — BLADE-SCALPEL #15

## (undated) DEVICE — CAUTERY PAD-POLYHESIVE II ADULT

## (undated) DEVICE — SYR 50ML LL W/O NDL 309653

## (undated) DEVICE — IRRIGATION-H2O 1000ML

## (undated) DEVICE — SCD SLEEVE-KNEE REG.

## (undated) DEVICE — PACK-ENT-CUSTOM

## (undated) DEVICE — PACK-BASIN SET-UP

## (undated) DEVICE — SUTURE-VICRYL 4-0 P-3 J494G

## (undated) DEVICE — ENDO BITE BLOCK 60 MAXI LF 00712804

## (undated) RX ORDER — DEXAMETHASONE SODIUM PHOSPHATE 10 MG/ML
INJECTION, SOLUTION INTRAMUSCULAR; INTRAVENOUS
Status: DISPENSED
Start: 2020-07-01

## (undated) RX ORDER — GLYCOPYRROLATE 0.2 MG/ML
INJECTION, SOLUTION INTRAMUSCULAR; INTRAVENOUS
Status: DISPENSED
Start: 2020-07-01

## (undated) RX ORDER — IBUPROFEN 200 MG
TABLET ORAL
Status: DISPENSED
Start: 2022-08-10

## (undated) RX ORDER — PROPOFOL 10 MG/ML
INJECTION, EMULSION INTRAVENOUS
Status: DISPENSED
Start: 2024-10-29

## (undated) RX ORDER — SODIUM CHLORIDE 9 MG/ML
INJECTION, SOLUTION INTRAVENOUS
Status: DISPENSED
Start: 2019-08-07

## (undated) RX ORDER — PROPOFOL 10 MG/ML
INJECTION, EMULSION INTRAVENOUS
Status: DISPENSED
Start: 2019-08-27

## (undated) RX ORDER — PROPOFOL 10 MG/ML
INJECTION, EMULSION INTRAVENOUS
Status: DISPENSED
Start: 2020-07-01

## (undated) RX ORDER — LIDOCAINE HYDROCHLORIDE 20 MG/ML
INJECTION, SOLUTION EPIDURAL; INFILTRATION; INTRACAUDAL; PERINEURAL
Status: DISPENSED
Start: 2019-08-27

## (undated) RX ORDER — ONDANSETRON 2 MG/ML
INJECTION INTRAMUSCULAR; INTRAVENOUS
Status: DISPENSED
Start: 2019-08-07

## (undated) RX ORDER — EPHEDRINE SULFATE 50 MG/ML
INJECTION, SOLUTION INTRAVENOUS
Status: DISPENSED
Start: 2020-07-01

## (undated) RX ORDER — FENTANYL CITRATE 50 UG/ML
INJECTION, SOLUTION INTRAMUSCULAR; INTRAVENOUS
Status: DISPENSED
Start: 2020-07-01

## (undated) RX ORDER — LIDOCAINE HCL/EPINEPHRINE/PF 2%-1:200K
VIAL (ML) INJECTION
Status: DISPENSED
Start: 2020-11-11

## (undated) RX ORDER — ONDANSETRON 2 MG/ML
INJECTION INTRAMUSCULAR; INTRAVENOUS
Status: DISPENSED
Start: 2020-07-01